# Patient Record
Sex: FEMALE | Race: WHITE | Employment: UNEMPLOYED | URBAN - METROPOLITAN AREA
[De-identification: names, ages, dates, MRNs, and addresses within clinical notes are randomized per-mention and may not be internally consistent; named-entity substitution may affect disease eponyms.]

---

## 2019-06-29 ENCOUNTER — HOSPITAL ENCOUNTER (OUTPATIENT)
Dept: MRI IMAGING | Age: 24
Discharge: HOME | End: 2019-07-01
Payer: COMMERCIAL

## 2019-06-29 DIAGNOSIS — G43.109 MIGRAINE WITH AURA AND WITHOUT STATUS MIGRAINOSUS, NOT INTRACTABLE: ICD-10-CM

## 2019-06-29 DIAGNOSIS — G89.29 CHRONIC NONINTRACTABLE HEADACHE, UNSPECIFIED HEADACHE TYPE: ICD-10-CM

## 2019-06-29 DIAGNOSIS — R51.9 CHRONIC NONINTRACTABLE HEADACHE, UNSPECIFIED HEADACHE TYPE: ICD-10-CM

## 2019-06-29 PROCEDURE — 70551 MRI BRAIN STEM W/O DYE: CPT

## 2019-07-03 ENCOUNTER — HOSPITAL ENCOUNTER (OUTPATIENT)
Dept: MRI IMAGING | Age: 24
Discharge: HOME | End: 2019-07-05
Payer: COMMERCIAL

## 2019-07-03 DIAGNOSIS — G43.109 MIGRAINE WITH AURA AND WITHOUT STATUS MIGRAINOSUS, NOT INTRACTABLE: ICD-10-CM

## 2019-07-03 PROCEDURE — A9579 GAD-BASE MR CONTRAST NOS,1ML: HCPCS | Performed by: NURSE PRACTITIONER

## 2019-07-03 PROCEDURE — 70553 MRI BRAIN STEM W/O & W/DYE: CPT

## 2019-07-03 PROCEDURE — 6360000004 HC RX CONTRAST MEDICATION: Performed by: NURSE PRACTITIONER

## 2019-07-03 RX ADMIN — GADOTERIDOL 20 ML: 279.3 INJECTION, SOLUTION INTRAVENOUS at 13:25

## 2020-08-21 ENCOUNTER — HOSPITAL ENCOUNTER (OUTPATIENT)
Dept: SLEEP CENTER | Age: 25
Discharge: HOME | End: 2020-08-23
Payer: COMMERCIAL

## 2020-08-21 PROCEDURE — 95810 POLYSOM 6/> YRS 4/> PARAM: CPT

## 2020-08-22 PROCEDURE — 95810 POLYSOM 6/> YRS 4/> PARAM: CPT | Performed by: INTERNAL MEDICINE

## 2020-09-17 ENCOUNTER — HOSPITAL ENCOUNTER (OUTPATIENT)
Dept: SLEEP CENTER | Age: 25
Discharge: HOME | End: 2020-09-19
Payer: COMMERCIAL

## 2020-09-17 PROCEDURE — 95811 POLYSOM 6/>YRS CPAP 4/> PARM: CPT | Performed by: INTERNAL MEDICINE

## 2020-09-17 PROCEDURE — 95811 POLYSOM 6/>YRS CPAP 4/> PARM: CPT

## 2020-11-10 ENCOUNTER — OFFICE VISIT (OUTPATIENT)
Dept: GASTROENTEROLOGY | Age: 25
End: 2020-11-10
Payer: COMMERCIAL

## 2020-11-10 VITALS
BODY MASS INDEX: 48.82 KG/M2 | HEART RATE: 90 BPM | HEIGHT: 65 IN | SYSTOLIC BLOOD PRESSURE: 122 MMHG | DIASTOLIC BLOOD PRESSURE: 80 MMHG | WEIGHT: 293 LBS | OXYGEN SATURATION: 98 %

## 2020-11-10 PROCEDURE — 1036F TOBACCO NON-USER: CPT | Performed by: INTERNAL MEDICINE

## 2020-11-10 PROCEDURE — 99203 OFFICE O/P NEW LOW 30 MIN: CPT | Performed by: INTERNAL MEDICINE

## 2020-11-10 PROCEDURE — G8484 FLU IMMUNIZE NO ADMIN: HCPCS | Performed by: INTERNAL MEDICINE

## 2020-11-10 PROCEDURE — G8427 DOCREV CUR MEDS BY ELIG CLIN: HCPCS | Performed by: INTERNAL MEDICINE

## 2020-11-10 PROCEDURE — G8417 CALC BMI ABV UP PARAM F/U: HCPCS | Performed by: INTERNAL MEDICINE

## 2020-11-10 RX ORDER — ARIPIPRAZOLE 20 MG/1
TABLET ORAL
COMMUNITY
Start: 2020-10-08

## 2020-11-10 RX ORDER — ONDANSETRON 4 MG/1
TABLET, FILM COATED ORAL
COMMUNITY
Start: 2020-08-09 | End: 2021-05-30

## 2020-11-10 RX ORDER — SERTRALINE HYDROCHLORIDE 100 MG/1
TABLET, FILM COATED ORAL
COMMUNITY
Start: 2020-10-08

## 2020-11-10 RX ORDER — BENZTROPINE MESYLATE 1 MG/1
TABLET ORAL
COMMUNITY
Start: 2020-10-16

## 2020-11-10 RX ORDER — LEVONORGESTREL AND ETHINYL ESTRADIOL AND ETHINYL ESTRADIOL 150-30(84)
KIT ORAL
COMMUNITY
Start: 2020-10-08 | End: 2021-05-30

## 2020-11-10 RX ORDER — CARIPRAZINE 1.5 MG/1
CAPSULE, GELATIN COATED ORAL
COMMUNITY
Start: 2020-08-17

## 2020-11-10 RX ORDER — GABAPENTIN 400 MG/1
CAPSULE ORAL
COMMUNITY
Start: 2019-05-24 | End: 2021-02-17

## 2020-11-10 ASSESSMENT — ENCOUNTER SYMPTOMS
CHEST TIGHTNESS: 0
RECTAL PAIN: 0
EYE REDNESS: 0
ABDOMINAL PAIN: 0
TROUBLE SWALLOWING: 0
DIARRHEA: 0
EYE PAIN: 0
NAUSEA: 0
BLOOD IN STOOL: 0
PHOTOPHOBIA: 0
CONSTIPATION: 0
SHORTNESS OF BREATH: 0
ABDOMINAL DISTENTION: 0
VOMITING: 0
VOICE CHANGE: 0
WHEEZING: 0
COLOR CHANGE: 0

## 2020-11-10 NOTE — PROGRESS NOTES
Subjective:      Patient ID: Raul Swann is a 26 y.o. female who presents today for:  Chief Complaint   Patient presents with   [facility] Consultation     irregular bm. Is in the process of bariatric surgery.  Abdominal Pain     hiatal hernia. abdominal pain       HPI  This is a very pleasant 81-year-old who came in today and she reports epigastric pain. Patient mentioned that she was told that she has hiatal hernia? Also patient is in the process of getting bariatric surgery. Patient mentioned that there is no triggering or relieving factors. The pain is constant and point toward the epigastrium. No associated nausea vomiting . Patient denies any postprandial nature of the pain. Denies any change of bowel movements in terms of diarrhea or constipation though she reported that her bowel been irregular for long time. No hematemesis melena hematochezia. Patient mentioned that her symptoms been there for at least 1 to 2 years. Of note CT scan in July 2019 was negative for any acute process. It did show some partially ruptured right ovarian cyst as well as constipation. Otherwise patient came in today to establish care and further evaluation.   Mention patient's been evaluated for bariatric surgery current BMI 63  Social History     Socioeconomic History    Marital status: Single     Spouse name: Not on file    Number of children: Not on file    Years of education: Not on file    Highest education level: Not on file   Occupational History    Not on file   Social Needs    Financial resource strain: Not on file    Food insecurity     Worry: Not on file     Inability: Not on file    Transportation needs     Medical: Not on file     Non-medical: Not on file   Tobacco Use    Smoking status: Former Smoker     Last attempt to quit: 11/10/2017     Years since quitting: 3.0    Smokeless tobacco: Never Used   Substance and Sexual Activity    Alcohol use: Not Currently    Drug use: Never    Sexual activity: Not on file   Lifestyle    Physical activity     Days per week: Not on file     Minutes per session: Not on file    Stress: Not on file   Relationships    Social connections     Talks on phone: Not on file     Gets together: Not on file     Attends Yazidism service: Not on file     Active member of club or organization: Not on file     Attends meetings of clubs or organizations: Not on file     Relationship status: Not on file    Intimate partner violence     Fear of current or ex partner: Not on file     Emotionally abused: Not on file     Physically abused: Not on file     Forced sexual activity: Not on file   Other Topics Concern    Not on file   Social History Narrative    Not on file     Family History   Problem Relation Age of Onset    Celiac Disease Neg Hx     Colon Cancer Neg Hx     Crohn's Disease Neg Hx      No Known Allergies      Review of Systems   Constitutional: Negative for appetite change, chills, fatigue, fever and unexpected weight change. HENT: Negative for nosebleeds, tinnitus, trouble swallowing and voice change. Eyes: Negative for photophobia, pain and redness. Respiratory: Negative for chest tightness, shortness of breath and wheezing. Cardiovascular: Negative for chest pain, palpitations and leg swelling. Gastrointestinal: Negative for abdominal distention, abdominal pain, blood in stool, constipation, diarrhea, nausea, rectal pain and vomiting. Endocrine: Negative for polydipsia, polyphagia and polyuria. Genitourinary: Negative for difficulty urinating and hematuria. Skin: Negative for color change, pallor and rash. Neurological: Negative for dizziness, speech difficulty and headaches. Psychiatric/Behavioral: Negative for confusion and suicidal ideas.        Objective:   /80 (Site: Right Lower Arm, Position: Sitting, Cuff Size: Medium Adult)   Pulse 90   Ht 5' 5\" (1.651 m)   Wt (!) 379 lb (171.9 kg)   SpO2 98%   BMI 63.07 kg/m² Physical Exam  Constitutional:       General: She is not in acute distress. Appearance: She is well-developed. HENT:      Head: Normocephalic and atraumatic. Eyes:      Conjunctiva/sclera: Conjunctivae normal.      Pupils: Pupils are equal, round, and reactive to light. Neck:      Musculoskeletal: Neck supple. Cardiovascular:      Rate and Rhythm: Normal rate and regular rhythm. Heart sounds: Normal heart sounds. Pulmonary:      Effort: Pulmonary effort is normal. No respiratory distress. Breath sounds: Normal breath sounds. No wheezing or rales. Abdominal:      General: Bowel sounds are normal. There is no distension. Palpations: Abdomen is soft. Abdomen is not rigid. There is no hepatomegaly, splenomegaly or mass. Tenderness: There is no abdominal tenderness. There is no guarding or rebound. Musculoskeletal: Normal range of motion. General: No tenderness or deformity. Skin:     Coloration: Skin is not pale. Findings: No erythema or rash. Neurological:      Mental Status: She is alert and oriented to person, place, and time. Laboratory, Pathology, Radiology reviewed in detail with relevantimportant investigations summarized below:  Lab Results   Component Value Date    WBC 19.3 07/04/2019    HGB 12.7 07/04/2019    HCT 39.4 07/04/2019    MCV 81 07/04/2019     07/04/2019    . Lab Results   Component Value Date    ALT 6 03/02/2020    ALT 12 07/04/2019    AST 13 03/02/2020    AST 12 07/04/2019    ALKPHOS 74 03/02/2020    ALKPHOS 69 07/04/2019    BILITOT <0.2 03/02/2020    BILITOT 0.3 07/04/2019       No results found. No results found for: IRON, TIBC, FERRITIN  No results found for: INR  No components found for: ACUTEHEPATITISSCREEN  No components found for: CELIACPANEL  No components found for: STOOLCULTURE, C.DIFF, STOOLOVAPARASITE, STOOLLEUCOCYTE        Assessment:       Diagnosis Orders   1.  Epigastric pain  EGD         Plan:      Orders Placed This Encounter   Procedures    EGD     Standing Status:   Future     Standing Expiration Date:   3/10/2021     Order Specific Question:   Screening or Diagnostic? Answer:   Diagnostic     Orders Placed This Encounter   Medications    esomeprazole (NEXIUM) 20 MG delayed release capsule     Sig: Take 1 capsule by mouth daily     Dispense:  30 capsule     Refill:  3     1-Dyspepsia/epigastric pain/abdominal pain  Not necessarily postprandial.  Patient mentioned that she has been told that she has hiatal hernia. No recall of recent EGD  Of note patient is currently undergoing work-up for bariatric surgery. We will proceed with an upper endoscopy assess for H. pylori as well as presence of hiatal hernia. Obtain also duodenal biopsies to assess for celiac given nonspecific symptoms. Trial of PPI. If negative initial work-up we may consider cross-sectional study / colonoscopy. Explained the procedure risk and benefit. Patient would like to proceed accordingly. 2-associated medical conditions include class II obesity with BMI 63, history of migraine, ascites/depression. .    Return in about 4 weeks (around 12/8/2020) for Post procedure results discussion, further management.       Kaylee Pittman MD

## 2020-11-23 ENCOUNTER — OUTSIDE SERVICES (OUTPATIENT)
Dept: GASTROENTEROLOGY | Age: 25
End: 2020-11-23
Payer: COMMERCIAL

## 2020-11-23 PROCEDURE — 43239 EGD BIOPSY SINGLE/MULTIPLE: CPT | Performed by: INTERNAL MEDICINE

## 2020-11-23 NOTE — PROGRESS NOTES
None  Complication: None  Specimen Sent: Yes    Impression:    Irregular Z-line otherwise negative/normal EGD  Biopsies obtained to assess for H. pylori as well as for celiac disease. Recommendations:    Follow-up in GI clinic in 1 to 2 weeks  Await pathology  Resume previous medications  Discharge Patient home when criteria met    Shana Nash MD  [facility]

## 2020-12-04 ENCOUNTER — VIRTUAL VISIT (OUTPATIENT)
Dept: GASTROENTEROLOGY | Age: 25
End: 2020-12-04
Payer: COMMERCIAL

## 2020-12-04 PROBLEM — R10.13 EPIGASTRIC PAIN: Status: ACTIVE | Noted: 2020-12-04

## 2020-12-04 PROCEDURE — 99212 OFFICE O/P EST SF 10 MIN: CPT | Performed by: NURSE PRACTITIONER

## 2020-12-04 ASSESSMENT — ENCOUNTER SYMPTOMS
SHORTNESS OF BREATH: 0
WHEEZING: 0
PHOTOPHOBIA: 0
EYE PAIN: 0
ABDOMINAL DISTENTION: 0
COLOR CHANGE: 0
CHEST TIGHTNESS: 0
CONSTIPATION: 0
BLOOD IN STOOL: 0
RECTAL PAIN: 0
DIARRHEA: 0
ABDOMINAL PAIN: 0
ANAL BLEEDING: 0
EYE REDNESS: 0
VOMITING: 0
VOICE CHANGE: 0
NAUSEA: 0
TROUBLE SWALLOWING: 0

## 2020-12-04 NOTE — PROGRESS NOTES
2020    TELEHEALTH EVALUATION -- Audio/Visual (During WLLHR-64 public health emergency)    Due to COVID 19 outbreak, patient's office visit was converted to a virtual visit. Patient was contacted and agreed to proceed with a virtual visit via Telephone Visit, 15 minutes  The risks and benefits of converting to a virtual visit were discussed in light of the current infectious disease epidemic. Patient also understood that insurance coverage and co-pays are up to their individual insurance plans. HPI:    Fredo Murry (:  1995) has requested an audio/video evaluation for the following concern(s):    Endoscopic hx:  EGD Dr Jerome Vallejo 20  Irregular Z-line otherwise negative EGD  Bx:  A. DUODENAL BIOPSY-      DUODENAL MUCOSA WITH NO SIGNIFICANT PATHOLOGIC CHANGES. [address] [place] MUCOSA WITH NO SIGNIFICANT PATHOLOGIC CHANGES.    NEGATIVE FOR HELICOBACTER PYLORI ORGANISMS ON   Background: This is a very pleasant 81-year-old who came in today and she reports epigastric pain. Patient mentioned that she was told that she has hiatal hernia? Also patient is in the process of getting bariatric surgery. Patient mentioned that there is no triggering or relieving factors. The pain is constant and point toward the epigastrium. No associated nausea vomiting . Patient denies any postprandial nature of the pain. Denies any change of bowel movements in terms of diarrhea or constipation though she reported that her bowel been irregular for long time. No hematemesis melena hematochezia. Patient mentioned that her symptoms been there for at least 1 to 2 years. Of note CT scan in 2019 was negative for any acute process. It did show some partially ruptured right ovarian cyst as well as constipation. Otherwise patient came in today to establish care and further evaluation.   Mention patient's been evaluated for bariatric surgery current BMI 63  Review of Systems    Prior to Visit Medications    Medication Sig Taking? Authorizing Provider   ARIPiprazole (ABILIFY) 20 MG tablet TAKE 1 TABLET BY MOUTH IN THE EVENING AS DIRECTED FOR DEPRESSION MOOD  Historical Provider, MD   B-Complex TABS TAKE 1 TABLET BY MOUTH ONCE DAILY  Historical Provider, MD   VRAYLAR 1.5 MG capsule TAKE 1 CAPSULE BY MOUTH IN THE MORNING AS DIRECTED FOR MOOD  Historical Provider, MD   benztropine (COGENTIN) 1 MG tablet TAKE 1 TABLET BY MOUTH ONCE DAILY AS DIRECTED FOR SIDE EFFECTS  Historical Provider, MD   gabapentin (NEURONTIN) 400 MG capsule Take by mouth.   Historical Provider, MD   ASHLYNA 0.15-0.03 &0.01 MG TABS TAKE 1 TABLET BY MOUTH ONCE DAILY  Historical Provider, MD   ondansetron (ZOFRAN) 4 MG tablet TAKE 1 TABLET BY MOUTH EVERY 8 HOURS  Historical Provider, MD   sertraline (ZOLOFT) 100 MG tablet TAKE 2 TABLETS BY MOUTH ONCE DAILY AT NIGHT AS DIRECTED FOR DEPRESSION ANXIETY  Historical Provider, MD   esomeprazole ([address]) 20 MG delayed release capsule Take 1 capsule by mouth daily  Harry Panchal MD       Social History     Tobacco Use    Smoking status: Former Smoker     Last attempt to quit: 11/10/2017     Years since quitting: 3.0    Smokeless tobacco: Never Used   Substance Use Topics    Alcohol use: Not Currently    Drug use: Never        {F2 for Optional History Smart[employer]:32192}    PHYSICAL EXAMINATION:  [ INSTRUCTIONS:  \"[x]\" Indicates a positive item  \"[]\" Indicates a negative item  -- DELETE ALL ITEMS NOT EXAMINED]  [] Alert  [] Oriented to person/place/time    [] No apparent distress  [] Toxic appearing    [] Face flushed appearing [] Sclera clear  [] Lips are cyanotic      [] Breathing appears normal  [] Appears tachypneic      [] Rash on visible skin    [] Cranial Nerves II-XII grossly intact    [] Motor grossly intact in visible upper extremities    [] Motor grossly intact in visible lower extremities    [] Normal Mood  [] Anxious appearing    [] Depressed appearing  [] Confused appearing [] Poor short term memory  [] Poor long term memory    [] OTHER:      Due to this being a TeleHealth encounter, evaluation of the following organ systems is limited: Vitals/Constitutional/EENT/Resp/CV/GI//MS/Neuro/Skin/Heme-Lymph-Imm. ASSESSMENT/PLAN:  There are no diagnoses linked to this encounter. No follow-ups on file. An  electronic signature was used to authenticate this note. --VIPUL Olmos - CNP on 12/4/2020 at 12:45 PM    {Coding Help -- Use CPT 77207-51770 with EM elements or Time rules for Office Visits.:64818}    Pursuant to the emergency declaration under the [facility], [facility] waiver authority and the [employer] and [employer] Act, this Virtual  Visit was conducted, with patient's consent, to reduce the patient's risk of exposure to COVID-19 and provide continuity of care for an established patient. Services were provided through a video synchronous discussion virtually to substitute for in-person clinic visit.

## 2020-12-04 NOTE — PROGRESS NOTES
2020    TELEHEALTH EVALUATION -- Audio/Visual (During JCSCR-56 public health emergency)    Due to COVID 19 outbreak, patient's office visit was converted to a virtual visit. Patient was contacted and agreed to proceed with a virtual visit via [email or web address] me  The risks and benefits of converting to a virtual visit were discussed in light of the current infectious disease epidemic. Patient also understood that insurance coverage and co-pays are up to their individual insurance plans. HPI:    Juana Nuñezorest (:  1995) has requested an audio/video evaluation for the following concern(s): Follow-up to recent EGD for epigastric pain, EGD results noted below and discussed at length with patient including pathology. Since undergoing her EGD she has been on Nexium 20 mg once daily, has no further epigastric pain denies nausea or vomiting, hematemesis, melena, or hematochezia. Pt is scheduled to meet with bariatric surgeon for evaluation for gastric bypass. Endoscopic hx:  EGD Dr Raymundo Hinds 20  Irregular Z-line otherwise negative EGD  Bx:  A. DUODENAL BIOPSY-      DUODENAL MUCOSA WITH NO SIGNIFICANT PATHOLOGIC CHANGES. [address] [place] MUCOSA WITH NO SIGNIFICANT PATHOLOGIC CHANGES.    NEGATIVE FOR HELICOBACTER PYLORI ORGANISMS ON   Background: This is a very pleasant 41-year-old who came in today and she reports epigastric pain. Patient mentioned that she was told that she has hiatal hernia? Also patient is in the process of getting bariatric surgery. Patient mentioned that there is no triggering or relieving factors. The pain is constant and point toward the epigastrium. No associated nausea vomiting . Patient denies any postprandial nature of the pain. Denies any change of bowel movements in terms of diarrhea or constipation though she reported that her bowel been irregular for long time. No hematemesis melena hematochezia.   Patient mentioned that her symptoms been there for at least 1 to 2 years. Of note CT scan in July 2019 was negative for any acute process. It did show some partially ruptured right ovarian cyst as well as constipation. Otherwise patient came in today to establish care and further evaluation. Mention patient's been evaluated for bariatric surgery current BMI 63      Review of Systems   Constitutional: Negative for appetite change, chills, fever and unexpected weight change. HENT: Negative for nosebleeds, tinnitus, trouble swallowing and voice change. Eyes: Negative for photophobia, pain and redness. Respiratory: Negative for chest tightness, shortness of breath and wheezing. Cardiovascular: Negative for chest pain, palpitations and leg swelling. Gastrointestinal: Negative for abdominal distention, abdominal pain, anal bleeding, blood in stool, constipation, diarrhea, nausea, rectal pain and vomiting. Endocrine: Negative for polydipsia, polyphagia and polyuria. Genitourinary: Negative for difficulty urinating and hematuria. Skin: Negative for color change, pallor and rash. Neurological: Negative for dizziness, speech difficulty and headaches. Psychiatric/Behavioral: Negative for confusion and suicidal ideas. Prior to Visit Medications    Medication Sig Taking? Authorizing Provider   ARIPiprazole (ABILIFY) 20 MG tablet TAKE 1 TABLET BY MOUTH IN THE EVENING AS DIRECTED FOR DEPRESSION MOOD Yes Historical Provider, MD   B-Complex TABS TAKE 1 TABLET BY MOUTH ONCE DAILY Yes Historical Provider, MD   VRAYLAR 1.5 MG capsule TAKE 1 CAPSULE BY MOUTH IN THE MORNING AS DIRECTED FOR MOOD Yes Historical Provider, MD   benztropine (COGENTIN) 1 MG tablet TAKE 1 TABLET BY MOUTH ONCE DAILY AS DIRECTED FOR SIDE EFFECTS Yes Historical Provider, MD   gabapentin (NEURONTIN) 400 MG capsule Take by mouth.  Yes Historical Provider, MD   ASHLYNA 0.15-0.03 &0.01 MG TABS TAKE 1 TABLET BY MOUTH ONCE DAILY Yes Historical Provider, MD   ondansetron (ZOFRAN) 4 MG tablet TAKE 1 TABLET BY MOUTH EVERY 8 HOURS Yes Historical Provider, MD   sertraline (ZOLOFT) 100 MG tablet TAKE 2 TABLETS BY MOUTH ONCE DAILY AT NIGHT AS DIRECTED FOR DEPRESSION ANXIETY Yes Historical Provider, MD   esomeprazole ([employer]) 20 MG delayed release capsule Take 1 capsule by mouth daily Yes Milagro Addison MD       Social History     Tobacco Use    Smoking status: Former Smoker     Last attempt to quit: 11/10/2017     Years since quitting: 3.0    Smokeless tobacco: Never Used   Substance Use Topics    Alcohol use: Not Currently    Drug use: Never        No Known Allergies, No past medical history on file., No past surgical history on file.,   Social History     Tobacco Use    Smoking status: Former Smoker     Last attempt to quit: 11/10/2017     Years since quitting: 3.0    Smokeless tobacco: Never Used   Substance Use Topics    Alcohol use: Not Currently    Drug use: Never   ,   Family History   Problem Relation Age of Onset    Celiac Disease Neg Hx     Colon Cancer Neg Hx     Crohn's Disease Neg Hx        PHYSICAL EXAMINATION:  [ INSTRUCTIONS:  \"[x]\" Indicates a positive item  \"[]\" Indicates a negative item  -- DELETE ALL ITEMS NOT EXAMINED]  [] Alert  [] Oriented to person/place/time    [] No apparent distress  [] Toxic appearing    [] Face flushed appearing [] Sclera clear  [] Lips are cyanotic      [] Breathing appears normal  [] Appears tachypneic      [] Rash on visible skin    [] Cranial Nerves II-XII grossly intact    [] Motor grossly intact in visible upper extremities    [] Motor grossly intact in visible lower extremities    [] Normal Mood  [] Anxious appearing    [] Depressed appearing  [] Confused appearing      [] Poor short term memory  [] Poor long term memory    [] OTHER:      Due to this being a TeleHealth encounter, evaluation of the following organ systems is limited: Vitals/Constitutional/EENT/Resp/CV/GI//MS/Neuro/Skin/Heme-Lymph-Imm. ASSESSMENT/PLAN:  1.  Epigastric pain  Now resolved since EGD and PPI daily, EGD essentially normal with biopsies negative for HP. Patient is scheduled to proceed with bariatric evaluation for gastric bypass next month  -Continue PPI or H2 blocker as needed  -Continue antireflux lifestyle measures  2. Associated medical conditions including but not limited to class III obesity BMI 63, migraines, anxiety and depression        Return if symptoms worsen or fail to improve. An  electronic signature was used to authenticate this note. --VIPUL Maradiaga - CNP on 12/4/2020 at 1:25 PM        Pursuant to the emergency declaration under the [facility], [facility] waiver authority and the [employer] and [employer] Act, this Virtual  Visit was conducted, with patient's consent, to reduce the patient's risk of exposure to COVID-19 and provide continuity of care for an established patient. Services were provided through a video synchronous discussion virtually to substitute for in-person clinic visit.

## 2021-04-07 ENCOUNTER — VIRTUAL VISIT (OUTPATIENT)
Dept: GASTROENTEROLOGY | Age: 26
End: 2021-04-07
Payer: COMMERCIAL

## 2021-04-07 DIAGNOSIS — R12 HEART BURN: Primary | ICD-10-CM

## 2021-04-07 DIAGNOSIS — R10.84 GENERALIZED ABDOMINAL PAIN: ICD-10-CM

## 2021-04-07 PROCEDURE — 99442 PR PHYS/QHP TELEPHONE EVALUATION 11-20 MIN: CPT | Performed by: NURSE PRACTITIONER

## 2021-04-07 RX ORDER — OMEPRAZOLE 40 MG/1
CAPSULE, DELAYED RELEASE ORAL
COMMUNITY
Start: 2021-02-09 | End: 2021-05-30

## 2021-04-07 RX ORDER — FAMOTIDINE 20 MG/1
20 TABLET, FILM COATED ORAL 2 TIMES DAILY
Qty: 60 TABLET | Refills: 0 | Status: SHIPPED | OUTPATIENT
Start: 2021-04-07 | End: 2022-01-17

## 2021-04-07 ASSESSMENT — ENCOUNTER SYMPTOMS
SHORTNESS OF BREATH: 0
COLOR CHANGE: 0
VOICE CHANGE: 0
PHOTOPHOBIA: 0
BLOOD IN STOOL: 0
NAUSEA: 0
CONSTIPATION: 0
EYE PAIN: 0
ABDOMINAL DISTENTION: 0
TROUBLE SWALLOWING: 0
ABDOMINAL PAIN: 1
CHEST TIGHTNESS: 0
ANAL BLEEDING: 0
VOMITING: 0
WHEEZING: 0
DIARRHEA: 0
RECTAL PAIN: 0
EYE REDNESS: 0

## 2021-04-07 NOTE — PROGRESS NOTES
blood in stool, constipation, diarrhea, nausea, rectal pain and vomiting. Endocrine: Negative for polydipsia, polyphagia and polyuria. Genitourinary: Negative for difficulty urinating and hematuria. Skin: Negative for color change, pallor and rash. Neurological: Negative for dizziness, speech difficulty and headaches. Psychiatric/Behavioral: Negative for confusion and suicidal ideas. Prior to Visit Medications    Medication Sig Taking? Authorizing Provider   omeprazole (PRILOSEC) 40 MG delayed release capsule  Yes Historical Provider, MD   famotidine (PEPCID) 20 MG tablet Take 1 tablet by mouth 2 times daily Yes Wei Clayton APRN - CNP   ARIPiprazole (ABILIFY) 20 MG tablet TAKE 1 TABLET BY MOUTH IN THE EVENING AS DIRECTED FOR DEPRESSION MOOD Yes Historical Provider, MD   B-Complex TABS TAKE 1 TABLET BY MOUTH ONCE DAILY Yes Historical Provider, MD   VRAYLAR 1.5 MG capsule TAKE 1 CAPSULE BY MOUTH IN THE MORNING AS DIRECTED FOR MOOD Yes Historical Provider, MD   benztropine (COGENTIN) 1 MG tablet TAKE 1 TABLET BY MOUTH ONCE DAILY AS DIRECTED FOR SIDE EFFECTS Yes Historical Provider, MD   sertraline (ZOLOFT) 100 MG tablet TAKE 2 TABLETS BY MOUTH ONCE DAILY AT NIGHT AS DIRECTED FOR DEPRESSION ANXIETY Yes Historical Provider, MD   esomeprazole (NEXIUM) 20 MG delayed release capsule Take 1 capsule by mouth daily Yes Evangelina Hoskins MD   gabapentin (NEURONTIN) 400 MG capsule Take by mouth.   Historical Provider, MD Bryna Ahumada 0.15-0.03 &0.01 MG TABS TAKE 1 TABLET BY MOUTH ONCE DAILY  Historical Provider, MD   ondansetron (ZOFRAN) 4 MG tablet TAKE 1 TABLET BY MOUTH EVERY 8 HOURS  Historical Provider, MD       Social History     Tobacco Use    Smoking status: Former Smoker     Quit date: 11/10/2017     Years since quitting: 3.4    Smokeless tobacco: Never Used   Substance Use Topics    Alcohol use: Not Currently    Drug use: Never        No Known Allergies, No past medical history on file., No past surgical history on file.,   Social History     Tobacco Use    Smoking status: Former Smoker     Quit date: 11/10/2017     Years since quitting: 3.4    Smokeless tobacco: Never Used   Substance Use Topics    Alcohol use: Not Currently    Drug use: Never   ,   Family History   Problem Relation Age of Onset    Celiac Disease Neg Hx     Colon Cancer Neg Hx     Crohn's Disease Neg Hx        PHYSICAL EXAMINATION:limited due to telephone exam  [ INSTRUCTIONS:  \"[x]\" Indicates a positive item  \"[]\" Indicates a negative item  -- DELETE ALL ITEMS NOT EXAMINED]  [x] Alert  [x] Oriented to person/place/time    [x] No apparent distress  [] Toxic appearing    [] Face flushed appearing [] Sclera clear  [] Lips are cyanotic      [] Breathing appears normal  [] Appears tachypneic      [] Rash on visible skin    [] Cranial Nerves II-XII grossly intact    [] Motor grossly intact in visible upper extremities    [] Motor grossly intact in visible lower extremities    [] Normal Mood  [] Anxious appearing    [] Depressed appearing  [] Confused appearing      [] Poor short term memory  [] Poor long term memory    [] OTHER:      Due to this being a TeleHealth encounter, evaluation of the following organ systems is limited: Vitals/Constitutional/EENT/Resp/CV/GI//MS/Neuro/Skin/Heme-Lymph-Imm. Endoscopic hx:  EGD Dr Richard Muller 11/23/20  Irregular Z-line otherwise normal  Bx:  A. DUODENAL BIOPSY-      DUODENAL MUCOSA WITH NO SIGNIFICANT PATHOLOGIC CHANGES. B.  GASTRIC BIOPSY-       GASTRIC MUCOSA WITH NO SIGNIFICANT PATHOLOGIC CHANGES.      NEGATIVE FOR HELICOBACTER PYLORI ORGANISMS ON        IMMUNOHISTOCHEMISTRY               STAINS WITH SATISFACTORY CONTROLS.             NEGATIVE FOR INTESTINAL METAPLASIA OR DYSPLASIA. ASSESSMENT/PLAN:  1.  Abdominal pain and loss of appetite  1 month history of generalized abdominal pain and anorexia did go to the emergency department at [facility] [place], no records are available for review she [address] [facility] waiver authority and the [employer] and [employer] Act, this Virtual  Visit was conducted, with patient's consent, to reduce the patient's risk of exposure to COVID-19 and provide continuity of care for an established patient. Services were provided through a video synchronous discussion virtually to substitute for in-person clinic visit.

## 2021-05-21 ENCOUNTER — VIRTUAL VISIT (OUTPATIENT)
Dept: GASTROENTEROLOGY | Age: 26
End: 2021-05-21
Payer: COMMERCIAL

## 2021-05-21 DIAGNOSIS — R16.1 SPLENOMEGALY: Primary | ICD-10-CM

## 2021-05-21 DIAGNOSIS — R12 HEART BURN: ICD-10-CM

## 2021-05-21 PROCEDURE — 99442 PR PHYS/QHP TELEPHONE EVALUATION 11-20 MIN: CPT | Performed by: NURSE PRACTITIONER

## 2021-05-21 RX ORDER — MULTIVIT WITH MINERALS/LUTEIN
TABLET ORAL
COMMUNITY
Start: 2021-05-03

## 2021-05-21 RX ORDER — VITAMIN B COMPLEX
CAPSULE ORAL
COMMUNITY
Start: 2021-05-03

## 2021-05-21 RX ORDER — IRON PS COMPLEX/B12/FOLIC ACID 150-25-1
CAPSULE ORAL
Status: ON HOLD | COMMUNITY
Start: 2021-05-05 | End: 2021-05-30 | Stop reason: HOSPADM

## 2021-05-21 ASSESSMENT — ENCOUNTER SYMPTOMS
EYE REDNESS: 0
RECTAL PAIN: 0
CHEST TIGHTNESS: 0
VOICE CHANGE: 0
CONSTIPATION: 0
SHORTNESS OF BREATH: 0
ANAL BLEEDING: 0
COLOR CHANGE: 0
DIARRHEA: 0
WHEEZING: 0
PHOTOPHOBIA: 0
TROUBLE SWALLOWING: 0
VOMITING: 0
EYE PAIN: 0
ABDOMINAL PAIN: 1
BLOOD IN STOOL: 0
ABDOMINAL DISTENTION: 0
NAUSEA: 0

## 2021-05-21 NOTE — PROGRESS NOTES
2021    TELEHEALTH EVALUATION -- Audio/Visual (During [facility]- public health emergency)    Due to [place] 19 outbreak, patient's office visit was converted to a virtual visit. Patient was contacted and agreed to proceed with a virtual visit via Telephone Visit, 15 minutes  The risks and benefits of converting to a virtual visit were discussed in light of the current infectious disease epidemic. Patient also understood that insurance coverage and co-pays are up to their individual insurance plans. HPI:    Levi Snow (:  1995) has requested an audio/video evaluation for the following concern(s): Patient came in as 1 month follow-up to generalized abdominal pain, primarily epigastric, and abnormal CT imaging, was initiated on double dose PPI, reports some improvement in symptoms. At last visit patient reported CT imaging at [address] [place] [address] from March noted questionable splenomegaly, records were not available for review, our office obtained [facility] records which noted a spleen of 16.1 cm, no liver abnormalities, blood work from that emergency room visit was reviewed, noted no thrombocytopenia and normal LFTs, noted BMI 63, pt denies any trauma. Patient mentions that she recently found out she is pregnant. Otherwise denies nausea or vomiting, hematemesis, melena, or hematochezia. Interval OV 21  Patient came in for abdominal pain, she reports a 1 month history of generalized abdominal pain and anorexia did go to the emergency department at [facility], no records are available for review she mentions that she had a CAT scan and blood work that showed questionable splenomegaly. Has previously been seen for similar symptoms several months ago had EGD which was essentially normal with negative biopsies, has been on a PPI since her EGD. She denies nausea or vomiting, hematemesis, melena, or hematochezia. No changes to bowel habits, or unintentional weight loss.   Mentions that she is in the process of undergoing evaluation for bariatric surgery, currently being followed by nutritional services, has monthly appointments and when she completes her 6-month evaluation will be scheduled accordingly. Endoscopic hx:  EGD Dr Celine Dennis 11/23/20  Irregular Z-line otherwise negative EGD  Bx:  A. DUODENAL BIOPSY-      DUODENAL MUCOSA WITH NO SIGNIFICANT PATHOLOGIC CHANGES. [address] MUCOSA WITH NO SIGNIFICANT PATHOLOGIC CHANGES.    NEGATIVE FOR HELICOBACTER PYLORI ORGANISMS ON   Background  This is a very pleasant 18-year-old who came in today and she reports epigastric pain. Patient mentioned that she was told that she has hiatal hernia? Also patient is in the process of getting bariatric surgery. Patient mentioned that there is no triggering or relieving factors. The pain is constant and point toward the epigastrium. No associated nausea vomiting . Patient denies any postprandial nature of the pain. Denies any change of bowel movements in terms of diarrhea or constipation though she reported that her bowel been irregular for long time. No hematemesis melena hematochezia. Patient mentioned that her symptoms been there for at least 1 to 2 years. Of note CT scan in July 2019 was negative for any acute process. It did show some partially ruptured right ovarian cyst as well as constipation. Otherwise patient came in today to establish care and further evaluation. Mention patient's been evaluated for bariatric surgery current BMI 63  Review of Systems   Constitutional: Negative for appetite change, chills, fever and unexpected weight change. HENT: Negative for nosebleeds, tinnitus, trouble swallowing and voice change. Eyes: Negative for photophobia, pain and redness. Respiratory: Negative for chest tightness, shortness of breath and wheezing. Cardiovascular: Negative for chest pain, palpitations and leg swelling. Gastrointestinal: Positive for abdominal pain (epigastric pain). Negative for abdominal distention, anal bleeding, blood in stool, constipation, diarrhea, nausea, rectal pain and vomiting. Endocrine: Negative for polydipsia, polyphagia and polyuria. Genitourinary: Negative for difficulty urinating and hematuria. Skin: Negative for color change, pallor and rash. Neurological: Negative for dizziness, speech difficulty and headaches. Psychiatric/Behavioral: Negative for confusion and suicidal ideas. Prior to Visit Medications    Medication Sig Taking?  Authorizing Provider   metFORMIN (GLUCOPHAGE) 500 MG tablet TAKE 1 TABLET BY MOUTH ONCE DAILY WITH FOOD *EMERGENCY REFIL* Yes Historical Provider, MD   Ascorbic Acid (VITAMIN C) 250 MG tablet TAKE 1 TABLET BY MOUTH EVERY DAY WITH IRON Yes Historical Provider, MD   POLY-IRON 150 FORTE 150-25-1 MG-MCG-MG CAPS capsule TAKE 1 CAPSULE BY MOUTH ONCE DAILY Yes Historical Provider, MD   B Complex CAPS TAKE 1 CAPSULE BY MOUTH EVERY DAY Yes Historical Provider, MD   omeprazole (PRILOSEC) 40 MG delayed release capsule  Yes Historical Provider, MD   famotidine (PEPCID) 20 MG tablet Take 1 tablet by mouth 2 times daily Yes Shira Jiang, VIPUL - CNP   ARIPiprazole (ABILIFY) 20 MG tablet TAKE 1 TABLET BY MOUTH IN THE EVENING AS DIRECTED FOR DEPRESSION MOOD Yes Historical Provider, MD   B-Complex TABS TAKE 1 TABLET BY MOUTH ONCE DAILY Yes Historical Provider, MD   VRAYLAR 1.5 MG capsule TAKE 1 CAPSULE BY MOUTH IN THE MORNING AS DIRECTED FOR MOOD Yes Historical Provider, MD   benztropine (COGENTIN) 1 MG tablet TAKE 1 TABLET BY MOUTH ONCE DAILY AS DIRECTED FOR SIDE EFFECTS Yes Historical Provider, MD Claire Galeazzi 0.15-0.03 &0.01 MG TABS TAKE 1 TABLET BY MOUTH ONCE DAILY Yes Historical Provider, MD   ondansetron (ZOFRAN) 4 MG tablet TAKE 1 TABLET BY MOUTH EVERY 8 HOURS Yes Historical Provider, MD   sertraline (ZOLOFT) 100 MG tablet TAKE 2 TABLETS BY MOUTH ONCE DAILY AT NIGHT AS DIRECTED FOR DEPRESSION ANXIETY Yes Historical Provider, MD esomeprazole (NEXIUM) 20 MG delayed release capsule Take 1 capsule by mouth daily Yes Severa Morrow, MD   gabapentin (NEURONTIN) 400 MG capsule Take by mouth. Historical Provider, MD       Social History     Tobacco Use    Smoking status: Former Smoker     Quit date: 11/10/2017     Years since quitting: 3.5    Smokeless tobacco: Never Used   Substance Use Topics    Alcohol use: Not Currently    Drug use: Never        No Known Allergies, No past medical history on file., No past surgical history on file.,   Social History     Tobacco Use    Smoking status: Former Smoker     Quit date: 11/10/2017     Years since quitting: 3.5    Smokeless tobacco: Never Used   Substance Use Topics    Alcohol use: Not Currently    Drug use: Never   ,   Family History   Problem Relation Age of Onset    Celiac Disease Neg Hx     Colon Cancer Neg Hx     Crohn's Disease Neg Hx        PHYSICAL EXAMINATION: Limited due to telephone exam  [ INSTRUCTIONS:  \"[x]\" Indicates a positive item  \"[]\" Indicates a negative item  -- DELETE ALL ITEMS NOT EXAMINED]  [x] Alert  [x] Oriented to person/place/time    [x] No apparent distress  [] Toxic appearing    [] Face flushed appearing [] Sclera clear  [] Lips are cyanotic      [] Breathing appears normal  [] Appears tachypneic      [] Rash on visible skin    [] Cranial Nerves II-XII grossly intact    [] Motor grossly intact in visible upper extremities    [] Motor grossly intact in visible lower extremities    [] Normal Mood  [] Anxious appearing    [] Depressed appearing  [] Confused appearing      [] Poor short term memory  [] Poor long term memory    [] OTHER:      Due to this being a TeleHealth encounter, evaluation of the following organ systems is limited: Vitals/Constitutional/EENT/Resp/CV/GI//MS/Neuro/Skin/Heme-Lymph-Imm. ASSESSMENT/PLAN:  1. Splenomegaly  Incidental finding on CT imaging from March, spleen 16.1 cm, no liver abnormalities noted.   Most recent blood work noted normal LFTs and no thrombocytopenia. Noted patient does have a BMI of 63, splenomegaly is of no clear clinical significance, No abnormal hematological findings on CBC, would recommend repeat CT imaging however pt is 3 weeks pregnant precluding further radiological imaging.  -obtain repeat blood work  -further evaluation pending clinical course and after pregnancy  2. GERD  History of epigastric pain/GERD has been on a PPI, had EGD 11/23/20 that was negative with negative biopsies  Patient is 3 weeks pregnant advised to stop PPI until seen by OB/GYN next week  = Pathophysiology and etiology of reflux discussed at length  = Lifestyle modification recommended and discussed with the patient    --Avoid spicy and acidic based foods   --Limit coffee, tea, alcohol use   --Limit the amount of food during meal time   --Avoid bedtime snacks and eat meals 3 to 4 hrs before lying down   --Elevate the head of the bed    --Keep healthy weight  3. Associated medical conditions include but are not limited to class III obesity BMI 63, migraines, anxiety and depression    Return if symptoms worsen or fail to improve. An  electronic signature was used to authenticate this note. --Geraldine Crigler, APRN - CNP on 5/21/2021 at 12:51 PM        Pursuant to the emergency declaration under the [facility], [facility]5 waiver authority and the [employer] and [employer] Act, this Virtual  Visit was conducted, with patient's consent, to reduce the patient's risk of exposure to COVID-19 and provide continuity of care for an established patient. Services were provided through a video synchronous discussion virtually to substitute for in-person clinic visit.

## 2021-05-28 ENCOUNTER — HOSPITAL ENCOUNTER (OUTPATIENT)
Age: 26
Setting detail: OBSERVATION
Discharge: HOME | End: 2021-05-28
Attending: EMERGENCY MEDICINE
Payer: COMMERCIAL

## 2021-05-28 ENCOUNTER — HOSPITAL ENCOUNTER (OUTPATIENT)
Dept: ULTRASOUND IMAGING | Age: 26
Discharge: HOME | End: 2021-05-30
Payer: COMMERCIAL

## 2021-05-28 VITALS
HEART RATE: 89 BPM | OXYGEN SATURATION: 99 % | BODY MASS INDEX: 48.82 KG/M2 | TEMPERATURE: 98.1 F | SYSTOLIC BLOOD PRESSURE: 141 MMHG | RESPIRATION RATE: 18 BRPM | DIASTOLIC BLOOD PRESSURE: 78 MMHG | HEIGHT: 65 IN | WEIGHT: 293 LBS

## 2021-05-28 DIAGNOSIS — O36.80X0 PREGNANCY OF UNKNOWN ANATOMIC LOCATION: ICD-10-CM

## 2021-05-28 DIAGNOSIS — Z34.90 PREGNANCY WITH FETUS OF UNKNOWN GESTATIONAL AGE: Primary | ICD-10-CM

## 2021-05-28 DIAGNOSIS — O20.0 THREATENED ABORTION: ICD-10-CM

## 2021-05-28 PROBLEM — O00.202 ECTOPIC PREGNANCY OF LEFT OVARY: Status: ACTIVE | Noted: 2021-05-28

## 2021-05-28 LAB
ALBUMIN SERPL-MCNC: 4.8 G/DL (ref 3.5–4.6)
ALP BLD-CCNC: 84 U/L (ref 40–130)
ALT SERPL-CCNC: 20 U/L (ref 0–33)
ANION GAP SERPL CALCULATED.3IONS-SCNC: 17 MEQ/L (ref 9–15)
AST SERPL-CCNC: 18 U/L (ref 0–35)
BASOPHILS ABSOLUTE: 0 K/UL (ref 0–0.2)
BASOPHILS RELATIVE PERCENT: 0.3 %
BILIRUB SERPL-MCNC: <0.2 MG/DL (ref 0.2–0.7)
BILIRUBIN URINE: NEGATIVE
BLOOD, URINE: NEGATIVE
BUN BLDV-MCNC: 13 MG/DL (ref 6–20)
CALCIUM SERPL-MCNC: 9.7 MG/DL (ref 8.5–9.9)
CHLORIDE BLD-SCNC: 102 MEQ/L (ref 95–107)
CLARITY: CLEAR
CO2: 22 MEQ/L (ref 20–31)
COLOR: YELLOW
CREAT SERPL-MCNC: 0.74 MG/DL (ref 0.5–0.9)
EOSINOPHILS ABSOLUTE: 0.3 K/UL (ref 0–0.7)
EOSINOPHILS RELATIVE PERCENT: 2.9 %
GFR AFRICAN AMERICAN: >60
GFR NON-AFRICAN AMERICAN: >60
GLOBULIN: 3.4 G/DL (ref 2.3–3.5)
GLUCOSE BLD-MCNC: 91 MG/DL (ref 70–99)
GLUCOSE URINE: NEGATIVE MG/DL
GONADOTROPIN, CHORIONIC (HCG) QUANT: 6202 MIU/ML
HCT VFR BLD CALC: 37.3 % (ref 37–47)
HEMOGLOBIN: 12 G/DL (ref 12–16)
INR BLD: 1
KETONES, URINE: NEGATIVE MG/DL
LEUKOCYTE ESTERASE, URINE: NEGATIVE
LYMPHOCYTES ABSOLUTE: 1.7 K/UL (ref 1–4.8)
LYMPHOCYTES RELATIVE PERCENT: 18.8 %
MCH RBC QN AUTO: 25.2 PG (ref 27–31.3)
MCHC RBC AUTO-ENTMCNC: 32.2 % (ref 33–37)
MCV RBC AUTO: 78.1 FL (ref 82–100)
MONOCYTES ABSOLUTE: 0.6 K/UL (ref 0.2–0.8)
MONOCYTES RELATIVE PERCENT: 6.2 %
NEUTROPHILS ABSOLUTE: 6.6 K/UL (ref 1.4–6.5)
NEUTROPHILS RELATIVE PERCENT: 71.8 %
NITRITE, URINE: NEGATIVE
PDW BLD-RTO: 16.2 % (ref 11.5–14.5)
PH UA: 5.5 (ref 5–9)
PLATELET # BLD: 332 K/UL (ref 130–400)
POTASSIUM SERPL-SCNC: 3.9 MEQ/L (ref 3.4–4.9)
PROTEIN UA: NEGATIVE MG/DL
PROTHROMBIN TIME: 13.5 SEC (ref 12.3–14.9)
RBC # BLD: 4.77 M/UL (ref 4.2–5.4)
SODIUM BLD-SCNC: 141 MEQ/L (ref 135–144)
SPECIFIC GRAVITY UA: 1.03 (ref 1–1.03)
TOTAL PROTEIN: 8.2 G/DL (ref 6.3–8)
URINE REFLEX TO CULTURE: NORMAL
UROBILINOGEN, URINE: 0.2 E.U./DL
WBC # BLD: 9.2 K/UL (ref 4.8–10.8)

## 2021-05-28 PROCEDURE — 85025 COMPLETE CBC W/AUTO DIFF WBC: CPT

## 2021-05-28 PROCEDURE — 81003 URINALYSIS AUTO W/O SCOPE: CPT

## 2021-05-28 PROCEDURE — 99283 EMERGENCY DEPT VISIT LOW MDM: CPT

## 2021-05-28 PROCEDURE — 99245 OFF/OP CONSLTJ NEW/EST HI 55: CPT | Performed by: OBSTETRICS & GYNECOLOGY

## 2021-05-28 PROCEDURE — 76801 OB US < 14 WKS SINGLE FETUS: CPT

## 2021-05-28 PROCEDURE — 84702 CHORIONIC GONADOTROPIN TEST: CPT

## 2021-05-28 PROCEDURE — 85610 PROTHROMBIN TIME: CPT

## 2021-05-28 PROCEDURE — 80053 COMPREHEN METABOLIC PANEL: CPT

## 2021-05-28 PROCEDURE — 36415 COLL VENOUS BLD VENIPUNCTURE: CPT

## 2021-05-28 PROCEDURE — 76817 TRANSVAGINAL US OBSTETRIC: CPT

## 2021-05-28 PROCEDURE — G0378 HOSPITAL OBSERVATION PER HR: HCPCS

## 2021-05-28 ASSESSMENT — ENCOUNTER SYMPTOMS
ABDOMINAL DISTENTION: 0
VOMITING: 0
COUGH: 0
ABDOMINAL PAIN: 0
EYE PAIN: 0
SINUS PAIN: 0
DIARRHEA: 0
SHORTNESS OF BREATH: 0
CONSTIPATION: 0
RHINORRHEA: 0
WHEEZING: 0
EYE DISCHARGE: 0
NAUSEA: 0

## 2021-05-28 NOTE — Clinical Note
Patient Class: Observation [104]  REQUIRED: Diagnosis: Ectopic pregnancy of left ovary [8141022]  Estimated Length of Stay: Estimated stay of less than 2 midnights

## 2021-05-28 NOTE — ED PROVIDER NOTES
[address] [facility]  EMERGENCY MEDICINE     Pt Name: Dalia Wellington  MRN: [id number]  [place] 1995  Date of evaluation: 5/28/2021  PCP:    VIPUL Laureano - CNP  Provider: Ángel Bennett DO    CHIEF COMPLAINT       Chief Complaint   Patient presents with    Other     sent from radiology may have tubal preg       HISTORY OF PRESENT ILLNESS    HPI     44-year-old female G1, P1 presents to the emergency department with complaint of possible to her pregnancy. Patient was seen at her OB/GYN today and was getting an ultrasound but OB/GYN was unable to visualize the pregnancy so she was sent into the hospital for a transvaginal ultrasound. During the ultrasound, there was a possible left-sided tubal pregnancy. He sent her into the emergency room for further evaluation. Patient is not having any vaginal bleeding, abdominal pain or cramping. Last period was March 18. Triage notes and Nursing notes were reviewed by myself. Any discrepancies are addressed above. PAST MEDICAL HISTORY   History reviewed. No pertinent past medical history. SURGICAL HISTORY     History reviewed. No pertinent surgical history.     CURRENT MEDICATIONS       Current Discharge Medication List      CONTINUE these medications which have NOT CHANGED    Details   metFORMIN (GLUCOPHAGE) 500 MG tablet TAKE 1 TABLET BY MOUTH ONCE DAILY WITH FOOD *EMERGENCY REFIL*      Ascorbic Acid (VITAMIN C) 250 MG tablet TAKE 1 TABLET BY MOUTH EVERY DAY WITH IRON      POLY-IRON 150 FORTE 150-25-1 MG-MCG-MG CAPS capsule TAKE 1 CAPSULE BY MOUTH ONCE DAILY      B Complex CAPS TAKE 1 CAPSULE BY MOUTH EVERY DAY      omeprazole (PRILOSEC) 40 MG delayed release capsule       famotidine (PEPCID) 20 MG tablet Take 1 tablet by mouth 2 times daily  Qty: 60 tablet, Refills: 0    Associated Diagnoses: Heart burn      ARIPiprazole (ABILIFY) 20 MG tablet TAKE 1 TABLET BY MOUTH IN THE EVENING AS DIRECTED FOR DEPRESSION MOOD      B-Complex TABS TAKE 1 TABLET BY MOUTH ONCE DAILY      VRAYLAR 1.5 MG capsule TAKE 1 CAPSULE BY MOUTH IN THE MORNING AS DIRECTED FOR MOOD      benztropine (COGENTIN) 1 MG tablet TAKE 1 TABLET BY MOUTH ONCE DAILY AS DIRECTED FOR SIDE EFFECTS      gabapentin (NEURONTIN) 400 MG capsule Take by mouth. ASHLYNA 0.15-0.03 &0.01 MG TABS TAKE 1 TABLET BY MOUTH ONCE DAILY      ondansetron (ZOFRAN) 4 MG tablet TAKE 1 TABLET BY MOUTH EVERY 8 HOURS      sertraline (ZOLOFT) 100 MG tablet TAKE 2 TABLETS BY MOUTH ONCE DAILY AT NIGHT AS DIRECTED FOR DEPRESSION ANXIETY      esomeprazole (NEXIUM) 20 MG delayed release capsule Take 1 capsule by mouth daily  Qty: 30 capsule, Refills: 3             ALLERGIES     No Known Allergies    FAMILY HISTORY       Family History   Problem Relation Age of Onset    Celiac Disease Neg Hx     Colon Cancer Neg Hx     Crohn's Disease Neg Hx         SOCIAL HISTORY       Social History     Socioeconomic History    Marital status: Single     Spouse name: None    Number of children: None    Years of education: None    Highest education level: None   Occupational History    None   Tobacco Use    Smoking status: Former Smoker     Quit date: 11/10/2017     Years since quitting: 3.5    Smokeless tobacco: Never Used   Substance and Sexual Activity    Alcohol use: Yes     Comment: rare    Drug use: Never    Sexual activity: Yes     Partners: Male   Other Topics Concern    None   Social History Narrative    None     Social Determinants of Health     Financial Resource Strain:     Difficulty of Paying Living Expenses:    Food Insecurity:     Worried About Running Out of Food in the Last Year:     Ran Out of Food in the Last Year:    Transportation Needs:     Lack of Transportation (Medical):      Lack of Transportation (Non-Medical):    Physical Activity:     Days of Exercise per Week:     Minutes of Exercise per Session:    Stress:     Feeling of Stress :    Social Connections:     Frequency of Communication with Friends and Family:     Frequency of Social Gatherings with Friends and Family:     Attends Anabaptism Services:     Active Member of Clubs or Organizations:     Attends Club or Organization Meetings:     Marital Status:    Intimate Partner Violence:     Fear of Current or Ex-Partner:     Emotionally Abused:     Physically Abused:     Sexually Abused:        REVIEW OF SYSTEMS     Review of Systems   Constitutional: Negative for activity change, chills, fatigue and fever. HENT: Negative for congestion, rhinorrhea and sinus pain. Eyes: Negative for pain and discharge. Respiratory: Negative for cough, shortness of breath and wheezing. Cardiovascular: Negative for chest pain and palpitations. Gastrointestinal: Negative for abdominal distention, abdominal pain, constipation, diarrhea, nausea and vomiting. Genitourinary: Negative for difficulty urinating, dysuria and hematuria. Pregnancy issues   Musculoskeletal: Negative for joint swelling and myalgias. Skin: Negative for rash. Neurological: Negative for dizziness, weakness, light-headedness, numbness and headaches. Except as noted above the remainder of the review of systems was reviewed and is negative. SCREENINGS        Himanshu Coma Scale  Eye Opening: Spontaneous  Best Verbal Response: Oriented  Best Motor Response: Obeys commands  [place] Coma Scale Score: 15               PHYSICAL EXAM    (up to 7 for level 4, 8 or more for level 5)     ED Triage Vitals [05/28/21 1343]   BP Temp Temp Source Pulse Resp SpO2 Height Weight   (!) 146/85 98.1 °F (36.7 °C) Temporal 93 19 98 % 5' 5\" (1.651 m) (!) 391 lb (177.4 kg)       Physical Exam  Constitutional:       General: She is not in acute distress. Appearance: Normal appearance. She is obese. She is not ill-appearing. Comments: Nontoxic-appearing   HENT:      Head: Normocephalic and atraumatic.       Right Ear: External ear normal.      Left Ear: External ear normal.      Nose: Nose normal. No congestion or rhinorrhea. Mouth/Throat:      Mouth: Mucous membranes are moist.      Pharynx: No oropharyngeal exudate or posterior oropharyngeal erythema. Eyes:      General:         Right eye: No discharge. Left eye: No discharge. Extraocular Movements: Extraocular movements intact. Pupils: Pupils are equal, round, and reactive to light. Cardiovascular:      Rate and Rhythm: Normal rate and regular rhythm. Pulses: Normal pulses. Pulmonary:      Effort: Pulmonary effort is normal.      Breath sounds: Normal breath sounds. Abdominal:      General: Abdomen is flat. Bowel sounds are normal. There is no distension. Tenderness: There is no abdominal tenderness. There is no right CVA tenderness, left CVA tenderness, guarding or rebound. Musculoskeletal:         General: No swelling or tenderness. Normal range of motion. Cervical back: Normal range of motion and neck supple. Right lower leg: No edema. Left lower leg: No edema. Skin:     General: Skin is warm and dry. Capillary Refill: Capillary refill takes less than 2 seconds. Neurological:      General: No focal deficit present. Mental Status: She is alert. Psychiatric:         Mood and Affect: Mood normal.           DIAGNOSTIC RESULTS     EKG:(none if blank)  All EKGs are interpreted by the Emergency Department Physician who either signs or Co-signs this chart in the absence of a cardiologist.        RADIOLOGY: (none if blank)   I directly visualized the following images and reviewed the radiologist interpretations. Interpretation per the Radiologist below, if available at the time of this note:  No orders to display   Patient's transvaginal ultrasound shows  1. THERE IS NO DEFINITIVE IUP OR YOLK SAC IDENTIFIED. HOWEVER THERE IS A THICK WALLED MASS WITH INCREASED VASCULARITY AND QUESTION POSSIBLE FETAL POLE WITHIN THE LEFT FALLOPIAN TUBE.  FINDINGS SUSPICIOUS FOR POSSIBLE ECTOPIC TUBAL PREGNANCY. 2. SPECTRAL COLOR FLOW SEEN BILATERALLY. LABS:  Labs Reviewed   CBC WITH AUTO DIFFERENTIAL - Abnormal; Notable for the following components:       Result Value    MCV 78.1 (*)     MCH 25.2 (*)     MCHC 32.2 (*)     RDW 16.2 (*)     Neutrophils Absolute 6.6 (*)     All other components within normal limits   COMPREHENSIVE METABOLIC PANEL - Abnormal; Notable for the following components:    Anion Gap 17 (*)     Total Protein 8.2 (*)     Albumin 4.8 (*)     All other components within normal limits   URINE RT REFLEX TO CULTURE   HCG, QUANTITATIVE, PREGNANCY   PROTIME-INR   TYPE AND SCREEN       All other labs were within normal range or not returned as of this dictation. Please note, any cultures that may have been sent were not resulted at the time of this patient visit. EMERGENCY DEPARTMENT COURSE and Medical Decision Making:     Vitals:    Vitals:    05/28/21 1343 05/28/21 1522 05/28/21 1707   BP: (!) 146/85 132/82 (!) 141/78   Pulse: 93 90 89   Resp: 19 18 18   Temp: 98.1 °F (36.7 °C)     TempSrc: Temporal     SpO2: 98% 99% 99%   Weight: (!) 391 lb (177.4 kg)     Height: 5' 5\" (1.651 m)         PROCEDURES: (None if blank)  Procedures       MDM     OB/GYN, , was consulted as the patient's hCG was over the discriminatory zone at 6000. After looking at the scans himself, he states that this may just be early pregnancy still. He states that because this is a wanted pregnancy, he wants to compare the hCG in 2 days for repeat ultrasound and repeat hCG in the hospital.  Patient is stable at this point and she has been stable in the entirety of her stay. He did come down to see the patient himself and recommended follow-up in 2 days to further assess the pregnancy. He wants to compare hCG levels. Patient will be discharged in stable condition with all set up follow-up.     Strict return precautions and follow up instructions were discussed with the

## 2021-05-28 NOTE — CONSULTS
Bleeding  Kelin Hightower is a 23 y.o. female,  who was sent from the office for an ultrasound     She has no complains with no pain or bleeding       She had a previous positive pregnancy test but no IUP     Previous Ectopic pregnancy left side     This is a wanted pregnancy       Obstetrical History:     A1  2X   1 X ectopic pregnancy ( Left side )        Gynecological history:    Previous regular periods , last period was in March   No active bleeding  No discharge         History reviewed. No pertinent past medical history. History reviewed. No pertinent surgical history. Family History   Problem Relation Age of Onset    Celiac Disease Neg Hx     Colon Cancer Neg Hx     Crohn's Disease Neg Hx      Social History     Tobacco Use    Smoking status: Former Smoker     Quit date: 11/10/2017     Years since quitting: 3.5    Smokeless tobacco: Never Used   Substance Use Topics    Alcohol use: Yes     Comment: rare    Drug use: Never     Social History     Tobacco Use   Smoking Status Former Smoker    Quit date: 11/10/2017    Years since quitting: 3.5   Smokeless Tobacco Never Used     No current facility-administered medications on file prior to encounter.      Current Outpatient Medications on File Prior to Encounter   Medication Sig Dispense Refill    metFORMIN (GLUCOPHAGE) 500 MG tablet TAKE 1 TABLET BY MOUTH ONCE DAILY WITH FOOD *EMERGENCY REFIL*      Ascorbic Acid (VITAMIN C) 250 MG tablet TAKE 1 TABLET BY MOUTH EVERY DAY WITH IRON      POLY-IRON 150 FORTE 150-25-1 MG-MCG-MG CAPS capsule TAKE 1 CAPSULE BY MOUTH ONCE DAILY      B Complex CAPS TAKE 1 CAPSULE BY MOUTH EVERY DAY      omeprazole (PRILOSEC) 40 MG delayed release capsule       famotidine (PEPCID) 20 MG tablet Take 1 tablet by mouth 2 times daily 60 tablet 0    ARIPiprazole (ABILIFY) 20 MG tablet TAKE 1 TABLET BY MOUTH IN THE EVENING AS DIRECTED FOR DEPRESSION MOOD      B-Complex TABS TAKE 1 TABLET BY MOUTH ONCE DAILY  VRAYLAR 1.5 MG capsule TAKE 1 CAPSULE BY MOUTH IN THE MORNING AS DIRECTED FOR MOOD      benztropine (COGENTIN) 1 MG tablet TAKE 1 TABLET BY MOUTH ONCE DAILY AS DIRECTED FOR SIDE EFFECTS      gabapentin (NEURONTIN) 400 MG capsule Take by mouth.  ASHLYNA 0.15-0.03 &0.01 MG TABS TAKE 1 TABLET BY MOUTH ONCE DAILY      ondansetron (ZOFRAN) 4 MG tablet TAKE 1 TABLET BY MOUTH EVERY 8 HOURS      sertraline (ZOLOFT) 100 MG tablet TAKE 2 TABLETS BY MOUTH ONCE DAILY AT NIGHT AS DIRECTED FOR DEPRESSION ANXIETY      esomeprazole (NEXIUM) 20 MG delayed release capsule Take 1 capsule by mouth daily 30 capsule 3        REVIEW OF SYSTEMS:  General: No weight loss, malaise or fevers or other constitutional symptoms. Neuro: No history of TIA's, stroke,. No neurological symptoms or problems. No visual  changes  Respiratory: No history of respiratory/pulmonary symptoms or problems. Cardiovascular: No history of cardiovascular symptoms or problems. GI: No history of GI symptoms or problems. : No history of UTI in past 6 weeks. No history of  symptoms or problems. Hematology: No history of bleeding or clotting disorder. Skin: Negative for lesions, rash, and itching. PHYSICAL EXAMINATION:       Vitals:    05/28/21 1522   BP: 132/82   Pulse: 90   Resp: 18   Temp:    SpO2: 99%         GENERAL: pleasant, female in no apparent distress  HEENT: Normocephalic, atraumatic, mucus membranes moist and no lesions  NEURO: alert and oriented x3,exam grossly non-focal  NECK: Supple, full range of motion, no adenopathy and thyroid normal  CHEST: Normal inspiratory effort  ABDOMEN: soft, non-tender and no masses  PELVIC:  uterus normal size, shape and consistency, no  adnexal masses and nontender    US:   THERE IS NO DEFINITIVE IUP OR YOLK SAC IDENTIFIED. HOWEVER THERE IS A THICK WALLED MASS WITH INCREASED VASCULARITY AND QUESTION POSSIBLE FETAL POLE WITHIN THE LEFT FALLOPIAN TUBE.  FINDINGS SUSPICIOUS FOR POSSIBLE ECTOPIC

## 2021-05-28 NOTE — ED NOTES
Pt isn't able to provide a urine sample at this time and lab was called to obtain pt's lab work      Kandis Feldman RN  05/28/21 2211

## 2021-05-30 ENCOUNTER — APPOINTMENT (OUTPATIENT)
Dept: ULTRASOUND IMAGING | Age: 26
End: 2021-05-30
Payer: COMMERCIAL

## 2021-05-30 ENCOUNTER — HOSPITAL ENCOUNTER (OUTPATIENT)
Age: 26
Setting detail: OBSERVATION
Discharge: HOME | End: 2021-05-30
Attending: EMERGENCY MEDICINE | Admitting: OBSTETRICS & GYNECOLOGY
Payer: COMMERCIAL

## 2021-05-30 VITALS
OXYGEN SATURATION: 100 % | WEIGHT: 293 LBS | TEMPERATURE: 97.7 F | HEIGHT: 65 IN | BODY MASS INDEX: 48.82 KG/M2 | DIASTOLIC BLOOD PRESSURE: 63 MMHG | SYSTOLIC BLOOD PRESSURE: 128 MMHG | RESPIRATION RATE: 18 BRPM | HEART RATE: 83 BPM

## 2021-05-30 DIAGNOSIS — O00.202 ECTOPIC PREGNANCY OF LEFT OVARY: ICD-10-CM

## 2021-05-30 DIAGNOSIS — O00.102 LEFT TUBAL PREGNANCY WITHOUT INTRAUTERINE PREGNANCY: Primary | ICD-10-CM

## 2021-05-30 PROBLEM — O00.90 ECTOPIC PREGNANCY WITHOUT INTRAUTERINE PREGNANCY: Status: ACTIVE | Noted: 2021-05-30

## 2021-05-30 LAB
ABO/RH: NORMAL
ALBUMIN SERPL-MCNC: 4.7 G/DL (ref 3.5–4.6)
ALP BLD-CCNC: 89 U/L (ref 40–130)
ALT SERPL-CCNC: 17 U/L (ref 0–33)
ANION GAP SERPL CALCULATED.3IONS-SCNC: 10 MEQ/L (ref 9–15)
ANTIBODY SCREEN: NORMAL
AST SERPL-CCNC: 16 U/L (ref 0–35)
BASOPHILS ABSOLUTE: 0.1 K/UL (ref 0–0.2)
BASOPHILS RELATIVE PERCENT: 0.9 %
BILIRUB SERPL-MCNC: 0.3 MG/DL (ref 0.2–0.7)
BUN BLDV-MCNC: 12 MG/DL (ref 6–20)
CALCIUM SERPL-MCNC: 9.7 MG/DL (ref 8.5–9.9)
CHLORIDE BLD-SCNC: 101 MEQ/L (ref 95–107)
CO2: 26 MEQ/L (ref 20–31)
CREAT SERPL-MCNC: 0.77 MG/DL (ref 0.5–0.9)
EOSINOPHILS ABSOLUTE: 0.3 K/UL (ref 0–0.7)
EOSINOPHILS RELATIVE PERCENT: 2.7 %
GFR AFRICAN AMERICAN: >60
GFR NON-AFRICAN AMERICAN: >60
GLOBULIN: 3.5 G/DL (ref 2.3–3.5)
GLUCOSE BLD-MCNC: 94 MG/DL (ref 70–99)
GONADOTROPIN, CHORIONIC (HCG) QUANT: 6807 MIU/ML
HCT VFR BLD CALC: 38.8 % (ref 37–47)
HEMOGLOBIN: 12.7 G/DL (ref 12–16)
LYMPHOCYTES ABSOLUTE: 2.1 K/UL (ref 1–4.8)
LYMPHOCYTES RELATIVE PERCENT: 19.6 %
MCH RBC QN AUTO: 25.5 PG (ref 27–31.3)
MCHC RBC AUTO-ENTMCNC: 32.7 % (ref 33–37)
MCV RBC AUTO: 77.9 FL (ref 82–100)
MONOCYTES ABSOLUTE: 0.8 K/UL (ref 0.2–0.8)
MONOCYTES RELATIVE PERCENT: 7.1 %
NEUTROPHILS ABSOLUTE: 7.6 K/UL (ref 1.4–6.5)
NEUTROPHILS RELATIVE PERCENT: 69.7 %
PDW BLD-RTO: 16 % (ref 11.5–14.5)
PLATELET # BLD: 345 K/UL (ref 130–400)
POTASSIUM SERPL-SCNC: 4.4 MEQ/L (ref 3.4–4.9)
RBC # BLD: 4.98 M/UL (ref 4.2–5.4)
REASON FOR REJECTION: NORMAL
REJECTED TEST: NORMAL
SODIUM BLD-SCNC: 137 MEQ/L (ref 135–144)
TOTAL PROTEIN: 8.2 G/DL (ref 6.3–8)
WBC # BLD: 10.9 K/UL (ref 4.8–10.8)

## 2021-05-30 PROCEDURE — 99231 SBSQ HOSP IP/OBS SF/LOW 25: CPT | Performed by: OBSTETRICS & GYNECOLOGY

## 2021-05-30 PROCEDURE — 86900 BLOOD TYPING SEROLOGIC ABO: CPT

## 2021-05-30 PROCEDURE — 76817 TRANSVAGINAL US OBSTETRIC: CPT

## 2021-05-30 PROCEDURE — 76801 OB US < 14 WKS SINGLE FETUS: CPT

## 2021-05-30 PROCEDURE — 99283 EMERGENCY DEPT VISIT LOW MDM: CPT

## 2021-05-30 PROCEDURE — 85025 COMPLETE CBC W/AUTO DIFF WBC: CPT

## 2021-05-30 PROCEDURE — 86901 BLOOD TYPING SEROLOGIC RH(D): CPT

## 2021-05-30 PROCEDURE — 86850 RBC ANTIBODY SCREEN: CPT

## 2021-05-30 PROCEDURE — 6360000002 HC RX W HCPCS: Performed by: OBSTETRICS & GYNECOLOGY

## 2021-05-30 PROCEDURE — 84702 CHORIONIC GONADOTROPIN TEST: CPT

## 2021-05-30 PROCEDURE — 99284 EMERGENCY DEPT VISIT MOD MDM: CPT

## 2021-05-30 PROCEDURE — 2580000003 HC RX 258: Performed by: EMERGENCY MEDICINE

## 2021-05-30 PROCEDURE — 36415 COLL VENOUS BLD VENIPUNCTURE: CPT

## 2021-05-30 PROCEDURE — 96401 CHEMO ANTI-NEOPL SQ/IM: CPT

## 2021-05-30 PROCEDURE — G0378 HOSPITAL OBSERVATION PER HR: HCPCS

## 2021-05-30 PROCEDURE — 80053 COMPREHEN METABOLIC PANEL: CPT

## 2021-05-30 PROCEDURE — 6370000000 HC RX 637 (ALT 250 FOR IP): Performed by: EMERGENCY MEDICINE

## 2021-05-30 RX ORDER — GABAPENTIN 100 MG/1
400 CAPSULE ORAL 2 TIMES DAILY
Status: CANCELLED | OUTPATIENT
Start: 2021-05-30

## 2021-05-30 RX ORDER — SODIUM CHLORIDE 9 MG/ML
25 INJECTION, SOLUTION INTRAVENOUS PRN
Status: CANCELLED | OUTPATIENT
Start: 2021-05-30

## 2021-05-30 RX ORDER — KETOROLAC TROMETHAMINE 30 MG/ML
30 INJECTION, SOLUTION INTRAMUSCULAR; INTRAVENOUS EVERY 6 HOURS PRN
Status: CANCELLED | OUTPATIENT
Start: 2021-05-30 | End: 2021-06-01

## 2021-05-30 RX ORDER — SODIUM CHLORIDE 0.9 % (FLUSH) 0.9 %
5-40 SYRINGE (ML) INJECTION PRN
Status: CANCELLED | OUTPATIENT
Start: 2021-05-30

## 2021-05-30 RX ORDER — ACETAMINOPHEN 500 MG
1000 TABLET ORAL ONCE
Status: COMPLETED | OUTPATIENT
Start: 2021-05-30 | End: 2021-05-30

## 2021-05-30 RX ORDER — SODIUM CHLORIDE 0.9 % (FLUSH) 0.9 %
5-40 SYRINGE (ML) INJECTION EVERY 12 HOURS SCHEDULED
Status: CANCELLED | OUTPATIENT
Start: 2021-05-30

## 2021-05-30 RX ORDER — 0.9 % SODIUM CHLORIDE 0.9 %
1000 INTRAVENOUS SOLUTION INTRAVENOUS ONCE
Status: COMPLETED | OUTPATIENT
Start: 2021-05-30 | End: 2021-05-30

## 2021-05-30 RX ORDER — ACETAMINOPHEN 325 MG/1
650 TABLET ORAL EVERY 4 HOURS PRN
Qty: 120 TABLET | Refills: 3 | Status: SHIPPED | OUTPATIENT
Start: 2021-05-30 | End: 2022-01-17 | Stop reason: SDUPTHER

## 2021-05-30 RX ORDER — PANTOPRAZOLE SODIUM 40 MG/1
40 TABLET, DELAYED RELEASE ORAL
Refills: 3 | Status: CANCELLED | OUTPATIENT
Start: 2021-05-31

## 2021-05-30 RX ORDER — BENZTROPINE MESYLATE 1 MG/1
1 TABLET ORAL 2 TIMES DAILY
Status: CANCELLED | OUTPATIENT
Start: 2021-05-30

## 2021-05-30 RX ORDER — ACETAMINOPHEN 325 MG/1
650 TABLET ORAL EVERY 4 HOURS PRN
Status: CANCELLED | OUTPATIENT
Start: 2021-05-30

## 2021-05-30 RX ORDER — ONDANSETRON 2 MG/ML
4 INJECTION INTRAMUSCULAR; INTRAVENOUS EVERY 6 HOURS PRN
Status: CANCELLED | OUTPATIENT
Start: 2021-05-30

## 2021-05-30 RX ORDER — MORPHINE SULFATE 2 MG/ML
4 INJECTION, SOLUTION INTRAMUSCULAR; INTRAVENOUS EVERY 4 HOURS PRN
Status: CANCELLED | OUTPATIENT
Start: 2021-05-30

## 2021-05-30 RX ORDER — ASCORBIC ACID 500 MG
250 TABLET ORAL DAILY
Status: CANCELLED | OUTPATIENT
Start: 2021-05-30

## 2021-05-30 RX ORDER — VITAMIN B COMPLEX
1 CAPSULE ORAL DAILY
Status: CANCELLED | OUTPATIENT
Start: 2021-05-30

## 2021-05-30 RX ORDER — ARIPIPRAZOLE 10 MG/1
20 TABLET ORAL DAILY
Status: CANCELLED | OUTPATIENT
Start: 2021-05-30

## 2021-05-30 RX ORDER — METHOTREXATE 25 MG/ML
50 INJECTION INTRA-ARTERIAL; INTRAMUSCULAR; INTRATHECAL; INTRAVENOUS ONCE
Status: COMPLETED | OUTPATIENT
Start: 2021-05-30 | End: 2021-05-30

## 2021-05-30 RX ORDER — FAMOTIDINE 20 MG/1
20 TABLET, FILM COATED ORAL 2 TIMES DAILY
Status: CANCELLED | OUTPATIENT
Start: 2021-05-30

## 2021-05-30 RX ADMIN — METHOTREXATE 142.5 MG: 25 SOLUTION INTRA-ARTERIAL; INTRAMUSCULAR; INTRATHECAL; INTRAVENOUS at 21:54

## 2021-05-30 RX ADMIN — SODIUM CHLORIDE 1000 ML: 9 INJECTION, SOLUTION INTRAVENOUS at 13:46

## 2021-05-30 RX ADMIN — ACETAMINOPHEN 1000 MG: 500 TABLET ORAL at 13:46

## 2021-05-30 ASSESSMENT — ENCOUNTER SYMPTOMS
ABDOMINAL PAIN: 0
ABDOMINAL PAIN: 1
VOMITING: 0
DIARRHEA: 0
SHORTNESS OF BREATH: 0
COUGH: 0
SORE THROAT: 0
NAUSEA: 0
BACK PAIN: 0

## 2021-05-30 ASSESSMENT — PAIN DESCRIPTION - FREQUENCY: FREQUENCY: INTERMITTENT

## 2021-05-30 ASSESSMENT — PAIN DESCRIPTION - LOCATION: LOCATION: ABDOMEN

## 2021-05-30 ASSESSMENT — PAIN SCALES - GENERAL
PAINLEVEL_OUTOF10: 6
PAINLEVEL_OUTOF10: 6

## 2021-05-30 ASSESSMENT — PAIN DESCRIPTION - DESCRIPTORS: DESCRIPTORS: CRAMPING

## 2021-05-30 ASSESSMENT — PAIN DESCRIPTION - PAIN TYPE: TYPE: ACUTE PAIN

## 2021-05-30 NOTE — ED PROVIDER NOTES
[facility] ED  eMERGENCYdEPARTMENT eNCOUnter      Pt Name: Kelsea Cordero  MRN: [id number]  Debbie 1995  Date of evaluation: 2021  Cassandra Wolff MD    CHIEF COMPLAINT           HPI  Kelsea Cordero is a 23 y.o. female per chart review is  at about 6-8 weeks dated by LMP presents to the ED with ab cramping. Pt notes she had an US done 2 days ago that showed no definite IUP and possible L ectopic pregnancy. Pt notes gradual onset, moderate, constant, cramping, LLQ ab pain since last night. Pt denies fever, n/v, cp, sob, vaginal bleeding, dysuria, diarrhea. Pt was seen in the ED 2 days ago and ob/gyn requested return in 2 days for repeat US and HCG. ROS  Review of Systems   Constitutional: Negative for activity change, chills and fever. HENT: Negative for ear pain and sore throat. Eyes: Negative for visual disturbance. Respiratory: Negative for cough and shortness of breath. Cardiovascular: Negative for chest pain, palpitations and leg swelling. Gastrointestinal: Positive for abdominal pain. Negative for diarrhea, nausea and vomiting. Genitourinary: Negative for dysuria. Musculoskeletal: Negative for back pain. Skin: Negative for rash. Neurological: Negative for dizziness and weakness. Except as noted above the remainder of the review of systems was reviewed and negative. PAST MEDICAL HISTORY   History reviewed. No pertinent past medical history. SURGICAL HISTORY     History reviewed. No pertinent surgical history.       CURRENTMEDICATIONS       Previous Medications    ARIPIPRAZOLE (ABILIFY) 20 MG TABLET    TAKE 1 TABLET BY MOUTH IN THE EVENING AS DIRECTED FOR DEPRESSION MOOD    ASCORBIC ACID (VITAMIN C) 250 MG TABLET    TAKE 1 TABLET BY MOUTH EVERY DAY WITH IRON    ASHLYNA 0.15-0.03 &0.01 MG TABS    TAKE 1 TABLET BY MOUTH ONCE DAILY    B COMPLEX CAPS    TAKE 1 CAPSULE BY MOUTH EVERY DAY    B-COMPLEX TABS    TAKE 1 TABLET BY MOUTH ONCE DAILY    BENZTROPINE (COGENTIN) 1 MG TABLET    TAKE 1 TABLET BY MOUTH ONCE DAILY AS DIRECTED FOR SIDE EFFECTS    ESOMEPRAZOLE (NEXIUM) 20 MG DELAYED RELEASE CAPSULE    Take 1 capsule by mouth daily    FAMOTIDINE (PEPCID) 20 MG TABLET    Take 1 tablet by mouth 2 times daily    GABAPENTIN (NEURONTIN) 400 MG CAPSULE    Take by mouth. METFORMIN (GLUCOPHAGE) 500 MG TABLET    TAKE 1 TABLET BY MOUTH ONCE DAILY WITH FOOD *EMERGENCY REFIL*    OMEPRAZOLE (PRILOSEC) 40 MG DELAYED RELEASE CAPSULE        ONDANSETRON (ZOFRAN) 4 MG TABLET    TAKE 1 TABLET BY MOUTH EVERY 8 HOURS    POLY-IRON 150 FORTE 150-25-1 MG-MCG-MG CAPS CAPSULE    TAKE 1 CAPSULE BY MOUTH ONCE DAILY    SERTRALINE (ZOLOFT) 100 MG TABLET    TAKE 2 TABLETS BY MOUTH ONCE DAILY AT NIGHT AS DIRECTED FOR DEPRESSION ANXIETY    VRAYLAR 1.5 MG CAPSULE    TAKE 1 CAPSULE BY MOUTH IN THE MORNING AS DIRECTED FOR MOOD       ALLERGIES     Patient has no known allergies.     FAMILY HISTORY       Family History   Problem Relation Age of Onset    Celiac Disease Neg Hx     Colon Cancer Neg Hx     Crohn's Disease Neg Hx           SOCIAL HISTORY       Social History     Socioeconomic History    Marital status: Single     Spouse name: None    Number of children: None    Years of education: None    Highest education level: None   Occupational History    None   Tobacco Use    Smoking status: Former Smoker     Quit date: 11/10/2017     Years since quitting: 3.5    Smokeless tobacco: Never Used   Substance and Sexual Activity    Alcohol use: Yes     Comment: rare    Drug use: Never    Sexual activity: Yes     Partners: Male   Other Topics Concern    None   Social History Narrative    None     Social Determinants of Health     Financial Resource Strain:     Difficulty of Paying Living Expenses:    Food Insecurity:     Worried About Running Out of Food in the Last Year:     Ran Out of Food in the Last Year:    Transportation Needs:     Lack of Transportation (Medical):  Lack of Transportation (Non-Medical):    Physical Activity:     Days of Exercise per Week:     Minutes of Exercise per Session:    Stress:     Feeling of Stress :    Social Connections:     Frequency of Communication with Friends and Family:     Frequency of Social Gatherings with Friends and Family:     Attends Restorationism Services:     Active Member of Clubs or Organizations:     Attends Club or Organization Meetings:     Marital Status:    Intimate Partner Violence:     Fear of Current or Ex-Partner:     Emotionally Abused:     Physically Abused:     Sexually Abused:          PHYSICAL EXAM       ED Triage Vitals [05/30/21 1311]   BP Temp Temp Source Pulse Resp SpO2 Height Weight   (!) 147/66 98.8 °F (37.1 °C) Oral 87 16 100 % 5' 5\" (1.651 m) (!) 391 lb 4.8 oz (177.5 kg)       Physical Exam  Vitals and nursing note reviewed. Constitutional:       Appearance: She is well-developed. HENT:      Head: Normocephalic. Right Ear: External ear normal.      Left Ear: External ear normal.   Eyes:      Conjunctiva/sclera: Conjunctivae normal.      Pupils: Pupils are equal, round, and reactive to light. Cardiovascular:      Rate and Rhythm: Normal rate and regular rhythm. Heart sounds: Normal heart sounds. Pulmonary:      Effort: Pulmonary effort is normal.      Breath sounds: Normal breath sounds. Abdominal:      Comments: Soft, nondistended. Moderate tenderness to palpation in LLQ. No rebound, guarding, peritoneal signs. Musculoskeletal:         General: Normal range of motion. Cervical back: Normal range of motion and neck supple. Skin:     General: Skin is warm and dry. Neurological:      Mental Status: She is alert and oriented to person, place, and time. Psychiatric:         Mood and Affect: Mood normal.           MDM  24 yo female presents to the ED with ab pain and possible ectopic pregnancy. Pt is afebrile, hemodynamically stable.   Pt given 1 L NS, PO tylenol in the ED. Labs remarkable for WBC 11, HCG 6807. Pt's HCG 2 days ago was 6000. US shows no IUP and thickened walled tubular structure in L adnex. Given HCG of 6807, previous ectopic, no IUP, no fetal pole, pt likely with ectopic pregnancy. Results discussed with Dr. Luis Muñoz (ob/gyn) who recommended admission for surgery. Pt is hemodynamically stable and without signs of rupture. Pt admitted to ob/gyn in serious condition. Critical care time:  36 min excluding procedures          FINAL IMPRESSION      1.  Left tubal pregnancy without intrauterine pregnancy          DISPOSITION/PLAN   DISPOSITION Decision To Admit 05/30/2021 06:06:10 PM        DISCHARGE MEDICATIONS:  @ROD@         Emilio Loyd MD(electronically signed)  Attending Emergency Physician            Emilio Loyd MD  05/30/21 [id number]

## 2021-05-30 NOTE — Clinical Note
Patient Class: Inpatient [101]   REQUIRED: Diagnosis: Ectopic pregnancy of left ovary [8292104]   Estimated Length of Stay: Estimated stay of more than 2 midnights

## 2021-05-30 NOTE — ED NOTES
Dr Servando Delong at bedside explaining results of [address] and possible admission status. All questions and concerns answered. Pt understands that she must remain NPO.        William Baker RN  05/30/21 1074

## 2021-05-30 NOTE — ED NOTES
Patient is asking for food. This nurse explained that we are currently awaiting US results before she can have any food.       Jhon Morrow RN  05/30/21 2087

## 2021-05-31 PROCEDURE — 99283 EMERGENCY DEPT VISIT LOW MDM: CPT

## 2021-05-31 NOTE — PROGRESS NOTES
Patient ambulatory, walked to main lobby by unit secretary and given instructions on proceeding to ER per patient request to leave that way.

## 2021-05-31 NOTE — DISCHARGE SUMMARY
Discharge Summary    Date: 5/30/2021  Patient Name: Daniel Swan YOB: 1995 Age: 23 y. o. Admit Date: 5/30/2021  Discharge Date: 5/30/2021  Discharge Condition: Stable    Admission Diagnosis  Ectopic pregnancy of left ovary (O00.202)     Discharge Diagnosis  Active Problems: Ectopic pregnancy of left ovaryResolved Problems: * No resolved hospital problems. [facility] Stay  Narrative of Hospital Course:  Uncomplicated    Consultants:  None    Surgeries/procedures Performed:  Methotrexate     Treatments:           Discharge Plan/Disposition:  Home    Hospital/Incidental Findings Requiring Follow Up:    Patient Instructions:    Diet: Regular Diet    Activity:Activity as Tolerated  For number of days (if applicable): Other Instructions: Stop prenatal vitamins  No intercourse x 2 weeks  Follow up on Wednesday 6/2 for second dose MTX  Follow up on Saturday 6/5 for beta hcg levels   Follow up with ob/gyn next Monday     Provider Follow-Up:   No follow-ups on file.      Significant Diagnostic Studies:    Recent Labs:  Admission on 05/30/2021Sodium                                        Date: 05/30/2021Value: 137         Ref range: 135 - 144 mEq/L    Status: FinalPotassium                                     Date: 05/30/2021Value: 4.4         Ref range: 3.4 - 4.9 mEq/L    Status: FinalChloride                                      Date: 05/30/2021Value: 101         Ref range: 95 - 107 mEq/L     Status: FinalCO2                                           Date: 05/30/2021Value: 26          Ref range: 20 - 31 mEq/L      Status: FinalAnion Gap                                     Date: 05/30/2021Value: 10          Ref range: 9 - 15 mEq/L       Status: FinalGlucose                                       Date: 05/30/2021Value: 94          Ref range: 70 - 99 mg/dL      Status: FinalBUN                                           Date: 05/30/2021Value: 12          Ref range: 6 - 20 mg/dL       Status: Endy Nguyen                                    Date: 05/30/2021Value: 0.77        Ref range: 0.50 - 0.90 mg/dL  Status: FinalGFR Non-                      Date: 05/30/2021Value: >60.0       Ref range: >60                Status: Final              Comment: >60 mL/min/1.73m2 EGFR, calc. for ages 25 and older using theMDRD formula (not corrected for weight), is valid for stablerenal function. GFR                           Date: 05/30/2021Value: >60.0       Ref range: >60                Status: Final              Comment: >60 mL/min/1.73m2 EGFR, calc. for ages 25 and older using theMDRD formula (not corrected for weight), is valid for stablerenal function. Calcium                                       Date: 05/30/2021Value: 9.7         Ref range: 8.5 - 9.9 mg/dL    Status: FinalTotal Protein                                 Date: 05/30/2021Value: 8.2*        Ref range: 6.3 - 8.0 g/dL     Status: FinalAlbumin                                       Date: 05/30/2021Value: 4.7*        Ref range: 3.5 - 4.6 g/dL     Status: FinalTotal Bilirubin                               Date: 05/30/2021Value: 0.3         Ref range: 0.2 - 0.7 mg/dL    Status: FinalAlkaline Phosphatase                          Date: 05/30/2021Value: 89          Ref range: 40 - 130 U/L       Status: FinalALT                                           Date: 05/30/2021Value: 17          Ref range: 0 - 33 U/L         Status: FinalAST                                           Date: 05/30/2021Value: 16          Ref range: 0 - 35 U/L         Status: FinalGlobulin                                      Date: 05/30/2021Value: 3.5         Ref range: 2.3 - 3.5 g/dL     Status: [address]                                           Date: 05/30/2021Value: 10.9*       Ref range: 4.8 - 10.8 K/uL    Status: FinalRBC                                           Date: 05/30/2021Value: 4.98        Ref range: 4.20 - 5.40 M/uL   Status: Joey Tang Date: 05/30/2021Value: 12.7        Ref range: 12.0 - 16.0 g/dL   Status: FinalHematocrit                                    Date: 05/30/2021Value: 38.8        Ref range: 37.0 - 47.0 %      Status: FinalMCV                                           Date: 05/30/2021Value: 77.9*       Ref range: 82.0 - 100.0 fL    Status: [address]                                           Date: 05/30/2021Value: 25.5*       Ref range: 27.0 - 31.3 pg     Status: [address]                                          Date: 05/30/2021Value: 32.7*       Ref range: 33.0 - 37.0 %      Status: FinalRDW                                           Date: 05/30/2021Value: 16.0*       Ref range: 11.5 - 14.5 %      Status: FinalPlatelets                                     Date: 05/30/2021Value: 345         Ref range: 130 - 400 K/uL     Status: FinalNeutrophils %                                 Date: 05/30/2021Value: 69.7        Ref range: %                  Status: FinalLymphocytes %                                 Date: 05/30/2021Value: 19.6        Ref range: %                  Status: FinalMonocytes %                                   Date: 05/30/2021Value: 7.1         Ref range: %                  Status: FinalEosinophils %                                 Date: 05/30/2021Value: 2.7         Ref range: %                  Status: FinalBasophils %                                   Date: 05/30/2021Value: 0.9         Ref range: %                  Status: FinalNeutrophils Absolute                          Date: 05/30/2021Value: 7.6*        Ref range: 1.4 - 6.5 K/uL     Status: FinalLymphocytes Absolute                          Date: 05/30/2021Value: 2.1         Ref range: 1.0 - 4.8 K/uL     Status: FinalMonocytes Absolute                            Date: 05/30/2021Value: 0.8         Ref range: 0.2 - 0.8 K/uL     Status: FinalEosinophils Absolute                          Date: 05/30/2021Value: 0.3         Ref range: 0.0 - 0.7 K/uL     Status: FinalBasophils Absolute                            Date: 05/30/2021Value: 0.1         Ref range: 0.0 - 0.2 K/uL     Status: FinalhCG Quant                                     Date: 05/30/2021Value: 6807.0      Ref range: mIU/mL             Status: Final              Comment: Gestational Age          Expected HCG values (mIU/ml)    3 weeks                           5-72    4 weeks                             5 weeks                      217-8,245    6 weeks                     152-32,177    8 weeks                 31,366-149,094   12 weeks                 27,107-201,615   16 weeks                   7,203-06,358   18 weeks                   0,786-17,600AYB/IE                                        Date: 05/30/2021Value: A POS         Status: FinalAntibody Screen                               Date: 05/30/2021Value: NEG           Status: FinalRejected Test                                 Date: 05/30/2021Value: ts3c          Status: Tila Weeks for Rejection                          Date: 05/30/2021Value: see below     Status: Final              Comment: Unable to perform testing; specimen special handlingrequirements not followed. To perform testing thespecimen will need to be recollected. Sp handle------------    Radiology last 7 days:  [address] Date: 5/30/2021NO INTRAUTERINE PREGNANCY IDENTIFIED. THICKENED WALLED TUBULAR STRUCTURE, LEFT ADNEXA, AGAIN DEMONSTRATED. NO FETAL POLE DEMONSTRATED WITH LEFT ADNEXAL TUBULAR STRUCTURE. CANNOT RULE OUT ECTOPIC PREGNANCY. US OB LESS THAN 14 WEEKS SINGLE OR FIRST GESTATIONResult Date: 5/28/20211. THERE IS NO DEFINITIVE IUP OR YOLK SAC IDENTIFIED. HOWEVER THERE IS A THICK WALLED MASS WITH INCREASED VASCULARITY AND QUESTION POSSIBLE FETAL POLE WITHIN THE LEFT FALLOPIAN TUBE. FINDINGS SUSPICIOUS FOR POSSIBLE ECTOPIC TUBAL PREGNANCY. 2. SPECTRAL COLOR FLOW SEEN BILATERALLY.  PATIENT WAS ADVISED OF THE FINDINGS AND tabletTAKE 2 TABLETS BY MOUTH ONCE DAILY AT NIGHT AS DIRECTED FOR DEPRESSION ANXIETYesomeprazole (NEXIUM) 20 MG delayed release capsuleTake 1 capsule by mouth dailyQty: 30 capsule Refills: 3gabapentin (NEURONTIN) 400 MG capsuleTake by mouth. Current Discharge Medication ListSTOP taking these medicationsomeprazole (PRILOSEC) 40 MG delayed release capsuleComments:Reason for Stopping:B-Complex TABSComments:Reason for Stopping:ASHLYNA 0.15-0.03 &0.01 MG TABSComments:Reason for Stopping:ondansetron ([place] Jazzmine) 4 MG tabletComments:Reason for Stopping:    Time Spent on Discharge:E] minutes were spent in patient examination, evaluation, counseling as well as medication reconciliation, prescriptions for required medications, discharge plan, and follow up.     Electronically signed by Hector Carl DO on 5/30/21 at 9:58 PM EDT

## 2021-05-31 NOTE — PROGRESS NOTES
Patient given discharge instructions, denies further questions. Patient educated on putting toilet seat down and flushing twice after using restroom for 48 hours due to administration of medication. Patient to return on Wednesday for another injection, Saturday to outpatient lab and follow up with Dr Belle Hernandez and [facility] on Monday, 6/7.

## 2021-05-31 NOTE — PLAN OF CARE
Problem: Coping:  Goal: Ability to identify appropriate support needs will improve  Description: Ability to identify appropriate support needs will improve  Outcome: Ongoing  Goal: Ability to verbalize feelings will improve  Description: Ability to verbalize feelings will improve  Outcome: Ongoing  Goal: Level of anxiety will decrease  Description: Level of anxiety will decrease  Outcome: Ongoing     Problem: Sensory:  Goal: General experience of comfort will improve  Description: General experience of comfort will improve  Outcome: Ongoing

## 2021-05-31 NOTE — PROGRESS NOTES
Patient arrived to [facility] floor, put in room 328. Patient informed that RN will be back with consents as soon as patient is transferred in computer system to [facility] floor. Patient verbalized understanding and denies needs currently.

## 2021-05-31 NOTE — H&P
HISTORY AND PHYSICAL             Date: 5/30/2021        Patient Name: Tiffany Shetty     YOB: 1995      Age:  23 y.o. Chief Complaint     Chief Complaint   Patient presents with    Pregnancy Problem     pt is pregnant, unknown how far along, c/o ABD cramping , seen here two days ago for the same symptoms      Pt presented for labs    History Obtained From   patient    History of Present Illness   Pt was seen on Friday for US and beta and was told to return today for repeat. US found no IUP, possible ectopic. Past Medical History     Past Medical History:   Diagnosis Date    Mental disorder     depression and anxiety    Postpartum depression         Past Surgical History     Past Surgical History:   Procedure Laterality Date    UPPER GASTROINTESTINAL ENDOSCOPY  2020    WISDOM TOOTH EXTRACTION  2017    WISDOM TOOTH EXTRACTION  2019        Medications Prior to Admission     Prior to Admission medications    Medication Sig Start Date End Date Taking?  Authorizing Provider   metFORMIN (GLUCOPHAGE) 500 MG tablet TAKE 1 TABLET BY MOUTH ONCE DAILY WITH FOOD *EMERGENCY REFIL* 4/30/21  Yes Historical Provider, MD   Ascorbic Acid (VITAMIN C) 250 MG tablet TAKE 1 TABLET BY MOUTH EVERY DAY WITH IRON 5/3/21  Yes Historical Provider, MD   POLY-IRON 150 FORTE 399-54-9 MG-MCG-MG CAPS capsule TAKE 1 CAPSULE BY MOUTH ONCE DAILY 5/5/21  Yes Historical Provider, MD   B Complex CAPS TAKE 1 CAPSULE BY MOUTH EVERY DAY 5/3/21  Yes Historical Provider, MD   famotidine (PEPCID) 20 MG tablet Take 1 tablet by mouth 2 times daily 4/7/21  Yes Michael Morales, APRN - CNP   ARIPiprazole (ABILIFY) 20 MG tablet TAKE 1 TABLET BY MOUTH IN THE EVENING AS DIRECTED FOR DEPRESSION MOOD 10/8/20  Yes Historical Provider, MD   VRAYLAR 1.5 MG capsule TAKE 1 CAPSULE BY MOUTH IN THE MORNING AS DIRECTED FOR MOOD 8/17/20  Yes Historical Provider, MD   benztropine (COGENTIN) 1 MG tablet TAKE 1 TABLET BY MOUTH ONCE DAILY AS DIRECTED FOR SIDE EFFECTS 10/16/20  Yes Historical Provider, MD   sertraline (ZOLOFT) 100 MG tablet TAKE 2 TABLETS BY MOUTH ONCE DAILY AT NIGHT AS DIRECTED FOR DEPRESSION ANXIETY 10/8/20  Yes Historical Provider, MD   esomeprazole ([address]) 20 MG delayed release capsule Take 1 capsule by mouth daily 11/10/20  Yes Peggy Cardozo MD   gabapentin (NEURONTIN) 400 MG capsule Take by mouth. 5/24/19 2/17/21  Historical Provider, MD        Allergies   Patient has no known allergies. Social History     Social History     Tobacco History     Smoking Status  Former Smoker Quit date  11/10/2017    Smokeless Tobacco Use  Never Used          Alcohol History     Alcohol Use Status  Yes Comment  rare          Drug Use     Drug Use Status  Never          Sexual Activity     Sexually Active  Yes Partners  Male                Family History     Family History   Problem Relation Age of Onset    Diabetes Mother     Cancer Maternal Grandfather     Celiac Disease Neg Hx     Colon Cancer Neg Hx     Crohn's Disease Neg Hx        Review of Systems   Review of Systems   Constitutional: Negative for fever. HENT: Negative for sore throat. Respiratory: Negative for cough and shortness of breath. Cardiovascular: Negative for chest pain. Gastrointestinal: Negative for abdominal pain and nausea. Genitourinary: Negative for difficulty urinating. Neurological: Negative for dizziness. All other systems reviewed and are negative. Physical Exam   BP (!) 112/50   Pulse 88   Temp 98.1 °F (36.7 °C) (Oral)   Resp 18   Ht 5' 5\" (1.651 m)   Wt (!) 391 lb 4.8 oz (177.5 kg)   LMP 03/25/2021   SpO2 100%   BMI 65.12 kg/m²     Physical Exam  Constitutional:       Appearance: Normal appearance. HENT:      Head: Normocephalic and atraumatic. Nose: Nose normal.      Mouth/Throat:      Mouth: Mucous membranes are moist.   Cardiovascular:      Rate and Rhythm: Normal rate. Pulses: Normal pulses.    Pulmonary: Effort: Pulmonary effort is normal.   Musculoskeletal:      Cervical back: Normal range of motion. Skin:     General: Skin is warm and dry. Neurological:      Mental Status: She is alert.    Psychiatric:         Mood and Affect: Mood normal.         Behavior: Behavior normal.         Labs      Recent Results (from the past 24 hour(s))   Comprehensive Metabolic Panel    Collection Time: 05/30/21  1:30 PM   Result Value Ref Range    Sodium 137 135 - 144 mEq/L    Potassium 4.4 3.4 - 4.9 mEq/L    Chloride 101 95 - 107 mEq/L    CO2 26 20 - 31 mEq/L    Anion Gap 10 9 - 15 mEq/L    Glucose 94 70 - 99 mg/dL    BUN 12 6 - 20 mg/dL    CREATININE 0.77 0.50 - 0.90 mg/dL    GFR Non-African American >60.0 >60    GFR  >60.0 >60    Calcium 9.7 8.5 - 9.9 mg/dL    Total Protein 8.2 (H) 6.3 - 8.0 g/dL    Albumin 4.7 (H) 3.5 - 4.6 g/dL    Total Bilirubin 0.3 0.2 - 0.7 mg/dL    Alkaline Phosphatase 89 40 - 130 U/L    ALT 17 0 - 33 U/L    AST 16 0 - 35 U/L    Globulin 3.5 2.3 - 3.5 g/dL   CBC Auto Differential    Collection Time: 05/30/21  1:30 PM   Result Value Ref Range    WBC 10.9 (H) 4.8 - 10.8 K/uL    RBC 4.98 4.20 - 5.40 M/uL    Hemoglobin 12.7 12.0 - 16.0 g/dL    Hematocrit 38.8 37.0 - 47.0 %    MCV 77.9 (L) 82.0 - 100.0 fL    MCH 25.5 (L) 27.0 - 31.3 pg    MCHC 32.7 (L) 33.0 - 37.0 %    RDW 16.0 (H) 11.5 - 14.5 %    Platelets 239 534 - 076 K/uL    Neutrophils % 69.7 %    Lymphocytes % 19.6 %    Monocytes % 7.1 %    Eosinophils % 2.7 %    Basophils % 0.9 %    Neutrophils Absolute 7.6 (H) 1.4 - 6.5 K/uL    Lymphocytes Absolute 2.1 1.0 - 4.8 K/uL    Monocytes Absolute 0.8 0.2 - 0.8 K/uL    Eosinophils Absolute 0.3 0.0 - 0.7 K/uL    Basophils Absolute 0.1 0.0 - 0.2 K/uL   HCG, Quantitative, Pregnancy    Collection Time: 05/30/21  1:30 PM   Result Value Ref Range    hCG Quant 6807.0 mIU/mL   TYPE AND SCREEN    Collection Time: 05/30/21  1:53 PM   Result Value Ref Range    ABO/Rh A POS     Antibody Screen NEG SPECIMEN REJECTION    Collection Time: 05/30/21  1:56 PM   Result Value Ref Range    Rejected Test [id number]     Reason for Rejection see below         Imaging/Diagnostics Last 24 Hours   US OB LESS THAN 14 WEEKS SINGLE OR FIRST GESTATION    Result Date: 5/30/2021  EXAMINATION: US OB LESS THAN 14 WEEKS SINGLE OR FIRST GESTATION, US OB TRANSVAGINAL CLINICAL HISTORY: ABDOMINAL CRAMPING. ULTRASOUND 2 DAYS AGO COULD NOT EXCLUDE ECTOPIC PREGNANCY. FOLLOW-UP (beta hCG today, [id number]). TECHNICAL FACTORS: Transabdominal and transvaginal sonography performed with transvaginal imaging obtained to better assess pelvic fetal anatomy. COMPARISON : Pelvic sonography, May 28, 2021 (beta hCG, 6202). FINDINGS: Uterus measured as 9.2 x 2.8 cm. Endometrial canal, measures 6 mm. No gestational sac identified. No fetal pole demonstrated. Right ovary measures 3.5 x 2.4 x 2.6 cm and contains a 2.0 x 1.5 x 1.7 cm complex, predominantly cystic area, with higher attenuation elements within it. Color flow and Doppler identified. Left ovary measures 2.3 x 2.0 x 1.9 cm. No color flow and Doppler identified within the left ovary. Thick walled tubular structure identified within left adnexa, measuring 2.6 x 1.9 x 1.7 cm. No fetal pole identified within tubular structure. No increased vascularity or flow demonstrated in either around or within tubular structure. No free fluid. NO INTRAUTERINE PREGNANCY IDENTIFIED. THICKENED WALLED TUBULAR STRUCTURE, LEFT ADNEXA, AGAIN DEMONSTRATED. NO FETAL POLE DEMONSTRATED WITH LEFT ADNEXAL TUBULAR STRUCTURE. CANNOT RULE OUT ECTOPIC PREGNANCY. US OB TRANSVAGINAL    Result Date: 5/30/2021  EXAMINATION: US OB LESS THAN 14 WEEKS SINGLE OR FIRST GESTATION, US OB TRANSVAGINAL CLINICAL HISTORY: ABDOMINAL CRAMPING. ULTRASOUND 2 DAYS AGO COULD NOT EXCLUDE ECTOPIC PREGNANCY. FOLLOW-UP (beta hCG today, [id number]).  TECHNICAL FACTORS: Transabdominal and transvaginal sonography performed with transvaginal imaging obtained to better assess pelvic fetal anatomy. COMPARISON : Pelvic sonography, May 28, 2021 (beta hCG, 6202). FINDINGS: Uterus measured as 9.2 x 2.8 cm. Endometrial canal, measures 6 mm. No gestational sac identified. No fetal pole demonstrated. Right ovary measures 3.5 x 2.4 x 2.6 cm and contains a 2.0 x 1.5 x 1.7 cm complex, predominantly cystic area, with higher attenuation elements within it. Color flow and Doppler identified. Left ovary measures 2.3 x 2.0 x 1.9 cm. No color flow and Doppler identified within the left ovary. Thick walled tubular structure identified within left adnexa, measuring 2.6 x 1.9 x 1.7 cm. No fetal pole identified within tubular structure. No increased vascularity or flow demonstrated in either around or within tubular structure. No free fluid. NO INTRAUTERINE PREGNANCY IDENTIFIED. THICKENED WALLED TUBULAR STRUCTURE, LEFT ADNEXA, AGAIN DEMONSTRATED. NO FETAL POLE DEMONSTRATED WITH LEFT ADNEXAL TUBULAR STRUCTURE. CANNOT RULE OUT ECTOPIC PREGNANCY. Assessment      Hospital Problems         Last Modified POA    Ectopic pregnancy of left ovary 2021 Yes          Plan   1. 24 yo  at 9+ wks presents for repeat labs and US  2. US shows no IUP, possible left ectopic  3. Results discussed w/pt and both medical and surgical treatments discussed - Pt has opted for medical treatment at this time  4. Administration of Methotrexate today x one  5. Follow up in 3 days for second dose   6. Follow up in 6 days for HCG level  7. R/B/A of treatment discussed with pt including but not limited to pain, nausea, bleeding and failure of treatment and need for surgery - pt verbalized understanding and agrees to treatment. 8. Case d/w admitting provider and treatment agreed upon since pt is hemodynamically stable she is a good candidate for medical treatment   9.  If pt tolerates medication well, she may be d/c home tonight    Consultations Ordered:  None    Electronically signed by Norbert Marin DO on 5/30/21 at 9:12 PM EDT

## 2021-05-31 NOTE — PROGRESS NOTES
Dr Kwame Leiva in to discuss options with patient regarding ectopic pregnancy. Patient initially adamant about surgery, however, once Dr Elizabeth Yang explained the risks, benefits and procedures, patient agreed to methotrexate injection.

## 2021-05-31 NOTE — PROGRESS NOTES
Patient provided with food and drink per request, Dr Dominique Vieira approved patient eating while awaiting administration of methotrexate.

## 2021-06-02 ENCOUNTER — HOSPITAL ENCOUNTER (OUTPATIENT)
Age: 26
Discharge: HOME | End: 2021-06-02
Attending: OBSTETRICS & GYNECOLOGY | Admitting: OBSTETRICS & GYNECOLOGY
Payer: COMMERCIAL

## 2021-06-02 VITALS
RESPIRATION RATE: 18 BRPM | HEART RATE: 87 BPM | TEMPERATURE: 98.9 F | DIASTOLIC BLOOD PRESSURE: 77 MMHG | SYSTOLIC BLOOD PRESSURE: 132 MMHG

## 2021-06-02 LAB
ALBUMIN SERPL-MCNC: 4.1 G/DL (ref 3.5–4.6)
ALP BLD-CCNC: 81 U/L (ref 40–130)
ALT SERPL-CCNC: 23 U/L (ref 0–33)
ANION GAP SERPL CALCULATED.3IONS-SCNC: 8 MEQ/L (ref 9–15)
AST SERPL-CCNC: 22 U/L (ref 0–35)
BASOPHILS ABSOLUTE: 0.1 K/UL (ref 0–0.2)
BASOPHILS RELATIVE PERCENT: 0.7 %
BILIRUB SERPL-MCNC: 0.4 MG/DL (ref 0.2–0.7)
BUN BLDV-MCNC: 9 MG/DL (ref 6–20)
CALCIUM SERPL-MCNC: 9.3 MG/DL (ref 8.5–9.9)
CHLORIDE BLD-SCNC: 102 MEQ/L (ref 95–107)
CO2: 25 MEQ/L (ref 20–31)
CREAT SERPL-MCNC: 0.65 MG/DL (ref 0.5–0.9)
EOSINOPHILS ABSOLUTE: 0.3 K/UL (ref 0–0.7)
EOSINOPHILS RELATIVE PERCENT: 3.2 %
GFR AFRICAN AMERICAN: >60
GFR NON-AFRICAN AMERICAN: >60
GLOBULIN: 3.1 G/DL (ref 2.3–3.5)
GLUCOSE BLD-MCNC: 102 MG/DL (ref 70–99)
GONADOTROPIN, CHORIONIC (HCG) QUANT: 6409 MIU/ML
HCT VFR BLD CALC: 37 % (ref 37–47)
HEMOGLOBIN: 12 G/DL (ref 12–16)
LYMPHOCYTES ABSOLUTE: 1.6 K/UL (ref 1–4.8)
LYMPHOCYTES RELATIVE PERCENT: 18.2 %
MCH RBC QN AUTO: 25.3 PG (ref 27–31.3)
MCHC RBC AUTO-ENTMCNC: 32.4 % (ref 33–37)
MCV RBC AUTO: 78.1 FL (ref 82–100)
MONOCYTES ABSOLUTE: 0.2 K/UL (ref 0.2–0.8)
MONOCYTES RELATIVE PERCENT: 1.8 %
NEUTROPHILS ABSOLUTE: 6.6 K/UL (ref 1.4–6.5)
NEUTROPHILS RELATIVE PERCENT: 76.1 %
PDW BLD-RTO: 15.9 % (ref 11.5–14.5)
PLATELET # BLD: 319 K/UL (ref 130–400)
POTASSIUM SERPL-SCNC: 4.2 MEQ/L (ref 3.4–4.9)
RBC # BLD: 4.73 M/UL (ref 4.2–5.4)
SODIUM BLD-SCNC: 135 MEQ/L (ref 135–144)
TOTAL PROTEIN: 7.2 G/DL (ref 6.3–8)
WBC # BLD: 8.7 K/UL (ref 4.8–10.8)

## 2021-06-02 PROCEDURE — 80053 COMPREHEN METABOLIC PANEL: CPT

## 2021-06-02 PROCEDURE — 84702 CHORIONIC GONADOTROPIN TEST: CPT

## 2021-06-02 PROCEDURE — 96372 THER/PROPH/DIAG INJ SC/IM: CPT

## 2021-06-02 PROCEDURE — 6360000002 HC RX W HCPCS: Performed by: OBSTETRICS & GYNECOLOGY

## 2021-06-02 PROCEDURE — 36415 COLL VENOUS BLD VENIPUNCTURE: CPT

## 2021-06-02 PROCEDURE — 99281 EMR DPT VST MAYX REQ PHY/QHP: CPT | Performed by: OBSTETRICS & GYNECOLOGY

## 2021-06-02 PROCEDURE — 85025 COMPLETE CBC W/AUTO DIFF WBC: CPT

## 2021-06-02 RX ORDER — METHOTREXATE 25 MG/ML
50 INJECTION INTRA-ARTERIAL; INTRAMUSCULAR; INTRATHECAL; INTRAVENOUS ONCE
Status: COMPLETED | OUTPATIENT
Start: 2021-06-02 | End: 2021-06-02

## 2021-06-02 RX ORDER — ACETAMINOPHEN 325 MG/1
650 TABLET ORAL EVERY 4 HOURS PRN
Status: DISCONTINUED | OUTPATIENT
Start: 2021-06-02 | End: 2021-06-02 | Stop reason: HOSPADM

## 2021-06-02 RX ADMIN — METHOTREXATE 142.5 MG: 25 SOLUTION INTRA-ARTERIAL; INTRAMUSCULAR; INTRATHECAL; INTRAVENOUS at 21:40

## 2021-06-03 NOTE — PLAN OF CARE
Pt returns after discussion of care   S/p MTX injection May 30th for left ectopic pregnancy  Pt returns for second injection on day #4  BHCG decreased to 6400. Normal CBC and LFT  Described to pt mechanism of action of MTX to decrease growth of ectopic as a medical therapy for ectopic vs surgery  Pt aware of no folic acid, risk of ruptured or growth of ectopic, need for emergent surgery.  Pain, orthostasis  Described laparoscopic removal and risk of anes, infx, injury to bowel, bladder, hemorrhage and loss of fallopian tube  Aware needs repeat BHCG day #7    MTX arrived   Injection site @ left buttock prepped w alcohol  Syringe inserted and drawnback without blood return  Medication injected  Pt tolerated well

## 2021-06-05 ENCOUNTER — HOSPITAL ENCOUNTER (OUTPATIENT)
Age: 26
Discharge: HOME | End: 2021-06-05
Attending: OBSTETRICS & GYNECOLOGY | Admitting: OBSTETRICS & GYNECOLOGY
Payer: COMMERCIAL

## 2021-06-05 DIAGNOSIS — O00.202 ECTOPIC PREGNANCY OF LEFT OVARY: ICD-10-CM

## 2021-06-05 DIAGNOSIS — O00.102 LEFT TUBAL PREGNANCY WITHOUT INTRAUTERINE PREGNANCY: ICD-10-CM

## 2021-06-05 LAB
ALBUMIN SERPL-MCNC: 4.6 G/DL (ref 3.5–4.6)
ALP BLD-CCNC: 89 U/L (ref 40–130)
ALT SERPL-CCNC: 34 U/L (ref 0–33)
ANION GAP SERPL CALCULATED.3IONS-SCNC: 12 MEQ/L (ref 9–15)
AST SERPL-CCNC: 27 U/L (ref 0–35)
BILIRUB SERPL-MCNC: 0.5 MG/DL (ref 0.2–0.7)
BUN BLDV-MCNC: 11 MG/DL (ref 6–20)
CALCIUM SERPL-MCNC: 9.9 MG/DL (ref 8.5–9.9)
CHLORIDE BLD-SCNC: 100 MEQ/L (ref 95–107)
CO2: 25 MEQ/L (ref 20–31)
CREAT SERPL-MCNC: 0.72 MG/DL (ref 0.5–0.9)
GFR AFRICAN AMERICAN: >60
GFR NON-AFRICAN AMERICAN: >60
GLOBULIN: 3.5 G/DL (ref 2.3–3.5)
GLUCOSE BLD-MCNC: 107 MG/DL (ref 70–99)
GONADOTROPIN, CHORIONIC (HCG) QUANT: 6598 MIU/ML
HCT VFR BLD CALC: 38.3 % (ref 37–47)
HEMOGLOBIN: 12.5 G/DL (ref 12–16)
MCH RBC QN AUTO: 25.6 PG (ref 27–31.3)
MCHC RBC AUTO-ENTMCNC: 32.8 % (ref 33–37)
MCV RBC AUTO: 78 FL (ref 82–100)
PDW BLD-RTO: 15.9 % (ref 11.5–14.5)
PLATELET # BLD: 334 K/UL (ref 130–400)
POTASSIUM SERPL-SCNC: 3.9 MEQ/L (ref 3.4–4.9)
RBC # BLD: 4.9 M/UL (ref 4.2–5.4)
SODIUM BLD-SCNC: 137 MEQ/L (ref 135–144)
TOTAL PROTEIN: 8.1 G/DL (ref 6.3–8)
WBC # BLD: 7.4 K/UL (ref 4.8–10.8)

## 2021-06-05 PROCEDURE — 85027 COMPLETE CBC AUTOMATED: CPT

## 2021-06-05 PROCEDURE — 99283 EMERGENCY DEPT VISIT LOW MDM: CPT

## 2021-06-05 PROCEDURE — 99213 OFFICE O/P EST LOW 20 MIN: CPT | Performed by: OBSTETRICS & GYNECOLOGY

## 2021-06-05 RX ORDER — METHOTREXATE 25 MG/ML
50 INJECTION INTRA-ARTERIAL; INTRAMUSCULAR; INTRATHECAL; INTRAVENOUS ONCE
Status: DISCONTINUED | OUTPATIENT
Start: 2021-06-05 | End: 2021-06-05 | Stop reason: HOSPADM

## 2021-06-05 NOTE — PROCEDURES
Claire Myles is a 23 y.o. female patient. No diagnosis found. Past Medical History:   Diagnosis Date    Mental disorder     depression and anxiety    Postpartum depression      Last menstrual period 03/25/2021. Procedures  Pt returns for counseling due to increasing BHCG  Initial BHCG May 30 6807, plan for MTX injection  Returned June 2 for 2nd injection after BHCG decreased 6409  Returned today for BHCG which increased to [address] [facility] was expecting >15% drop to consider hold on 3rd MTX  However no decrease and did increase slightly  Pt remains asx, LFT and CBC normal  Offered to continue w 3rd injection Day #11 due to increased BHCG and clinically stable as an alternative to surgery  Will schedule BHCG and poss MTX on day #11 (Wednesday, June 9)     However, aware surgery may be needed if failed MTX therapy or symptoms of rupture occur and no longer medically stable for expt management  Requests to schedule surgery if possible for a later date due to family availability  Aware risk of surgery with risk of anes, infx, injury to bowel, bladder, loss of tube/fertility.     Mallorie Nascimento MD  6/5/2021

## 2021-06-05 NOTE — FLOWSHEET NOTE
Dr Manuela Benavidez at the bedside speaking with pt regarding treatment options. CBC drawn as ordered by Dr Griffin Shabazz tolerated well.

## 2021-06-05 NOTE — FLOWSHEET NOTE
Dr Carroll Correia at the bedside plan of care and treatment options reviewed with pt. Pt decided to return Wednesday for more lab work and possible injection then.

## 2021-06-08 ENCOUNTER — OFFICE VISIT (OUTPATIENT)
Dept: OBGYN CLINIC | Age: 26
End: 2021-06-08
Payer: COMMERCIAL

## 2021-06-08 ENCOUNTER — PATIENT MESSAGE (OUTPATIENT)
Dept: OBGYN CLINIC | Age: 26
End: 2021-06-08

## 2021-06-08 VITALS
DIASTOLIC BLOOD PRESSURE: 60 MMHG | BODY MASS INDEX: 48.82 KG/M2 | SYSTOLIC BLOOD PRESSURE: 92 MMHG | HEART RATE: 96 BPM | HEIGHT: 65 IN | WEIGHT: 293 LBS

## 2021-06-08 DIAGNOSIS — O00.102 LEFT TUBAL PREGNANCY WITHOUT INTRAUTERINE PREGNANCY: ICD-10-CM

## 2021-06-08 DIAGNOSIS — O00.102 LEFT TUBAL PREGNANCY WITHOUT INTRAUTERINE PREGNANCY: Primary | ICD-10-CM

## 2021-06-08 LAB — GONADOTROPIN, CHORIONIC (HCG) QUANT: 4709 MIU/ML

## 2021-06-08 PROCEDURE — G8427 DOCREV CUR MEDS BY ELIG CLIN: HCPCS | Performed by: OBSTETRICS & GYNECOLOGY

## 2021-06-08 PROCEDURE — 1036F TOBACCO NON-USER: CPT | Performed by: OBSTETRICS & GYNECOLOGY

## 2021-06-08 PROCEDURE — 99203 OFFICE O/P NEW LOW 30 MIN: CPT | Performed by: OBSTETRICS & GYNECOLOGY

## 2021-06-08 PROCEDURE — G8417 CALC BMI ABV UP PARAM F/U: HCPCS | Performed by: OBSTETRICS & GYNECOLOGY

## 2021-06-08 RX ORDER — POLYETHYLENE GLYCOL 3350 17 G/17G
POWDER, FOR SOLUTION ORAL
Qty: 1020 G | Refills: 0 | Status: SHIPPED | OUTPATIENT
Start: 2021-06-08 | End: 2022-01-25

## 2021-06-08 NOTE — PROGRESS NOTES
for a second surgery and that there may be an incomplete removal of abnormal tissue. Discussed all risks and benefits of procedure in detail including risks of anesthesia, blood loss, need for transfusion, infection;  also potential for complication, injury, need for repair and/or removal of uterus, tubes, ovaries, bowel, bladder, ureters, blood vessels and nerves. Also discussed pre-operative and post-operative expectations. Patient verbalizes understanding. Orders Placed This Encounter   Procedures    HCG, Quantitative, Pregnancy     Standing Status:   Future     Standing Expiration Date:   6/8/2022     Orders Placed This Encounter   Medications    polyethylene glycol (MIRALAX) 17 GM/SCOOP powder     Sig: Use ONE CAPEFUL at 10 am and then 2 pm on day prior to procedure     Dispense:  1020 g     Refill:  0       Follow Up:  Return for scheduled for surgery.         Dot Robles MD

## 2021-06-08 NOTE — TELEPHONE ENCOUNTER
Per Dr. Jarred Warren, pt was contacted to come in tomorrow for HCG level. Lost connection with pt during call, vm picked up upon calling back.

## 2021-06-08 NOTE — TELEPHONE ENCOUNTER
She was seen today for hospital follow up. Possible ectopic. Hcg quant done today. Scheduled for procedure 6/10/21.

## 2021-06-08 NOTE — TELEPHONE ENCOUNTER
From: Lelia Ortiz  To:  Triny Kingsley MD  Sent: 6/8/2021 3:20 PM EDT  Subject: Non-Urgent Medical Question    I just passed this what could it possibly mean

## 2021-06-09 ENCOUNTER — HOSPITAL ENCOUNTER (EMERGENCY)
Age: 26
Discharge: HOME | End: 2021-06-09
Attending: EMERGENCY MEDICINE
Payer: COMMERCIAL

## 2021-06-09 VITALS
DIASTOLIC BLOOD PRESSURE: 42 MMHG | SYSTOLIC BLOOD PRESSURE: 103 MMHG | BODY MASS INDEX: 48.82 KG/M2 | WEIGHT: 293 LBS | RESPIRATION RATE: 18 BRPM | TEMPERATURE: 98.6 F | OXYGEN SATURATION: 92 % | HEIGHT: 65 IN | HEART RATE: 99 BPM

## 2021-06-09 DIAGNOSIS — O00.109 TUBAL PREGNANCY WITHOUT INTRAUTERINE PREGNANCY, UNSPECIFIED LATERALITY: Primary | ICD-10-CM

## 2021-06-09 LAB
ALBUMIN SERPL-MCNC: 3.9 G/DL (ref 3.5–4.6)
ALP BLD-CCNC: 76 U/L (ref 40–130)
ALT SERPL-CCNC: 14 U/L (ref 0–33)
ANION GAP SERPL CALCULATED.3IONS-SCNC: 10 MEQ/L (ref 9–15)
AST SERPL-CCNC: 13 U/L (ref 0–35)
BASOPHILS ABSOLUTE: 0 K/UL (ref 0–0.2)
BASOPHILS RELATIVE PERCENT: 0.3 %
BILIRUB SERPL-MCNC: 0.4 MG/DL (ref 0.2–0.7)
BUN BLDV-MCNC: 10 MG/DL (ref 6–20)
CALCIUM SERPL-MCNC: 9.5 MG/DL (ref 8.5–9.9)
CHLORIDE BLD-SCNC: 105 MEQ/L (ref 95–107)
CO2: 25 MEQ/L (ref 20–31)
CREAT SERPL-MCNC: 0.68 MG/DL (ref 0.5–0.9)
EOSINOPHILS ABSOLUTE: 0.2 K/UL (ref 0–0.7)
EOSINOPHILS RELATIVE PERCENT: 1.6 %
GFR AFRICAN AMERICAN: >60
GFR NON-AFRICAN AMERICAN: >60
GLOBULIN: 3.1 G/DL (ref 2.3–3.5)
GLUCOSE BLD-MCNC: 101 MG/DL (ref 70–99)
GONADOTROPIN, CHORIONIC (HCG) QUANT: 3537 MIU/ML
HCT VFR BLD CALC: 35.6 % (ref 37–47)
HEMOGLOBIN: 11.5 G/DL (ref 12–16)
INR BLD: 1
LYMPHOCYTES ABSOLUTE: 1.2 K/UL (ref 1–4.8)
LYMPHOCYTES RELATIVE PERCENT: 9.6 %
MCH RBC QN AUTO: 25.4 PG (ref 27–31.3)
MCHC RBC AUTO-ENTMCNC: 32.4 % (ref 33–37)
MCV RBC AUTO: 78.6 FL (ref 82–100)
MONOCYTES ABSOLUTE: 0.5 K/UL (ref 0.2–0.8)
MONOCYTES RELATIVE PERCENT: 4 %
NEUTROPHILS ABSOLUTE: 10.9 K/UL (ref 1.4–6.5)
NEUTROPHILS RELATIVE PERCENT: 84.5 %
PDW BLD-RTO: 16.1 % (ref 11.5–14.5)
PLATELET # BLD: 257 K/UL (ref 130–400)
POTASSIUM SERPL-SCNC: 4.2 MEQ/L (ref 3.4–4.9)
PROTHROMBIN TIME: 13.5 SEC (ref 12.3–14.9)
RBC # BLD: 4.52 M/UL (ref 4.2–5.4)
SODIUM BLD-SCNC: 140 MEQ/L (ref 135–144)
TOTAL PROTEIN: 7 G/DL (ref 6.3–8)
WBC # BLD: 12.9 K/UL (ref 4.8–10.8)

## 2021-06-09 PROCEDURE — 85610 PROTHROMBIN TIME: CPT

## 2021-06-09 PROCEDURE — 84702 CHORIONIC GONADOTROPIN TEST: CPT

## 2021-06-09 PROCEDURE — 2580000003 HC RX 258: Performed by: EMERGENCY MEDICINE

## 2021-06-09 PROCEDURE — 80053 COMPREHEN METABOLIC PANEL: CPT

## 2021-06-09 PROCEDURE — 85025 COMPLETE CBC W/AUTO DIFF WBC: CPT

## 2021-06-09 PROCEDURE — 99283 EMERGENCY DEPT VISIT LOW MDM: CPT

## 2021-06-09 PROCEDURE — 36415 COLL VENOUS BLD VENIPUNCTURE: CPT

## 2021-06-09 RX ORDER — 0.9 % SODIUM CHLORIDE 0.9 %
1000 INTRAVENOUS SOLUTION INTRAVENOUS ONCE
Status: COMPLETED | OUTPATIENT
Start: 2021-06-09 | End: 2021-06-09

## 2021-06-09 RX ORDER — HYDROCODONE BITARTRATE AND ACETAMINOPHEN 5; 325 MG/1; MG/1
1 TABLET ORAL EVERY 6 HOURS PRN
Qty: 10 TABLET | Refills: 0 | Status: SHIPPED | OUTPATIENT
Start: 2021-06-09 | End: 2021-06-12

## 2021-06-09 RX ADMIN — SODIUM CHLORIDE 1000 ML: 9 INJECTION, SOLUTION INTRAVENOUS at 11:42

## 2021-06-09 ASSESSMENT — ENCOUNTER SYMPTOMS
NAUSEA: 0
SORE THROAT: 0
CHEST TIGHTNESS: 0
ABDOMINAL PAIN: 0
VOMITING: 0
SHORTNESS OF BREATH: 0
EYE PAIN: 0

## 2021-06-09 ASSESSMENT — PAIN SCALES - GENERAL: PAINLEVEL_OUTOF10: 5

## 2021-06-09 ASSESSMENT — PAIN DESCRIPTION - PAIN TYPE: TYPE: ACUTE PAIN

## 2021-06-09 ASSESSMENT — PAIN DESCRIPTION - ORIENTATION: ORIENTATION: LEFT;LOWER

## 2021-06-09 ASSESSMENT — PAIN DESCRIPTION - DESCRIPTORS: DESCRIPTORS: CRAMPING

## 2021-06-09 ASSESSMENT — PAIN DESCRIPTION - LOCATION: LOCATION: ABDOMEN

## 2021-06-09 NOTE — ED NOTES
Pt resting on cart skin pink w/d resp non labored. Awaiting dispo     Carina Cheung.  Michelle Tolliver RN  06/09/21 [id number]

## 2021-06-09 NOTE — ED NOTES
PATIENT PRESENTS TO THE ER WITH COMPLAINTS OF ABDOMINAL PAIN AND VAGINAL BLEEDING DUE TO ECTOPIC PREGNANCY   PT IS SCHEDULED FOR SURGERY TOMORROW WITH DR Shannan Bob  SHE HAS HAD TWO SHOTS DOSES OF METHOTREXATE  BLEEDING AND CRAMPING STARTED YESTERDAY   PATIENT IS NOT WEARING A PAD OR TAMPON BUT STATES THAT EVERY TIME SHE USES THE BATHROOM SHE SATURATES A BABY WIPE WITH BRIGHT RED BLOOD   PATIENT RECEIVED 0.5 MG OF DILUADID AND 4MG OF [address] ON THE WAY TO THE ER   STATES PAIN IS 5/10     Jalen Squires RN  06/09/21 [id number]

## 2021-06-09 NOTE — ED PROVIDER NOTES
[facility] ED  EMERGENCY DEPARTMENT ENCOUNTER      Pt Name: Shruthi Bartholomew  MRN: [id number]  [place] 1995  Date of evaluation: 2021  Provider: Beti Rome, [address] [place] [address]       Chief Complaint   Patient presents with    Vaginal Bleeding         HISTORY OF PRESENT ILLNESS   (Location/Symptom, Timing/Onset, Context/Setting, Quality, Duration, Modifying Factors, Severity)  Note limiting factors. Shruthi Bartholomew is a 23 y.o. female who presents to the emergency department . Patient presents with severe pain left side of her pelvis. Patient has known ectopic pregnancy on the left. Scheduled for surgery tomorrow by Dr. Rashid Gutierrez. Patient is  4 para 2-0-0-2. This is her second ectopic pregnancy on the left. Surgery tomorrow is scheduled to remove the tube and possibly the left ovary as well. Patient states that she is here today because the pain became unbearable. Pain was not really a big issue before. Patient was given Zofran and Dilaudid by EMS. Patient feels a little bit dizzy. Did not feel dizzy when she saw OB/GYN yesterday in the office and her blood pressure was 92 systolic. HPI    Nursing Notes were reviewed. REVIEW OF SYSTEMS    (2-9 systems for level 4, 10 or more for level 5)     Review of Systems   Constitutional: Negative for activity change, appetite change and fatigue. HENT: Negative for congestion and sore throat. Eyes: Negative for pain and visual disturbance. Respiratory: Negative for chest tightness and shortness of breath. Cardiovascular: Negative for chest pain. Gastrointestinal: Negative for abdominal pain, nausea and vomiting. Endocrine: Negative for polydipsia. Genitourinary: Positive for pelvic pain and vaginal bleeding. Negative for flank pain and urgency. Musculoskeletal: Negative for gait problem and neck stiffness. Skin: Negative for rash. Neurological: Negative for weakness, light-headedness and headaches. Psychiatric/Behavioral: Negative for confusion and sleep disturbance. Except as noted above the remainder of the review of systems was reviewed and negative. PAST MEDICAL HISTORY     Past Medical History:   Diagnosis Date    Mental disorder     depression and anxiety    Postpartum depression          SURGICAL HISTORY       Past Surgical History:   Procedure Laterality Date    UPPER GASTROINTESTINAL ENDOSCOPY  2020    WISDOM TOOTH EXTRACTION  2017    WISDOM TOOTH EXTRACTION  2019         CURRENT MEDICATIONS       Previous Medications    ACETAMINOPHEN (AMINOFEN) 325 MG TABLET    Take 2 tablets by mouth every 4 hours as needed for Pain    ARIPIPRAZOLE (ABILIFY) 20 MG TABLET    TAKE 1 TABLET BY MOUTH IN THE EVENING AS DIRECTED FOR DEPRESSION MOOD    ASCORBIC ACID (VITAMIN C) 250 MG TABLET    TAKE 1 TABLET BY MOUTH EVERY DAY WITH IRON    B COMPLEX CAPS    TAKE 1 CAPSULE BY MOUTH EVERY DAY    BENZTROPINE (COGENTIN) 1 MG TABLET    TAKE 1 TABLET BY MOUTH ONCE DAILY AS DIRECTED FOR SIDE EFFECTS    ESOMEPRAZOLE (NEXIUM) 20 MG DELAYED RELEASE CAPSULE    Take 1 capsule by mouth daily    FAMOTIDINE (PEPCID) 20 MG TABLET    Take 1 tablet by mouth 2 times daily    GABAPENTIN (NEURONTIN) 400 MG CAPSULE    Take by mouth. METFORMIN (GLUCOPHAGE) 500 MG TABLET    TAKE 1 TABLET BY MOUTH ONCE DAILY WITH FOOD *EMERGENCY REFIL*    POLYETHYLENE GLYCOL (MIRALAX) 17 GM/SCOOP POWDER    Use ONE CAPEFUL at 10 am and then 2 pm on day prior to procedure    SERTRALINE (ZOLOFT) 100 MG TABLET    TAKE 2 TABLETS BY MOUTH ONCE DAILY AT NIGHT AS DIRECTED FOR DEPRESSION ANXIETY    VRAYLAR 1.5 MG CAPSULE    TAKE 1 CAPSULE BY MOUTH IN THE MORNING AS DIRECTED FOR MOOD       ALLERGIES     Patient has no known allergies.     FAMILY HISTORY       Family History   Problem Relation Age of Onset    Diabetes Mother     Cancer Maternal Grandfather     Celiac Disease Neg Hx     Colon Cancer Neg Hx     Crohn's Disease Neg Hx           SOCIAL HISTORY       Social History     Socioeconomic History    Marital status: Single     Spouse name: None    Number of children: None    Years of education: None    Highest education level: None   Occupational History    None   Tobacco Use    Smoking status: Former Smoker     Quit date: 11/10/2017     Years since quitting: 3.5    Smokeless tobacco: Never Used   Vaping Use    Vaping Use: Never used   Substance and Sexual Activity    Alcohol use: Yes     Comment: rare    Drug use: Never    Sexual activity: Yes     Partners: Male   Other Topics Concern    None   Social History Narrative    None     Social Determinants of Health     Financial Resource Strain:     Difficulty of Paying Living Expenses:    Food Insecurity:     Worried About Running Out of Food in the Last Year:     [address] in the Last Year:    Transportation Needs:     Lack of Transportation (Medical):  Lack of Transportation (Non-Medical):    Physical Activity:     Days of Exercise per Week:     Minutes of Exercise per Session:    Stress:     Feeling of Stress :    Social Connections:     Frequency of Communication with Friends and Family:     Frequency of Social Gatherings with Friends and Family:     Attends Jain Services:     Active Member of Clubs or Organizations:     Attends Club or Organization Meetings:     Marital Status:    Intimate Partner Violence:     Fear of Current or Ex-Partner:     Emotionally Abused:     Physically Abused:     Sexually Abused:        SCREENINGS                        PHYSICAL EXAM    (up to 7 for level 4, 8 or more for level 5)     ED Triage Vitals   BP Temp Temp Source Pulse Resp SpO2 Height Weight   06/09/21 1144 06/09/21 1143 06/09/21 1143 06/09/21 1143 06/09/21 1143 06/09/21 1143 06/09/21 1143 06/09/21 1143   99/62 98.6 °F (37 °C) Oral 103 18 100 % 5' 5\" (1.651 m) (!) 388 lb (176 kg)       Physical Exam  Vitals and nursing note reviewed.    Constitutional:       General: She is not in acute distress. Appearance: She is well-developed. She is obese. She is not ill-appearing or diaphoretic. HENT:      Head: Normocephalic and atraumatic. Right Ear: External ear normal.      Left Ear: External ear normal.      Mouth/Throat:      Pharynx: No oropharyngeal exudate. Eyes:      Conjunctiva/sclera: Conjunctivae normal.      Pupils: Pupils are equal, round, and reactive to light. Neck:      Thyroid: No thyromegaly. Vascular: No JVD. Trachea: No tracheal deviation. Cardiovascular:      Rate and Rhythm: Normal rate. Heart sounds: Normal heart sounds. No murmur heard. Pulmonary:      Effort: Pulmonary effort is normal. No respiratory distress. Breath sounds: Normal breath sounds. No wheezing. Abdominal:      General: Bowel sounds are normal.      Palpations: Abdomen is soft. Tenderness: There is abdominal tenderness. There is no guarding. Comments: Left pelvic region. No guarding or rebound. Musculoskeletal:         General: Normal range of motion. Cervical back: Normal range of motion and neck supple. Skin:     General: Skin is warm and dry. Findings: No rash. Neurological:      Mental Status: She is alert and oriented to person, place, and time. Cranial Nerves: No cranial nerve deficit.    Psychiatric:         Behavior: Behavior normal.         DIAGNOSTIC RESULTS     EKG: All EKG's are interpreted by the Emergency Department Physician who either signs or Co-signs this chart in the absence of a cardiologist.        RADIOLOGY:   Non-plain film images such as CT, Ultrasound and MRI are read by the radiologist. Plain radiographic images are visualized and preliminarily interpreted by the emergency physician with the below findings:        Interpretation per the Radiologist below, if available at the time of this note:    No orders to display         ED BEDSIDE ULTRASOUND:   Performed by ED Physician - none    LABS:  Labs Reviewed HCG, QUANTITATIVE, PREGNANCY   CBC WITH AUTO DIFFERENTIAL   COMPREHENSIVE METABOLIC PANEL   PROTIME-INR       All other labs were within normal range or not returned as of this dictation. EMERGENCY DEPARTMENT COURSE and DIFFERENTIAL DIAGNOSIS/MDM:   Vitals:    Vitals:    06/09/21 1143 06/09/21 1144   BP:  99/62   Pulse: 103    Resp: 18    Temp: 98.6 °F (37 °C)    TempSrc: Oral    SpO2: 100%    Weight: (!) 388 lb (176 kg)    Height: 5' 5\" (1.651 m)        Patient comes in stating that she has increased pelvic pain. Known ectopic. hCG is decreasing. Patient did not appear in severe pain at any point. I did speak to Dr Ileana Terrell about her and it was decided that she could go home since she is already scheduled for surgery the next day. Patient agreed that that would be fine and I did send her home with a small supply of Norco.  Patient had normal vitals normal hemoglobin. MDM      REASSESSMENT          CRITICAL CARE TIME   Total Critical Care time was 0 minutes, excluding separately reportable procedures. There was a high probability of clinically significant/life threatening deterioration in the patient's condition which required my urgent intervention. CONSULTS:  None    PROCEDURES:  Unless otherwise noted below, none     Procedures        FINAL IMPRESSION      1. Tubal pregnancy without intrauterine pregnancy, unspecified laterality          DISPOSITION/PLAN   DISPOSITION        PATIENT REFERRED TO:  Candace Rueda MD  [address] [place] [id number] [phone]    In 1 day        DISCHARGE MEDICATIONS:  Discharge Medication List as of 6/9/2021  2:06 PM      START taking these medications    Details   HYDROcodone-acetaminophen (NORCO) 5-325 MG per tablet Take 1 tablet by mouth every 6 hours as needed for Pain for up to 3 days. , Disp-10 tablet, R-0Print           Controlled Substances Monitoring:     No flowsheet data found.     (Please note that portions of this note were completed with a voice recognition program.  Efforts were made to edit the dictations but occasionally words are mis-transcribed.)    Lou Fowler DO (electronically signed)  Attending Emergency Physician            Lou Fowler DO  06/11/21 8521

## 2021-06-09 NOTE — ED NOTES
Bed: 07  Expected date: 6/9/21  Expected time: 11:23 AM  Means of arrival: [facility]  Comments:  23 F - ectopic pregnancy. Scheduled for surgery tomorrow. Has been given methotrexate x2 doses. Last oral intake 0930 today. IV. Given Zorfan 4mg and fluids running. 120/67,16,102,99% RA.  JON Leblanc 49, [facility]  06/09/21 114

## 2021-06-10 ENCOUNTER — ANESTHESIA EVENT (OUTPATIENT)
Dept: OPERATING ROOM | Age: 26
End: 2021-06-10
Payer: COMMERCIAL

## 2021-06-10 ENCOUNTER — ANESTHESIA (OUTPATIENT)
Dept: OPERATING ROOM | Age: 26
End: 2021-06-10
Payer: COMMERCIAL

## 2021-06-10 ENCOUNTER — HOSPITAL ENCOUNTER (OUTPATIENT)
Age: 26
Setting detail: OUTPATIENT SURGERY
Discharge: HOME | End: 2021-06-10
Attending: OBSTETRICS & GYNECOLOGY | Admitting: OBSTETRICS & GYNECOLOGY
Payer: COMMERCIAL

## 2021-06-10 VITALS
BODY MASS INDEX: 48.82 KG/M2 | TEMPERATURE: 98.5 F | OXYGEN SATURATION: 97 % | WEIGHT: 293 LBS | SYSTOLIC BLOOD PRESSURE: 112 MMHG | HEART RATE: 92 BPM | DIASTOLIC BLOOD PRESSURE: 62 MMHG | HEIGHT: 65 IN | RESPIRATION RATE: 16 BRPM

## 2021-06-10 VITALS — DIASTOLIC BLOOD PRESSURE: 82 MMHG | SYSTOLIC BLOOD PRESSURE: 136 MMHG | TEMPERATURE: 67.5 F | OXYGEN SATURATION: 96 %

## 2021-06-10 DIAGNOSIS — G89.18 POSTOPERATIVE PAIN: Primary | ICD-10-CM

## 2021-06-10 LAB — SARS-COV-2, NAAT: NOT DETECTED

## 2021-06-10 PROCEDURE — 6360000002 HC RX W HCPCS: Performed by: OBSTETRICS & GYNECOLOGY

## 2021-06-10 PROCEDURE — 3700000001 HC ADD 15 MINUTES (ANESTHESIA): Performed by: OBSTETRICS & GYNECOLOGY

## 2021-06-10 PROCEDURE — 6370000000 HC RX 637 (ALT 250 FOR IP): Performed by: OBSTETRICS & GYNECOLOGY

## 2021-06-10 PROCEDURE — 7100000000 HC PACU RECOVERY - FIRST 15 MIN: Performed by: OBSTETRICS & GYNECOLOGY

## 2021-06-10 PROCEDURE — 2500000003 HC RX 250 WO HCPCS: Performed by: ANESTHESIOLOGY

## 2021-06-10 PROCEDURE — 7100000010 HC PHASE II RECOVERY - FIRST 15 MIN: Performed by: OBSTETRICS & GYNECOLOGY

## 2021-06-10 PROCEDURE — 87635 SARS-COV-2 COVID-19 AMP PRB: CPT

## 2021-06-10 PROCEDURE — 2720000010 HC SURG SUPPLY STERILE: Performed by: OBSTETRICS & GYNECOLOGY

## 2021-06-10 PROCEDURE — 6360000002 HC RX W HCPCS: Performed by: ANESTHESIOLOGY

## 2021-06-10 PROCEDURE — 7100000011 HC PHASE II RECOVERY - ADDTL 15 MIN: Performed by: OBSTETRICS & GYNECOLOGY

## 2021-06-10 PROCEDURE — 64488 TAP BLOCK BI INJECTION: CPT | Performed by: ANESTHESIOLOGY

## 2021-06-10 PROCEDURE — 2709999900 HC NON-CHARGEABLE SUPPLY: Performed by: OBSTETRICS & GYNECOLOGY

## 2021-06-10 PROCEDURE — 2580000003 HC RX 258: Performed by: OBSTETRICS & GYNECOLOGY

## 2021-06-10 PROCEDURE — 88305 TISSUE EXAM BY PATHOLOGIST: CPT

## 2021-06-10 PROCEDURE — 2580000003 HC RX 258: Performed by: ANESTHESIOLOGY

## 2021-06-10 PROCEDURE — 3600000004 HC SURGERY LEVEL 4 BASE: Performed by: OBSTETRICS & GYNECOLOGY

## 2021-06-10 PROCEDURE — 3600000014 HC SURGERY LEVEL 4 ADDTL 15MIN: Performed by: OBSTETRICS & GYNECOLOGY

## 2021-06-10 PROCEDURE — 59151 TREAT ECTOPIC PREGNANCY: CPT | Performed by: OBSTETRICS & GYNECOLOGY

## 2021-06-10 PROCEDURE — C1760 CLOSURE DEV, VASC: HCPCS | Performed by: OBSTETRICS & GYNECOLOGY

## 2021-06-10 PROCEDURE — 3700000000 HC ANESTHESIA ATTENDED CARE: Performed by: OBSTETRICS & GYNECOLOGY

## 2021-06-10 PROCEDURE — 7100000001 HC PACU RECOVERY - ADDTL 15 MIN: Performed by: OBSTETRICS & GYNECOLOGY

## 2021-06-10 RX ORDER — SODIUM CHLORIDE 9 MG/ML
25 INJECTION, SOLUTION INTRAVENOUS PRN
Status: DISCONTINUED | OUTPATIENT
Start: 2021-06-10 | End: 2021-06-10 | Stop reason: HOSPADM

## 2021-06-10 RX ORDER — HYDROCODONE BITARTRATE AND ACETAMINOPHEN 5; 325 MG/1; MG/1
2 TABLET ORAL PRN
Status: DISCONTINUED | OUTPATIENT
Start: 2021-06-10 | End: 2021-06-10 | Stop reason: HOSPADM

## 2021-06-10 RX ORDER — POLYETHYLENE GLYCOL 3350 17 G/17G
17 POWDER, FOR SOLUTION ORAL 2 TIMES DAILY PRN
Qty: 1020 G | Refills: 1 | Status: SHIPPED | OUTPATIENT
Start: 2021-06-10 | End: 2021-07-10

## 2021-06-10 RX ORDER — MIDAZOLAM HYDROCHLORIDE 1 MG/ML
INJECTION INTRAMUSCULAR; INTRAVENOUS PRN
Status: DISCONTINUED | OUTPATIENT
Start: 2021-06-10 | End: 2021-06-10 | Stop reason: SDUPTHER

## 2021-06-10 RX ORDER — FENTANYL CITRATE 50 UG/ML
50 INJECTION, SOLUTION INTRAMUSCULAR; INTRAVENOUS EVERY 10 MIN PRN
Status: DISCONTINUED | OUTPATIENT
Start: 2021-06-10 | End: 2021-06-10 | Stop reason: HOSPADM

## 2021-06-10 RX ORDER — LIDOCAINE HYDROCHLORIDE 20 MG/ML
JELLY TOPICAL PRN
Status: DISCONTINUED | OUTPATIENT
Start: 2021-06-10 | End: 2021-06-10

## 2021-06-10 RX ORDER — OXYCODONE HYDROCHLORIDE AND ACETAMINOPHEN 5; 325 MG/1; MG/1
1 TABLET ORAL EVERY 4 HOURS PRN
Status: DISCONTINUED | OUTPATIENT
Start: 2021-06-10 | End: 2021-06-10 | Stop reason: HOSPADM

## 2021-06-10 RX ORDER — DIPHENHYDRAMINE HYDROCHLORIDE 50 MG/ML
12.5 INJECTION INTRAMUSCULAR; INTRAVENOUS
Status: DISCONTINUED | OUTPATIENT
Start: 2021-06-10 | End: 2021-06-10

## 2021-06-10 RX ORDER — SODIUM CHLORIDE 0.9 % (FLUSH) 0.9 %
10 SYRINGE (ML) INJECTION PRN
Status: DISCONTINUED | OUTPATIENT
Start: 2021-06-10 | End: 2021-06-10

## 2021-06-10 RX ORDER — ROCURONIUM BROMIDE 10 MG/ML
INJECTION, SOLUTION INTRAVENOUS PRN
Status: DISCONTINUED | OUTPATIENT
Start: 2021-06-10 | End: 2021-06-10 | Stop reason: SDUPTHER

## 2021-06-10 RX ORDER — ROPIVACAINE HYDROCHLORIDE 5 MG/ML
INJECTION, SOLUTION EPIDURAL; INFILTRATION; PERINEURAL PRN
Status: DISCONTINUED | OUTPATIENT
Start: 2021-06-10 | End: 2021-06-10 | Stop reason: SDUPTHER

## 2021-06-10 RX ORDER — LIDOCAINE HYDROCHLORIDE 20 MG/ML
INJECTION, SOLUTION EPIDURAL; INFILTRATION; INTRACAUDAL; PERINEURAL PRN
Status: DISCONTINUED | OUTPATIENT
Start: 2021-06-10 | End: 2021-06-10 | Stop reason: SDUPTHER

## 2021-06-10 RX ORDER — DIPHENHYDRAMINE HYDROCHLORIDE 50 MG/ML
12.5 INJECTION INTRAMUSCULAR; INTRAVENOUS
Status: DISCONTINUED | OUTPATIENT
Start: 2021-06-10 | End: 2021-06-10 | Stop reason: HOSPADM

## 2021-06-10 RX ORDER — HYDROCODONE BITARTRATE AND ACETAMINOPHEN 5; 325 MG/1; MG/1
2 TABLET ORAL PRN
Status: DISCONTINUED | OUTPATIENT
Start: 2021-06-10 | End: 2021-06-10

## 2021-06-10 RX ORDER — ONDANSETRON 2 MG/ML
INJECTION INTRAMUSCULAR; INTRAVENOUS PRN
Status: DISCONTINUED | OUTPATIENT
Start: 2021-06-10 | End: 2021-06-10 | Stop reason: SDUPTHER

## 2021-06-10 RX ORDER — METOCLOPRAMIDE HYDROCHLORIDE 5 MG/ML
10 INJECTION INTRAMUSCULAR; INTRAVENOUS
Status: DISCONTINUED | OUTPATIENT
Start: 2021-06-10 | End: 2021-06-10

## 2021-06-10 RX ORDER — SODIUM CHLORIDE 0.9 % (FLUSH) 0.9 %
5-40 SYRINGE (ML) INJECTION EVERY 12 HOURS SCHEDULED
Status: DISCONTINUED | OUTPATIENT
Start: 2021-06-10 | End: 2021-06-10 | Stop reason: HOSPADM

## 2021-06-10 RX ORDER — SODIUM CHLORIDE 0.9 % (FLUSH) 0.9 %
10 SYRINGE (ML) INJECTION EVERY 12 HOURS SCHEDULED
Status: DISCONTINUED | OUTPATIENT
Start: 2021-06-10 | End: 2021-06-10

## 2021-06-10 RX ORDER — ONDANSETRON 4 MG/1
4 TABLET, FILM COATED ORAL EVERY 6 HOURS PRN
Qty: 30 TABLET | Refills: 1 | Status: SHIPPED | OUTPATIENT
Start: 2021-06-10

## 2021-06-10 RX ORDER — ONDANSETRON 2 MG/ML
4 INJECTION INTRAMUSCULAR; INTRAVENOUS EVERY 6 HOURS PRN
Status: DISCONTINUED | OUTPATIENT
Start: 2021-06-10 | End: 2021-06-10 | Stop reason: HOSPADM

## 2021-06-10 RX ORDER — SODIUM CHLORIDE, SODIUM LACTATE, POTASSIUM CHLORIDE, CALCIUM CHLORIDE 600; 310; 30; 20 MG/100ML; MG/100ML; MG/100ML; MG/100ML
INJECTION, SOLUTION INTRAVENOUS CONTINUOUS
Status: DISCONTINUED | OUTPATIENT
Start: 2021-06-10 | End: 2021-06-10

## 2021-06-10 RX ORDER — ONDANSETRON 2 MG/ML
4 INJECTION INTRAMUSCULAR; INTRAVENOUS
Status: DISCONTINUED | OUTPATIENT
Start: 2021-06-10 | End: 2021-06-10

## 2021-06-10 RX ORDER — OXYCODONE HYDROCHLORIDE AND ACETAMINOPHEN 5; 325 MG/1; MG/1
2 TABLET ORAL EVERY 4 HOURS PRN
Status: DISCONTINUED | OUTPATIENT
Start: 2021-06-10 | End: 2021-06-10 | Stop reason: HOSPADM

## 2021-06-10 RX ORDER — PROPOFOL 10 MG/ML
INJECTION, EMULSION INTRAVENOUS PRN
Status: DISCONTINUED | OUTPATIENT
Start: 2021-06-10 | End: 2021-06-10 | Stop reason: SDUPTHER

## 2021-06-10 RX ORDER — OXYCODONE HYDROCHLORIDE AND ACETAMINOPHEN 5; 325 MG/1; MG/1
2 TABLET ORAL NIGHTLY PRN
Qty: 10 TABLET | Refills: 0 | Status: SHIPPED | OUTPATIENT
Start: 2021-06-10 | End: 2021-06-15

## 2021-06-10 RX ORDER — DOCUSATE SODIUM 100 MG/1
100 CAPSULE, LIQUID FILLED ORAL 2 TIMES DAILY PRN
Qty: 60 CAPSULE | Refills: 2 | Status: SHIPPED | OUTPATIENT
Start: 2021-06-10

## 2021-06-10 RX ORDER — HYDROCODONE BITARTRATE AND ACETAMINOPHEN 5; 325 MG/1; MG/1
1 TABLET ORAL PRN
Status: DISCONTINUED | OUTPATIENT
Start: 2021-06-10 | End: 2021-06-10 | Stop reason: HOSPADM

## 2021-06-10 RX ORDER — FENTANYL CITRATE 50 UG/ML
INJECTION, SOLUTION INTRAMUSCULAR; INTRAVENOUS PRN
Status: DISCONTINUED | OUTPATIENT
Start: 2021-06-10 | End: 2021-06-10 | Stop reason: SDUPTHER

## 2021-06-10 RX ORDER — MEPERIDINE HYDROCHLORIDE 25 MG/ML
12.5 INJECTION INTRAMUSCULAR; INTRAVENOUS; SUBCUTANEOUS EVERY 5 MIN PRN
Status: DISCONTINUED | OUTPATIENT
Start: 2021-06-10 | End: 2021-06-10 | Stop reason: HOSPADM

## 2021-06-10 RX ORDER — ACETAMINOPHEN 500 MG
1000 TABLET ORAL EVERY 6 HOURS PRN
Qty: 60 TABLET | Refills: 1 | Status: SHIPPED | OUTPATIENT
Start: 2021-06-10 | End: 2022-01-17 | Stop reason: SDUPTHER

## 2021-06-10 RX ORDER — LIDOCAINE HYDROCHLORIDE 10 MG/ML
1 INJECTION, SOLUTION EPIDURAL; INFILTRATION; INTRACAUDAL; PERINEURAL
Status: DISCONTINUED | OUTPATIENT
Start: 2021-06-10 | End: 2021-06-10

## 2021-06-10 RX ORDER — MAGNESIUM HYDROXIDE 1200 MG/15ML
LIQUID ORAL CONTINUOUS PRN
Status: DISCONTINUED | OUTPATIENT
Start: 2021-06-10 | End: 2021-06-10

## 2021-06-10 RX ORDER — SODIUM CHLORIDE 0.9 % (FLUSH) 0.9 %
5-40 SYRINGE (ML) INJECTION PRN
Status: DISCONTINUED | OUTPATIENT
Start: 2021-06-10 | End: 2021-06-10 | Stop reason: HOSPADM

## 2021-06-10 RX ORDER — MEPERIDINE HYDROCHLORIDE 25 MG/ML
12.5 INJECTION INTRAMUSCULAR; INTRAVENOUS; SUBCUTANEOUS EVERY 5 MIN PRN
Status: DISCONTINUED | OUTPATIENT
Start: 2021-06-10 | End: 2021-06-10

## 2021-06-10 RX ORDER — HYDROCODONE BITARTRATE AND ACETAMINOPHEN 5; 325 MG/1; MG/1
1 TABLET ORAL PRN
Status: DISCONTINUED | OUTPATIENT
Start: 2021-06-10 | End: 2021-06-10

## 2021-06-10 RX ORDER — ONDANSETRON 2 MG/ML
4 INJECTION INTRAMUSCULAR; INTRAVENOUS
Status: DISCONTINUED | OUTPATIENT
Start: 2021-06-10 | End: 2021-06-10 | Stop reason: HOSPADM

## 2021-06-10 RX ORDER — SODIUM CHLORIDE 9 MG/ML
25 INJECTION, SOLUTION INTRAVENOUS PRN
Status: DISCONTINUED | OUTPATIENT
Start: 2021-06-10 | End: 2021-06-10

## 2021-06-10 RX ORDER — METOCLOPRAMIDE HYDROCHLORIDE 5 MG/ML
10 INJECTION INTRAMUSCULAR; INTRAVENOUS
Status: DISCONTINUED | OUTPATIENT
Start: 2021-06-10 | End: 2021-06-10 | Stop reason: HOSPADM

## 2021-06-10 RX ORDER — ONDANSETRON 4 MG/1
4 TABLET, ORALLY DISINTEGRATING ORAL EVERY 8 HOURS PRN
Status: DISCONTINUED | OUTPATIENT
Start: 2021-06-10 | End: 2021-06-10 | Stop reason: HOSPADM

## 2021-06-10 RX ORDER — SIMETHICONE 80 MG
80 TABLET,CHEWABLE ORAL 4 TIMES DAILY PRN
Qty: 180 TABLET | Refills: 1 | Status: SHIPPED | OUTPATIENT
Start: 2021-06-10 | End: 2022-01-25

## 2021-06-10 RX ORDER — KETOROLAC TROMETHAMINE 30 MG/ML
30 INJECTION, SOLUTION INTRAMUSCULAR; INTRAVENOUS EVERY 6 HOURS
Status: DISCONTINUED | OUTPATIENT
Start: 2021-06-10 | End: 2021-06-10 | Stop reason: HOSPADM

## 2021-06-10 RX ORDER — IBUPROFEN 800 MG/1
800 TABLET ORAL EVERY 6 HOURS PRN
Qty: 60 TABLET | Refills: 0 | Status: SHIPPED | OUTPATIENT
Start: 2021-06-10 | End: 2022-03-18

## 2021-06-10 RX ORDER — ACETAMINOPHEN 325 MG/1
650 TABLET ORAL EVERY 4 HOURS PRN
Status: DISCONTINUED | OUTPATIENT
Start: 2021-06-10 | End: 2021-06-10 | Stop reason: HOSPADM

## 2021-06-10 RX ORDER — FENTANYL CITRATE 50 UG/ML
50 INJECTION, SOLUTION INTRAMUSCULAR; INTRAVENOUS EVERY 10 MIN PRN
Status: DISCONTINUED | OUTPATIENT
Start: 2021-06-10 | End: 2021-06-10

## 2021-06-10 RX ADMIN — MIDAZOLAM HYDROCHLORIDE 2 MG: 2 INJECTION, SOLUTION INTRAMUSCULAR; INTRAVENOUS at 10:22

## 2021-06-10 RX ADMIN — KETOROLAC TROMETHAMINE 30 MG: 30 INJECTION, SOLUTION INTRAMUSCULAR; INTRAVENOUS at 13:01

## 2021-06-10 RX ADMIN — SUGAMMADEX 200 MG: 100 INJECTION, SOLUTION INTRAVENOUS at 11:45

## 2021-06-10 RX ADMIN — ROCURONIUM BROMIDE 30 MG: 10 INJECTION INTRAVENOUS at 11:17

## 2021-06-10 RX ADMIN — LIDOCAINE HYDROCHLORIDE 50 MG: 20 INJECTION, SOLUTION EPIDURAL; INFILTRATION; INTRACAUDAL; PERINEURAL at 10:29

## 2021-06-10 RX ADMIN — SODIUM CHLORIDE, POTASSIUM CHLORIDE, SODIUM LACTATE AND CALCIUM CHLORIDE: 600; 310; 30; 20 INJECTION, SOLUTION INTRAVENOUS at 11:45

## 2021-06-10 RX ADMIN — ROCURONIUM BROMIDE 50 MG: 10 INJECTION INTRAVENOUS at 10:29

## 2021-06-10 RX ADMIN — PROPOFOL 200 MG: 10 INJECTION, EMULSION INTRAVENOUS at 10:29

## 2021-06-10 RX ADMIN — FENTANYL CITRATE 50 MCG: 50 INJECTION, SOLUTION INTRAMUSCULAR; INTRAVENOUS at 12:12

## 2021-06-10 RX ADMIN — ROPIVACAINE HYDROCHLORIDE 75 MG: 5 INJECTION, SOLUTION EPIDURAL; INFILTRATION; PERINEURAL at 10:38

## 2021-06-10 RX ADMIN — FENTANYL CITRATE 50 MCG: 50 INJECTION, SOLUTION INTRAMUSCULAR; INTRAVENOUS at 10:29

## 2021-06-10 RX ADMIN — ONDANSETRON 4 MG: 2 INJECTION INTRAMUSCULAR; INTRAVENOUS at 11:28

## 2021-06-10 RX ADMIN — SODIUM CHLORIDE, POTASSIUM CHLORIDE, SODIUM LACTATE AND CALCIUM CHLORIDE: 600; 310; 30; 20 INJECTION, SOLUTION INTRAVENOUS at 08:31

## 2021-06-10 RX ADMIN — CEFOXITIN SODIUM 2000 MG: 2 POWDER, FOR SOLUTION INTRAVENOUS at 10:39

## 2021-06-10 RX ADMIN — ROPIVACAINE HYDROCHLORIDE 75 MG: 5 INJECTION, SOLUTION EPIDURAL; INFILTRATION; PERINEURAL at 10:41

## 2021-06-10 RX ADMIN — FENTANYL CITRATE 50 MCG: 50 INJECTION, SOLUTION INTRAMUSCULAR; INTRAVENOUS at 12:24

## 2021-06-10 ASSESSMENT — PULMONARY FUNCTION TESTS
PIF_VALUE: 31
PIF_VALUE: 30
PIF_VALUE: 31
PIF_VALUE: 30
PIF_VALUE: 30
PIF_VALUE: 31
PIF_VALUE: 32
PIF_VALUE: 29
PIF_VALUE: 35
PIF_VALUE: 29
PIF_VALUE: 30
PIF_VALUE: 4
PIF_VALUE: 1
PIF_VALUE: 30
PIF_VALUE: 30
PIF_VALUE: 4
PIF_VALUE: 30
PIF_VALUE: 32
PIF_VALUE: 30
PIF_VALUE: 31
PIF_VALUE: 30
PIF_VALUE: 35
PIF_VALUE: 30
PIF_VALUE: 36
PIF_VALUE: 30
PIF_VALUE: 30
PIF_VALUE: 35
PIF_VALUE: 30
PIF_VALUE: 31
PIF_VALUE: 29
PIF_VALUE: 29
PIF_VALUE: 30
PIF_VALUE: 30
PIF_VALUE: 36
PIF_VALUE: 36
PIF_VALUE: 38
PIF_VALUE: 36
PIF_VALUE: 30
PIF_VALUE: 31
PIF_VALUE: 35
PIF_VALUE: 1
PIF_VALUE: 30
PIF_VALUE: 35
PIF_VALUE: 5
PIF_VALUE: 36
PIF_VALUE: 31
PIF_VALUE: 1
PIF_VALUE: 32
PIF_VALUE: 1
PIF_VALUE: 29
PIF_VALUE: 5
PIF_VALUE: 30
PIF_VALUE: 12
PIF_VALUE: 30
PIF_VALUE: 31
PIF_VALUE: 30
PIF_VALUE: 0
PIF_VALUE: 3
PIF_VALUE: 35
PIF_VALUE: 30
PIF_VALUE: 25
PIF_VALUE: 30
PIF_VALUE: 27
PIF_VALUE: 31
PIF_VALUE: 30
PIF_VALUE: 35
PIF_VALUE: 32
PIF_VALUE: 35
PIF_VALUE: 14
PIF_VALUE: 35
PIF_VALUE: 0
PIF_VALUE: 3
PIF_VALUE: 36
PIF_VALUE: 30
PIF_VALUE: 32
PIF_VALUE: 35
PIF_VALUE: 34
PIF_VALUE: 1
PIF_VALUE: 30
PIF_VALUE: 35
PIF_VALUE: 14
PIF_VALUE: 30
PIF_VALUE: 30
PIF_VALUE: 31
PIF_VALUE: 30
PIF_VALUE: 6
PIF_VALUE: 31
PIF_VALUE: 31
PIF_VALUE: 30

## 2021-06-10 ASSESSMENT — PAIN SCALES - GENERAL
PAINLEVEL_OUTOF10: 9
PAINLEVEL_OUTOF10: 7
PAINLEVEL_OUTOF10: 8
PAINLEVEL_OUTOF10: 9

## 2021-06-10 ASSESSMENT — PAIN DESCRIPTION - DESCRIPTORS: DESCRIPTORS: CRAMPING

## 2021-06-10 ASSESSMENT — PAIN - FUNCTIONAL ASSESSMENT: PAIN_FUNCTIONAL_ASSESSMENT: 0-10

## 2021-06-10 NOTE — ANESTHESIA PROCEDURE NOTES
Peripheral Block    Patient location during procedure: pre-op  Start time: 6/10/2021 10:35 AM  End time: 6/10/2021 10:41 AM  Staffing  Performed: anesthesiologist   Anesthesiologist: Kulwinder Garcia MD  Preanesthetic Checklist  Completed: patient identified, IV checked, site marked, risks and benefits discussed, surgical consent, monitors and equipment checked, pre-op evaluation, timeout performed, anesthesia consent given, oxygen available and patient being monitored  Peripheral Block  Patient position: supine  Prep: ChloraPrep  Patient monitoring: cardiac monitor, continuous pulse ox, frequent blood pressure checks and IV access  Block type: TAP  Laterality: bilateral  Injection technique: single-shot  Guidance: ultrasound guided and IP approach, probe cover employed  Local infiltration: lidocaine  Provider prep: mask and sterile gloves  Local infiltration: lidocaine  Needle  Needle type: combined needle/nerve stimulator   Needle gauge: 21 G  Needle length: 10 cm  Needle localization: ultrasound guidance (IP approach, probe cover employed)  Assessment  Injection assessment: negative aspiration for heme, no paresthesia on injection and local visualized surrounding nerve on ultrasound  Paresthesia pain: none  Slow fractionated injection: yes  Hemodynamics: stable  Additional Notes  30cc 0.25% ropivacaine injected each side  Reason for block: post-op pain management

## 2021-06-10 NOTE — PROGRESS NOTES
CLINICAL PHARMACY NOTE: MEDS TO BEDS    Total # of Prescriptions Filled: 6   The following medications were delivered to the patient:  · Peg 3350 17 gram  · Ondansetron 4 mg tab  · Ibuprofen 800 mg tab  · Docusate 100 mg cap  · Acetaminophen ex 500 mg tab  · Oxycodone/Apap 5-325 mg tab    Additional Documentation:

## 2021-06-10 NOTE — ANESTHESIA POSTPROCEDURE EVALUATION
Department of Anesthesiology  Postprocedure Note    Patient: Cathi Gutierrez  MRN: [id number]  YOB: 1995  Date of evaluation: 6/10/2021  Time:  12:01 PM     Procedure Summary     Date: 06/10/21 Room / Location: [address]    Anesthesia Start: 1024 Anesthesia Stop: 1159    Procedure: LAPAROSCOPIC ECTOPIC REMOVAL, LEFT SALPINGECTOMY (Left Abdomen) Diagnosis: (LEFT TUBAL ECTOPIC)    Surgeons: Henderson Klinefelter, MD Responsible Provider: Jody Mares MD    Anesthesia Type: general ASA Status: 3          Anesthesia Type: general    Deodnre Phase I: Deondre Score: 10    Deondre Phase II:      Last vitals: Reviewed and per EMR flowsheets.        Anesthesia Post Evaluation    Patient location during evaluation: PACU  Patient participation: complete - patient participated  Level of consciousness: awake and alert  Airway patency: patent  Nausea & Vomiting: no nausea and no vomiting  Complications: no  Cardiovascular status: blood pressure returned to baseline and hemodynamically stable  Respiratory status: acceptable and nasal cannula  Hydration status: euvolemic

## 2021-06-10 NOTE — BRIEF OP NOTE
Brief Postoperative Note      Patient: Christina Cage  YOB: 1995  MRN: [id number]    Date of Procedure: 6/10/2021    Pre-Op Diagnosis: LEFT TUBAL ECTOPIC    Post-Op Diagnosis: Same       Procedure(s):  LAPAROSCOPIC ECTOPIC REMOVAL, LEFT SALPINGECTOMY    Surgeon(s):  Little Foy MD    Assistant:  First Assistant: Jackeline Martinez    Anesthesia: General    Estimated Blood Loss (mL): Minimal    Complications: None    Specimens:   ID Type Source Tests Collected by Time Destination   A : Left fallopian tube Tissue Fallopian Tube SURGICAL PATHOLOGY Little Foy MD 6/10/2021 1130        Implants:  * No implants in log *      Drains:   Urethral Catheter Non-latex 16 fr (Active)       [REMOVED] Urethral Catheter Non-latex 16 fr (Removed)   Site Assessment Pink 05/30/21 1340   Urine Color Yellow 05/30/21 1340   Urine Appearance Clear 05/30/21 1340       Findings: left distal tubal ectopic pregnancy     Electronically signed by Little Foy MD on 6/10/2021 at 11:37 AM

## 2021-06-10 NOTE — ANESTHESIA PRE PROCEDURE
Department of Anesthesiology  Preprocedure Note       Name:  Amparo Baptiste   Age:  23 y.o.  :  1995                                          MRN:  [id number]         Date:  6/10/2021      Surgeon: Alexandra Leung):  Laurita Pedraza MD    Procedure: Procedure(s):  LAPAROSCOPIC ECTOPIC REMOVAL, POSSIBLE LEFT OVARY. (PAT ON ADMIT)    Medications prior to admission:   Prior to Admission medications    Medication Sig Start Date End Date Taking? Authorizing Provider   HYDROcodone-acetaminophen (NORCO) 5-325 MG per tablet Take 1 tablet by mouth every 6 hours as needed for Pain for up to 3 days.  21  Maria De Jesus Sutton,    polyethylene glycol (MIRALAX) 17 GM/SCOOP powder Use ONE CAPEFUL at 10 am and then 2 pm on day prior to procedure 21   Laurita Pedraza MD   acetaminophen (AMINOFEN) 325 MG tablet Take 2 tablets by mouth every 4 hours as needed for Pain  Patient not taking: Reported on 2021   Mamadou Curry DO   metFORMIN (GLUCOPHAGE) 500 MG tablet TAKE 1 TABLET BY MOUTH ONCE DAILY WITH FOOD *EMERGENCY REFIL* 21   Historical Provider, MD   Ascorbic Acid (VITAMIN C) 250 MG tablet TAKE 1 TABLET BY MOUTH EVERY DAY WITH IRON 5/3/21   Historical Provider, MD   B Complex CAPS TAKE 1 CAPSULE BY MOUTH EVERY DAY 5/3/21   Historical Provider, MD   famotidine (PEPCID) 20 MG tablet Take 1 tablet by mouth 2 times daily 21   [facility], APRN - CNP   ARIPiprazole (ABILIFY) 20 MG tablet TAKE 1 TABLET BY MOUTH IN THE EVENING AS DIRECTED FOR DEPRESSION MOOD 10/8/20   Historical Provider, MD   VRAYLAR 1.5 MG capsule TAKE 1 CAPSULE BY MOUTH IN THE MORNING AS DIRECTED FOR MOOD 20   Historical Provider, MD   benztropine (COGENTIN) 1 MG tablet TAKE 1 TABLET BY MOUTH ONCE DAILY AS DIRECTED FOR SIDE EFFECTS 10/16/20   Historical Provider, MD   gabapentin (NEURONTIN) 400 MG capsule Take by mouth. 19  Historical Provider, MD   sertraline (ZOLOFT) 100 MG tablet TAKE 2 TABLETS BY MOUTH ONCE DAILY AT NIGHT AS DIRECTED FOR DEPRESSION ANXIETY 10/8/20   Historical Provider, MD   esomeprazole (NEXIUM) 20 MG delayed release capsule Take 1 capsule by mouth daily 11/10/20   Peggy Cardozo MD       Current medications:    Current Facility-Administered Medications   Medication Dose Route Frequency Provider Last Rate Last Admin    cefOXitin (MEFOXIN) 2000 mg in dextrose 5% 50 mL (mini-bag)  2,000 mg Intravenous On Call to 3424 Riana Oneil MD        lactated ringers infusion   Intravenous Continuous Joy Brittle, MD        meperidine (DEMEROL) injection 12.5 mg  12.5 mg Intravenous Q5 Min PRN Joy Brittle, MD        fentaNYL (SUBLIMAZE) injection 50 mcg  50 mcg Intravenous Q10 Min PRN Joy Brittle, MD        HYDROmorphone (DILAUDID) injection 0.5 mg  0.5 mg Intravenous Q10 Min PRN Joy Brittle, MD        HYDROcodone-acetaminophen (NORCO) 5-325 MG per tablet 1 tablet  1 tablet Oral PRN Joy Brittle, MD        Or    HYDROcodone-acetaminophen (NORCO) 5-325 MG per tablet 2 tablet  2 tablet Oral PRN Joy Brittle, MD        ondansetron [facility]) injection 4 mg  4 mg Intravenous Once PRN Joy Brittle, MD        metoclopramide (REGLAN) injection 10 mg  10 mg Intravenous Once PRN Joy Brittle, MD        diphenhydrAMINE (BENADRYL) injection 12.5 mg  12.5 mg Intravenous Once PRN Joy Brittle, MD        lactated ringers infusion   Intravenous Continuous Joy Brittle, MD        sodium chloride flush 0.9 % injection 10 mL  10 mL Intravenous 2 times per day Joy Brittle, MD        sodium chloride flush 0.9 % injection 10 mL  10 mL Intravenous PRN Joy Brittle, MD        0.9 % sodium chloride infusion  25 mL Intravenous PRN Joy Brittle, MD        lidocaine PF 1 % injection 1 mL  1 mL Intradermal Once PRN Joy Brittle, MD           Allergies:  No Known Allergies    Problem List:    Patient Active Problem List   Diagnosis Code    ZAK (obstructive sleep apnea) G47.33    Epigastric pain R10.13    Heart burn R12    Generalized abdominal pain R10.84    Splenomegaly R16.1    Ectopic pregnancy of left ovary O00.202    Ectopic pregnancy without intrauterine pregnancy O00.90       Past Medical History:        Diagnosis Date    Mental disorder     depression and anxiety    Postpartum depression        Past Surgical History:        Procedure Laterality Date    UPPER GASTROINTESTINAL ENDOSCOPY  2020    WISDOM TOOTH EXTRACTION  2017    WISDOM TOOTH EXTRACTION  2019       Social History:    Social History     Tobacco Use    Smoking status: Former Smoker     Quit date: 11/10/2017     Years since quitting: 3.5    Smokeless tobacco: Never Used   Substance Use Topics    Alcohol use: Yes     Comment: rare                                Counseling given: Not Answered      Vital Signs (Current): There were no vitals filed for this visit.                                            BP Readings from Last 3 Encounters:   06/09/21 (!) 103/42   06/08/21 92/60   06/02/21 132/77       NPO Status:                                                                                 BMI:   Wt Readings from Last 3 Encounters:   06/09/21 (!) 388 lb (176 kg)   06/08/21 (!) 388 lb (176 kg)   05/30/21 (!) 391 lb 4.8 oz (177.5 kg)     There is no height or weight on file to calculate BMI.    CBC:   Lab Results   Component Value Date    WBC 12.9 06/09/2021    RBC 4.52 06/09/2021    HGB 11.5 06/09/2021    HCT 35.6 06/09/2021    MCV 78.6 06/09/2021    RDW 16.1 06/09/2021     06/09/2021       CMP:   Lab Results   Component Value Date     06/09/2021    K 4.2 06/09/2021     06/09/2021    CO2 25 06/09/2021    BUN 10 06/09/2021    CREATININE 0.68 06/09/2021    GFRAA >60.0 06/09/2021    LABGLOM >60.0 06/09/2021    GLUCOSE 101 06/09/2021    GLUCOSE 136 07/04/2019    PROT 7.0 06/09/2021    CALCIUM 9.5 06/09/2021    BILITOT 0.4 06/09/2021    ALKPHOS 76 06/09/2021    AST 13 06/09/2021    ALT 14 06/09/2021       POC Tests: No results for input(s): POCGLU, POCNA, POCK, POCCL, POCBUN, POCHEMO, POCHCT in the last 72 hours. Coags:   Lab Results   Component Value Date    PROTIME 13.5 06/09/2021    INR 1.0 06/09/2021       HCG (If Applicable): No results found for: PREGTESTUR, PREGSERUM, HCG, HCGQUANT     ABGs: No results found for: PHART, PO2ART, PUE9HHT, OKL6JBJ, BEART, E9PVIKRL     Type & Screen (If Applicable):  No results found for: LABABO, LABRH    Drug/Infectious Status (If Applicable):  No results found for: HIV, HEPCAB    COVID-19 Screening (If Applicable): No results found for: COVID19        Anesthesia Evaluation  Patient summary reviewed and Nursing notes reviewed no history of anesthetic complications:   Airway: Mallampati: II  TM distance: >3 FB   Neck ROM: full  Mouth opening: > = 3 FB Dental: normal exam         Pulmonary:   (+) sleep apnea:                             Cardiovascular:Negative CV ROS             Beta Blocker:  Not on Beta Blocker         Neuro/Psych:   (+) psychiatric history:            GI/Hepatic/Renal:   (+) morbid obesity (super obesity)          Endo/Other:    (+) blood dyscrasia: anemia:., .                  ROS comment: Ectopic pregnancy Abdominal:           Vascular: negative vascular ROS. Anesthesia Plan      general     ASA 3     (TAP block)  Induction: intravenous. MIPS: Postoperative opioids intended and Prophylactic antiemetics administered. Anesthetic plan and risks discussed with patient. Plan discussed with CRNA.     Attending anesthesiologist reviewed and agrees with Josselin Mathew MD   6/10/2021

## 2021-06-10 NOTE — H&P
Insecurity:     Worried About Running Out of Food in the Last Year:     [address] in the Last Year:    Transportation Needs:     Lack of Transportation (Medical):  Lack of Transportation (Non-Medical):    Physical Activity:     Days of Exercise per Week:     Minutes of Exercise per Session:    Stress:     Feeling of Stress :    Social Connections:     Frequency of Communication with Friends and Family:     Frequency of Social Gatherings with Friends and Family:     Attends L[employer] Services:     Active Member of Clubs or Organizations:     Attends Club or Organization Meetings:     Marital Status:    Intimate Partner Violence:     Fear of Current or Ex-Partner:     Emotionally Abused:     Physically Abused:     Sexually Abused:             Contraceptive method:  none     Patient's medications, allergies, past medical, surgical, social and family histories were reviewed and updated as appropriate.     Review of Systems  As per chief complaint   All other systems reviewed and are negative.     Physical Exam:  Vitals:  BP 92/60 (Site: Right Upper Arm, Position: Sitting, Cuff Size: Large Adult)   Pulse 96   Ht 5' 5\" (1.651 m)   Wt (!) 388 lb (176 kg)   LMP 03/25/2021   BMI 64.57 kg/m²   Lungs: CTAB   Heart : Regular S1/S2, no M/R/G  Abdomen: Soft , NT, ND , + BS   Pelvic exam : deferred     Assessment:        Diagnosis Orders   1. Left tubal pregnancy without intrauterine pregnancy  HCG, Quantitative, Pregnancy         Plan:      Failed medical treatment for left tubal ectopic pregnancy   Counseling: The patient was counseled on all options both medical and surgical, conservative as well as definitive. She has elected to proceed with the procedure as stated above.     The patient was counseled on the procedure. Risks and complications were reviewed in detail. The patients orders, labs, consents have been completed.  The history and physical as well as all supporting surgical documentation will be forwarded to the pre-operative holding area.     The patient is aware that this procedure may not alleviate her symptoms. That there may be a necessity for a second surgery and that there may be an incomplete removal of abnormal tissue.     Discussed all risks and benefits of procedure in detail including risks of anesthesia, blood loss, need for transfusion, infection;  also potential for complication, injury, need for repair and/or removal of uterus, tubes, ovaries, bowel, bladder, ureters, blood vessels and nerves. Also discussed pre-operative and post-operative expectations.   Patient verbalizes understanding.               Cathy Gant MD

## 2021-06-10 NOTE — PROGRESS NOTES
Reviewed discharge instructions & Rx's with patient, states understanding, a &o x4 female,skin pk/w/d, resp unlabored. Up with steady gait.

## 2021-06-11 NOTE — OP NOTE
[address]                      [facility], [address] [facility]                                OPERATIVE REPORT    PATIENT NAME: Carrol Canchola                :        1995  MED REC NO:   [id number]                            ROOM:  ACCOUNT NO:   [de-identified]                           ADMIT DATE: 06/10/2021  PROVIDER:     Denis Gallegos MD    DATE OF PROCEDURE:  06/10/2021    PREOPERATIVE DIAGNOSIS:  Left tubal ectopic pregnancy. POSTOPERATIVE DIAGNOSIS:   Left tubal ectopic pregnancy. OPERATION PERFORMED:  Laparoscopic ectopic removal with left  salpingectomy. SURGEON:  Denis Gallegos MD    ASSISTANT:  Surgical staff. ANESTHESIA:  General.    ESTIMATED BLOOD LOSS:  Minimal.    COMPLICATIONS:  None. SPECIMEN:  Left tube with ectopic pregnancy. FINDINGS:  Left tubal ectopic pregnancy. OPERATIVE PROCEDURE:  The patient was taken to the operating room and  after general anesthesia has been established, she was scrubbed and  draped in usual fashion for laparoscopic procedure. Attention was then  towards the vagina. A ZUMI manipulator was inserted through the cervix  into the uterine cavity. The ZUMI balloon was inflated with 9 mL of  normal saline. After that, attention was towards the abdomen. A Veress  needle was inserted through the umbilicus. Abdomen was inflated to 50  mmHg of CO2 gas after which a 5-mm subumbilical incision was made  through which a 5-mm trocar was inserted under direct visualization. After confirming intra-abdominal entry, the patient was placed in steep  Trendelenburg position. The ectopic pregnancy was visualized on the  left side. After that, two more incisions were made on the left side.    A 12-mm incision was made slightly below the level of the umbilicus 12  cm lateral to the midline through which a 12-mm trocar was inserted  below that at the level of the anterior superior iliac spine just medial  to that a 5-mm incision was made through which a 5-mm trocar was  inserted. Attention was then towards the left adnexa. I used the  bipolar coagulator. The infundibulopelvic ligament on the left side was  coagulated multiple times and then transected using the Harmonic Ace  device. Transection continued through the mesosalpinx and the proximal  end of the tube on the left side. The tube and the ectopic pregnancy  were then placed in an Endobag that was inserted through a 12-mm trocar  and the bag was removed through the 12-mm incision. A Te-Alejandra  needle was then used to introduce 2-0 Vicryl sutures around the 12-mm  fascial defects which were tied down to attenuate the defect. At that  point, the procedure was complete. All gas was emptied. The skin  incisions were closed using 2-0 Vicryl in subcuticular fashion and  Dermabond. All instruments and sponge counts were correct at the end of  the procedure.         Ale Garcia MD    D: 06/10/2021 13:52:51       T: 06/10/2021 14:01:56     [email or web address]  Job#: [id number]     Doc#: [id number]    CC:

## 2021-06-21 ENCOUNTER — OFFICE VISIT (OUTPATIENT)
Dept: OBGYN CLINIC | Age: 26
End: 2021-06-21

## 2021-06-21 VITALS
SYSTOLIC BLOOD PRESSURE: 122 MMHG | WEIGHT: 293 LBS | DIASTOLIC BLOOD PRESSURE: 80 MMHG | HEART RATE: 80 BPM | HEIGHT: 65 IN | BODY MASS INDEX: 48.82 KG/M2

## 2021-06-21 DIAGNOSIS — O00.80 OTHER ECTOPIC PREGNANCY WITHOUT INTRAUTERINE PREGNANCY: Primary | ICD-10-CM

## 2021-06-21 DIAGNOSIS — O00.80 OTHER ECTOPIC PREGNANCY WITHOUT INTRAUTERINE PREGNANCY: ICD-10-CM

## 2021-06-21 LAB — GONADOTROPIN, CHORIONIC (HCG) QUANT: 8.3 MIU/ML

## 2021-06-21 PROCEDURE — 99024 POSTOP FOLLOW-UP VISIT: CPT | Performed by: OBSTETRICS & GYNECOLOGY

## 2021-06-21 NOTE — PROGRESS NOTES
Op Exam     Tisha Tobin is a 23y.o. year old female who presents to the office  10 days status post [address], LEFT SALPINGECTOMY for left tubal ectopic. Bowel movements are Normal.  The patient is not having any pain. The patient states she is unsure with post-procedure results. Vitals:  /80 (Site: Right Upper Arm, Position: Sitting, Cuff Size: Large Adult)   Pulse 80   Ht 5' 5\" (1.651 m)   Wt (!) 399 lb (181 kg)   LMP 2021   Breastfeeding Unknown   BMI 66.40 kg/m²   Allergies:  Patient has no known allergies.   Past Medical History:   Diagnosis Date    Mental disorder     depression and anxiety    Postpartum depression      Past Surgical History:   Procedure Laterality Date    LAPAROSCOPY Left 6/10/2021    LAPAROSCOPIC ECTOPIC REMOVAL, LEFT SALPINGECTOMY performed by Ishmael Parry MD at [place] [address]      WISDOM TOOTH EXTRACTION  2017    WISDOM TOOTH EXTRACTION  2019     OB History        4    Para   2    Term   2            AB        Living   2       SAB        TAB        Ectopic        Molar        Multiple        Live Births                  Family History   Problem Relation Age of Onset    Diabetes Mother     Cancer Maternal Grandfather     Celiac Disease Neg Hx     Colon Cancer Neg Hx     Crohn's Disease Neg Hx      Social History     Socioeconomic History    Marital status: Single     Spouse name: Not on file    Number of children: Not on file    Years of education: Not on file    Highest education level: Not on file   Occupational History    Not on file   Tobacco Use    Smoking status: Former Smoker     Quit date: 11/10/2017     Years since quitting: 3.6    Smokeless tobacco: Never Used   Vaping Use    Vaping Use: Never used   Substance and Sexual Activity    Alcohol use: Yes     Comment: rare    Drug use: Never    Sexual activity: Yes     Partners: Male   Other Topics Concern    Not on file   Social History Narrative    Not on file     Social Determinants of Health     Financial Resource Strain:     Difficulty of Paying Living Expenses:    Food Insecurity:     Worried About Running Out of Food in the Last Year:     [address] in the Last Year:    Transportation Needs:     Lack of Transportation (Medical):  Lack of Transportation (Non-Medical):    Physical Activity:     Days of Exercise per Week:     Minutes of Exercise per Session:    Stress:     Feeling of Stress :    Social Connections:     Frequency of Communication with Friends and Family:     Frequency of Social Gatherings with Friends and Family:     Attends Denominational Services:     Active Member of Clubs or Organizations:     Attends Club or Organization Meetings:     Marital Status:    Intimate Partner Violence:     Fear of Current or Ex-Partner:     Emotionally Abused:     Physically Abused:     Sexually Abused:        Contraceptive method:  none    Patient's medications, allergies, past medical, surgical,social and family histories were reviewed and updated as appropriate. Review of Systems  Review of Systems   All other systems reviewed and are negative. Review of Systems  Constitutional: Negative for chills and fever. Respiratory: Negative for coughand shortness of breath. Cardiovascular: Negative for chestpain and palpitations. Gastrointestinal: Negative for nauseaand vomiting. Genitourinary: Negative for dysuria, frequencyand urgency. Musculoskeletal: Negative for myalgias. Neurological: Negative for dizziness, seizures andheadaches. Psychiatric/Behavioral: Negativefor depression and suicidal ideas. Exam  Physical Exam  Exam   Constitutional: Sheis well-developed, well-nourished, and in no distress. Cardiovascular: Normal rate and regularrhythm. Pulmonary/Chest: Effort normal and breath sounds normal.  Abdominal: Soft. Bowel sounds are normal.   Incision/s intact clean and dry. Healingadequately.  :   Genitourinary Comments: deferred      Assessment:     Patient state:  Doingwell postoperatively. Operative findings again reviewed. Diagnosis Orders   1. Other ectopic pregnancy without intrauterine pregnancy  HCG, Quantitative, Pregnancy        Pathology report discussed. Plan:         Orders Placed This Encounter   Procedures    HCG, Quantitative, Pregnancy     Standing Status:   Future     Standing Expiration Date:   6/21/2022     No orders of the defined types were placed in this encounter.       Franklin Lynch MD

## 2022-01-17 ENCOUNTER — HOSPITAL ENCOUNTER (EMERGENCY)
Age: 27
Discharge: HOME | End: 2022-01-17
Attending: STUDENT IN AN ORGANIZED HEALTH CARE EDUCATION/TRAINING PROGRAM
Payer: COMMERCIAL

## 2022-01-17 VITALS
OXYGEN SATURATION: 97 % | WEIGHT: 293 LBS | TEMPERATURE: 98.2 F | BODY MASS INDEX: 48.82 KG/M2 | DIASTOLIC BLOOD PRESSURE: 74 MMHG | HEART RATE: 78 BPM | RESPIRATION RATE: 18 BRPM | HEIGHT: 65 IN | SYSTOLIC BLOOD PRESSURE: 124 MMHG

## 2022-01-17 DIAGNOSIS — R10.30 LOWER ABDOMINAL PAIN: ICD-10-CM

## 2022-01-17 DIAGNOSIS — Z32.02 PREGNANCY TEST NEGATIVE: Primary | ICD-10-CM

## 2022-01-17 LAB
ALBUMIN SERPL-MCNC: 3.7 G/DL (ref 3.5–4.6)
ALP BLD-CCNC: 75 U/L (ref 40–130)
ALT SERPL-CCNC: 29 U/L (ref 0–33)
ANION GAP SERPL CALCULATED.3IONS-SCNC: 12 MEQ/L (ref 9–15)
AST SERPL-CCNC: 26 U/L (ref 0–35)
BASOPHILS ABSOLUTE: 0.1 K/UL (ref 0–0.2)
BASOPHILS RELATIVE PERCENT: 0.8 %
BILIRUB SERPL-MCNC: <0.2 MG/DL (ref 0.2–0.7)
BILIRUBIN URINE: NEGATIVE
BLOOD, URINE: NEGATIVE
BUN BLDV-MCNC: 10 MG/DL (ref 6–20)
CALCIUM SERPL-MCNC: 8.5 MG/DL (ref 8.5–9.9)
CHLORIDE BLD-SCNC: 106 MEQ/L (ref 95–107)
CHP ED QC CHECK: NORMAL
CLARITY: ABNORMAL
CLUE CELLS: NORMAL
CO2: 22 MEQ/L (ref 20–31)
COLOR: YELLOW
CREAT SERPL-MCNC: 0.73 MG/DL (ref 0.5–0.9)
EOSINOPHILS ABSOLUTE: 0.3 K/UL (ref 0–0.7)
EOSINOPHILS RELATIVE PERCENT: 2.7 %
GFR AFRICAN AMERICAN: >60
GFR NON-AFRICAN AMERICAN: >60
GLOBULIN: 3.4 G/DL (ref 2.3–3.5)
GLUCOSE BLD-MCNC: 97 MG/DL (ref 70–99)
GLUCOSE URINE: NEGATIVE MG/DL
HCT VFR BLD CALC: 40.8 % (ref 37–47)
HEMOGLOBIN: 13.1 G/DL (ref 12–16)
KETONES, URINE: ABNORMAL MG/DL
LEUKOCYTE ESTERASE, URINE: NEGATIVE
LIPASE: 36 U/L (ref 12–95)
LYMPHOCYTES ABSOLUTE: 2.4 K/UL (ref 1–4.8)
LYMPHOCYTES RELATIVE PERCENT: 25 %
MAGNESIUM: 1.7 MG/DL (ref 1.7–2.4)
MCH RBC QN AUTO: 24.8 PG (ref 27–31.3)
MCHC RBC AUTO-ENTMCNC: 32 % (ref 33–37)
MCV RBC AUTO: 77.4 FL (ref 82–100)
MONOCYTES ABSOLUTE: 0.6 K/UL (ref 0.2–0.8)
MONOCYTES RELATIVE PERCENT: 6.3 %
NEUTROPHILS ABSOLUTE: 6.4 K/UL (ref 1.4–6.5)
NEUTROPHILS RELATIVE PERCENT: 65.2 %
NITRITE, URINE: NEGATIVE
PDW BLD-RTO: 15.9 % (ref 11.5–14.5)
PH UA: 5.5 (ref 5–9)
PLATELET # BLD: 348 K/UL (ref 130–400)
POTASSIUM SERPL-SCNC: 4.2 MEQ/L (ref 3.4–4.9)
PREGNANCY TEST URINE, POC: NEGATIVE
PROTEIN UA: ABNORMAL MG/DL
RBC # BLD: 5.27 M/UL (ref 4.2–5.4)
SODIUM BLD-SCNC: 140 MEQ/L (ref 135–144)
SPECIFIC GRAVITY UA: 1.03 (ref 1–1.03)
TOTAL PROTEIN: 7.1 G/DL (ref 6.3–8)
TRICHOMONAS PREP: NORMAL
TRICHOMONAS VAGINALIS SCREEN: NEGATIVE
URINE REFLEX TO CULTURE: ABNORMAL
UROBILINOGEN, URINE: 1 E.U./DL
WBC # BLD: 9.8 K/UL (ref 4.8–10.8)
YEAST WET PREP: NORMAL

## 2022-01-17 PROCEDURE — 80053 COMPREHEN METABOLIC PANEL: CPT

## 2022-01-17 PROCEDURE — 87808 TRICHOMONAS ASSAY W/OPTIC: CPT

## 2022-01-17 PROCEDURE — 96375 TX/PRO/DX INJ NEW DRUG ADDON: CPT

## 2022-01-17 PROCEDURE — 85025 COMPLETE CBC W/AUTO DIFF WBC: CPT

## 2022-01-17 PROCEDURE — 2580000003 HC RX 258: Performed by: STUDENT IN AN ORGANIZED HEALTH CARE EDUCATION/TRAINING PROGRAM

## 2022-01-17 PROCEDURE — 87491 CHLMYD TRACH DNA AMP PROBE: CPT

## 2022-01-17 PROCEDURE — 2500000003 HC RX 250 WO HCPCS: Performed by: STUDENT IN AN ORGANIZED HEALTH CARE EDUCATION/TRAINING PROGRAM

## 2022-01-17 PROCEDURE — 87591 N.GONORRHOEAE DNA AMP PROB: CPT

## 2022-01-17 PROCEDURE — 83735 ASSAY OF MAGNESIUM: CPT

## 2022-01-17 PROCEDURE — 83690 ASSAY OF LIPASE: CPT

## 2022-01-17 PROCEDURE — 36415 COLL VENOUS BLD VENIPUNCTURE: CPT

## 2022-01-17 PROCEDURE — 96374 THER/PROPH/DIAG INJ IV PUSH: CPT

## 2022-01-17 PROCEDURE — 87210 SMEAR WET MOUNT SALINE/INK: CPT

## 2022-01-17 PROCEDURE — 6360000002 HC RX W HCPCS: Performed by: STUDENT IN AN ORGANIZED HEALTH CARE EDUCATION/TRAINING PROGRAM

## 2022-01-17 PROCEDURE — 99284 EMERGENCY DEPT VISIT MOD MDM: CPT

## 2022-01-17 PROCEDURE — 81003 URINALYSIS AUTO W/O SCOPE: CPT

## 2022-01-17 RX ORDER — ONDANSETRON 4 MG/1
4 TABLET, ORALLY DISINTEGRATING ORAL 3 TIMES DAILY PRN
Qty: 21 TABLET | Refills: 0 | Status: SHIPPED | OUTPATIENT
Start: 2022-01-17 | End: 2022-02-04

## 2022-01-17 RX ORDER — ONDANSETRON 2 MG/ML
4 INJECTION INTRAMUSCULAR; INTRAVENOUS ONCE
Status: COMPLETED | OUTPATIENT
Start: 2022-01-17 | End: 2022-01-17

## 2022-01-17 RX ORDER — ACETAMINOPHEN 500 MG
1000 TABLET ORAL EVERY 6 HOURS PRN
Qty: 60 TABLET | Refills: 0 | Status: SHIPPED | OUTPATIENT
Start: 2022-01-17 | End: 2022-03-18

## 2022-01-17 RX ORDER — 0.9 % SODIUM CHLORIDE 0.9 %
1000 INTRAVENOUS SOLUTION INTRAVENOUS ONCE
Status: COMPLETED | OUTPATIENT
Start: 2022-01-17 | End: 2022-01-17

## 2022-01-17 RX ADMIN — FAMOTIDINE 20 MG: 10 INJECTION, SOLUTION INTRAVENOUS at 22:41

## 2022-01-17 RX ADMIN — SODIUM CHLORIDE 1000 ML: 9 INJECTION, SOLUTION INTRAVENOUS at 22:40

## 2022-01-17 RX ADMIN — ONDANSETRON 4 MG: 2 INJECTION INTRAMUSCULAR; INTRAVENOUS at 22:41

## 2022-01-17 ASSESSMENT — ENCOUNTER SYMPTOMS
COUGH: 0
VOMITING: 0
CONSTIPATION: 0
DIARRHEA: 0
ABDOMINAL PAIN: 1
NAUSEA: 1
SORE THROAT: 0
SHORTNESS OF BREATH: 0
ABDOMINAL DISTENTION: 0
RHINORRHEA: 0
BACK PAIN: 0
EYE PAIN: 0

## 2022-01-18 NOTE — ED NOTES
Pt verbalizes understanding of discharge instructions and follow up.  Pt ambulatory out of ED, vss, A&O X3      Ankur Woodson RN  01/17/22 [id number]

## 2022-01-18 NOTE — ED PROVIDER NOTES
[address] [facility] ED  eMERGENCY dEPARTMENT eNCOUnter      Pt Name: Shruthi Bartholomew  MRN: [id number]  [place] 1995  Date of evaluation: 1/17/2022  Provider: Delvis Ryan MD      HISTORY OF PRESENT ILLNESS      Chief Complaint   Patient presents with    Pelvic Pain       The history is provided by the Patient. Shruthi Bartholomew is a 33 y.o. female with a PMH clinically significant for ZAK, Morbid obesity, previous ectopic pregnancy s/p left-sided salpingectomy, BV, Depression and Anxiety presenting to the ED via EMS c/o pelvic pain, lower abdominal pain and left flank pain associated with nausea without vomiting with initial onset today. Characterizes pain as aching, cramping. Initially starting in the left lower quadrant and then radiating to the right lower quadrant and left flank. States no trauma or abnormal ingestions prior to onset of symptoms. Denies any associated F/C/D, sore throat, cough, SOB, CP, vomiting, diarrhea, constipation, vaginal discharge, vaginal bleeding, vaginal lesions, dysuria, hematuria or concern for sexually transmitted infections. States symptoms worse with nothing. Improved with nothing, nothing tried PTA. States no history of similar previous episodes. States symptoms are not similar to previous ectopic pregnancy that occurred 9 months ago. States last menstrual period December 20 and states that they are regular. Is having regular unprotected sex. States they have otherwise been feeling well. Per Chart Review: No recent evaluations noted in the ED. REVIEW OF SYSTEMS       Review of Systems   Constitutional: Negative for chills and fever. HENT: Negative for rhinorrhea and sore throat. Eyes: Negative for pain and visual disturbance. Respiratory: Negative for cough and shortness of breath. Cardiovascular: Negative for chest pain and palpitations. Gastrointestinal: Positive for abdominal pain and nausea.  Negative for abdominal distention, constipation, diarrhea and vomiting. Genitourinary: Positive for flank pain. Negative for difficulty urinating, dysuria, frequency, genital sores, pelvic pain, vaginal bleeding, vaginal discharge and vaginal pain. Musculoskeletal: Negative for back pain and neck pain. Skin: Negative for rash. Neurological: Negative for weakness, numbness and headaches.        PAST MEDICAL HISTORY     Past Medical History:   Diagnosis Date    Mental disorder     depression and anxiety    Postpartum depression        SURGICAL HISTORY       Past Surgical History:   Procedure Laterality Date    LAPAROSCOPY Left 6/10/2021    LAPAROSCOPIC ECTOPIC REMOVAL, LEFT SALPINGECTOMY performed by Rosalind Durant MD at [facility]      WISDOM TOOTH EXTRACTION  2017    WISDOM TOOTH EXTRACTION  2019       FAMILY HISTORY       Family History   Problem Relation Age of Onset    Diabetes Mother     Cancer Maternal Grandfather     Celiac Disease Neg Hx     Colon Cancer Neg Hx     Crohn's Disease Neg Hx        SOCIAL HISTORY       Social History     Socioeconomic History    Marital status: Single     Spouse name: None    Number of children: None    Years of education: None    Highest education level: None   Occupational History    None   Tobacco Use    Smoking status: Former Smoker     Quit date: 11/10/2017     Years since quittin.1    Smokeless tobacco: Never Used   Vaping Use    Vaping Use: Never used   Substance and Sexual Activity    Alcohol use: Yes     Comment: rare    Drug use: Never    Sexual activity: Yes     Partners: Male   Other Topics Concern    None   Social History Narrative    None     Social Determinants of Health     Financial Resource Strain:     Difficulty of Paying Living Expenses: Not on file   Food Insecurity:     Worried About Running Out of Food in the Last Year: Not on file    Adis of Food in the Last Year: Not on file   Transportation Needs:     Lack of Transportation (Medical): Not on file    Lack of Transportation (Non-Medical): Not on file   Physical Activity:     Days of Exercise per Week: Not on file    Minutes of Exercise per Session: Not on file   Stress:     Feeling of Stress : Not on file   Social Connections:     Frequency of Communication with Friends and Family: Not on file    Frequency of Social Gatherings with Friends and Family: Not on file    Attends [employer] Services: Not on file    Active Member of Clubs or Organizations: Not on file    Attends Club or Organization Meetings: Not on file    Marital Status: Not on file   Intimate Partner Violence:     Fear of Current or Ex-Partner: Not on file    Emotionally Abused: Not on file    Physically Abused: Not on file    Sexually Abused: Not on file   Housing Stability:     Unable to Pay for Housing in the Last Year: Not on file    Number of Places Lived in the Last Year: Not on file    Unstable Housing in the Last Year: Not on file       CURRENT MEDICATIONS       Previous Medications    ARIPIPRAZOLE (ABILIFY) 20 MG TABLET    TAKE 1 TABLET BY MOUTH IN THE EVENING AS DIRECTED FOR DEPRESSION MOOD    ASCORBIC ACID (VITAMIN C) 250 MG TABLET    TAKE 1 TABLET BY MOUTH EVERY DAY WITH IRON    B COMPLEX CAPS    TAKE 1 CAPSULE BY MOUTH EVERY DAY    BENZTROPINE (COGENTIN) 1 MG TABLET    TAKE 1 TABLET BY MOUTH ONCE DAILY AS DIRECTED FOR SIDE EFFECTS    DOCUSATE SODIUM (COLACE) 100 MG CAPSULE    Take 1 capsule by mouth 2 times daily as needed for Constipation    ESOMEPRAZOLE (NEXIUM) 20 MG DELAYED RELEASE CAPSULE    Take 1 capsule by mouth daily    GABAPENTIN (NEURONTIN) 400 MG CAPSULE    Take by mouth.     IBUPROFEN (ADVIL;MOTRIN) 800 MG TABLET    Take 1 tablet by mouth every 6 hours as needed for Pain    METFORMIN (GLUCOPHAGE) 500 MG TABLET    TAKE 1 TABLET BY MOUTH ONCE DAILY WITH FOOD *EMERGENCY REFIL*    ONDANSETRON (ZOFRAN) 4 MG TABLET    Take 1 tablet by mouth every 6 hours as needed for Nausea or Vomiting 1 tablet every 6 hrs prn for nausea and vomiting. POLYETHYLENE GLYCOL (MIRALAX) 17 GM/SCOOP POWDER    Use ONE CAPEFUL at 10 am and then 2 pm on day prior to procedure    SERTRALINE (ZOLOFT) 100 MG TABLET    TAKE 2 TABLETS BY MOUTH ONCE DAILY AT NIGHT AS DIRECTED FOR DEPRESSION ANXIETY    SIMETHICONE (MYLICON) 80 MG CHEWABLE TABLET    Take 1 tablet by mouth 4 times daily as needed for Flatulence    VRAYLAR 1.5 MG CAPSULE    TAKE 1 CAPSULE BY MOUTH IN THE MORNING AS DIRECTED FOR MOOD       ALLERGIES     Patient has no known allergies. PHYSICAL EXAM       ED Triage Vitals [01/17/22 2048]   BP Temp Temp Source Pulse Resp SpO2 Height Weight   127/71 98.2 °F (36.8 °C) Oral 82 18 98 % 5' 5\" (1.651 m) (!) 371 lb (168.3 kg)       Physical Exam  Vitals and nursing note reviewed. Constitutional:       General: She is not in acute distress. Appearance: She is morbidly obese. HENT:      Head: Normocephalic and atraumatic. Mouth/Throat:      Mouth: Mucous membranes are moist.      Pharynx: Oropharynx is clear. Eyes:      Extraocular Movements: Extraocular movements intact. Pupils: Pupils are equal, round, and reactive to light. Cardiovascular:      Rate and Rhythm: Normal rate and regular rhythm. Pulses: Normal pulses. Heart sounds: Normal heart sounds. Pulmonary:      Effort: Pulmonary effort is normal. No respiratory distress. Breath sounds: Normal breath sounds. Abdominal:      General: There is no distension. Palpations: Abdomen is soft. Tenderness: There is no abdominal tenderness. There is no right CVA tenderness, left CVA tenderness, guarding or rebound. Musculoskeletal:         General: No tenderness. Cervical back: Normal range of motion and neck supple. Right lower leg: No edema. Left lower leg: No edema. Skin:     General: Skin is warm and dry. Capillary Refill: Capillary refill takes less than 2 seconds.    Neurological: General: No focal deficit present. Mental Status: She is alert and oriented to person, place, and time. Psychiatric:         Mood and Affect: Mood is anxious.          Behavior: Behavior normal.         MDM:   Chart Reviewed: PMH and additional information as noted in HPI obtained from chart review    Vitals:    Vitals:    01/17/22 2048 01/17/22 2230   BP: 127/71 124/74   Pulse: 82 78   Resp: 18 18   Temp: 98.2 °F (36.8 °C)    TempSrc: Oral    SpO2: 98% 97%   Weight: (!) 371 lb (168.3 kg)    Height: 5' 5\" (1.651 m)        PROCEDURES:  Unless otherwise noted below, none  Procedures    LABS:  Labs Reviewed   URINE RT REFLEX TO CULTURE - Abnormal; Notable for the following components:       Result Value    Clarity, UA CLOUDY (*)     Ketones, Urine TRACE (*)     Protein, UA TRACE (*)     All other components within normal limits   CBC WITH AUTO DIFFERENTIAL - Abnormal; Notable for the following components:    MCV 77.4 (*)     MCH 24.8 (*)     MCHC 32.0 (*)     RDW 15.9 (*)     All other components within normal limits   POCT URINE PREGNANCY - Normal   WET PREP, GENITAL   COMPREHENSIVE METABOLIC PANEL   MAGNESIUM   LIPASE   TRICHOMONAS BY EIA       No orders to display       ED Course as of 01/17/22 2339 Mon Jan 17, 2022 2249 Pregnancy, Urine: negative [NA]   2304 TRICHOMONAS VAGINALIS SCREEN: Negative [NA]   2305 Clue Cells, Wet Prep: None Seen [NA]   2305 Yeast, Wet Prep: None Seen [NA]   2318 Comprehensive Metabolic Panel:    Sodium 140   Potassium 4.2   Chloride 106   CO2 22   Anion Gap 12   Glucose 97   BUN 10   Creatinine 0.73   GFR Non- >60.0   GFR  >60.0   CALCIUM, SERUM, 709125 8.5   Total Protein 7.1   Albumin 3.7   Bilirubin <0.2   Alk Phos 75   ALT 29   AST 26   Globulin 3.4  Unremarkable [NA]   2319 Lipase: 36  WNL, lower suspicion for acute pancreatitis [NA]   2319 CBC Auto Differential(!):    WBC 9.8   RBC 5.27   Hemoglobin Quant 13.1   Hematocrit 40.8   MCV 77.4(!)   MCH 24.8(!)   MCHC 32.0(!)   RDW 15.9(!)   Platelet Count 045   Neutrophils % 65.2   Lymphocyte % 25.0   Monocytes % 6.3   Eosinophils % 2.7   Basophils % 0.8   Neutrophils Absolute 6.4   Lymphocytes Absolute 2.4   Monocytes Absolute 0.6   Eosinophils Absolute 0.3   Basophils Absolute 0.1  Largely unremarkable, no evidence of leukocytosis, anemia or thrombocytopenia [NA]   2319 Urinalysis Reflex to Culture(!):    Color, UA Yellow   Clarity, UA CLOUDY(!)   Glucose, UA Negative   Bilirubin, Urine Negative   Ketones, Urine TRACE(!)   Specific Gravity, UA 1.034   Blood, Urine Negative   pH, UA 5.5   Protein, UA TRACE(!)   Urobilinogen, Urine 1.0   Nitrite, Urine Negative   Leukocyte Esterase, Urine Negative   Urine Reflex to Culture Not Indicated  Low suspicion for UTI [NA]   2319 Magnesium: 1.7  WNL [NA]      ED Course User Index  [NA] Chandler Lawson MD       33 y.o. female with a PMH clinically significant for ZAK, Morbid obesity, previous ectopic pregnancy s/p left-sided salpingectomy, BV, Depression and Anxiety presenting to the ED via EMS c/o pelvic pain, lower abdominal pain and left flank pain associated with nausea without vomiting with initial onset today. Upon initial evaluation, Pt Afebrile, HDS and in NAD. PE as noted above. Labs,  as noted above. Given findings, clinical presentation most likely consistent w/ nonspecific lower abdominal and pelvic cramping secondary to possible dysmenorrhea versus mittelschmerz versus bowel gas pains. Although considered, no evidence of UTI. Low suspicion for pyelonephritis, nephrolithiasis. Also with lower suspicion for cervicitis or PID given patient's lack of concern for sexually transmitted infections, no leukocytosis, afebrile and benign abdominal exam.  Although considered given patient's history, lower suspicion for ectopic pregnancy given negative hCG in the ED. Given findings, medications as noted below for symptomatic relief.   Moderately improved in the ED. Able to tolerate p.o. intake without difficulty. Stable for further evaluation management as an outpatient. Pt was administered   Medications   0.9 % sodium chloride bolus (1,000 mLs IntraVENous New Bag 1/17/22 2240)   famotidine (PEPCID) injection 20 mg (20 mg IntraVENous Given 1/17/22 2241)   ondansetron (ZOFRAN) injection 4 mg (4 mg IntraVENous Given 1/17/22 2241)       Plan: Discharge home in good condition with meds as noted below and instructions to follow up with PCP and OB. Pt stable and appropriate for further evaluation and management as an outpatient. and Patient understanding and amenable to the POC. CRITICAL CARE TIME   Total CriticalCare time was 0 minutes, excluding separately reportable procedures. There was a high probability of clinically significant/life threatening deterioration in the patient's condition which required my urgent intervention. FINAL IMPRESSION      1. Pregnancy test negative    2.  Lower abdominal pain          DISPOSITION/PLAN   DISPOSITION Decision To Discharge 01/17/2022 11:30:36 PM      Current Discharge Medication List      START taking these medications    Details   ondansetron (ZOFRAN-ODT) 4 MG disintegrating tablet Take 1 tablet by mouth 3 times daily as needed for Nausea or Vomiting  Qty: 21 tablet, Refills: 0              MD Rose Marie Dinero MD  01/17/22 4922

## 2022-01-18 NOTE — PROGRESS NOTES
Patient seen in ED, has no OBGYN provider on file. Call and check if needs follow up for Pregnancy Test Negative (Primary)  Lower abdominal pain   and to schedule with our office.

## 2022-01-20 LAB
C. TRACHOMATIS DNA ,URINE: NEGATIVE
N. GONORRHOEAE DNA, URINE: NEGATIVE

## 2022-01-25 ENCOUNTER — OFFICE VISIT (OUTPATIENT)
Dept: OBGYN CLINIC | Age: 27
End: 2022-01-25
Payer: COMMERCIAL

## 2022-01-25 VITALS
BODY MASS INDEX: 48.82 KG/M2 | SYSTOLIC BLOOD PRESSURE: 110 MMHG | WEIGHT: 293 LBS | HEIGHT: 65 IN | HEART RATE: 92 BPM | DIASTOLIC BLOOD PRESSURE: 68 MMHG

## 2022-01-25 DIAGNOSIS — N91.2 ABSENT MENSES: Primary | ICD-10-CM

## 2022-01-25 DIAGNOSIS — N91.2 ABSENT MENSES: ICD-10-CM

## 2022-01-25 LAB
GONADOTROPIN, CHORIONIC (HCG) QUANT: <0.1 MIU/ML
HCG, URINE, POC: NEGATIVE
Lab: NORMAL
NEGATIVE QC PASS/FAIL: NORMAL
POSITIVE QC PASS/FAIL: NORMAL

## 2022-01-25 PROCEDURE — G8484 FLU IMMUNIZE NO ADMIN: HCPCS | Performed by: OBSTETRICS & GYNECOLOGY

## 2022-01-25 PROCEDURE — 81025 URINE PREGNANCY TEST: CPT | Performed by: OBSTETRICS & GYNECOLOGY

## 2022-01-25 PROCEDURE — G8417 CALC BMI ABV UP PARAM F/U: HCPCS | Performed by: OBSTETRICS & GYNECOLOGY

## 2022-01-25 PROCEDURE — 1036F TOBACCO NON-USER: CPT | Performed by: OBSTETRICS & GYNECOLOGY

## 2022-01-25 PROCEDURE — G8427 DOCREV CUR MEDS BY ELIG CLIN: HCPCS | Performed by: OBSTETRICS & GYNECOLOGY

## 2022-01-25 PROCEDURE — 99213 OFFICE O/P EST LOW 20 MIN: CPT | Performed by: OBSTETRICS & GYNECOLOGY

## 2022-01-26 ENCOUNTER — TELEPHONE (OUTPATIENT)
Dept: OBGYN CLINIC | Age: 27
End: 2022-01-26

## 2022-01-26 NOTE — TELEPHONE ENCOUNTER
Pt was seen yesterday, she is requesting medication to help her get pregnant. Does she need appt to discuss, or do you want to send her something ?  She said to use [facility]

## 2022-01-29 NOTE — PROGRESS NOTES
Lan Levine is a 33 y.o. female who presents here today for complaints of Follow-Up from [facility], [facility] for severe abdominal pain. She states 3 days ago she started spotting. She said this started around when her period would typically start. She said she's not bleeding heavy and is still cramping so she's a little bit concerned. She's tried Tylenol and heat. She said it's mainly on the right side. )      . Vitals:  /68 (Site: Right Upper Arm, Position: Sitting, Cuff Size: Large Adult)   Pulse 92   Ht 5' 5\" (1.651 m)   Wt (!) 385 lb (174.6 kg)   LMP 2021 (Approximate)   BMI 64.07 kg/m²   Allergies:  Patient has no known allergies. Past Medical History:   Diagnosis Date    Mental disorder     depression and anxiety    Postpartum depression      Past Surgical History:   Procedure Laterality Date    LAPAROSCOPY Left 6/10/2021    LAPAROSCOPIC ECTOPIC REMOVAL, LEFT SALPINGECTOMY performed by Mirtha Jon MD at [facility]      WISDOM TOOTH EXTRACTION  2017    WISDOM TOOTH EXTRACTION  2019     OB History        4    Para   2    Term   2            AB        Living   2       SAB        IAB        Ectopic        Molar        Multiple        Live Births                  Family History   Problem Relation Age of Onset    Diabetes Mother     Cancer Maternal Grandfather     Celiac Disease Neg Hx     Colon Cancer Neg Hx     Crohn's Disease Neg Hx      Social History     Socioeconomic History    Marital status: Single     Spouse name: Not on file    Number of children: Not on file    Years of education: Not on file    Highest education level: Not on file   Occupational History    Not on file   Tobacco Use    Smoking status: Former Smoker     Quit date: 11/10/2017     Years since quittin.2    Smokeless tobacco: Never Used   Vaping Use    Vaping Use: Never used   Substance and Sexual Activity    Alcohol use:  Yes Comment: rare    Drug use: Never    Sexual activity: Yes     Partners: Male   Other Topics Concern    Not on file   Social History Narrative    Not on file     Social Determinants of Health     Financial Resource Strain:     Difficulty of Paying Living Expenses: Not on file   Food Insecurity:     Worried About Running Out of Food in the Last Year: Not on file    Adis of Food in the Last Year: Not on file   Transportation Needs:     Lack of Transportation (Medical): Not on file    Lack of Transportation (Non-Medical): Not on file   Physical Activity:     Days of Exercise per Week: Not on file    Minutes of Exercise per Session: Not on file   Stress:     Feeling of Stress : Not on file   Social Connections:     Frequency of Communication with Friends and Family: Not on file    Frequency of Social Gatherings with Friends and Family: Not on file    Attends Spiritism Services: Not on file    Active Member of [address] [place] [address] or Organizations: Not on file    Attends Club or Organization Meetings: Not on file    Marital Status: Not on file   Intimate Partner Violence:     Fear of Current or Ex-Partner: Not on file    Emotionally Abused: Not on file    Physically Abused: Not on file    Sexually Abused: Not on file   Housing Stability:     Unable to Pay for Housing in the Last Year: Not on file    Number of [place] in the Last Year: Not on file    Unstable Housing in the Last Year: Not on file       Contraceptive method:  none    Patient's medications, allergies, past medical, surgical, social and family histories were reviewed and updated as appropriate. Review of Systems  As per chief complaint   All other systems reviewed and are negative.     Physical Exam:  Vitals:  /68 (Site: Right Upper Arm, Position: Sitting, Cuff Size: Large Adult)   Pulse 92   Ht 5' 5\" (1.651 m)   Wt (!) 385 lb (174.6 kg)   LMP 12/24/2021 (Approximate)   BMI 64.07 kg/m²   Lungs: CTAB   Heart : Regular S1/S2, no M/R/G  Abdomen: Soft , NT, ND , + BS   Pelvic exam : deferred    Assessment:      Diagnosis Orders   1. Absent menses  POC Pregnancy Ur-Qual    HCG, Quantitative, Pregnancy       Plan:     Irregular menses  Concerned about possible pregnancy       Orders Placed This Encounter   Procedures    HCG, Quantitative, Pregnancy     Standing Status:   Future     Number of Occurrences:   1     Standing Expiration Date:   1/25/2023    POC Pregnancy Ur-Qual     No orders of the defined types were placed in this encounter. Follow Up:  Return in about 1 week (around 2/1/2022) for F/U for results.         Dina Brewster MD

## 2022-02-04 ENCOUNTER — HOSPITAL ENCOUNTER (EMERGENCY)
Age: 27
Discharge: HOME | End: 2022-02-04
Attending: EMERGENCY MEDICINE
Payer: COMMERCIAL

## 2022-02-04 ENCOUNTER — APPOINTMENT (OUTPATIENT)
Dept: CT IMAGING | Age: 27
End: 2022-02-04
Payer: COMMERCIAL

## 2022-02-04 VITALS
DIASTOLIC BLOOD PRESSURE: 65 MMHG | WEIGHT: 293 LBS | HEIGHT: 65 IN | BODY MASS INDEX: 48.82 KG/M2 | RESPIRATION RATE: 16 BRPM | SYSTOLIC BLOOD PRESSURE: 109 MMHG | HEART RATE: 64 BPM | OXYGEN SATURATION: 100 % | TEMPERATURE: 98.5 F

## 2022-02-04 DIAGNOSIS — R11.0 NAUSEA: ICD-10-CM

## 2022-02-04 DIAGNOSIS — R10.9 ABDOMINAL PAIN, UNSPECIFIED ABDOMINAL LOCATION: Primary | ICD-10-CM

## 2022-02-04 LAB
ALBUMIN SERPL-MCNC: 3.8 G/DL (ref 3.5–4.6)
ALP BLD-CCNC: 97 U/L (ref 40–130)
ALT SERPL-CCNC: 15 U/L (ref 0–33)
ANION GAP SERPL CALCULATED.3IONS-SCNC: 9 MEQ/L (ref 9–15)
AST SERPL-CCNC: 22 U/L (ref 0–35)
BASOPHILS ABSOLUTE: 0.1 K/UL (ref 0–0.2)
BASOPHILS RELATIVE PERCENT: 0.8 %
BILIRUB SERPL-MCNC: <0.2 MG/DL (ref 0.2–0.7)
BILIRUBIN URINE: NEGATIVE
BLOOD, URINE: NEGATIVE
BUN BLDV-MCNC: 15 MG/DL (ref 6–20)
CALCIUM SERPL-MCNC: 9.2 MG/DL (ref 8.5–9.9)
CHLORIDE BLD-SCNC: 102 MEQ/L (ref 95–107)
CHP ED QC CHECK: NORMAL
CLARITY: CLEAR
CO2: 25 MEQ/L (ref 20–31)
COLOR: YELLOW
CREAT SERPL-MCNC: 0.76 MG/DL (ref 0.5–0.9)
EOSINOPHILS ABSOLUTE: 0.3 K/UL (ref 0–0.7)
EOSINOPHILS RELATIVE PERCENT: 2.7 %
GFR AFRICAN AMERICAN: >60
GFR NON-AFRICAN AMERICAN: >60
GLOBULIN: 3.9 G/DL (ref 2.3–3.5)
GLUCOSE BLD-MCNC: 107 MG/DL (ref 70–99)
GLUCOSE URINE: NEGATIVE MG/DL
HCG QUALITATIVE: NEGATIVE
HCT VFR BLD CALC: 38 % (ref 37–47)
HEMOGLOBIN: 12.5 G/DL (ref 12–16)
KETONES, URINE: NEGATIVE MG/DL
LEUKOCYTE ESTERASE, URINE: NEGATIVE
LIPASE: 33 U/L (ref 12–95)
LYMPHOCYTES ABSOLUTE: 2.2 K/UL (ref 1–4.8)
LYMPHOCYTES RELATIVE PERCENT: 20.6 %
MAGNESIUM: 1.9 MG/DL (ref 1.7–2.4)
MCH RBC QN AUTO: 25.8 PG (ref 27–31.3)
MCHC RBC AUTO-ENTMCNC: 32.9 % (ref 33–37)
MCV RBC AUTO: 78.3 FL (ref 82–100)
MONOCYTES ABSOLUTE: 0.5 K/UL (ref 0.2–0.8)
MONOCYTES RELATIVE PERCENT: 4.9 %
NEUTROPHILS ABSOLUTE: 7.5 K/UL (ref 1.4–6.5)
NEUTROPHILS RELATIVE PERCENT: 71 %
NITRITE, URINE: NEGATIVE
PDW BLD-RTO: 16.5 % (ref 11.5–14.5)
PH UA: 6 (ref 5–9)
PLATELET # BLD: 325 K/UL (ref 130–400)
POTASSIUM SERPL-SCNC: 5.4 MEQ/L (ref 3.4–4.9)
PREGNANCY TEST URINE, POC: NEGATIVE
PROTEIN UA: NEGATIVE MG/DL
RBC # BLD: 4.86 M/UL (ref 4.2–5.4)
SODIUM BLD-SCNC: 136 MEQ/L (ref 135–144)
SPECIFIC GRAVITY UA: 1.02 (ref 1–1.03)
TOTAL PROTEIN: 7.7 G/DL (ref 6.3–8)
UROBILINOGEN, URINE: 0.2 E.U./DL
WBC # BLD: 10.6 K/UL (ref 4.8–10.8)

## 2022-02-04 PROCEDURE — 6360000002 HC RX W HCPCS: Performed by: EMERGENCY MEDICINE

## 2022-02-04 PROCEDURE — 2580000003 HC RX 258: Performed by: EMERGENCY MEDICINE

## 2022-02-04 PROCEDURE — 83735 ASSAY OF MAGNESIUM: CPT

## 2022-02-04 PROCEDURE — 36415 COLL VENOUS BLD VENIPUNCTURE: CPT

## 2022-02-04 PROCEDURE — 96376 TX/PRO/DX INJ SAME DRUG ADON: CPT

## 2022-02-04 PROCEDURE — 6360000004 HC RX CONTRAST MEDICATION: Performed by: EMERGENCY MEDICINE

## 2022-02-04 PROCEDURE — 6370000000 HC RX 637 (ALT 250 FOR IP): Performed by: EMERGENCY MEDICINE

## 2022-02-04 PROCEDURE — 74177 CT ABD & PELVIS W/CONTRAST: CPT

## 2022-02-04 PROCEDURE — 84703 CHORIONIC GONADOTROPIN ASSAY: CPT

## 2022-02-04 PROCEDURE — 96374 THER/PROPH/DIAG INJ IV PUSH: CPT

## 2022-02-04 PROCEDURE — 96375 TX/PRO/DX INJ NEW DRUG ADDON: CPT

## 2022-02-04 PROCEDURE — 80053 COMPREHEN METABOLIC PANEL: CPT

## 2022-02-04 PROCEDURE — 85025 COMPLETE CBC W/AUTO DIFF WBC: CPT

## 2022-02-04 PROCEDURE — 99285 EMERGENCY DEPT VISIT HI MDM: CPT

## 2022-02-04 PROCEDURE — 83690 ASSAY OF LIPASE: CPT

## 2022-02-04 PROCEDURE — 81003 URINALYSIS AUTO W/O SCOPE: CPT

## 2022-02-04 RX ORDER — ONDANSETRON 4 MG/1
4 TABLET, ORALLY DISINTEGRATING ORAL 3 TIMES DAILY PRN
Qty: 21 TABLET | Refills: 0 | Status: SHIPPED | OUTPATIENT
Start: 2022-02-04 | End: 2022-02-14 | Stop reason: SDUPTHER

## 2022-02-04 RX ORDER — SODIUM CHLORIDE 0.9 % (FLUSH) 0.9 %
10 SYRINGE (ML) INJECTION ONCE
Status: COMPLETED | OUTPATIENT
Start: 2022-02-04 | End: 2022-02-04

## 2022-02-04 RX ORDER — ONDANSETRON 2 MG/ML
4 INJECTION INTRAMUSCULAR; INTRAVENOUS ONCE
Status: COMPLETED | OUTPATIENT
Start: 2022-02-04 | End: 2022-02-04

## 2022-02-04 RX ORDER — MORPHINE SULFATE 4 MG/ML
4 INJECTION, SOLUTION INTRAMUSCULAR; INTRAVENOUS
Status: DISCONTINUED | OUTPATIENT
Start: 2022-02-04 | End: 2022-02-04 | Stop reason: HOSPADM

## 2022-02-04 RX ORDER — 0.9 % SODIUM CHLORIDE 0.9 %
1000 INTRAVENOUS SOLUTION INTRAVENOUS ONCE
Status: COMPLETED | OUTPATIENT
Start: 2022-02-04 | End: 2022-02-04

## 2022-02-04 RX ORDER — ACETAMINOPHEN 500 MG
1000 TABLET ORAL ONCE
Status: COMPLETED | OUTPATIENT
Start: 2022-02-04 | End: 2022-02-04

## 2022-02-04 RX ORDER — TRAMADOL HYDROCHLORIDE 50 MG/1
50 TABLET ORAL EVERY 4 HOURS PRN
Qty: 18 TABLET | Refills: 0 | Status: SHIPPED | OUTPATIENT
Start: 2022-02-04 | End: 2022-02-07

## 2022-02-04 RX ORDER — KETOROLAC TROMETHAMINE 15 MG/ML
30 INJECTION, SOLUTION INTRAMUSCULAR; INTRAVENOUS ONCE
Status: COMPLETED | OUTPATIENT
Start: 2022-02-04 | End: 2022-02-04

## 2022-02-04 RX ADMIN — ACETAMINOPHEN 1000 MG: 500 TABLET ORAL at 09:06

## 2022-02-04 RX ADMIN — ONDANSETRON 4 MG: 2 INJECTION INTRAMUSCULAR; INTRAVENOUS at 09:53

## 2022-02-04 RX ADMIN — KETOROLAC TROMETHAMINE 30 MG: 15 INJECTION, SOLUTION INTRAMUSCULAR; INTRAVENOUS at 09:53

## 2022-02-04 RX ADMIN — SODIUM CHLORIDE, PRESERVATIVE FREE 10 ML: 5 INJECTION INTRAVENOUS at 10:14

## 2022-02-04 RX ADMIN — IOPAMIDOL 100 ML: 612 INJECTION, SOLUTION INTRAVENOUS at 10:13

## 2022-02-04 RX ADMIN — ONDANSETRON 4 MG: 2 INJECTION INTRAMUSCULAR; INTRAVENOUS at 09:06

## 2022-02-04 RX ADMIN — SODIUM CHLORIDE 1000 ML: 9 INJECTION, SOLUTION INTRAVENOUS at 09:06

## 2022-02-04 ASSESSMENT — PAIN DESCRIPTION - ONSET
ONSET: GRADUAL
ONSET: GRADUAL

## 2022-02-04 ASSESSMENT — ENCOUNTER SYMPTOMS
SHORTNESS OF BREATH: 0
ABDOMINAL PAIN: 1
BACK PAIN: 0
NAUSEA: 1
COUGH: 0
SORE THROAT: 0
DIARRHEA: 0
VOMITING: 0

## 2022-02-04 ASSESSMENT — PAIN SCALES - GENERAL
PAINLEVEL_OUTOF10: 5
PAINLEVEL_OUTOF10: 5
PAINLEVEL_OUTOF10: 9
PAINLEVEL_OUTOF10: 5

## 2022-02-04 ASSESSMENT — PAIN DESCRIPTION - PROGRESSION
CLINICAL_PROGRESSION: GRADUALLY IMPROVING
CLINICAL_PROGRESSION: GRADUALLY IMPROVING

## 2022-02-04 ASSESSMENT — PAIN DESCRIPTION - PAIN TYPE
TYPE: ACUTE PAIN

## 2022-02-04 ASSESSMENT — PAIN DESCRIPTION - FREQUENCY
FREQUENCY: CONTINUOUS
FREQUENCY: CONTINUOUS

## 2022-02-04 ASSESSMENT — PAIN DESCRIPTION - ORIENTATION
ORIENTATION: MID
ORIENTATION: MID

## 2022-02-04 ASSESSMENT — PAIN DESCRIPTION - LOCATION
LOCATION: ABDOMEN

## 2022-02-04 ASSESSMENT — PAIN DESCRIPTION - DESCRIPTORS: DESCRIPTORS: DISCOMFORT

## 2022-02-04 NOTE — ED NOTES
Discharge instructions reviewed with patient. Medications reviewed and explained to patient. Patient denies any further questions at this time. Pt encouraged to make follow up appointments with PCP and any speciality referrals.         Amauri Wadsworth RN  02/04/22 [id number]

## 2022-02-04 NOTE — ED NOTES
Patient resting in bed with lights on and call light within reach. Equal rise and fall of chest. No distress noted. Nurse will continue to monitor. Bed in lowest position. No complaints at this time.         Arthur De RN  02/04/22 [id number]

## 2022-02-04 NOTE — ED NOTES
Bed: 11  Expected date: 2/4/22  Expected time:   Means of arrival:   Comments:  27F abd pain x3 days  No BM for 3 days  10 days late for her period  127/75, 86, 99%RA, OT [address] [facility], RN  02/04/22 [id number]

## 2022-02-04 NOTE — ED PROVIDER NOTES
[address] [facility] ED  eMERGENCYdEPARTMENT eNCOUnter      Pt Name: Lelia Ortiz  MRN: [id number]  [place] 1995  Date of evaluation: 2/4/2022  Rocky Odonnell MD    CHIEF COMPLAINT           HPI  Lelia Ortiz is a 33 y.o. female per chart review has a h/o depression, anxiety, two ectopic pregnancies presents to the ED with ab pain, nausea. Pt notes gradual onset, moderate, constant, suprapubic ab pressure x 2 days. +Nausea. Pt took pregnancy tests at home and it was negative. Pt denies fever, cp, sob, dysuria, diarrhea. ROS  Review of Systems   Constitutional: Negative for activity change, chills and fever. HENT: Negative for ear pain and sore throat. Eyes: Negative for visual disturbance. Respiratory: Negative for cough and shortness of breath. Cardiovascular: Negative for chest pain, palpitations and leg swelling. Gastrointestinal: Positive for abdominal pain and nausea. Negative for diarrhea and vomiting. Genitourinary: Negative for dysuria. Musculoskeletal: Negative for back pain. Skin: Negative for rash. Neurological: Negative for dizziness and weakness. Except as noted above the remainder of the review of systems was reviewed and negative.        PAST MEDICAL HISTORY     Past Medical History:   Diagnosis Date    Mental disorder     depression and anxiety    Postpartum depression          SURGICAL HISTORY       Past Surgical History:   Procedure Laterality Date    LAPAROSCOPY Left 6/10/2021    LAPAROSCOPIC ECTOPIC REMOVAL, LEFT SALPINGECTOMY performed by Triny Kingsley MD at [address] [place] [address]    WISDOM TOOTH EXTRACTION  2017    WISDOM TOOTH EXTRACTION  2019         CURRENTMEDICATIONS       Previous Medications    ACETAMINOPHEN (TYLENOL) 500 MG TABLET    Take 2 tablets by mouth every 6 hours as needed for Pain or Fever    ARIPIPRAZOLE (ABILIFY) 20 MG TABLET    TAKE 1 TABLET BY MOUTH IN THE EVENING AS DIRECTED FOR DEPRESSION MOOD    ASCORBIC ACID (VITAMIN C) 250 MG TABLET    TAKE 1 TABLET BY MOUTH EVERY DAY WITH IRON    B COMPLEX CAPS    TAKE 1 CAPSULE BY MOUTH EVERY DAY    BENZTROPINE (COGENTIN) 1 MG TABLET    TAKE 1 TABLET BY MOUTH ONCE DAILY AS DIRECTED FOR SIDE EFFECTS    DOCUSATE SODIUM (COLACE) 100 MG CAPSULE    Take 1 capsule by mouth 2 times daily as needed for Constipation    ESOMEPRAZOLE (NEXIUM) 20 MG DELAYED RELEASE CAPSULE    Take 1 capsule by mouth daily    GABAPENTIN (NEURONTIN) 400 MG CAPSULE    Take by mouth. IBUPROFEN (ADVIL;MOTRIN) 800 MG TABLET    Take 1 tablet by mouth every 6 hours as needed for Pain    METFORMIN (GLUCOPHAGE) 500 MG TABLET    TAKE 1 TABLET BY MOUTH ONCE DAILY WITH FOOD *EMERGENCY REFIL*    ONDANSETRON (ZOFRAN) 4 MG TABLET    Take 1 tablet by mouth every 6 hours as needed for Nausea or Vomiting 1 tablet every 6 hrs prn for nausea and vomiting. SERTRALINE (ZOLOFT) 100 MG TABLET    TAKE 2 TABLETS BY MOUTH ONCE DAILY AT NIGHT AS DIRECTED FOR DEPRESSION ANXIETY    VITAMIN D (CHOLECALCIFEROL) 04895 UNIT CAPS    Take 50,000 Units by mouth once a week    VRAYLAR 1.5 MG CAPSULE    TAKE 1 CAPSULE BY MOUTH IN THE MORNING AS DIRECTED FOR MOOD       ALLERGIES     Patient has no known allergies.     FAMILY HISTORY       Family History   Problem Relation Age of Onset    Diabetes Mother     Cancer Maternal Grandfather     Celiac Disease Neg Hx     Colon Cancer Neg Hx     Crohn's Disease Neg Hx           SOCIAL HISTORY       Social History     Socioeconomic History    Marital status: Single     Spouse name: None    Number of children: None    Years of education: None    Highest education level: None   Occupational History    None   Tobacco Use    Smoking status: Former Smoker     Quit date: 11/10/2017     Years since quittin.2    Smokeless tobacco: Never Used   Vaping Use    Vaping Use: Never used   Substance and Sexual Activity    Alcohol use: Yes     Comment: rare    Drug use: sounds. Pulmonary:      Effort: Pulmonary effort is normal.      Breath sounds: Normal breath sounds. Abdominal:      General: Bowel sounds are normal. There is no distension. Palpations: Abdomen is soft. Tenderness: There is abdominal tenderness in the suprapubic area. There is no guarding or rebound. Musculoskeletal:         General: Normal range of motion. Cervical back: Normal range of motion and neck supple. Skin:     General: Skin is warm and dry. Neurological:      Mental Status: She is alert and oriented to person, place, and time. MDM  32 yo female presents to the ED with ab pain, nausea. Pt is afebrile, hemodynamically stable. Pt given 1 L NS, PO tylenol, IV zofran in the ED. Labs unremarkable. UA negative. HCG blood and urine negative. Pt reassessed and still having pain. Pt given IV morphine, IV zofran, IV toradol with complete relief. CT AP negative. Unsure of etiology of ab pain, nausea. Pt given ab pain, nausea warning signs, prescription for tramadol, zofran. Pt will f/u with pcp. Pt understands plan. FINAL IMPRESSION      1. Abdominal pain, unspecified abdominal location    2.  Nausea          DISPOSITION/PLAN   DISPOSITION Decision To Discharge 02/04/2022 10:53:56 AM        DISCHARGE MEDICATIONS:  [unfilled]         Liyah Gibbs MD(electronically signed)  Attending Emergency Physician            Liyah Gibbs MD  02/04/22 [id number]

## 2022-02-12 ENCOUNTER — PATIENT MESSAGE (OUTPATIENT)
Dept: OBGYN CLINIC | Age: 27
End: 2022-02-12

## 2022-02-14 ENCOUNTER — TELEPHONE (OUTPATIENT)
Dept: OBGYN CLINIC | Age: 27
End: 2022-02-14

## 2022-02-14 ENCOUNTER — OFFICE VISIT (OUTPATIENT)
Dept: GASTROENTEROLOGY | Age: 27
End: 2022-02-14
Payer: COMMERCIAL

## 2022-02-14 VITALS
HEART RATE: 112 BPM | SYSTOLIC BLOOD PRESSURE: 118 MMHG | BODY MASS INDEX: 62.57 KG/M2 | RESPIRATION RATE: 12 BRPM | DIASTOLIC BLOOD PRESSURE: 80 MMHG | OXYGEN SATURATION: 98 % | WEIGHT: 293 LBS

## 2022-02-14 DIAGNOSIS — R11.2 NAUSEA AND VOMITING, INTRACTABILITY OF VOMITING NOT SPECIFIED, UNSPECIFIED VOMITING TYPE: Primary | ICD-10-CM

## 2022-02-14 DIAGNOSIS — R10.2 PELVIC PAIN: ICD-10-CM

## 2022-02-14 PROCEDURE — 99213 OFFICE O/P EST LOW 20 MIN: CPT | Performed by: NURSE PRACTITIONER

## 2022-02-14 PROCEDURE — G8427 DOCREV CUR MEDS BY ELIG CLIN: HCPCS | Performed by: NURSE PRACTITIONER

## 2022-02-14 PROCEDURE — G8417 CALC BMI ABV UP PARAM F/U: HCPCS | Performed by: NURSE PRACTITIONER

## 2022-02-14 PROCEDURE — G8484 FLU IMMUNIZE NO ADMIN: HCPCS | Performed by: NURSE PRACTITIONER

## 2022-02-14 PROCEDURE — 1036F TOBACCO NON-USER: CPT | Performed by: NURSE PRACTITIONER

## 2022-02-14 RX ORDER — ONDANSETRON 4 MG/1
4 TABLET, ORALLY DISINTEGRATING ORAL 3 TIMES DAILY PRN
Qty: 21 TABLET | Refills: 0 | Status: SHIPPED | OUTPATIENT
Start: 2022-02-14 | End: 2022-02-21

## 2022-02-14 ASSESSMENT — ENCOUNTER SYMPTOMS
CHEST TIGHTNESS: 0
PHOTOPHOBIA: 0
COLOR CHANGE: 0
ABDOMINAL DISTENTION: 0
DIARRHEA: 0
ABDOMINAL PAIN: 1
TROUBLE SWALLOWING: 0
VOICE CHANGE: 0
CONSTIPATION: 0
EYE PAIN: 0
ANAL BLEEDING: 0
WHEEZING: 0
VOMITING: 1
EYE REDNESS: 0
NAUSEA: 1
RECTAL PAIN: 0
SHORTNESS OF BREATH: 0

## 2022-02-14 NOTE — PROGRESS NOTES
Subjective:      Patient ID: Mikel Ron is a 33 y.o. female who presents today for:  Chief Complaint   Patient presents with    Abdominal Pain       HPI   Patient came in as follow-up and further evaluation of N/V. Symptoms started approximately 1 month ago no association to meals, no trigger foods, no cannabis use, reports rare associated right upper quadrant pain. Reports that she is actively trying to get pregnant-She  feels her symptoms are similar to her pregnancies in the past, however most recent pregnancy test was negative. Patient was previously seen for similar symptoms in May, however was pregnant at that time, this was a tubal pregnancy. In addition patient has complaints of pelvic pain, recent UA was normal, has follow-up with GYN in 1 week. Denies changes in appetite, unintentional weight loss, changes in bowel habits, no constipation or diarrhea, no blood or mucus. No family history of CRC or IBD. Interval OV 5/21/21  Patient came in as 1 month follow-up to generalized abdominal pain, primarily epigastric, and abnormal CT imaging, was initiated on double dose PPI, reports some improvement in symptoms. At last visit patient reported CT imaging at [facility] from March noted questionable splenomegaly, records were not available for review, our office obtained [facility] records which noted a spleen of 16.1 cm, no liver abnormalities, blood work from that emergency room visit was reviewed, noted no thrombocytopenia and normal LFTs, noted BMI 63, pt denies any trauma. Patient mentions that she recently found out she is pregnant. Otherwise denies nausea or vomiting, hematemesis, melena, or hematochezia.   OV 4/7/21  Patient came in for abdominal pain, she reports a 1 month history of generalized abdominal pain and anorexia did go to the emergency department at [facility] records are available for review she mentions that she had a CAT scan and blood work that showed questionable splenomegaly.  Has previously been seen for similar symptoms several months ago had EGD which was essentially normal with negative biopsies, has been on a PPI since her EGD.  She denies nausea or vomiting, hematemesis, melena, or hematochezia.  No changes to bowel habits, or unintentional weight loss.  Mentions that she is in the process of undergoing evaluation for bariatric surgery, currently being followed by nutritional services, has monthly appointments and when she completes her 6-month evaluation will be scheduled accordingly.   Background  This is a very pleasant 45-year-old who came in today and she reports epigastric pain.  Patient mentioned that she was told that she has hiatal hernia?  Also patient is in the process of getting bariatric surgery.  Patient mentioned that there is no triggering or relieving factors.  The pain is constant and point toward the epigastrium.  No associated nausea vomiting .  Patient denies any postprandial nature of the pain.  Denies any change of bowel movements in terms of diarrhea or constipation though she reported that her bowel been irregular for long time.  No hematemesis melena hematochezia.  Patient mentioned that her symptoms been there for at least 1 to 2 years. Ed Dobbs note CT scan in July 2019 was negative for any acute process.  It did show some partially ruptured right ovarian cyst as well as constipation.  Otherwise patient came in today to establish care and further evaluation. Patsy Keane patient's been evaluated for bariatric surgery current BMI 63    Past Medical History:   Diagnosis Date    Mental disorder     depression and anxiety    Postpartum depression      Past Surgical History:   Procedure Laterality Date    LAPAROSCOPY Left 6/10/2021    LAPAROSCOPIC ECTOPIC REMOVAL, LEFT SALPINGECTOMY performed by Triston Dunaway MD at [address] [place] [address]    WISDOM TOOTH EXTRACTION  2017    [address] EXTRACTION  2019     Social History     Socioeconomic History    Marital status: Single     Spouse name: Not on file    Number of children: Not on file    Years of education: Not on file    Highest education level: Not on file   Occupational History    Not on file   Tobacco Use    Smoking status: Former Smoker     Quit date: 11/10/2017     Years since quittin.2    Smokeless tobacco: Never Used   Vaping Use    Vaping Use: Never used   Substance and Sexual Activity    Alcohol use: Yes     Comment: rare    Drug use: Never    Sexual activity: Yes     Partners: Male   Other Topics Concern    Not on file   Social History Narrative    Not on file     Social Determinants of Health     Financial Resource Strain:     Difficulty of Paying Living Expenses: Not on file   Food Insecurity:     Worried About Running Out of Food in the Last Year: Not on file    Adis of Food in the Last Year: Not on file   Transportation Needs:     Lack of Transportation (Medical): Not on file    Lack of Transportation (Non-Medical):  Not on file   Physical Activity:     Days of Exercise per Week: Not on file    Minutes of Exercise per Session: Not on file   Stress:     Feeling of Stress : Not on file   Social Connections:     Frequency of Communication with Friends and Family: Not on file    Frequency of Social Gatherings with Friends and Family: Not on file    Attends Baptism Services: Not on file    Active Member of [address] [place] [address] [employer] or Organizations: Not on file    Attends Club or Organization Meetings: Not on file    Marital Status: Not on file   Intimate Partner Violence:     Fear of Current or Ex-Partner: Not on file    Emotionally Abused: Not on file    Physically Abused: Not on file    Sexually Abused: Not on file   Housing Stability:     Unable to Pay for Housing in the Last Year: Not on file    Number of [place] in the Last Year: Not on file    Unstable Housing in the Last Year: Not on file     Family History   Problem Relation Age of Onset    Diabetes Mother     Cancer Maternal Grandfather     Celiac Disease Neg Hx     Colon Cancer Neg Hx     Crohn's Disease Neg Hx      No Known Allergies      Review of Systems   Constitutional: Negative for appetite change, chills, fever and unexpected weight change. HENT: Negative for nosebleeds, tinnitus, trouble swallowing and voice change. Eyes: Negative for photophobia, pain and redness. Respiratory: Negative for chest tightness, shortness of breath and wheezing. Cardiovascular: Negative for chest pain, palpitations and leg swelling. Gastrointestinal: Positive for abdominal pain, nausea and vomiting. Negative for abdominal distention, anal bleeding, constipation, diarrhea and rectal pain. Blood in stool: RUQ pain. Endocrine: Negative for polydipsia, polyphagia and polyuria. Genitourinary: Negative for difficulty urinating and hematuria. Skin: Negative for color change, pallor and rash. Neurological: Negative for dizziness, speech difficulty and headaches. Psychiatric/Behavioral: Negative for confusion and suicidal ideas. Objective:   /80 (Site: Right Upper Arm, Position: Sitting, Cuff Size: Large Adult)   Pulse 112   Resp 12   Wt (!) 376 lb (170.6 kg)   LMP 03/25/2021   SpO2 98%   BMI 62.57 kg/m²     Physical Exam  Vitals reviewed. Constitutional:       General: She is not in acute distress. Appearance: Normal appearance. She is well-developed and well-groomed. HENT:      Head: Normocephalic and atraumatic. Nose: Nose normal.   Eyes:      General: No scleral icterus. Extraocular Movements: Extraocular movements intact. Conjunctiva/sclera: Conjunctivae normal.      Pupils: Pupils are equal, round, and reactive to light. Cardiovascular:      Rate and Rhythm: Normal rate and regular rhythm. Pulses: Normal pulses. Heart sounds: Normal heart sounds. Pulmonary:      Effort: Pulmonary effort is normal. No respiratory distress.       Breath sounds: Normal breath sounds. No wheezing or rales. Abdominal:      General: Abdomen is flat. Bowel sounds are normal. There is no distension. Palpations: Abdomen is soft. There is no hepatomegaly, splenomegaly or mass. Tenderness: There is no abdominal tenderness. There is no guarding or rebound. Musculoskeletal:         General: No tenderness or deformity. Normal range of motion. Cervical back: Neck supple. Right lower leg: No edema. Left lower leg: No edema. Skin:     General: Skin is warm and dry. Capillary Refill: Capillary refill takes less than 2 seconds. Coloration: Skin is not jaundiced. Findings: No erythema or rash. Neurological:      General: No focal deficit present. Mental Status: She is alert and oriented to person, place, and time. Psychiatric:         Mood and Affect: Mood normal.         Behavior: Behavior normal.         Laboratory, Pathology, Radiology reviewed in detail with relevantimportant investigations summarized below:  Lab Results   Component Value Date    WBC 10.6 02/04/2022    WBC 9.8 01/17/2022    WBC 12.9 06/09/2021    WBC 7.4 06/05/2021    WBC 8.7 06/02/2021    HGB 12.5 02/04/2022    HGB 13.1 01/17/2022    HGB 11.5 06/09/2021    HGB 12.5 06/05/2021    HGB 12.0 06/02/2021    HCT 38.0 02/04/2022    HCT 40.8 01/17/2022    HCT 35.6 06/09/2021    HCT 38.3 06/05/2021    HCT 37.0 06/02/2021    MCV 78.3 02/04/2022    MCV 77.4 01/17/2022    MCV 78.6 06/09/2021    MCV 78.0 06/05/2021    MCV 78.1 06/02/2021     02/04/2022     01/17/2022     06/09/2021     06/05/2021     06/02/2021    .   Lab Results   Component Value Date    ALT 15 02/04/2022    ALT 29 01/17/2022    ALT 14 06/09/2021    AST 22 02/04/2022    AST 26 01/17/2022    AST 13 06/09/2021    ALKPHOS 97 02/04/2022    ALKPHOS 75 01/17/2022    ALKPHOS 76 06/09/2021    BILITOT <0.2 02/04/2022    BILITOT <0.2 01/17/2022    BILITOT 0.4 06/09/2021       CT ABDOMEN PELVIS W IV CONTRAST Additional Contrast? None    Result Date: 2/4/2022  CT ABDOMEN PELVIS W IV CONTRAST HISTORY:   Lower abdominal and pelvic pain for 3 days with nausea, vomiting, and constipation. TECHNIQUE: CT of the abdomen and pelvis was performed using standard technique, scanning from just above the dome of the diaphragm to the symphysis pubis. Sagittal and coronal performed. Contrast: IV: 100 ml Isovue 300 Oral:  None. All CT scans at this facility use dose modulation, iterative reconstruction, and/or weight based dosing when appropriate to reduce radiation dose to as low as reasonably achievable. COMPARISON: None. RESULT: Some limitations from patient body habitus with associated artifact from portions of the abdominal soft tissues out of the field-of-view. Liver: No mass or lesion. Biliary: Gallbladder unremarkable. No bile duct dilation. Pancreas: No mass or duct dilation. Spleen: No mass or splenomegaly. Adrenals: No mass. Kidneys: No calculus or hydronephrosis. No suspicious renal lesions. GI tract: No dilation or wall thickening. Normal appendix. Feces throughout the colon. Lymph nodes: No abdominal or pelvic lymphadenopathy. Mesentery/Peritoneum/Retroperitoneum: No ascites or mass. Vasculature: The celiac axis and SMA are patent. The portal vein and branches, splenic vein, SMV, and hepatic veins are patent. No abdominal aortic or iliac artery aneurysm. Pelvis: No significant free fluid. Bladder decompressed. Low-attenuation adnexal cysts/follicles. Bones: No acute osseous findings. No destructive osseous lesions. Soft tissues: Unremarkable. Lower thorax: Unremarkable.      No acute process in the abdomen/pelvis. ==========     No results found for: IRON, TIBC, FERRITIN  Lab Results   Component Value Date    INR 1.0 06/09/2021    INR 1.0 05/28/2021     No components found for: ACUTEHEPATITISSCREEN  No components found for: CELIACPANEL  No components found for: STOOLCULTURE, C.DIFF, STOOLOVAPARASITE, STOOLLEUCOCYTE    Endoscopic hx:  EGD Dr Elena Stallworth 11/23/20  Irregular Z-line otherwise negative EGD  Bx:  A. DUODENAL BIOPSY-      DUODENAL MUCOSA WITH NO SIGNIFICANT PATHOLOGIC CHANGES. [address] MUCOSA WITH NO SIGNIFICANT PATHOLOGIC CHANGES.    NEGATIVE FOR [employer]GANISMS ON     Assessment:       Diagnosis Orders   1. Nausea and vomiting, intractability of vomiting not specified, unspecified vomiting type  NM HEPATOBILIARY   2. Pelvic pain                 Orders Placed This Encounter   Procedures    NM HEPATOBILIARY     Standing Status:   Future     Standing Expiration Date:   2/14/2023     Order Specific Question:   Reason for exam:     Answer:   nausea and vomiting with RUQ pain     Orders Placed This Encounter   Medications    ondansetron (ZOFRAN-ODT) 4 MG disintegrating tablet     Sig: Take 1 tablet by mouth 3 times daily as needed for Nausea or Vomiting     Dispense:  21 tablet     Refill:  0     Plan:  1. N/V , RUQ pain  1 month ago no association to meals, no trigger foods, no cannabis use, reports rare associated right upper quadrant pain. Reports that she is actively trying to get pregnant-She  feels her symptoms are similar to her pregnancies in the past, however most recent pregnancy test was negative. Patient was previously seen for similar symptoms in May, however was pregnant at that time, this was a tubal pregnancy. Recent CT of the abdomen and pelvis noted no acute process  -Obtain HIDA scan  -Further recommendations pending clinical course  -Zofran as needed  2. Pelvic Pain  1 mth hx, recent UA was normal, has follow-up with GYN in 1 week. Denies changes in appetite, unintentional weight loss, changes in bowel habits, no constipation or diarrhea, no blood or mucus. No family history of CRC or IBD.   Recent CT abdomen shows no acute process  -Follow-up with GYN as scheduled in 1 week for further evaluation  -Pending clinical course will consider colonoscopy  3. Associated medical conditions include but are not limited to class III obesity BMI 63, migraines, anxiety and depression, splenomegaly    Return in about 4 weeks (around 3/14/2022), or if symptoms worsen or fail to improve.       Umm Hanks, APRN - CNP

## 2022-02-14 NOTE — TELEPHONE ENCOUNTER
Patient states from [address] sent on 2/12/22 her symptoms as stated below,     I still haven't received my period and I'm now 20 days late and I've been having some pelvic pain and pressure, and I'm still really nauseous and still vomiting and having dry heaves. What other tests can you run to see what's going on with me? And to answer your questions yes I'm trying to get pregnant and I just really need help to figure out what's going on. She states that if you would like labs done she has an appointment today @ 1 pm and would like to get them done at that time.  Please advise

## 2022-02-16 NOTE — TELEPHONE ENCOUNTER
From: Lan Levine  To: Dr. Juan C Schulz: 2022 9:47 AM EST  Subject: Need advice     I still haven't received my period and I'm now 20 days late and I've been having some pelvic pain and pressure, and I'm still really nauseous and still vomiting and having dry heaves. What other tests can you run to see what's going on with me? And to answer your questions yes I'm trying to get pregnant and I just really need help to figure out what's going on.

## 2022-03-04 ENCOUNTER — TELEPHONE (OUTPATIENT)
Dept: GASTROENTEROLOGY | Age: 27
End: 2022-03-04

## 2022-03-04 DIAGNOSIS — R10.11 RUQ PAIN: Primary | ICD-10-CM

## 2022-03-08 ENCOUNTER — TELEMEDICINE (OUTPATIENT)
Dept: OBGYN CLINIC | Age: 27
End: 2022-03-08
Payer: COMMERCIAL

## 2022-03-08 DIAGNOSIS — N92.6 IRREGULAR BLEEDING: Primary | ICD-10-CM

## 2022-03-08 DIAGNOSIS — N97.0 ANOVULATORY (DYSFUNCTIONAL UTERINE) BLEEDING: ICD-10-CM

## 2022-03-08 PROCEDURE — 99213 OFFICE O/P EST LOW 20 MIN: CPT | Performed by: OBSTETRICS & GYNECOLOGY

## 2022-03-08 RX ORDER — PNV NO.95/FERROUS FUM/FOLIC AC 28MG-0.8MG
1 TABLET ORAL 2 TIMES DAILY
Qty: 60 TABLET | Refills: 2 | Status: SHIPPED | OUTPATIENT
Start: 2022-03-08

## 2022-03-08 NOTE — PROGRESS NOTES
Phone visit     Kelsea Cordero is a 33 y.o. female who presents here today for complaints of No chief complaint on file. F/u irregular bleeding . Trying to conceive for past 4 mnth. .      Vitals:  LMP 2021   Allergies:  Patient has no known allergies.   Past Medical History:   Diagnosis Date    Mental disorder     depression and anxiety    Postpartum depression      Past Surgical History:   Procedure Laterality Date    LAPAROSCOPY Left 6/10/2021    LAPAROSCOPIC ECTOPIC REMOVAL, LEFT SALPINGECTOMY performed by Ana Luisa Guy MD at [facility]      WISDOM TOOTH EXTRACTION      WISDOM TOOTH EXTRACTION  2019     OB History        4    Para   2    Term   2            AB        Living   2       SAB        IAB        Ectopic        Molar        Multiple        Live Births                  Family History   Problem Relation Age of Onset    Diabetes Mother     Cancer Maternal Grandfather     Celiac Disease Neg Hx     Colon Cancer Neg Hx     Crohn's Disease Neg Hx      Social History     Socioeconomic History    Marital status: Single     Spouse name: Not on file    Number of children: Not on file    Years of education: Not on file    Highest education level: Not on file   Occupational History    Not on file   Tobacco Use    Smoking status: Former Smoker     Quit date: 11/10/2017     Years since quittin.3    Smokeless tobacco: Never Used   Vaping Use    Vaping Use: Never used   Substance and Sexual Activity    Alcohol use: Yes     Comment: rare    Drug use: Never    Sexual activity: Yes     Partners: Male   Other Topics Concern    Not on file   Social History Narrative    Not on file     Social Determinants of Health     Financial Resource Strain:     Difficulty of Paying Living Expenses: Not on file   Food Insecurity:     Worried About Running Out of Food in the Last Year: Not on file    Adis of Food in the Last Year: Not on file   Transportation Needs:     Lack of Transportation (Medical): Not on file    Lack of Transportation (Non-Medical): Not on file   Physical Activity:     Days of Exercise per Week: Not on file    Minutes of Exercise per Session: Not on file   Stress:     Feeling of Stress : Not on file   Social Connections:     Frequency of Communication with Friends and Family: Not on file    Frequency of Social Gatherings with Friends and Family: Not on file    Attends Taoist Services: Not on file    Active Member of [address] [place] [address] [employer] or Organizations: Not on file    Attends Club or Organization Meetings: Not on file    Marital Status: Not on file   Intimate Partner Violence:     Fear of Current or Ex-Partner: Not on file    Emotionally Abused: Not on file    Physically Abused: Not on file    Sexually Abused: Not on file   Housing Stability:     Unable to Pay for Housing in the Last Year: Not on file    Number of [place] in the Last Year: Not on file    Unstable Housing in the Last Year: Not on file       Contraceptive method:  none    Patient's medications, allergies, past medical, surgical, social and family histories were reviewed and updated as appropriate. Review of Systems  As per chief complaint   All other systems reviewed and are negative. Physical Exam:  Phone visit     Assessment:      Diagnosis Orders   1. Irregular bleeding     2. Anovulatory (dysfunctional uterine) bleeding         Plan:     Discussed ovulation kits  Discussed semen analysis   Discussed to return to discuss ovulation induction after completing the above and keeping an ovulation diary . Advised to continue trying to conceieve naturally around ovulation days for at least 6 mnth before attempting ovulation induction with clomid or letrozole. Patient was seen with total phone time of 12 minutes. More than 50% of this visit was counseling and education regarding The primary encounter diagnosis was Irregular bleeding.  A diagnosis of Anovulatory (dysfunctional uterine) bleeding was also pertinent to this visit. and No chief complaint on file. as well as  counseling on preventative health maintenance follow-up. No orders of the defined types were placed in this encounter. Orders Placed This Encounter   Medications    Ferrous Sulfate (IRON) 325 (65 Fe) MG TABS     Sig: Take 1 tablet by mouth 2 times daily     Dispense:  60 tablet     Refill:  2       Follow Up:  No follow-ups on file.         Olga Stoddard MD

## 2022-03-09 ENCOUNTER — HOSPITAL ENCOUNTER (EMERGENCY)
Age: 27
Discharge: HOME | End: 2022-03-09
Attending: EMERGENCY MEDICINE
Payer: COMMERCIAL

## 2022-03-09 VITALS
HEART RATE: 92 BPM | HEIGHT: 65 IN | BODY MASS INDEX: 48.82 KG/M2 | OXYGEN SATURATION: 100 % | RESPIRATION RATE: 22 BRPM | DIASTOLIC BLOOD PRESSURE: 92 MMHG | WEIGHT: 293 LBS | TEMPERATURE: 98.9 F | SYSTOLIC BLOOD PRESSURE: 137 MMHG

## 2022-03-09 DIAGNOSIS — T78.40XA ALLERGIC REACTION, INITIAL ENCOUNTER: Primary | ICD-10-CM

## 2022-03-09 PROCEDURE — 6370000000 HC RX 637 (ALT 250 FOR IP): Performed by: EMERGENCY MEDICINE

## 2022-03-09 PROCEDURE — 94640 AIRWAY INHALATION TREATMENT: CPT

## 2022-03-09 PROCEDURE — 94761 N-INVAS EAR/PLS OXIMETRY MLT: CPT

## 2022-03-09 PROCEDURE — 96374 THER/PROPH/DIAG INJ IV PUSH: CPT

## 2022-03-09 PROCEDURE — 99284 EMERGENCY DEPT VISIT MOD MDM: CPT

## 2022-03-09 PROCEDURE — 2500000003 HC RX 250 WO HCPCS: Performed by: EMERGENCY MEDICINE

## 2022-03-09 RX ORDER — PREDNISONE 20 MG/1
40 TABLET ORAL DAILY
Qty: 10 TABLET | Refills: 0 | Status: SHIPPED | OUTPATIENT
Start: 2022-03-09 | End: 2022-03-14

## 2022-03-09 RX ORDER — IPRATROPIUM BROMIDE AND ALBUTEROL SULFATE 2.5; .5 MG/3ML; MG/3ML
1 SOLUTION RESPIRATORY (INHALATION) CONTINUOUS PRN
Status: DISCONTINUED | OUTPATIENT
Start: 2022-03-09 | End: 2022-03-09 | Stop reason: HOSPADM

## 2022-03-09 RX ADMIN — LIDOCAINE HYDROCHLORIDE: 20 SOLUTION ORAL; TOPICAL at 20:51

## 2022-03-09 RX ADMIN — FAMOTIDINE 20 MG: 10 INJECTION, SOLUTION INTRAVENOUS at 20:02

## 2022-03-09 RX ADMIN — IPRATROPIUM BROMIDE AND ALBUTEROL SULFATE 1 AMPULE: .5; 2.5 SOLUTION RESPIRATORY (INHALATION) at 19:32

## 2022-03-09 ASSESSMENT — ENCOUNTER SYMPTOMS
ABDOMINAL PAIN: 0
EYE DISCHARGE: 0
SHORTNESS OF BREATH: 1
ABDOMINAL DISTENTION: 0
VOMITING: 0
SORE THROAT: 0
WHEEZING: 1
CHEST TIGHTNESS: 0
COUGH: 0
PHOTOPHOBIA: 0

## 2022-03-10 NOTE — ED PROVIDER NOTES
[facility] ED  eMERGENCY dEPARTMENT eNCOUnter      Pt Name: Cyndie Del Rosario  MRN: [id number]  [place] 1995  Date of evaluation: 3/9/2022  Provider: Lazarus Clam, MD    CHIEF COMPLAINT       Chief Complaint   Patient presents with    Allergic Reaction     Rash and SOB after touching eggplant. Patient has no hx of allergies. HISTORY OF PRESENT ILLNESS   (Location/Symptom, Timing/Onset,Context/Setting, Quality, Duration, Modifying Factors, Severity)  Note limiting factors. Cyndie Del Rosario is a 33 y.o. female who presents to the emergency department evaluation of rash and short of breath feeling after touching eggplant. Patient works at [employer] and touched egg plant. Within 5 minutes she developed some rash like hives to her left arm and a short of breath feeling. She has history of seasonal allergies and occasional asthma-like symptoms. She states this did happen one other time when she touched eggplant in the past few months. No other documented eggplant allergy. No other complaints. No other new exposures. HPI    NursingNotes were reviewed. REVIEW OF SYSTEMS    (2-9 systems for level 4, 10 or more for level 5)     Review of Systems   Constitutional: Negative for chills and diaphoresis. HENT: Negative for congestion, ear pain, mouth sores and sore throat. Eyes: Negative for photophobia and discharge. Respiratory: Positive for shortness of breath and wheezing. Negative for cough and chest tightness. Cardiovascular: Negative for chest pain and palpitations. Gastrointestinal: Negative for abdominal distention, abdominal pain and vomiting. Endocrine: Negative for cold intolerance. Genitourinary: Negative for difficulty urinating. Musculoskeletal: Negative for arthralgias. Skin: Positive for rash. Negative for pallor. Allergic/Immunologic: Negative for immunocompromised state. Neurological: Negative for dizziness and syncope. Hematological: Negative for adenopathy. Psychiatric/Behavioral: Negative for agitation and hallucinations. All other systems reviewed and are negative. Except as noted above the remainder of the review of systems was reviewed and negative. PAST MEDICAL HISTORY     Past Medical History:   Diagnosis Date    Mental disorder     depression and anxiety    Postpartum depression          SURGICALHISTORY       Past Surgical History:   Procedure Laterality Date    LAPAROSCOPY Left 6/10/2021    LAPAROSCOPIC ECTOPIC REMOVAL, LEFT SALPINGECTOMY performed by Gokul Perez MD at [address] [place] [address]    WISDOM TOOTH EXTRACTION  2017    WISDOM TOOTH EXTRACTION  2019         CURRENT MEDICATIONS       Previous Medications    ACETAMINOPHEN (TYLENOL) 500 MG TABLET    Take 2 tablets by mouth every 6 hours as needed for Pain or Fever    ARIPIPRAZOLE (ABILIFY) 20 MG TABLET    TAKE 1 TABLET BY MOUTH IN THE EVENING AS DIRECTED FOR DEPRESSION MOOD    ASCORBIC ACID (VITAMIN C) 250 MG TABLET    TAKE 1 TABLET BY MOUTH EVERY DAY WITH IRON    B COMPLEX CAPS    TAKE 1 CAPSULE BY MOUTH EVERY DAY    BENZTROPINE (COGENTIN) 1 MG TABLET    TAKE 1 TABLET BY MOUTH ONCE DAILY AS DIRECTED FOR SIDE EFFECTS    DOCUSATE SODIUM (COLACE) 100 MG CAPSULE    Take 1 capsule by mouth 2 times daily as needed for Constipation    ESOMEPRAZOLE (NEXIUM) 20 MG DELAYED RELEASE CAPSULE    Take 1 capsule by mouth daily    FERROUS SULFATE (IRON) 325 (65 FE) MG TABS    Take 1 tablet by mouth 2 times daily    GABAPENTIN (NEURONTIN) 400 MG CAPSULE    Take by mouth. IBUPROFEN (ADVIL;MOTRIN) 800 MG TABLET    Take 1 tablet by mouth every 6 hours as needed for Pain    METFORMIN (GLUCOPHAGE) 500 MG TABLET    TAKE 1 TABLET BY MOUTH ONCE DAILY WITH FOOD *EMERGENCY REFIL*    ONDANSETRON (ZOFRAN) 4 MG TABLET    Take 1 tablet by mouth every 6 hours as needed for Nausea or Vomiting 1 tablet every 6 hrs prn for nausea and vomiting. SERTRALINE (ZOLOFT) 100 MG TABLET    TAKE 2 TABLETS BY MOUTH ONCE DAILY AT NIGHT AS DIRECTED FOR DEPRESSION ANXIETY    VITAMIN D (CHOLECALCIFEROL) 51467 UNIT CAPS    Take 50,000 Units by mouth once a week    VRAYLAR 1.5 MG CAPSULE    TAKE 1 CAPSULE BY MOUTH IN THE MORNING AS DIRECTED FOR MOOD       ALLERGIES     Patient has no known allergies. FAMILY HISTORY       Family History   Problem Relation Age of Onset    Diabetes Mother     Cancer Maternal Grandfather     Celiac Disease Neg Hx     Colon Cancer Neg Hx     Crohn's Disease Neg Hx           SOCIAL HISTORY       Social History     Socioeconomic History    Marital status: Single     Spouse name: None    Number of children: None    Years of education: None    Highest education level: None   Occupational History    None   Tobacco Use    Smoking status: Current Some Day Smoker     Types: Cigarettes    Smokeless tobacco: Never Used   Vaping Use    Vaping Use: Some days    Substances: CBD   Substance and Sexual Activity    Alcohol use: Not Currently     Comment: rare    Drug use: Not Currently    Sexual activity: Yes     Partners: Male   Other Topics Concern    None   Social History Narrative    None     Social Determinants of Health     Financial Resource Strain:     Difficulty of Paying Living Expenses: Not on file   Food Insecurity:     Worried About Running Out of Food in the Last Year: Not on file    Adis of Food in the Last Year: Not on file   Transportation Needs:     Lack of Transportation (Medical): Not on file    Lack of Transportation (Non-Medical):  Not on file   Physical Activity:     Days of Exercise per Week: Not on file    Minutes of Exercise per Session: Not on file   Stress:     Feeling of Stress : Not on file   Social Connections:     Frequency of Communication with Friends and Family: Not on file    Frequency of Social Gatherings with Friends and Family: Not on file    Attends Buddhism Services: Not on file   Lynda Austin Active Member of Clubs or Organizations: Not on file    Attends Club or Organization Meetings: Not on file    Marital Status: Not on file   Intimate Partner Violence:     Fear of Current or Ex-Partner: Not on file    Emotionally Abused: Not on file    Physically Abused: Not on file    Sexually Abused: Not on file   Housing Stability:     Unable to Pay for Housing in the Last Year: Not on file    Number of [place] in the Last Year: Not on file    Unstable Housing in the Last Year: Not on file       SCREENINGS    Himanshu Coma Scale  Eye Opening: Spontaneous  Best Verbal Response: Oriented  Best Motor Response: Obeys commands  Himanshu Coma Scale Score: 15 @FLOW([id number])@      PHYSICAL EXAM    (up to 7 for level 4, 8 or more for level 5)     ED Triage Vitals [03/09/22 1917]   BP Temp Temp Source Pulse Resp SpO2 Height Weight   (!) 137/92 98.9 °F (37.2 °C) Oral 92 22 100 % 5' 5\" (1.651 m) (!) 368 lb (166.9 kg)       Physical Exam  Vitals and nursing note reviewed. Constitutional:       Appearance: She is well-developed. HENT:      Head: Normocephalic. Nose: Nose normal.      Mouth/Throat:      Mouth: Mucous membranes are moist.   Eyes:      Conjunctiva/sclera: Conjunctivae normal.      Pupils: Pupils are equal, round, and reactive to light. Cardiovascular:      Rate and Rhythm: Normal rate and regular rhythm. Heart sounds: Normal heart sounds. Pulmonary:      Effort: Pulmonary effort is normal.      Breath sounds: Wheezing present. Abdominal:      General: Bowel sounds are normal.      Palpations: Abdomen is soft. Tenderness: There is no abdominal tenderness. There is no guarding. Musculoskeletal:         General: Normal range of motion. Cervical back: Normal range of motion and neck supple. Skin:     General: Skin is warm and dry. Capillary Refill: Capillary refill takes less than 2 seconds. Findings: Rash present.  Rash is urticarial.      Comments: Mild allergic-like skin rash to the left forearm. Mild hives. Neurological:      General: No focal deficit present. Mental Status: She is alert and oriented to person, place, and time. Psychiatric:         Mood and Affect: Mood normal.         DIAGNOSTIC RESULTS     EKG: All EKG's are interpreted by the Emergency Department Physician who either signs or Co-signsthis chart in the absence of a cardiologist.        RADIOLOGY:   Ruthellen Legions such as CT, Ultrasound and MRI are read by the radiologist. Plain radiographic images are visualized and preliminarily interpreted by the emergency physician with the below findings:      Interpretation per the Radiologist below, if available at the time ofthis note:    No orders to display         ED BEDSIDE ULTRASOUND:   Performed by ED Physician - none    LABS:  Labs Reviewed - No data to display    All other labs were within normal range or not returned as of this dictation. EMERGENCY DEPARTMENT COURSE and DIFFERENTIAL DIAGNOSIS/MDM:   Vitals:    Vitals:    03/09/22 1917 03/09/22 1934   BP: (!) 137/92    Pulse: 92    Resp: 22    Temp: 98.9 °F (37.2 °C)    TempSrc: Oral    SpO2: 100% 100%   Weight: (!) 368 lb (166.9 kg)    Height: 5' 5\" (1.651 m)         MDM patient was observed for couple hours. All symptoms improved. Discharged home on 5 days of prednisone. Turn for worsening symptoms      CONSULTS:  None    PROCEDURES:  Unless otherwise noted below, none     Procedures    FINAL IMPRESSION      1.  Allergic reaction, initial encounter          DISPOSITION/PLAN   DISPOSITION Decision To Discharge 03/09/2022 08:39:59 PM      PATIENT REFERRED TO:  VIPUL Nieves - CNP  [address] [facility]  [phone]    In 2 days        DISCHARGE MEDICATIONS:  New Prescriptions    No medications on file          (Please note that portions of this note were completed with a voice recognition program.  Efforts were made to edit the dictations but occasionally words are mis-transcribed.)    Kelly Andino MD (electronically signed)  Attending Emergency Physician          Kelly Andino MD  03/09/22 2040

## 2022-03-11 ENCOUNTER — HOSPITAL ENCOUNTER (OUTPATIENT)
Dept: NUCLEAR MEDICINE | Age: 27
Discharge: HOME | End: 2022-03-13
Payer: COMMERCIAL

## 2022-03-11 DIAGNOSIS — R10.11 RUQ PAIN: ICD-10-CM

## 2022-03-11 PROCEDURE — 78227 HEPATOBIL SYST IMAGE W/DRUG: CPT

## 2022-03-11 PROCEDURE — 3430000000 HC RX DIAGNOSTIC RADIOPHARMACEUTICAL: Performed by: NURSE PRACTITIONER

## 2022-03-11 PROCEDURE — A9537 TC99M MEBROFENIN: HCPCS | Performed by: NURSE PRACTITIONER

## 2022-03-11 RX ADMIN — Medication 8 MILLICURIE: at 07:26

## 2022-03-17 ENCOUNTER — HOSPITAL ENCOUNTER (EMERGENCY)
Age: 27
Discharge: HOME | End: 2022-03-18
Attending: STUDENT IN AN ORGANIZED HEALTH CARE EDUCATION/TRAINING PROGRAM
Payer: COMMERCIAL

## 2022-03-17 ENCOUNTER — APPOINTMENT (OUTPATIENT)
Dept: GENERAL RADIOLOGY | Age: 27
End: 2022-03-17
Payer: COMMERCIAL

## 2022-03-17 DIAGNOSIS — R07.9 CHEST PAIN, UNSPECIFIED TYPE: Primary | ICD-10-CM

## 2022-03-17 DIAGNOSIS — F41.1 ANXIETY STATE: ICD-10-CM

## 2022-03-17 LAB
HCG, URINE, POC: NEGATIVE
Lab: NORMAL
NEGATIVE QC PASS/FAIL: NORMAL
POSITIVE QC PASS/FAIL: NORMAL

## 2022-03-17 PROCEDURE — 93005 ELECTROCARDIOGRAM TRACING: CPT | Performed by: STUDENT IN AN ORGANIZED HEALTH CARE EDUCATION/TRAINING PROGRAM

## 2022-03-17 PROCEDURE — 71046 X-RAY EXAM CHEST 2 VIEWS: CPT

## 2022-03-17 PROCEDURE — 96374 THER/PROPH/DIAG INJ IV PUSH: CPT

## 2022-03-17 PROCEDURE — 99285 EMERGENCY DEPT VISIT HI MDM: CPT

## 2022-03-17 ASSESSMENT — PAIN SCALES - GENERAL: PAINLEVEL_OUTOF10: 8

## 2022-03-17 ASSESSMENT — PAIN - FUNCTIONAL ASSESSMENT: PAIN_FUNCTIONAL_ASSESSMENT: 0-10

## 2022-03-17 ASSESSMENT — PAIN DESCRIPTION - PAIN TYPE: TYPE: ACUTE PAIN

## 2022-03-17 ASSESSMENT — PAIN DESCRIPTION - LOCATION: LOCATION: CHEST;ARM

## 2022-03-17 ASSESSMENT — PAIN DESCRIPTION - ORIENTATION: ORIENTATION: LEFT

## 2022-03-17 ASSESSMENT — PAIN DESCRIPTION - DESCRIPTORS: DESCRIPTORS: SHARP;STABBING

## 2022-03-18 ENCOUNTER — APPOINTMENT (OUTPATIENT)
Dept: CT IMAGING | Age: 27
End: 2022-03-18
Payer: COMMERCIAL

## 2022-03-18 VITALS
DIASTOLIC BLOOD PRESSURE: 67 MMHG | SYSTOLIC BLOOD PRESSURE: 112 MMHG | TEMPERATURE: 97.2 F | OXYGEN SATURATION: 100 % | BODY MASS INDEX: 48.82 KG/M2 | RESPIRATION RATE: 18 BRPM | HEIGHT: 65 IN | WEIGHT: 293 LBS | HEART RATE: 81 BPM

## 2022-03-18 LAB
ALBUMIN SERPL-MCNC: 3.7 G/DL (ref 3.5–4.6)
ALP BLD-CCNC: 78 U/L (ref 40–130)
ALT SERPL-CCNC: 17 U/L (ref 0–33)
ANION GAP SERPL CALCULATED.3IONS-SCNC: 13 MEQ/L (ref 9–15)
AST SERPL-CCNC: 12 U/L (ref 0–35)
BASOPHILS ABSOLUTE: 0.2 K/UL (ref 0–0.2)
BASOPHILS RELATIVE PERCENT: 1.1 %
BILIRUB SERPL-MCNC: 0.3 MG/DL (ref 0.2–0.7)
BUN BLDV-MCNC: 18 MG/DL (ref 6–20)
CALCIUM SERPL-MCNC: 9.1 MG/DL (ref 8.5–9.9)
CHLORIDE BLD-SCNC: 104 MEQ/L (ref 95–107)
CO2: 20 MEQ/L (ref 20–31)
CREAT SERPL-MCNC: 0.74 MG/DL (ref 0.5–0.9)
D DIMER: 0.58 MG/L FEU (ref 0–0.5)
EKG ATRIAL RATE: 85 BPM
EKG P AXIS: -7 DEGREES
EKG P-R INTERVAL: 130 MS
EKG Q-T INTERVAL: 376 MS
EKG QRS DURATION: 80 MS
EKG QTC CALCULATION (BAZETT): 447 MS
EKG R AXIS: 21 DEGREES
EKG T AXIS: 44 DEGREES
EKG VENTRICULAR RATE: 85 BPM
EOSINOPHILS ABSOLUTE: 0.1 K/UL (ref 0–0.7)
EOSINOPHILS RELATIVE PERCENT: 1 %
GFR AFRICAN AMERICAN: >60
GFR NON-AFRICAN AMERICAN: >60
GLOBULIN: 3.2 G/DL (ref 2.3–3.5)
GLUCOSE BLD-MCNC: 97 MG/DL (ref 70–99)
HCT VFR BLD CALC: 36.5 % (ref 37–47)
HEMOGLOBIN: 11.6 G/DL (ref 12–16)
LYMPHOCYTES ABSOLUTE: 3.3 K/UL (ref 1–4.8)
LYMPHOCYTES RELATIVE PERCENT: 23.1 %
MAGNESIUM: 1.9 MG/DL (ref 1.7–2.4)
MCH RBC QN AUTO: 25.1 PG (ref 27–31.3)
MCHC RBC AUTO-ENTMCNC: 31.7 % (ref 33–37)
MCV RBC AUTO: 79.2 FL (ref 82–100)
MONOCYTES ABSOLUTE: 0.9 K/UL (ref 0.2–0.8)
MONOCYTES RELATIVE PERCENT: 6 %
NEUTROPHILS ABSOLUTE: 10 K/UL (ref 1.4–6.5)
NEUTROPHILS RELATIVE PERCENT: 68.8 %
PDW BLD-RTO: 16.2 % (ref 11.5–14.5)
PLATELET # BLD: 331 K/UL (ref 130–400)
POTASSIUM SERPL-SCNC: 3.9 MEQ/L (ref 3.4–4.9)
RBC # BLD: 4.61 M/UL (ref 4.2–5.4)
SODIUM BLD-SCNC: 137 MEQ/L (ref 135–144)
TOTAL PROTEIN: 6.9 G/DL (ref 6.3–8)
TROPONIN: <0.01 NG/ML (ref 0–0.01)
WBC # BLD: 14.5 K/UL (ref 4.8–10.8)

## 2022-03-18 PROCEDURE — 6370000000 HC RX 637 (ALT 250 FOR IP): Performed by: STUDENT IN AN ORGANIZED HEALTH CARE EDUCATION/TRAINING PROGRAM

## 2022-03-18 PROCEDURE — 6360000002 HC RX W HCPCS: Performed by: STUDENT IN AN ORGANIZED HEALTH CARE EDUCATION/TRAINING PROGRAM

## 2022-03-18 PROCEDURE — 84484 ASSAY OF TROPONIN QUANT: CPT

## 2022-03-18 PROCEDURE — 36415 COLL VENOUS BLD VENIPUNCTURE: CPT

## 2022-03-18 PROCEDURE — 71275 CT ANGIOGRAPHY CHEST: CPT

## 2022-03-18 PROCEDURE — 93010 ELECTROCARDIOGRAM REPORT: CPT | Performed by: INTERNAL MEDICINE

## 2022-03-18 PROCEDURE — 85025 COMPLETE CBC W/AUTO DIFF WBC: CPT

## 2022-03-18 PROCEDURE — 2580000003 HC RX 258: Performed by: STUDENT IN AN ORGANIZED HEALTH CARE EDUCATION/TRAINING PROGRAM

## 2022-03-18 PROCEDURE — 83735 ASSAY OF MAGNESIUM: CPT

## 2022-03-18 PROCEDURE — 6360000004 HC RX CONTRAST MEDICATION: Performed by: STUDENT IN AN ORGANIZED HEALTH CARE EDUCATION/TRAINING PROGRAM

## 2022-03-18 PROCEDURE — 80053 COMPREHEN METABOLIC PANEL: CPT

## 2022-03-18 PROCEDURE — 85379 FIBRIN DEGRADATION QUANT: CPT

## 2022-03-18 RX ORDER — KETOROLAC TROMETHAMINE 15 MG/ML
15 INJECTION, SOLUTION INTRAMUSCULAR; INTRAVENOUS ONCE
Status: COMPLETED | OUTPATIENT
Start: 2022-03-18 | End: 2022-03-18

## 2022-03-18 RX ORDER — LORATADINE 10 MG/1
10 TABLET ORAL DAILY
Qty: 30 TABLET | Refills: 0 | Status: SHIPPED | OUTPATIENT
Start: 2022-03-18 | End: 2022-04-17

## 2022-03-18 RX ORDER — ACETAMINOPHEN 500 MG
1000 TABLET ORAL EVERY 6 HOURS PRN
Qty: 60 TABLET | Refills: 0 | Status: SHIPPED | OUTPATIENT
Start: 2022-03-18

## 2022-03-18 RX ORDER — ACETAMINOPHEN 500 MG
1000 TABLET ORAL ONCE
Status: COMPLETED | OUTPATIENT
Start: 2022-03-18 | End: 2022-03-18

## 2022-03-18 RX ORDER — IBUPROFEN 400 MG/1
400 TABLET ORAL EVERY 6 HOURS PRN
Qty: 120 TABLET | Refills: 0 | Status: SHIPPED | OUTPATIENT
Start: 2022-03-18

## 2022-03-18 RX ORDER — 0.9 % SODIUM CHLORIDE 0.9 %
1000 INTRAVENOUS SOLUTION INTRAVENOUS ONCE
Status: COMPLETED | OUTPATIENT
Start: 2022-03-18 | End: 2022-03-18

## 2022-03-18 RX ADMIN — ACETAMINOPHEN 1000 MG: 500 TABLET ORAL at 00:17

## 2022-03-18 RX ADMIN — SODIUM CHLORIDE 1000 ML: 9 INJECTION, SOLUTION INTRAVENOUS at 00:17

## 2022-03-18 RX ADMIN — IOPAMIDOL 100 ML: 612 INJECTION, SOLUTION INTRAVENOUS at 01:23

## 2022-03-18 RX ADMIN — KETOROLAC TROMETHAMINE 15 MG: 15 INJECTION, SOLUTION INTRAMUSCULAR; INTRAVENOUS at 00:17

## 2022-03-18 ASSESSMENT — ENCOUNTER SYMPTOMS
CHEST TIGHTNESS: 1
SHORTNESS OF BREATH: 1
RHINORRHEA: 0
COUGH: 0
ABDOMINAL PAIN: 0
NAUSEA: 0
BACK PAIN: 0
VOMITING: 0
SORE THROAT: 0
DIARRHEA: 0
EYE PAIN: 0

## 2022-03-18 ASSESSMENT — PAIN SCALES - GENERAL: PAINLEVEL_OUTOF10: 7

## 2022-03-18 NOTE — ED NOTES
Pt back from CT. Pt sitting up in bed. Call bell within reach. Pt has no other needs or complaints.       [place] breeze, RN  03/18/22 4694

## 2022-03-18 NOTE — ED PROVIDER NOTES
[facility] ED  eMERGENCY dEPARTMENT eNCOUnter      Pt Name: Dennie Pata  MRN: [id number]  [place] 1995  Date of evaluation: 3/17/2022  Provider: Ever Galdamez MD      HISTORY OF PRESENT ILLNESS      Chief Complaint   Patient presents with    Chest Pain     SOB. Pain worse with deep inspiration. The history is provided by the Patient. Dennie Pata is a 33 y.o. female with a PMH clinically significant for Depression, Anxiety, ZAK and GERD presenting to the ED c/o chest pain, shortness of breath, palpitations and fatigue with initial onset approximately 3 to 4 hours ago. Patient states she was feeling a little bit fatigued throughout the day, but otherwise feeling well. Denies any recent illnesses. States chest pain started just while she was getting up from sitting down. Denies any significant exertional activities or trauma prior to the symptoms. Denies any associated cough, hemoptysis, abdominal pain, N/V/D/C, urinary symptoms. Also denies any new BLE edema/pain. Did not take anything for relief prior to arrival.  Denies any known history of DVT/PE, CAD or arrhythmias. Does state family history of clotting disorder in her biological mother. States she has never been tested for herself however. Characterizes pain as sternal, sharp, stabbing and worse with deep inspiration. REVIEW OF SYSTEMS       Review of Systems   Constitutional: Positive for fatigue. Negative for chills and fever. HENT: Negative for rhinorrhea and sore throat. Eyes: Negative for pain and visual disturbance. Respiratory: Positive for chest tightness and shortness of breath. Negative for cough. Cardiovascular: Positive for chest pain and palpitations. Negative for leg swelling. Gastrointestinal: Negative for abdominal pain, diarrhea, nausea and vomiting. Genitourinary: Negative for difficulty urinating and dysuria. Musculoskeletal: Negative for back pain and neck pain.    Skin:  Feeling of Stress : Not on file   Social Connections:     Frequency of Communication with Friends and Family: Not on file    Frequency of Social Gatherings with Friends and Family: Not on file    Attends Protestant Services: Not on file    Active Member of Clubs or Organizations: Not on file    Attends Club or Organization Meetings: Not on file    Marital Status: Not on file   Intimate Partner Violence:     Fear of Current or Ex-Partner: Not on file    Emotionally Abused: Not on file    Physically Abused: Not on file    Sexually Abused: Not on file   Housing Stability:     Unable to Pay for Housing in the Last Year: Not on file    Number of Places Lived in the Last Year: Not on file    Unstable Housing in the Last Year: Not on file       CURRENT MEDICATIONS       Previous Medications    ARIPIPRAZOLE (ABILIFY) 20 MG TABLET    TAKE 1 TABLET BY MOUTH IN THE EVENING AS DIRECTED FOR DEPRESSION MOOD    ASCORBIC ACID (VITAMIN C) 250 MG TABLET    TAKE 1 TABLET BY MOUTH EVERY DAY WITH IRON    B COMPLEX CAPS    TAKE 1 CAPSULE BY MOUTH EVERY DAY    BENZTROPINE (COGENTIN) 1 MG TABLET    TAKE 1 TABLET BY MOUTH ONCE DAILY AS DIRECTED FOR SIDE EFFECTS    DOCUSATE SODIUM (COLACE) 100 MG CAPSULE    Take 1 capsule by mouth 2 times daily as needed for Constipation    ESOMEPRAZOLE (NEXIUM) 20 MG DELAYED RELEASE CAPSULE    Take 1 capsule by mouth daily    FERROUS SULFATE (IRON) 325 (65 FE) MG TABS    Take 1 tablet by mouth 2 times daily    GABAPENTIN (NEURONTIN) 400 MG CAPSULE    Take by mouth. METFORMIN (GLUCOPHAGE) 500 MG TABLET    TAKE 1 TABLET BY MOUTH ONCE DAILY WITH FOOD *EMERGENCY REFIL*    ONDANSETRON (ZOFRAN) 4 MG TABLET    Take 1 tablet by mouth every 6 hours as needed for Nausea or Vomiting 1 tablet every 6 hrs prn for nausea and vomiting.     SERTRALINE (ZOLOFT) 100 MG TABLET    TAKE 2 TABLETS BY MOUTH ONCE DAILY AT NIGHT AS DIRECTED FOR DEPRESSION ANXIETY    VITAMIN D (CHOLECALCIFEROL) 11958 UNIT CAPS Take 50,000 Units by mouth once a week    VRAYLAR 1.5 MG CAPSULE    TAKE 1 CAPSULE BY MOUTH IN THE MORNING AS DIRECTED FOR MOOD       ALLERGIES     Patient has no known allergies. PHYSICAL EXAM       ED Triage Vitals [03/17/22 2205]   BP Temp Temp Source Pulse Resp SpO2 Height Weight   130/87 97.2 °F (36.2 °C) Temporal 96 22 98 % 5' 5\" (1.651 m) (!) 373 lb (169.2 kg)       Physical Exam  Vitals and nursing note reviewed. Constitutional:       General: She is not in acute distress. Appearance: She is morbidly obese. She is not ill-appearing, toxic-appearing or diaphoretic. HENT:      Head: Normocephalic and atraumatic. Mouth/Throat:      Mouth: Mucous membranes are moist.      Pharynx: Oropharynx is clear. No oropharyngeal exudate or posterior oropharyngeal erythema. Eyes:      Extraocular Movements: Extraocular movements intact. Pupils: Pupils are equal, round, and reactive to light. Cardiovascular:      Rate and Rhythm: Normal rate and regular rhythm. Pulses: Normal pulses. Heart sounds: Normal heart sounds. Pulmonary:      Effort: Pulmonary effort is normal.      Breath sounds: Normal breath sounds. Chest:      Chest wall: No tenderness. Abdominal:      General: There is no distension. Palpations: Abdomen is soft. Tenderness: There is no abdominal tenderness. There is no right CVA tenderness, left CVA tenderness, guarding or rebound. Musculoskeletal:         General: No swelling, tenderness or deformity. Cervical back: Normal range of motion and neck supple. No tenderness. Right lower leg: No edema. Left lower leg: No edema. Skin:     General: Skin is warm and dry. Capillary Refill: Capillary refill takes less than 2 seconds. Neurological:      General: No focal deficit present. Mental Status: She is alert and oriented to person, place, and time. Psychiatric:         Mood and Affect: Mood is anxious.          MDM:   Chart Reviewed: PMH and additional information as noted in HPI obtained from chart review    Vitals:    Vitals:    03/17/22 2205 03/18/22 0000 03/18/22 0145   BP: 130/87 111/70 (!) 104/54   Pulse: 96 86 80   Resp: 22 18 18   Temp: 97.2 °F (36.2 °C)     TempSrc: Temporal     SpO2: 98% 100% 100%   Weight: (!) 373 lb (169.2 kg)     Height: 5' 5\" (1.651 m)         PROCEDURES:  Unless otherwise noted below, none  Procedures    LABS:  Labs Reviewed   D-DIMER, QUANTITATIVE - Abnormal; Notable for the following components:       Result Value    D-Dimer, Quant 0.58 (*)     All other components within normal limits    Narrative:     CALL  Dong  [facility] tel. [phone],  DIMER results called to and read back by Angie Gibbs to [place], 03/18/2022 01:13, by  Gosia Rosa   CBC WITH AUTO DIFFERENTIAL - Abnormal; Notable for the following components:    WBC 14.5 (*)     Hemoglobin 11.6 (*)     Hematocrit 36.5 (*)     MCV 79.2 (*)     MCH 25.1 (*)     MCHC 31.7 (*)     RDW 16.2 (*)     Neutrophils Absolute 10.0 (*)     Monocytes Absolute 0.9 (*)     All other components within normal limits   TROPONIN   MAGNESIUM   COMPREHENSIVE METABOLIC PANEL   POC PREGNANCY UR-QUAL       XR CHEST (2 VW)    (Results Pending)   CTA Chest W WO  (PE study)    (Results Pending)       ED Course as of 03/18/22 0312   Thu Mar 17, 2022   2337 HCG, Urine, POC: Negative [NA]   2337 XR CHEST (2 VW)  Possibly mildly increased interstitial markings versus overpenetration. Otherwise no gross acute cardiopulmonary abnormalities. [NA]   2337 EKG 12 Lead  EKG showing normal sinus rhythm, rate of 85 bpm.  Normal axis, normal intervals, no acute ST-T wave abnormalities. Normal EKG. [NA]   Fri Mar 18, 2022   0049 WBC(!): 14.5  Mild leukocytosis without left shift. Nonspecific. [NA]   0049 Hemoglobin Quant(!): 11.6  Mild anemia consistent with baseline. [NA]   0049 Troponin: <0.010  WNL, very low suspicion for ACS. [NA]   0116 D-Dimer, Quant(!!): 0.58  Elevated.  CTA ordered [NA]   0112 Comprehensive Metabolic Panel:    Sodium 137   Potassium 3.9   Chloride 104   CO2 20   Anion Gap 13   GLUCOSE, FASTING,GF 97   BUN,BUNPL 18   Creatinine 0.74   GFR Non- >60.0   GFR  >60.0   CALCIUM, SERUM, 539316 9.1   Total Protein 6.9   Albumin 3.7   Bilirubin 0.3   Alk Phos 78   ALT 17   AST 12   Globulin 3.2  Unremarkable. [NA]   0116 Magnesium: 1.9  WNL [NA]   0307 CTA Chest W WO  (PE study)  Preliminary radiology read: Clear lung parenchyma with no acute cardiopulmonary process. No pulmonary embolus or aortic dissection. Remainder of findings as noted in full read and unremarkable. [NA]      ED Course User Index  [NA] Kimberly Stokes MD       33 y.o. female with a PMH clinically significant for Depression, Anxiety, ZAK and GERD presenting to the ED c/o chest pain, shortness of breath, palpitations and fatigue with initial onset approximately 3 to 4 hours ago. Upon initial evaluation, Pt Afebrile, HDS and in NAD. PE as noted above. Labs, , EKG, and Imaging as noted above. Given findings, clinical presentation most likely consistent w/ chest pain secondary to musculoskeletal etiology versus GERD versus anxiety. Given patient's characterization of the pain and stated family history of clotting disorders, did obtain D-dimer which was elevated. Therefore obtained CTA of the chest which did not note any evidence of PE and/or other acute cardiopulmonary disease. EKG unremarkable. Labs otherwise largely unremarkable, as noted above. Symptoms improved following meds in the ED. Given findings, stable for further evaluation and management as an outpatient.   Pt was administered   Medications   acetaminophen (TYLENOL) tablet 1,000 mg (1,000 mg Oral Given 3/18/22 0017)   ketorolac (TORADOL) injection 15 mg (15 mg IntraVENous Given 3/18/22 0017)   0.9 % sodium chloride bolus (0 mLs IntraVENous Stopped 3/18/22 0124)   iopamidol (ISOVUE-300) 61 % injection 100 mL (100 mLs IntraVENous Given 3/18/22 0123)       Plan: Discharge home in good condition with meds as noted below and instructions to follow up with PCP . Pt stable and appropriate for further evaluation and management as an outpatient. and Patient understanding and amenable to the POC. CRITICAL CARE TIME   Total CriticalCare time was 0 minutes, excluding separately reportable procedures. There was a high probability of clinically significant/life threatening deterioration in the patient's condition which required my urgent intervention. FINAL IMPRESSION      1. Chest pain, unspecified type    2.  Anxiety state          DISPOSITION/PLAN   DISPOSITION Decision To Discharge 03/18/2022 03:05:38 AM      Current Discharge Medication List      START taking these medications    Details   loratadine (CLARITIN) 10 MG tablet Take 1 tablet by mouth daily  Qty: 30 tablet, Refills: 0              MD Adam Doe MD  03/18/22 7744

## 2022-03-18 NOTE — ED NOTES
Pt states pain relief. Pt sitting up in bed on phone. Call bell within reach. No other concerns.       [place] breeze, RN  03/18/22 1938

## 2022-03-18 NOTE — ED TRIAGE NOTES
Patient was sitting in a chair after dinner, and when she went to stand up she had a sharp stabbing pain midsternal. Patient has hx of COPD/sleep apnea and tried to wear her CPAP with no relief. Patient A&Ox4. Skin p/w/d. Respirations even and unlabored, but when being observed starts breathing heavier and making a struggling type noise.

## 2022-03-18 NOTE — ED NOTES
Pt stable and ambulatory at this moment. D/c, follow up and medication discussed. Pt prescribed x 3 medications. Pt verbalized understanding,      [place] breeze, RN  03/18/22 6275

## 2022-05-05 ENCOUNTER — TELEPHONE (OUTPATIENT)
Dept: OBGYN CLINIC | Age: 27
End: 2022-05-05

## 2022-05-05 DIAGNOSIS — N91.2 AMENORRHEA: Primary | ICD-10-CM

## 2022-05-05 DIAGNOSIS — Z87.59 HISTORY OF ECTOPIC PREGNANCY: ICD-10-CM

## 2022-05-05 NOTE — TELEPHONE ENCOUNTER
Pt calling in . LMP 4-18-22. Hx of ectopic. + faint pregnancy test at home and pregnancy symptoms. Pt would like a confirmation of pregnancy. Per Dr. Tony Bryan verbal request standing order for HCG to b e placed and follow up as directed.  Pt made aware

## 2022-05-09 DIAGNOSIS — Z87.59 HISTORY OF ECTOPIC PREGNANCY: ICD-10-CM

## 2022-05-09 DIAGNOSIS — N91.2 AMENORRHEA: ICD-10-CM

## 2022-05-09 LAB — GONADOTROPIN, CHORIONIC (HCG) QUANT: <0.1 MIU/ML

## 2022-06-21 ENCOUNTER — HOSPITAL ENCOUNTER (EMERGENCY)
Age: 27
Discharge: HOME | End: 2022-06-21
Payer: COMMERCIAL

## 2022-06-21 ENCOUNTER — APPOINTMENT (OUTPATIENT)
Dept: CT IMAGING | Age: 27
End: 2022-06-21
Payer: COMMERCIAL

## 2022-06-21 VITALS
RESPIRATION RATE: 15 BRPM | SYSTOLIC BLOOD PRESSURE: 135 MMHG | OXYGEN SATURATION: 98 % | BODY MASS INDEX: 48.82 KG/M2 | WEIGHT: 293 LBS | HEIGHT: 65 IN | HEART RATE: 72 BPM | TEMPERATURE: 98.7 F | DIASTOLIC BLOOD PRESSURE: 74 MMHG

## 2022-06-21 DIAGNOSIS — N30.00 ACUTE CYSTITIS WITHOUT HEMATURIA: Primary | ICD-10-CM

## 2022-06-21 LAB
BILIRUBIN URINE: NEGATIVE
BLOOD, URINE: NEGATIVE
CHP ED QC CHECK: NORMAL
CLARITY: CLEAR
COLOR: YELLOW
GLUCOSE URINE: NEGATIVE MG/DL
KETONES, URINE: NEGATIVE MG/DL
LEUKOCYTE ESTERASE, URINE: NEGATIVE
NITRITE, URINE: NEGATIVE
PH UA: 7 (ref 5–9)
PREGNANCY TEST URINE, POC: NEGATIVE
PROTEIN UA: NEGATIVE MG/DL
SPECIFIC GRAVITY UA: 1.02 (ref 1–1.03)
URINE REFLEX TO CULTURE: NORMAL
UROBILINOGEN, URINE: 0.2 E.U./DL

## 2022-06-21 PROCEDURE — 74176 CT ABD & PELVIS W/O CONTRAST: CPT

## 2022-06-21 PROCEDURE — 81003 URINALYSIS AUTO W/O SCOPE: CPT

## 2022-06-21 PROCEDURE — 99284 EMERGENCY DEPT VISIT MOD MDM: CPT

## 2022-06-21 RX ORDER — PHENAZOPYRIDINE HYDROCHLORIDE 100 MG/1
100 TABLET, FILM COATED ORAL 3 TIMES DAILY PRN
Qty: 9 TABLET | Refills: 0 | Status: SHIPPED | OUTPATIENT
Start: 2022-06-21 | End: 2022-06-24

## 2022-06-21 RX ORDER — CIPROFLOXACIN 500 MG/1
500 TABLET, FILM COATED ORAL 2 TIMES DAILY
Qty: 6 TABLET | Refills: 0 | Status: SHIPPED | OUTPATIENT
Start: 2022-06-21 | End: 2022-06-24

## 2022-06-21 ASSESSMENT — ENCOUNTER SYMPTOMS
EYE DISCHARGE: 0
SHORTNESS OF BREATH: 0
COLOR CHANGE: 0
CONSTIPATION: 0
ABDOMINAL DISTENTION: 0
RHINORRHEA: 0
SORE THROAT: 0
ABDOMINAL PAIN: 0

## 2022-06-21 ASSESSMENT — PAIN - FUNCTIONAL ASSESSMENT: PAIN_FUNCTIONAL_ASSESSMENT: 0-10

## 2022-06-21 ASSESSMENT — PAIN DESCRIPTION - LOCATION: LOCATION: ABDOMEN

## 2022-06-21 ASSESSMENT — PAIN DESCRIPTION - DESCRIPTORS: DESCRIPTORS: ACHING;SHARP;SORE

## 2022-06-21 ASSESSMENT — PAIN DESCRIPTION - ORIENTATION: ORIENTATION: RIGHT;LOWER

## 2022-06-21 ASSESSMENT — PAIN DESCRIPTION - PAIN TYPE: TYPE: ACUTE PAIN

## 2022-06-21 ASSESSMENT — PAIN SCALES - GENERAL: PAINLEVEL_OUTOF10: 4

## 2022-06-21 NOTE — ED PROVIDER NOTES
[address] [facility] ED  eMERGENCY dEPARTMENT eNCOUnter      Pt Name: Lary Jimenez  MRN: [id number]  [place] 1995  Date of evaluation: 6/21/2022  Provider: Mercy Renee PA-C    CHIEF COMPLAINT       Chief Complaint   Patient presents with    Dizziness     room spinning    Urinary Frequency     feels like she has a full bladder and cant empty it. Pt denies any injury         HISTORY OF PRESENT ILLNESS   (Location/Symptom, Timing/Onset,Context/Setting, Quality, Duration, Modifying Factors, Severity)  Note limiting factors. Lary Jimenez is a 33 y.o. female who presents to the emergency department night of increasing urinary frequency, decreased urine output, which patient states been ongoing for the last 2 days, she states she feels like she does empty her bladder, when she pees only gets very small amounts out, she denies any fevers, no nausea vomiting, she states she does have some pain radiation to her right flank. She rates her current pain at this time is a 4 out of 10. There is no nausea vomiting. She denies acute injury. Has medical history significant for psychiatric disorder, depression. HPI    NursingNotes were reviewed. REVIEW OF SYSTEMS    (2-9 systems for level 4, 10 or more for level 5)     Review of Systems   Constitutional: Negative for activity change and appetite change. HENT: Negative for congestion, ear discharge, ear pain, nosebleeds, rhinorrhea and sore throat. Eyes: Negative for discharge. Respiratory: Negative for shortness of breath. Cardiovascular: Negative for chest pain, palpitations and leg swelling. Gastrointestinal: Negative for abdominal distention, abdominal pain and constipation. Genitourinary: Positive for difficulty urinating, dysuria, flank pain, frequency and urgency. Musculoskeletal: Negative for arthralgias. Skin: Negative for color change. Neurological: Negative for dizziness, syncope, numbness and headaches. Psychiatric/Behavioral: Negative for agitation and confusion. Except as noted above the remainder of the review of systems was reviewed and negative. PAST MEDICAL HISTORY     Past Medical History:   Diagnosis Date    Mental disorder     depression and anxiety    Postpartum depression          SURGICALHISTORY       Past Surgical History:   Procedure Laterality Date    LAPAROSCOPY Left 6/10/2021    LAPAROSCOPIC ECTOPIC REMOVAL, LEFT SALPINGECTOMY performed by Yuliana Russ MD at [address] [place] [address]    WISDOM TOOTH EXTRACTION  2017    WISDOM TOOTH EXTRACTION  2019         CURRENT MEDICATIONS       Previous Medications    ACETAMINOPHEN (TYLENOL) 500 MG TABLET    Take 2 tablets by mouth every 6 hours as needed for Pain or Fever    ARIPIPRAZOLE (ABILIFY) 20 MG TABLET    TAKE 1 TABLET BY MOUTH IN THE EVENING AS DIRECTED FOR DEPRESSION MOOD    ASCORBIC ACID (VITAMIN C) 250 MG TABLET    TAKE 1 TABLET BY MOUTH EVERY DAY WITH IRON    B COMPLEX CAPS    TAKE 1 CAPSULE BY MOUTH EVERY DAY    BENZTROPINE (COGENTIN) 1 MG TABLET    TAKE 1 TABLET BY MOUTH ONCE DAILY AS DIRECTED FOR SIDE EFFECTS    DOCUSATE SODIUM (COLACE) 100 MG CAPSULE    Take 1 capsule by mouth 2 times daily as needed for Constipation    ESOMEPRAZOLE (NEXIUM) 20 MG DELAYED RELEASE CAPSULE    Take 1 capsule by mouth daily    FERROUS SULFATE (IRON) 325 (65 FE) MG TABS    Take 1 tablet by mouth 2 times daily    GABAPENTIN (NEURONTIN) 400 MG CAPSULE    Take by mouth. IBUPROFEN (IBU) 400 MG TABLET    Take 1 tablet by mouth every 6 hours as needed for Pain    METFORMIN (GLUCOPHAGE) 500 MG TABLET    TAKE 1 TABLET BY MOUTH ONCE DAILY WITH FOOD *EMERGENCY REFIL*    ONDANSETRON (ZOFRAN) 4 MG TABLET    Take 1 tablet by mouth every 6 hours as needed for Nausea or Vomiting 1 tablet every 6 hrs prn for nausea and vomiting.     SERTRALINE (ZOLOFT) 100 MG TABLET    TAKE 2 TABLETS BY MOUTH ONCE DAILY AT NIGHT AS DIRECTED FOR DEPRESSION ANXIETY    VITAMIN D (CHOLECALCIFEROL) 25478 UNIT CAPS    Take 50,000 Units by mouth once a week    VRAYLAR 1.5 MG CAPSULE    TAKE 1 CAPSULE BY MOUTH IN THE MORNING AS DIRECTED FOR MOOD       ALLERGIES     Patient has no known allergies. FAMILY HISTORY       Family History   Problem Relation Age of Onset    Diabetes Mother     Cancer Maternal Grandfather     Celiac Disease Neg Hx     Colon Cancer Neg Hx     Crohn's Disease Neg Hx           SOCIAL HISTORY       Social History     Socioeconomic History    Marital status: Single     Spouse name: None    Number of children: None    Years of education: None    Highest education level: None   Occupational History    None   Tobacco Use    Smoking status: Current Some Day Smoker     Types: Cigarettes    Smokeless tobacco: Never Used   Vaping Use    Vaping Use: Some days    Substances: CBD   Substance and Sexual Activity    Alcohol use: Not Currently     Comment: rare    Drug use: Yes     Types: Marijuana Fronie Janeth)     Comment: Last used yesterday    Sexual activity: Yes     Partners: Male   Other Topics Concern    None   Social History Narrative    None     Social Determinants of Health     Financial Resource Strain:     Difficulty of Paying Living Expenses: Not on file   Food Insecurity:     Worried About Running Out of Food in the Last Year: Not on file    Adis of Food in the Last Year: Not on file   Transportation Needs:     Lack of Transportation (Medical): Not on file    Lack of Transportation (Non-Medical):  Not on file   Physical Activity:     Days of Exercise per Week: Not on file    Minutes of Exercise per Session: Not on file   Stress:     Feeling of Stress : Not on file   Social Connections:     Frequency of Communication with Friends and Family: Not on file    Frequency of Social Gatherings with Friends and Family: Not on file    Attends Mormonism Services: Not on file    Active Member of Clubs or Organizations: Not on file   [address] or Organization Meetings: Not on file    Marital Status: Not on file   Intimate Partner Violence:     Fear of Current or Ex-Partner: Not on file    Emotionally Abused: Not on file    Physically Abused: Not on file    Sexually Abused: Not on file   Housing Stability:     Unable to Pay for Housing in the Last Year: Not on file    Number of [place] in the Last Year: Not on file    Unstable Housing in the Last Year: Not on file       SCREENINGS    [place] Coma Scale  Eye Opening: Spontaneous  Best Verbal Response: Oriented  Best Motor Response: Obeys commands  Himanshu Coma Scale Score: 15 @FLOW([id number])@      PHYSICAL EXAM    (up to 7 for level 4, 8 or more for level 5)     ED Triage Vitals [06/21/22 1338]   BP Temp Temp Source Heart Rate Resp SpO2 Height Weight   135/74 98.7 °F (37.1 °C) Oral 72 15 98 % 5' 5\" (1.651 m) (!) 358 lb (162.4 kg)       Physical Exam  Vitals and nursing note reviewed. Constitutional:       General: She is not in acute distress. Appearance: She is well-developed. She is not ill-appearing, toxic-appearing or diaphoretic. HENT:      Head: Normocephalic. Nose: No congestion. Mouth/Throat:      Mouth: Mucous membranes are moist.      Pharynx: No oropharyngeal exudate or posterior oropharyngeal erythema. Eyes:      Extraocular Movements: Extraocular movements intact. Conjunctiva/sclera: Conjunctivae normal.      Pupils: Pupils are equal, round, and reactive to light. Neck:      Vascular: No JVD. Trachea: No tracheal deviation. Cardiovascular:      Rate and Rhythm: Normal rate. Pulses: Normal pulses. Heart sounds: Normal heart sounds. No murmur heard. No friction rub. No gallop. Pulmonary:      Effort: Pulmonary effort is normal. No tachypnea, accessory muscle usage, respiratory distress or retractions. Breath sounds: No stridor. No wheezing, rhonchi or rales. Chest:      Chest wall: No tenderness. Abdominal:      General: Abdomen is flat. Bowel sounds are normal. There is no distension or abdominal bruit. Palpations: Abdomen is soft. There is no shifting dullness, fluid wave, hepatomegaly, splenomegaly, mass or pulsatile mass. Tenderness: There is no abdominal tenderness. There is no right CVA tenderness, left CVA tenderness, guarding or rebound. Negative signs include Glover's sign, Rovsing's sign and McBurney's sign. Comments: Abdomen soft nondistended nontender no guarding mass rebound, no CVA tenderness. Musculoskeletal:         General: No deformity. Cervical back: Normal range of motion and neck supple. No rigidity. Skin:     General: Skin is warm and dry. Capillary Refill: Capillary refill takes less than 2 seconds. Coloration: Skin is not jaundiced. Neurological:      General: No focal deficit present. Mental Status: She is alert and oriented to person, place, and time. Mental status is at baseline. Cranial Nerves: No cranial nerve deficit. Sensory: No sensory deficit. Motor: No weakness. Coordination: Coordination normal.   Psychiatric:         Mood and Affect: Mood normal.         DIAGNOSTIC RESULTS     EKG: All EKG's are interpreted by the Emergency Department Physician who either signs or Co-signsthis chart in the absence of a cardiologist.        RADIOLOGY:   [facility] such as CT, Ultrasound and MRI are read by the radiologist. Plain radiographic images are visualized and preliminarily interpreted by the emergency physician with the below findings:        Interpretation per the Radiologist below, if available at the time ofthis note:    CT ABDOMEN PELVIS WO CONTRAST Additional Contrast? None   Final Result   No evidence of acute abdominal pathology is seen.                ED BEDSIDE ULTRASOUND:   Performed by ED Physician - none    LABS:  Labs Reviewed   POCT URINE PREGNANCY - Normal   URINALYSIS WITH REFLEX TO CULTURE All other labs were within normal range or not returned as of this dictation. EMERGENCY DEPARTMENT COURSE and DIFFERENTIAL DIAGNOSIS/MDM:   Vitals:    Vitals:    06/21/22 1338   BP: 135/74   Pulse: 72   Resp: 15   Temp: 98.7 °F (37.1 °C)   TempSrc: Oral   SpO2: 98%   Weight: (!) 358 lb (162.4 kg)   Height: 5' 5\" (1.651 m)          MDM  Number of Diagnoses or Management Options  Acute cystitis without hematuria  Diagnosis management comments: Presents emerged part with complaint of fullness in her bladder, and frequent urination, she is also also burning with urination for the last 2 days, she denies any fevers, complains also of right flank pain. Urine shows no leukoesterase, or nitrates. CT scan of the abdomen pelvis was completed which is negative for acute intra-abdominal pelvic process, patient has symptoms of a urinary tract infection without confirmation through laboratory. Patient was placed on Cipro, and Pyridium, and advised to contact her family doctor for follow-up next 2 to 3 days, should she have any worsening or change symptoms, she was vies return to ED. CRITICAL CARE TIME   Total Critical Care time was 0 minutes, excluding separately reportableprocedures. There was a high probability of clinicallysignificant/life threatening deterioration in the patient's condition which required my urgent intervention. CONSULTS:  None    PROCEDURES:  Unless otherwise noted below, none     Procedures    FINAL IMPRESSION      1.  Acute cystitis without hematuria          DISPOSITION/PLAN   DISPOSITION Decision To Discharge 06/21/2022 03:59:31 PM      PATIENT REFERRED TO:  VIPUL Avalos - CNP  [address] [address]  [phone]    In 3 days        DISCHARGE MEDICATIONS:  New Prescriptions    CIPROFLOXACIN (CIPRO) 500 MG TABLET    Take 1 tablet by mouth 2 times daily for 3 days    PHENAZOPYRIDINE (PYRIDIUM) 100 MG TABLET    Take 1 tablet by mouth 3 times daily as needed for Pain          (Please note that portions of this note were completed with a voice recognition program.  Efforts were made to edit the dictations but occasionally words are mis-transcribed.)    Thao Nielsen PA-C (electronically signed)  Attending Emergency Physician         Thao Nielsen PA-C  06/21/22 [address] One[address] PA-C  06/21/22 6715

## 2022-06-21 NOTE — ED TRIAGE NOTES
Pt to the ED via walk into triage with c/o urinary frequency for 3-4 days. Pt also having vertigo feeling like the room is spinning.   Pt alert and in no acute distress

## 2022-06-26 ENCOUNTER — HOSPITAL ENCOUNTER (EMERGENCY)
Age: 27
Discharge: HOME | End: 2022-06-26
Attending: FAMILY MEDICINE
Payer: COMMERCIAL

## 2022-06-26 ENCOUNTER — APPOINTMENT (OUTPATIENT)
Dept: GENERAL RADIOLOGY | Age: 27
End: 2022-06-26
Payer: COMMERCIAL

## 2022-06-26 VITALS
TEMPERATURE: 97.3 F | HEIGHT: 65 IN | SYSTOLIC BLOOD PRESSURE: 117 MMHG | HEART RATE: 81 BPM | DIASTOLIC BLOOD PRESSURE: 73 MMHG | RESPIRATION RATE: 20 BRPM | OXYGEN SATURATION: 100 % | BODY MASS INDEX: 48.82 KG/M2 | WEIGHT: 293 LBS

## 2022-06-26 VITALS
HEART RATE: 85 BPM | DIASTOLIC BLOOD PRESSURE: 72 MMHG | HEIGHT: 65 IN | SYSTOLIC BLOOD PRESSURE: 117 MMHG | RESPIRATION RATE: 16 BRPM | WEIGHT: 293 LBS | OXYGEN SATURATION: 98 % | TEMPERATURE: 98 F | BODY MASS INDEX: 48.82 KG/M2

## 2022-06-26 DIAGNOSIS — R07.89 CHEST WALL PAIN: Primary | ICD-10-CM

## 2022-06-26 DIAGNOSIS — F14.10 NONDEPENDENT COCAINE ABUSE (HCC): ICD-10-CM

## 2022-06-26 DIAGNOSIS — J98.2 PNEUMOMEDIASTINUM (HCC): Primary | ICD-10-CM

## 2022-06-26 LAB
ALBUMIN SERPL-MCNC: 3.5 G/DL (ref 3.5–4.6)
ALP BLD-CCNC: 75 U/L (ref 40–130)
ALT SERPL-CCNC: 12 U/L (ref 0–33)
AMPHETAMINE SCREEN, URINE: ABNORMAL
ANION GAP SERPL CALCULATED.3IONS-SCNC: 9 MEQ/L (ref 9–15)
AST SERPL-CCNC: 18 U/L (ref 0–35)
BARBITURATE SCREEN URINE: ABNORMAL
BENZODIAZEPINE SCREEN, URINE: ABNORMAL
BILIRUB SERPL-MCNC: 0.5 MG/DL (ref 0.2–0.7)
BUN BLDV-MCNC: 10 MG/DL (ref 6–20)
CALCIUM SERPL-MCNC: 8.8 MG/DL (ref 8.5–9.9)
CANNABINOID SCREEN URINE: ABNORMAL
CHLORIDE BLD-SCNC: 105 MEQ/L (ref 95–107)
CO2: 25 MEQ/L (ref 20–31)
COCAINE METABOLITE SCREEN URINE: POSITIVE
CREAT SERPL-MCNC: 0.8 MG/DL (ref 0.5–0.9)
ETHANOL PERCENT: NORMAL G/DL
ETHANOL: <10 MG/DL (ref 0–0.08)
GFR AFRICAN AMERICAN: >60
GFR NON-AFRICAN AMERICAN: >60
GLOBULIN: 3 G/DL (ref 2.3–3.5)
GLUCOSE BLD-MCNC: 107 MG/DL (ref 70–99)
Lab: ABNORMAL
METHADONE SCREEN, URINE: ABNORMAL
OPIATE SCREEN URINE: ABNORMAL
OXYCODONE URINE: ABNORMAL
PHENCYCLIDINE SCREEN URINE: ABNORMAL
POTASSIUM SERPL-SCNC: 4.5 MEQ/L (ref 3.4–4.9)
PRO-BNP: 174 PG/ML
PROPOXYPHENE SCREEN: ABNORMAL
SODIUM BLD-SCNC: 139 MEQ/L (ref 135–144)
TOTAL PROTEIN: 6.5 G/DL (ref 6.3–8)
TROPONIN: <0.01 NG/ML (ref 0–0.01)

## 2022-06-26 PROCEDURE — 96374 THER/PROPH/DIAG INJ IV PUSH: CPT

## 2022-06-26 PROCEDURE — 2500000003 HC RX 250 WO HCPCS: Performed by: FAMILY MEDICINE

## 2022-06-26 PROCEDURE — 74220 X-RAY XM ESOPHAGUS 1CNTRST: CPT

## 2022-06-26 PROCEDURE — 6360000002 HC RX W HCPCS: Performed by: FAMILY MEDICINE

## 2022-06-26 PROCEDURE — 99285 EMERGENCY DEPT VISIT HI MDM: CPT

## 2022-06-26 PROCEDURE — 84484 ASSAY OF TROPONIN QUANT: CPT

## 2022-06-26 PROCEDURE — 36415 COLL VENOUS BLD VENIPUNCTURE: CPT

## 2022-06-26 PROCEDURE — 93005 ELECTROCARDIOGRAM TRACING: CPT | Performed by: FAMILY MEDICINE

## 2022-06-26 PROCEDURE — 71045 X-RAY EXAM CHEST 1 VIEW: CPT

## 2022-06-26 PROCEDURE — 99283 EMERGENCY DEPT VISIT LOW MDM: CPT

## 2022-06-26 PROCEDURE — 82077 ASSAY SPEC XCP UR&BREATH IA: CPT

## 2022-06-26 PROCEDURE — 80053 COMPREHEN METABOLIC PANEL: CPT

## 2022-06-26 PROCEDURE — 6370000000 HC RX 637 (ALT 250 FOR IP): Performed by: FAMILY MEDICINE

## 2022-06-26 PROCEDURE — 2580000003 HC RX 258: Performed by: FAMILY MEDICINE

## 2022-06-26 PROCEDURE — 83880 ASSAY OF NATRIURETIC PEPTIDE: CPT

## 2022-06-26 PROCEDURE — 80307 DRUG TEST PRSMV CHEM ANLYZR: CPT

## 2022-06-26 RX ORDER — KETOROLAC TROMETHAMINE 30 MG/ML
30 INJECTION, SOLUTION INTRAMUSCULAR; INTRAVENOUS ONCE
Status: COMPLETED | OUTPATIENT
Start: 2022-06-26 | End: 2022-06-26

## 2022-06-26 RX ORDER — TRAMADOL HYDROCHLORIDE 50 MG/1
50 TABLET ORAL EVERY 4 HOURS PRN
Qty: 18 TABLET | Refills: 0 | Status: SHIPPED | OUTPATIENT
Start: 2022-06-26 | End: 2022-06-29

## 2022-06-26 RX ORDER — AMOXICILLIN AND CLAVULANATE POTASSIUM 875; 125 MG/1; MG/1
1 TABLET, FILM COATED ORAL 2 TIMES DAILY
Qty: 20 TABLET | Refills: 0 | Status: SHIPPED | OUTPATIENT
Start: 2022-06-26 | End: 2022-07-06

## 2022-06-26 RX ORDER — 0.9 % SODIUM CHLORIDE 0.9 %
1000 INTRAVENOUS SOLUTION INTRAVENOUS ONCE
Status: COMPLETED | OUTPATIENT
Start: 2022-06-26 | End: 2022-06-26

## 2022-06-26 RX ORDER — AMOXICILLIN AND CLAVULANATE POTASSIUM 875; 125 MG/1; MG/1
1 TABLET, FILM COATED ORAL ONCE
Status: COMPLETED | OUTPATIENT
Start: 2022-06-26 | End: 2022-06-26

## 2022-06-26 RX ADMIN — KETOROLAC TROMETHAMINE 30 MG: 30 INJECTION, SOLUTION INTRAMUSCULAR; INTRAVENOUS at 13:11

## 2022-06-26 RX ADMIN — SODIUM CHLORIDE 1000 ML: 9 INJECTION, SOLUTION INTRAVENOUS at 13:11

## 2022-06-26 RX ADMIN — AMOXICILLIN AND CLAVULANATE POTASSIUM 1 TABLET: 875; 125 TABLET, FILM COATED ORAL at 17:25

## 2022-06-26 RX ADMIN — BARIUM SULFATE 176 G: 960 POWDER, FOR SUSPENSION ORAL at 16:48

## 2022-06-26 ASSESSMENT — ENCOUNTER SYMPTOMS
RESPIRATORY NEGATIVE: 1
RESPIRATORY NEGATIVE: 1
CHEST TIGHTNESS: 0
SHORTNESS OF BREATH: 0
CHOKING: 0
ALLERGIC/IMMUNOLOGIC NEGATIVE: 1
COUGH: 0
ALLERGIC/IMMUNOLOGIC NEGATIVE: 1
WHEEZING: 0
APNEA: 0
EYES NEGATIVE: 1
STRIDOR: 0
EYES NEGATIVE: 1

## 2022-06-26 ASSESSMENT — PAIN - FUNCTIONAL ASSESSMENT
PAIN_FUNCTIONAL_ASSESSMENT: NONE - DENIES PAIN
PAIN_FUNCTIONAL_ASSESSMENT: 0-10
PAIN_FUNCTIONAL_ASSESSMENT: PREVENTS OR INTERFERES SOME ACTIVE ACTIVITIES AND ADLS
PAIN_FUNCTIONAL_ASSESSMENT: 0-10
PAIN_FUNCTIONAL_ASSESSMENT: PREVENTS OR INTERFERES SOME ACTIVE ACTIVITIES AND ADLS
PAIN_FUNCTIONAL_ASSESSMENT: 0-10

## 2022-06-26 ASSESSMENT — PAIN DESCRIPTION - LOCATION
LOCATION: CHEST
LOCATION: CHEST
LOCATION: CHEST;THROAT
LOCATION: CHEST;THROAT

## 2022-06-26 ASSESSMENT — PAIN DESCRIPTION - DESCRIPTORS
DESCRIPTORS: SHARP
DESCRIPTORS: DISCOMFORT
DESCRIPTORS: SHARP
DESCRIPTORS: PRESSURE

## 2022-06-26 ASSESSMENT — PAIN SCALES - GENERAL
PAINLEVEL_OUTOF10: 7
PAINLEVEL_OUTOF10: 7
PAINLEVEL_OUTOF10: 5
PAINLEVEL_OUTOF10: 7

## 2022-06-26 ASSESSMENT — PAIN DESCRIPTION - ONSET
ONSET: ON-GOING
ONSET: ON-GOING

## 2022-06-26 ASSESSMENT — PAIN DESCRIPTION - PAIN TYPE
TYPE: ACUTE PAIN

## 2022-06-26 ASSESSMENT — PAIN DESCRIPTION - FREQUENCY
FREQUENCY: CONTINUOUS

## 2022-06-26 ASSESSMENT — PAIN DESCRIPTION - ORIENTATION: ORIENTATION: MID

## 2022-06-26 NOTE — ED TRIAGE NOTES
Pt presents to ED from home with c/o mid-sternal chest pain. Pt reports onset at 1900 last night after smoking marijuana and crack. Pt reports that pain radiates into her throat. Upon assessment, pt is A/Ox4, skin p/w/d, respirations even and unlabored, msp's intact. Pt denies n/v/d, fever, and chills. Reports SOB d/t pain.

## 2022-06-26 NOTE — ED PROVIDER NOTES
[address] [facility] ED  eMERGENCY dEPARTMENT eNCOUnter      Pt Name: Neeta Garcia  MRN: [id number]  [place] 1995  Date of evaluation: 6/26/2022  Provider: Fred Terrell MD    CHIEF COMPLAINT       Chief Complaint   Patient presents with    Chest Pain     pt c/o chest and throat pain that increases with activity,          HISTORY OF PRESENT ILLNESS   (Location/Symptom, Timing/Onset,Context/Setting, Quality, Duration, Modifying Factors, Severity)  Note limiting factors. Neeta Garcia is a 33 y.o. female who presents to the emergency department  Chest pain     Patient returns to the ER today after she was called to come back chest x-ray per radiologist have pneumomediastinum per cardiothoracic surgery recommendation patient needed to come back to have an esophageal gram.  Patient states her pain have been improving since the left the ER denies any shortness of breath any fever or tachycardia denies any other complaint or concern    The history is provided by the patient. NursingNotes were reviewed. REVIEW OF SYSTEMS    (2-9 systems for level 4, 10 or more for level 5)     Review of Systems   Constitutional: Negative. HENT: Negative. Eyes: Negative. Respiratory: Negative. Negative for apnea, cough, choking, chest tightness, shortness of breath, wheezing and stridor. Cardiovascular: Positive for chest pain. Endocrine: Negative. Genitourinary: Negative. Skin: Negative. Allergic/Immunologic: Negative. Neurological: Negative. Hematological: Negative. Psychiatric/Behavioral: Negative. All other systems reviewed and are negative. Except as noted above the remainder of the review of systems was reviewed and negative.        PAST MEDICAL HISTORY     Past Medical History:   Diagnosis Date    Mental disorder     depression and anxiety    Postpartum depression          SURGICALHISTORY       Past Surgical History:   Procedure Laterality Date    LAPAROSCOPY Left 6/10/2021    LAPAROSCOPIC ECTOPIC REMOVAL, LEFT SALPINGECTOMY performed by Shiva De La Paz MD at [facility]  2020    WISDOM TOOTH EXTRACTION  2017    WISDOM TOOTH EXTRACTION  2019         CURRENT MEDICATIONS       Discharge Medication List as of 6/26/2022  5:31 PM      CONTINUE these medications which have NOT CHANGED    Details   acetaminophen (TYLENOL) 500 MG tablet Take 2 tablets by mouth every 6 hours as needed for Pain or Fever, Disp-60 tablet, R-0Normal      ibuprofen (IBU) 400 MG tablet Take 1 tablet by mouth every 6 hours as needed for Pain, Disp-120 tablet, R-0Normal      Ferrous Sulfate (IRON) 325 (65 Fe) MG TABS Take 1 tablet by mouth 2 times daily, Disp-60 tablet, R-2Normal      vitamin D (CHOLECALCIFEROL) 25699 UNIT CAPS Take 50,000 Units by mouth once a weekHistorical Med      docusate sodium (COLACE) 100 MG capsule Take 1 capsule by mouth 2 times daily as needed for Constipation, Disp-60 capsule, R-2Normal      ondansetron (ZOFRAN) 4 MG tablet Take 1 tablet by mouth every 6 hours as needed for Nausea or Vomiting 1 tablet every 6 hrs prn for nausea and vomiting., Disp-30 tablet, R-1Normal      metFORMIN (GLUCOPHAGE) 500 MG tablet TAKE 1 TABLET BY MOUTH ONCE DAILY WITH FOOD *EMERGENCY REFIL*Historical Med      Ascorbic Acid (VITAMIN C) 250 MG tablet TAKE 1 TABLET BY MOUTH EVERY DAY WITH IRONHistorical Med      B Complex CAPS TAKE 1 CAPSULE BY MOUTH EVERY DAYHistorical Med      ARIPiprazole (ABILIFY) 20 MG tablet TAKE 1 TABLET BY MOUTH IN THE EVENING AS DIRECTED FOR DEPRESSION MOODHistorical Med      VRAYLAR 1.5 MG capsule TAKE 1 CAPSULE BY MOUTH IN THE MORNING AS DIRECTED FOR MOOD, DAWHistorical Med      benztropine (COGENTIN) 1 MG tablet TAKE 1 TABLET BY MOUTH ONCE DAILY AS DIRECTED FOR SIDE EFFECTSHistorical Med      gabapentin (NEURONTIN) 400 MG capsule Take by mouth. Historical Med      sertraline (ZOLOFT) 100 MG tablet TAKE 2 TABLETS BY MOUTH ONCE DAILY AT NIGHT AS DIRECTED FOR DEPRESSION ANXIETYHistorical Med      esomeprazole (NEXIUM) 20 MG delayed release capsule Take 1 capsule by mouth daily, Disp-30 capsule,R-3Normal             ALLERGIES     Patient has no known allergies. FAMILY HISTORY       Family History   Problem Relation Age of Onset    Diabetes Mother     Cancer Maternal Grandfather     Celiac Disease Neg Hx     Colon Cancer Neg Hx     Crohn's Disease Neg Hx           SOCIAL HISTORY       Social History     Socioeconomic History    Marital status: Single     Spouse name: Not on file    Number of children: Not on file    Years of education: Not on file    Highest education level: Not on file   Occupational History    Not on file   Tobacco Use    Smoking status: Current Some Day Smoker     Types: Cigarettes    Smokeless tobacco: Never Used   Vaping Use    Vaping Use: Some days    Substances: CBD   Substance and Sexual Activity    Alcohol use: Not Currently     Comment: rare    Drug use: Yes     Types: Marijuana Lum Olden)     Comment: Last used yesterday    Sexual activity: Yes     Partners: Male   Other Topics Concern    Not on file   Social History Narrative    Not on file     Social Determinants of Health     Financial Resource Strain:     Difficulty of Paying Living Expenses: Not on file   Food Insecurity:     Worried About Running Out of Food in the Last Year: Not on file    Adis of Food in the Last Year: Not on file   Transportation Needs:     Lack of Transportation (Medical): Not on file    Lack of Transportation (Non-Medical):  Not on file   Physical Activity:     Days of Exercise per Week: Not on file    Minutes of Exercise per Session: Not on file   Stress:     Feeling of Stress : Not on file   Social Connections:     Frequency of Communication with Friends and Family: Not on file    Frequency of Social Gatherings with Friends and Family: Not on file    Attends Hoahaoism Services: Not on file   [employer] of Clubs or Organizations: Not on file    Attends Club or Organization Meetings: Not on file    Marital Status: Not on file   Intimate Partner Violence:     Fear of Current or Ex-Partner: Not on file    Emotionally Abused: Not on file    Physically Abused: Not on file    Sexually Abused: Not on file   Housing Stability:     Unable to Pay for Housing in the Last Year: Not on file    Number of [place] in the Last Year: Not on file    Unstable Housing in the Last Year: Not on file       SCREENINGS    Himanshu Coma Scale  Eye Opening: Spontaneous  Best Verbal Response: Oriented  Best Motor Response: Obeys commands  Himanshu Coma Scale Score: 15 @FLOW([id number])@      PHYSICAL EXAM    (up to 7 for level 4, 8 or more for level 5)     ED Triage Vitals   BP Temp Temp src Pulse Resp SpO2 Height Weight   -- -- -- -- -- -- -- --       Physical Exam  Vitals and nursing note reviewed. Constitutional:       Appearance: She is well-developed. HENT:      Head: Normocephalic and atraumatic. Right Ear: Tympanic membrane and external ear normal.      Left Ear: Tympanic membrane and external ear normal.      Nose: Nose normal.   Eyes:      Pupils: Pupils are equal, round, and reactive to light. Cardiovascular:      Rate and Rhythm: Normal rate and regular rhythm. Heart sounds: Normal heart sounds. Pulmonary:      Effort: Pulmonary effort is normal. No respiratory distress. Breath sounds: Normal breath sounds. No wheezing or rales. Chest:      Chest wall: No tenderness. Abdominal:      General: Bowel sounds are normal.      Palpations: Abdomen is soft. Musculoskeletal:         General: Normal range of motion. Cervical back: Normal range of motion and neck supple. Skin:     General: Skin is warm and dry. Neurological:      Mental Status: She is alert and oriented to person, place, and time. Cranial Nerves: No cranial nerve deficit. Sensory: No sensory deficit.       Motor: No abnormal muscle tone. Coordination: Coordination normal.      Deep Tendon Reflexes: Reflexes normal.   Psychiatric:         Behavior: Behavior normal.         Thought Content: Thought content normal.         Judgment: Judgment normal.         DIAGNOSTIC RESULTS     EKG: All EKG's are interpreted by the Emergency Department Physician who either signs or Co-signsthis chart in the absence of a cardiologist.        RADIOLOGY:   [place] such as CT, Ultrasound and MRI are read by the radiologist. Plain radiographic images are visualized and preliminarily interpreted by the emergency physician with the below findings:    Interpretation per the Radiologist below, if available at the time ofthis note:    [place] ESOPHAGRAM   Final Result      ESSENTIALLY NEGATIVE LIMITED ESOPHAGRAM.                     ED BEDSIDE ULTRASOUND:   Performed by ED Physician - none    LABS:  Labs Reviewed - No data to display    All other labs were within normal range or not returned as of this dictation. EMERGENCY DEPARTMENT COURSE and DIFFERENTIAL DIAGNOSIS/MDM:   Vitals:    Vitals:    06/26/22 1659 06/26/22 1703   BP:  117/72   Pulse: 85    Resp: 16    Temp: 98 °F (36.7 °C)    TempSrc: Oral    SpO2: 98%    Weight: (!) 358 lb (162.4 kg)    Height: 5' 5\" (1.651 m)               MDM  Number of Diagnoses or Management Options  Pneumomediastinum ([facility] [address]  Diagnosis management comments: 33years old was here earlier for chest pain after smoking cocaine yesterday. Per radiology her chest x-ray showed pneumomediastinum per cardiothoracic surgery recommendation she needed to come back for esophageal gram is a venogram was negative. Cardiothoracic surgery advised to discharge home with Augmentin advised to return to the ER immediately if any worsening pain tachycardia or fever develop follow-up with cardiothoracic surgery as an outpatient in 2 weeks.   Patient verbalized understanding and agreed with the       Amount and/or Complexity of Data Reviewed  Tests in the radiology section of CPT®: ordered and reviewed        CONSULTS:  None    PROCEDURES:  Unless otherwise noted below, none     Procedures    FINAL IMPRESSION      1. Pneumomediastinum [facility])          DISPOSITION/PLAN   DISPOSITION Decision To Discharge 06/26/2022 05:27:55 PM      PATIENT REFERRED TO:  MD Tara [address]  [address] [address]  [phone]    In 2 weeks        DISCHARGE MEDICATIONS:  Discharge Medication List as of 6/26/2022  5:31 PM      START taking these medications    Details   amoxicillin-clavulanate (AUGMENTIN) 875-125 MG per tablet Take 1 tablet by mouth 2 times daily for 10 days, Disp-20 tablet, R-0Normal      traMADol (ULTRAM) 50 MG tablet Take 1 tablet by mouth every 4 hours as needed for Pain for up to 3 days. Intended supply: 3 days.  Take lowest dose possible to manage pain, Disp-18 tablet, R-0Normal                (Please note thatportions of this note were completed with a voice recognition program.  Efforts were made to edit the dictations but occasionally words are mis-transcribed.)    Asael Kitchen MD (electronically signed)  Attending Emergency Physician          Reena Mota MD  06/26/22 2018

## 2022-06-26 NOTE — ED TRIAGE NOTES
Pt c/o chest and throat pain that increases with activity, Pt seen here earlier today for the same and instructed to come back to the ER for abnormal chest xray findings

## 2022-06-27 LAB
EKG ATRIAL RATE: 87 BPM
EKG P AXIS: 7 DEGREES
EKG P-R INTERVAL: 134 MS
EKG Q-T INTERVAL: 396 MS
EKG QRS DURATION: 78 MS
EKG QTC CALCULATION (BAZETT): 476 MS
EKG R AXIS: 41 DEGREES
EKG T AXIS: 59 DEGREES
EKG VENTRICULAR RATE: 87 BPM

## 2022-06-27 PROCEDURE — 93010 ELECTROCARDIOGRAM REPORT: CPT | Performed by: INTERNAL MEDICINE

## 2022-07-11 ENCOUNTER — OFFICE VISIT (OUTPATIENT)
Dept: CARDIOTHORACIC SURGERY | Age: 27
End: 2022-07-11
Payer: COMMERCIAL

## 2022-07-11 VITALS
WEIGHT: 293 LBS | TEMPERATURE: 97.2 F | HEART RATE: 91 BPM | HEIGHT: 65 IN | BODY MASS INDEX: 48.82 KG/M2 | OXYGEN SATURATION: 99 %

## 2022-07-11 DIAGNOSIS — J98.2 PNEUMOMEDIASTINUM (HCC): Primary | ICD-10-CM

## 2022-07-11 PROCEDURE — 99203 OFFICE O/P NEW LOW 30 MIN: CPT | Performed by: THORACIC SURGERY (CARDIOTHORACIC VASCULAR SURGERY)

## 2022-07-11 PROCEDURE — G8427 DOCREV CUR MEDS BY ELIG CLIN: HCPCS | Performed by: THORACIC SURGERY (CARDIOTHORACIC VASCULAR SURGERY)

## 2022-07-11 PROCEDURE — G8417 CALC BMI ABV UP PARAM F/U: HCPCS | Performed by: THORACIC SURGERY (CARDIOTHORACIC VASCULAR SURGERY)

## 2022-07-11 PROCEDURE — 4004F PT TOBACCO SCREEN RCVD TLK: CPT | Performed by: THORACIC SURGERY (CARDIOTHORACIC VASCULAR SURGERY)

## 2022-07-11 ASSESSMENT — ENCOUNTER SYMPTOMS
SORE THROAT: 0
STRIDOR: 0
CHOKING: 0
VOICE CHANGE: 0
COUGH: 0
CHEST TIGHTNESS: 0
ABDOMINAL PAIN: 0
DIARRHEA: 0
SHORTNESS OF BREATH: 0
ABDOMINAL DISTENTION: 0
TROUBLE SWALLOWING: 0
NAUSEA: 0
WHEEZING: 0
VOMITING: 0

## 2022-07-11 NOTE — PROGRESS NOTES
Subjective:      Patient ID: Connie Frank is a 33 y.o. female who presents today for:  Chief Complaint   Patient presents with    New Patient       HPI  Patient was smoking some crack and had a coughing fit. She then developed some chest pain. This all happened about 2 weeks ago. She went to the emergency department where as a part of her work-up a chest x-ray is obtained. This showed diffuse pneumomediastinum. An esophagram was performed which showed no evidence of esophageal perforation. There was no evidence of pneumothorax either. Patient wanted to be discharged so she was given a prescription for antibiotics and allowed to leave. Since discharge she says she did complete her antibiotic course. She says she is no longer smoking crack. She denies any fevers. She is able to swallow and resume her normal activities. She still has occasional pain in her chest and there is some mild discomfort when she hyperextends her neck, but all of this is slowly improving from 2 weeks ago. Past Medical History:   Diagnosis Date    Mental disorder     depression and anxiety    Postpartum depression       Past Surgical History:   Procedure Laterality Date    LAPAROSCOPY Left 6/10/2021    LAPAROSCOPIC ECTOPIC REMOVAL, [facility]    WISDOM TOOTH EXTRACTION  2017    WISDOM TOOTH EXTRACTION  2019     Social History     Socioeconomic History    Marital status: Single     Spouse name: Not on file    Number of children: Not on file    Years of education: Not on file    Highest education level: Not on file   Occupational History    Not on file   Tobacco Use    Smoking status: Current Some Day Smoker     Types: Cigarettes    Smokeless tobacco: Never Used   Vaping Use    Vaping Use: Some days    Substances: CBD   Substance and Sexual Activity    Alcohol use: Not Currently     Comment: rare    Drug use:  Yes Types: Marijuana Lauren Keith     Comment: Last used yesterday    Sexual activity: Yes     Partners: Male   Other Topics Concern    Not on file   Social History Narrative    Not on file     Social Determinants of Health     Financial Resource Strain:     Difficulty of Paying Living Expenses: Not on file   Food Insecurity:     Worried About Running Out of Food in the Last Year: Not on file    Adis of Food in the Last Year: Not on file   Transportation Needs:     Lack of Transportation (Medical): Not on file    Lack of Transportation (Non-Medical):  Not on file   Physical Activity:     Days of Exercise per Week: Not on file    Minutes of Exercise per Session: Not on file   Stress:     Feeling of Stress : Not on file   Social Connections:     Frequency of Communication with Friends and Family: Not on file    Frequency of Social Gatherings with Friends and Family: Not on file    Attends Spiritism Services: Not on file    Active Member of [address] [place] [address] or Organizations: Not on file    Attends Club or Organization Meetings: Not on file    Marital Status: Not on file   Intimate Partner Violence:     Fear of Current or Ex-Partner: Not on file    Emotionally Abused: Not on file    Physically Abused: Not on file    Sexually Abused: Not on file   Housing Stability:     Unable to Pay for Housing in the Last Year: Not on file    Number of [place] in the Last Year: Not on file    Unstable Housing in the Last Year: Not on file     Family History   Problem Relation Age of Onset    Diabetes Mother     Cancer Maternal Grandfather     Celiac Disease Neg Hx     Colon Cancer Neg Hx     Crohn's Disease Neg Hx      No Known Allergies  Current Outpatient Medications on File Prior to Visit   Medication Sig Dispense Refill    acetaminophen (TYLENOL) 500 MG tablet Take 2 tablets by mouth every 6 hours as needed for Pain or Fever 60 tablet 0    ibuprofen (IBU) 400 MG tablet Take 1 tablet by mouth every 6 hours as needed for Pain 120 tablet 0    Ferrous Sulfate (IRON) 325 (65 Fe) MG TABS Take 1 tablet by mouth 2 times daily 60 tablet 2    ondansetron (ZOFRAN) 4 MG tablet Take 1 tablet by mouth every 6 hours as needed for Nausea or Vomiting 1 tablet every 6 hrs prn for nausea and vomiting. 30 tablet 1    metFORMIN (GLUCOPHAGE) 500 MG tablet TAKE 1 TABLET BY MOUTH ONCE DAILY WITH FOOD *EMERGENCY REFIL*      ARIPiprazole (ABILIFY) 20 MG tablet TAKE 1 TABLET BY MOUTH IN THE EVENING AS DIRECTED FOR DEPRESSION MOOD      VRAYLAR 1.5 MG capsule TAKE 1 CAPSULE BY MOUTH IN THE MORNING AS DIRECTED FOR MOOD      benztropine (COGENTIN) 1 MG tablet TAKE 1 TABLET BY MOUTH ONCE DAILY AS DIRECTED FOR SIDE EFFECTS      sertraline (ZOLOFT) 100 MG tablet TAKE 2 TABLETS BY MOUTH ONCE DAILY AT NIGHT AS DIRECTED FOR DEPRESSION ANXIETY      vitamin D (CHOLECALCIFEROL) 55909 UNIT CAPS Take 50,000 Units by mouth once a week      docusate sodium (COLACE) 100 MG capsule Take 1 capsule by mouth 2 times daily as needed for Constipation (Patient not taking: Reported on 1/25/2022) 60 capsule 2    Ascorbic Acid (VITAMIN C) 250 MG tablet TAKE 1 TABLET BY MOUTH EVERY DAY WITH IRON (Patient not taking: Reported on 1/25/2022)      B Complex CAPS TAKE 1 CAPSULE BY MOUTH EVERY DAY (Patient not taking: Reported on 1/25/2022)      [DISCONTINUED] famotidine (PEPCID) 20 MG tablet Take 1 tablet by mouth 2 times daily 60 tablet 0    gabapentin (NEURONTIN) 400 MG capsule Take by mouth.  esomeprazole (NEXIUM) 20 MG delayed release capsule Take 1 capsule by mouth daily (Patient not taking: Reported on 1/25/2022) 30 capsule 3     No current facility-administered medications on file prior to visit. Review of Systems   Constitutional: Negative for activity change, appetite change, chills, diaphoresis, fatigue, fever and unexpected weight change. HENT: Negative for sore throat, trouble swallowing and voice change.     Eyes: Negative for visual disturbance. Respiratory: Negative for cough, choking, chest tightness, shortness of breath, wheezing and stridor. Cardiovascular: Negative for chest pain, palpitations and leg swelling. Gastrointestinal: Negative for abdominal distention, abdominal pain, diarrhea, nausea and vomiting. Genitourinary: Negative for difficulty urinating. Musculoskeletal: Negative for gait problem and joint swelling. Skin: Negative for rash and wound. Neurological: Negative for dizziness, seizures and light-headedness. Hematological: Negative for adenopathy. Does not bruise/bleed easily. Psychiatric/Behavioral: Negative for behavioral problems and confusion. Objective:   Pulse 91   Temp 97.2 °F (36.2 °C) (Temporal)   Ht 5' 5\" (1.651 m)   Wt (!) 358 lb (162.4 kg)   LMP 06/15/2022 (Exact Date)   SpO2 99%   BMI 59.57 kg/m²     Physical Exam  Constitutional:       General: She is not in acute distress. Appearance: She is obese. She is not ill-appearing, toxic-appearing or diaphoretic. HENT:      Head: Normocephalic and atraumatic. Nose: Nose normal.   Eyes:      Extraocular Movements: Extraocular movements intact. Conjunctiva/sclera: Conjunctivae normal.      Pupils: Pupils are equal, round, and reactive to light. Neck:      Vascular: No carotid bruit. Cardiovascular:      Rate and Rhythm: Normal rate and regular rhythm. Heart sounds: Normal heart sounds. No murmur heard. No gallop. Pulmonary:      Effort: Pulmonary effort is normal. No respiratory distress. Breath sounds: Normal breath sounds. No wheezing or rales. Abdominal:      General: Abdomen is flat. Bowel sounds are normal.      Palpations: Abdomen is soft. There is no mass. Tenderness: There is no abdominal tenderness. Musculoskeletal:         General: No swelling. Cervical back: Neck supple. Right lower leg: No edema. Left lower leg: No edema.    Lymphadenopathy:      Cervical: No cervical adenopathy. Skin:     General: Skin is warm and dry. Neurological:      General: No focal deficit present. Mental Status: She is alert and oriented to person, place, and time. Psychiatric:         Mood and Affect: Mood normal.         Behavior: Behavior normal.         Thought Content: Thought content normal.         Judgment: Judgment normal.     No palpable subcutaneous emphysema noted. Radiographs and Laboratory Studies:     Diagnostic Imaging Studies:    None        Assessment/Plan     Resolution of pneumomediastinum due to a coughing fit after smoking crack cocaine. As long as her symptoms continue to slowly improve then no further intervention would be needed. I will see her on an as-needed basis. Diagnosis Orders   1. Pneumomediastinum ([address]       No orders of the defined types were placed in this encounter. No orders of the defined types were placed in this encounter. Return if symptoms worsen or fail to improve.       Sandor Abraham MD

## 2022-08-04 ENCOUNTER — HOSPITAL ENCOUNTER (OUTPATIENT)
Dept: PHYSICAL THERAPY | Age: 27
Setting detail: THERAPIES SERIES
Discharge: HOME | End: 2022-08-04
Payer: COMMERCIAL

## 2022-08-04 PROCEDURE — 97750 PHYSICAL PERFORMANCE TEST: CPT
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
## 2022-11-27 ENCOUNTER — HOSPITAL ENCOUNTER (EMERGENCY)
Age: 27
Discharge: HOME | End: 2022-11-27
Payer: COMMERCIAL

## 2022-11-27 ENCOUNTER — APPOINTMENT (OUTPATIENT)
Dept: CT IMAGING | Age: 27
End: 2022-11-27
Payer: COMMERCIAL

## 2022-11-27 VITALS
RESPIRATION RATE: 18 BRPM | DIASTOLIC BLOOD PRESSURE: 75 MMHG | OXYGEN SATURATION: 98 % | WEIGHT: 293 LBS | HEIGHT: 65 IN | TEMPERATURE: 97.8 F | BODY MASS INDEX: 48.82 KG/M2 | SYSTOLIC BLOOD PRESSURE: 107 MMHG | HEART RATE: 95 BPM

## 2022-11-27 DIAGNOSIS — R10.11 ABDOMINAL PAIN, RIGHT UPPER QUADRANT: ICD-10-CM

## 2022-11-27 DIAGNOSIS — N30.00 ACUTE CYSTITIS WITHOUT HEMATURIA: ICD-10-CM

## 2022-11-27 DIAGNOSIS — K59.00 CONSTIPATION, UNSPECIFIED CONSTIPATION TYPE: Primary | ICD-10-CM

## 2022-11-27 LAB
ALBUMIN SERPL-MCNC: 3.5 G/DL (ref 3.5–4.6)
ALP BLD-CCNC: 58 U/L (ref 40–130)
ALT SERPL-CCNC: 10 U/L (ref 0–33)
ANION GAP SERPL CALCULATED.3IONS-SCNC: 13 MEQ/L (ref 9–15)
AST SERPL-CCNC: 12 U/L (ref 0–35)
BACTERIA: ABNORMAL /HPF
BASOPHILS ABSOLUTE: 0.1 K/UL (ref 0–0.2)
BASOPHILS RELATIVE PERCENT: 0.9 %
BILIRUB SERPL-MCNC: <0.2 MG/DL (ref 0.2–0.7)
BILIRUBIN URINE: NEGATIVE
BLOOD, URINE: NEGATIVE
BUN BLDV-MCNC: 7 MG/DL (ref 6–20)
CALCIUM SERPL-MCNC: 8.7 MG/DL (ref 8.5–9.9)
CHLORIDE BLD-SCNC: 102 MEQ/L (ref 95–107)
CLARITY: ABNORMAL
CO2: 22 MEQ/L (ref 20–31)
COLOR: YELLOW
CREAT SERPL-MCNC: 0.78 MG/DL (ref 0.5–0.9)
EOSINOPHILS ABSOLUTE: 0.3 K/UL (ref 0–0.7)
EOSINOPHILS RELATIVE PERCENT: 2.4 %
EPITHELIAL CELLS, UA: ABNORMAL /HPF (ref 0–5)
GFR SERPL CREATININE-BSD FRML MDRD: >60 ML/MIN/{1.73_M2}
GLOBULIN: 3 G/DL (ref 2.3–3.5)
GLUCOSE BLD-MCNC: 88 MG/DL (ref 70–99)
GLUCOSE URINE: NEGATIVE MG/DL
HCG, URINE, POC: NEGATIVE
HCT VFR BLD CALC: 39.6 % (ref 37–47)
HEMOGLOBIN: 12.9 G/DL (ref 12–16)
HYALINE CASTS: ABNORMAL /HPF (ref 0–5)
INFLUENZA A BY PCR: NEGATIVE
INFLUENZA B BY PCR: NEGATIVE
KETONES, URINE: ABNORMAL MG/DL
LACTIC ACID: 1.5 MMOL/L (ref 0.5–2.2)
LEUKOCYTE ESTERASE, URINE: ABNORMAL
LIPASE: 39 U/L (ref 12–95)
LYMPHOCYTES ABSOLUTE: 2.2 K/UL (ref 1–4.8)
LYMPHOCYTES RELATIVE PERCENT: 21.3 %
Lab: NORMAL
MCH RBC QN AUTO: 25.9 PG (ref 27–31.3)
MCHC RBC AUTO-ENTMCNC: 32.5 % (ref 33–37)
MCV RBC AUTO: 79.4 FL (ref 79.4–94.8)
MONOCYTES ABSOLUTE: 0.7 K/UL (ref 0.2–0.8)
MONOCYTES RELATIVE PERCENT: 6.5 %
NEGATIVE QC PASS/FAIL: NORMAL
NEUTROPHILS ABSOLUTE: 7.2 K/UL (ref 1.4–6.5)
NEUTROPHILS RELATIVE PERCENT: 68.9 %
NITRITE, URINE: NEGATIVE
PDW BLD-RTO: 16 % (ref 11.5–14.5)
PH UA: 6.5 (ref 5–9)
PLATELET # BLD: 372 K/UL (ref 130–400)
POSITIVE QC PASS/FAIL: NORMAL
POTASSIUM SERPL-SCNC: 3.9 MEQ/L (ref 3.4–4.9)
PROTEIN UA: ABNORMAL MG/DL
RBC # BLD: 4.98 M/UL (ref 4.2–5.4)
SODIUM BLD-SCNC: 137 MEQ/L (ref 135–144)
SPECIFIC GRAVITY UA: 1.03 (ref 1–1.03)
TOTAL PROTEIN: 6.5 G/DL (ref 6.3–8)
URINE REFLEX TO CULTURE: YES
UROBILINOGEN, URINE: 1 E.U./DL
WBC # BLD: 10.4 K/UL (ref 4.8–10.8)
WBC UA: ABNORMAL /HPF (ref 0–5)

## 2022-11-27 PROCEDURE — 96375 TX/PRO/DX INJ NEW DRUG ADDON: CPT

## 2022-11-27 PROCEDURE — 83605 ASSAY OF LACTIC ACID: CPT

## 2022-11-27 PROCEDURE — 6360000004 HC RX CONTRAST MEDICATION: Performed by: PHYSICIAN ASSISTANT

## 2022-11-27 PROCEDURE — 96372 THER/PROPH/DIAG INJ SC/IM: CPT

## 2022-11-27 PROCEDURE — 96374 THER/PROPH/DIAG INJ IV PUSH: CPT

## 2022-11-27 PROCEDURE — 74177 CT ABD & PELVIS W/CONTRAST: CPT

## 2022-11-27 PROCEDURE — 80053 COMPREHEN METABOLIC PANEL: CPT

## 2022-11-27 PROCEDURE — 81001 URINALYSIS AUTO W/SCOPE: CPT

## 2022-11-27 PROCEDURE — 99285 EMERGENCY DEPT VISIT HI MDM: CPT

## 2022-11-27 PROCEDURE — 87502 INFLUENZA DNA AMP PROBE: CPT

## 2022-11-27 PROCEDURE — 6360000002 HC RX W HCPCS: Performed by: PHYSICIAN ASSISTANT

## 2022-11-27 PROCEDURE — 2580000003 HC RX 258: Performed by: PHYSICIAN ASSISTANT

## 2022-11-27 PROCEDURE — 85025 COMPLETE CBC W/AUTO DIFF WBC: CPT

## 2022-11-27 PROCEDURE — 6370000000 HC RX 637 (ALT 250 FOR IP): Performed by: PHYSICIAN ASSISTANT

## 2022-11-27 PROCEDURE — 2500000003 HC RX 250 WO HCPCS: Performed by: PHYSICIAN ASSISTANT

## 2022-11-27 PROCEDURE — 36415 COLL VENOUS BLD VENIPUNCTURE: CPT

## 2022-11-27 PROCEDURE — 83690 ASSAY OF LIPASE: CPT

## 2022-11-27 PROCEDURE — A4216 STERILE WATER/SALINE, 10 ML: HCPCS | Performed by: PHYSICIAN ASSISTANT

## 2022-11-27 RX ORDER — CEPHALEXIN 500 MG/1
500 CAPSULE ORAL ONCE
Status: COMPLETED | OUTPATIENT
Start: 2022-11-27 | End: 2022-11-27

## 2022-11-27 RX ORDER — DICYCLOMINE HYDROCHLORIDE 10 MG/ML
20 INJECTION INTRAMUSCULAR ONCE
Status: COMPLETED | OUTPATIENT
Start: 2022-11-27 | End: 2022-11-27

## 2022-11-27 RX ORDER — KETOROLAC TROMETHAMINE 30 MG/ML
30 INJECTION, SOLUTION INTRAMUSCULAR; INTRAVENOUS ONCE
Status: COMPLETED | OUTPATIENT
Start: 2022-11-27 | End: 2022-11-27

## 2022-11-27 RX ORDER — ETODOLAC 400 MG/1
400 TABLET, FILM COATED ORAL 2 TIMES DAILY
Qty: 14 TABLET | Refills: 0 | Status: SHIPPED | OUTPATIENT
Start: 2022-11-27

## 2022-11-27 RX ORDER — LACTULOSE 10 G/15ML
20 SOLUTION ORAL 2 TIMES DAILY PRN
Qty: 300 ML | Refills: 1 | Status: SHIPPED | OUTPATIENT
Start: 2022-11-27

## 2022-11-27 RX ORDER — DICYCLOMINE HYDROCHLORIDE 10 MG/1
10 CAPSULE ORAL ONCE
Status: COMPLETED | OUTPATIENT
Start: 2022-11-27 | End: 2022-11-27

## 2022-11-27 RX ORDER — 0.9 % SODIUM CHLORIDE 0.9 %
1000 INTRAVENOUS SOLUTION INTRAVENOUS ONCE
Status: COMPLETED | OUTPATIENT
Start: 2022-11-27 | End: 2022-11-27

## 2022-11-27 RX ORDER — CEPHALEXIN 500 MG/1
500 CAPSULE ORAL 2 TIMES DAILY
Qty: 14 CAPSULE | Refills: 0 | Status: SHIPPED | OUTPATIENT
Start: 2022-11-27 | End: 2022-12-04

## 2022-11-27 RX ADMIN — IOPAMIDOL 70 ML: 612 INJECTION, SOLUTION INTRAVENOUS at 17:17

## 2022-11-27 RX ADMIN — KETOROLAC TROMETHAMINE 30 MG: 30 INJECTION, SOLUTION INTRAMUSCULAR at 16:28

## 2022-11-27 RX ADMIN — SODIUM CHLORIDE 20 MG: 9 INJECTION INTRAMUSCULAR; INTRAVENOUS; SUBCUTANEOUS at 16:28

## 2022-11-27 RX ADMIN — DICYCLOMINE HYDROCHLORIDE 20 MG: 10 INJECTION, SOLUTION INTRAMUSCULAR at 16:41

## 2022-11-27 RX ADMIN — SODIUM CHLORIDE 1000 ML: 9 INJECTION, SOLUTION INTRAVENOUS at 16:27

## 2022-11-27 RX ADMIN — DICYCLOMINE HYDROCHLORIDE 10 MG: 10 CAPSULE ORAL at 18:26

## 2022-11-27 RX ADMIN — CEPHALEXIN 500 MG: 500 CAPSULE ORAL at 18:26

## 2022-11-27 ASSESSMENT — ENCOUNTER SYMPTOMS
DIARRHEA: 1
SHORTNESS OF BREATH: 0
NAUSEA: 1
VOMITING: 1
ABDOMINAL PAIN: 1
COLOR CHANGE: 0
TROUBLE SWALLOWING: 0
APNEA: 0
EYE PAIN: 0
ALLERGIC/IMMUNOLOGIC NEGATIVE: 1

## 2022-11-27 ASSESSMENT — PAIN SCALES - GENERAL: PAINLEVEL_OUTOF10: 8

## 2022-11-27 ASSESSMENT — PAIN - FUNCTIONAL ASSESSMENT: PAIN_FUNCTIONAL_ASSESSMENT: 0-10

## 2022-11-27 ASSESSMENT — PAIN DESCRIPTION - PAIN TYPE: TYPE: ACUTE PAIN

## 2022-11-27 ASSESSMENT — PAIN DESCRIPTION - FREQUENCY: FREQUENCY: CONTINUOUS

## 2022-11-27 ASSESSMENT — LIFESTYLE VARIABLES: HOW OFTEN DO YOU HAVE A DRINK CONTAINING ALCOHOL: MONTHLY OR LESS

## 2022-11-27 ASSESSMENT — PAIN DESCRIPTION - LOCATION: LOCATION: ABDOMEN

## 2022-11-27 NOTE — ED PROVIDER NOTES
[address] [facility] ED  eMERGENCYdEPARTMENT eNCOUnter      Pt Name: Sharon Aguilera  MRN: [id number]  [place] 1995  Date of evaluation: 11/27/2022  Provider:Kev Pedersen PA-C    CHIEF COMPLAINT       Chief Complaint   Patient presents with    Abdominal Pain         HISTORY OF PRESENT ILLNESS  (Location/Symptom, Timing/Onset, Context/Setting, Quality, Duration, Modifying Factors, Severity.)   Sharon Aguilera is a 33 y.o. female who presents to the emergency department with 4-day history of right upper quadrant abdominal pain, left flank pain, nausea, vomiting and diarrhea. Patient states not able to tolerate p.o. due to the vomiting. Right upper quadrant pain is postprandial in nature. Patient has tried multiple over-the-counter medicines without improvement. No known fevers. No hematuria or dysuria. Last period was 1 week ago and no chance of pregnancy per patient    HPI    Nursing Notes were reviewed and I agree. REVIEW OF SYSTEMS    (2-9 systems for level 4, 10 or more for level 5)     Review of Systems   Constitutional:  Negative for diaphoresis and fever. HENT:  Negative for hearing loss and trouble swallowing. Eyes:  Negative for pain. Respiratory:  Negative for apnea and shortness of breath. Cardiovascular:  Negative for chest pain. Gastrointestinal:  Positive for abdominal pain, diarrhea, nausea and vomiting. Endocrine: Negative. Genitourinary:  Negative for hematuria. Musculoskeletal:  Negative for neck pain and neck stiffness. Skin:  Negative for color change. Allergic/Immunologic: Negative. Neurological:  Negative for dizziness and numbness. Hematological: Negative. Psychiatric/Behavioral: Negative. All other systems reviewed and are negative. Except as noted above the remainder of the review of systems was reviewed and negative.        PAST MEDICAL HISTORY     Past Medical History:   Diagnosis Date    Mental disorder     depression and anxiety    Postpartum depression          SURGICAL HISTORY       Past Surgical History:   Procedure Laterality Date    LAPAROSCOPY Left 6/10/2021    LAPAROSCOPIC ECTOPIC REMOVAL, LEFT SALPINGECTOMY performed by Lien Peace MD at [facility] [address]    WISDOM TOOTH EXTRACTION  2017    WISDOM TOOTH EXTRACTION  2019         CURRENT MEDICATIONS       Previous Medications    ACETAMINOPHEN (TYLENOL) 500 MG TABLET    Take 2 tablets by mouth every 6 hours as needed for Pain or Fever    ARIPIPRAZOLE (ABILIFY) 20 MG TABLET    TAKE 1 TABLET BY MOUTH IN THE EVENING AS DIRECTED FOR DEPRESSION MOOD    ASCORBIC ACID (VITAMIN C) 250 MG TABLET    TAKE 1 TABLET BY MOUTH EVERY DAY WITH IRON    B COMPLEX CAPS    TAKE 1 CAPSULE BY MOUTH EVERY DAY    BENZTROPINE (COGENTIN) 1 MG TABLET    TAKE 1 TABLET BY MOUTH ONCE DAILY AS DIRECTED FOR SIDE EFFECTS    DOCUSATE SODIUM (COLACE) 100 MG CAPSULE    Take 1 capsule by mouth 2 times daily as needed for Constipation    ESOMEPRAZOLE (NEXIUM) 20 MG DELAYED RELEASE CAPSULE    Take 1 capsule by mouth daily    FERROUS SULFATE (IRON) 325 (65 FE) MG TABS    Take 1 tablet by mouth 2 times daily    GABAPENTIN (NEURONTIN) 400 MG CAPSULE    Take by mouth. IBUPROFEN (IBU) 400 MG TABLET    Take 1 tablet by mouth every 6 hours as needed for Pain    METFORMIN (GLUCOPHAGE) 500 MG TABLET    TAKE 1 TABLET BY MOUTH ONCE DAILY WITH FOOD *EMERGENCY REFIL*    ONDANSETRON (ZOFRAN) 4 MG TABLET    Take 1 tablet by mouth every 6 hours as needed for Nausea or Vomiting 1 tablet every 6 hrs prn for nausea and vomiting. SERTRALINE (ZOLOFT) 100 MG TABLET    TAKE 2 TABLETS BY MOUTH ONCE DAILY AT NIGHT AS DIRECTED FOR DEPRESSION ANXIETY    VITAMIN D (CHOLECALCIFEROL) 23800 UNIT CAPS    Take 50,000 Units by mouth once a week    VRAYLAR 1.5 MG CAPSULE    TAKE 1 CAPSULE BY MOUTH IN THE MORNING AS DIRECTED FOR MOOD       ALLERGIES     Patient has no known allergies.     FAMILY HISTORY Family History   Problem Relation Age of Onset    Diabetes Mother     Cancer Maternal Grandfather     Celiac Disease Neg Hx     Colon Cancer Neg Hx     Crohn's Disease Neg Hx           SOCIAL HISTORY       Social History     Socioeconomic History    Marital status: Single     Spouse name: None    Number of children: None    Years of education: None    Highest education level: None   Tobacco Use    Smoking status: Some Days     Types: Cigarettes    Smokeless tobacco: Never   Vaping Use    Vaping Use: Some days    Substances: CBD   Substance and Sexual Activity    Alcohol use: Not Currently     Comment: rare    Drug use: Yes     Types: Marijuana (Weed)     Comment: Last used yesterday    Sexual activity: Yes     Partners: Male       SCREENINGS    Himanshu Coma Scale  Eye Opening: Spontaneous  Best Verbal Response: Oriented  Best Motor Response: Obeys commands  [place] Coma Scale Score: 15      PHYSICAL EXAM    (up to 7 forlevel 4, 8 or more for level 5)     ED Triage Vitals   BP Temp Temp src Pulse Resp SpO2 Height Weight   -- -- -- -- -- -- -- --       Physical Exam  Vitals and nursing note reviewed. Constitutional:       General: She is not in acute distress. Appearance: She is well-developed. She is not diaphoretic. HENT:      Head: Normocephalic and atraumatic. Mouth/Throat:      Mouth: Mucous membranes are moist.      Pharynx: No oropharyngeal exudate. Eyes:      General: No scleral icterus. Conjunctiva/sclera: Conjunctivae normal.      Pupils: Pupils are equal, round, and reactive to light. Neck:      Trachea: No tracheal deviation. Cardiovascular:      Rate and Rhythm: Normal rate. Heart sounds: Normal heart sounds. Pulmonary:      Effort: Pulmonary effort is normal. No respiratory distress. Breath sounds: Normal breath sounds. Abdominal:      General: Bowel sounds are normal. There is no distension. Palpations: Abdomen is soft. Tenderness:  There is abdominal tenderness in the right upper quadrant and left upper quadrant. There is left CVA tenderness. Musculoskeletal:         General: Normal range of motion. Cervical back: Normal range of motion and neck supple. No rigidity or tenderness. Skin:     General: Skin is warm and dry. Findings: No erythema or rash. Neurological:      Mental Status: She is alert and oriented to person, place, and time. Cranial Nerves: No cranial nerve deficit. Motor: No abnormal muscle tone. Psychiatric:         Behavior: Behavior normal.         Thought Content: Thought content normal.         Judgment: Judgment normal.         DIAGNOSTIC RESULTS     RADIOLOGY:   Non-plain film images such as CT, Ultrasound and MRI are read by the radiologist. Plain radiographic images are visualized and preliminarilyinterpreted by Juan Johnson PA-C with the below findings:      Interpretation per the Radiologist below, if available at the time of this note:    CT ABDOMEN PELVIS W IV CONTRAST Additional Contrast? None   Final Result   Diffuse colonic fecal retention. No urinary tract stones or hydronephrosis.              LABS:  Labs Reviewed   CBC WITH AUTO DIFFERENTIAL - Abnormal; Notable for the following components:       Result Value    MCH 25.9 (*)     MCHC 32.5 (*)     RDW 16.0 (*)     Neutrophils Absolute 7.2 (*)     All other components within normal limits   URINALYSIS WITH REFLEX TO CULTURE - Abnormal; Notable for the following components:    Clarity, UA CLOUDY (*)     Ketones, Urine TRACE (*)     Protein, UA TRACE (*)     Leukocyte Esterase, Urine SMALL (*)     All other components within normal limits   MICROSCOPIC URINALYSIS - Abnormal; Notable for the following components:    Bacteria, UA MODERATE (*)     WBC, UA 10-20 (*)     All other components within normal limits   RAPID INFLUENZA A/B ANTIGENS   CULTURE, URINE   COMPREHENSIVE METABOLIC PANEL   LIPASE   LACTIC ACID   POC PREGNANCY UR-QUAL       All other labs were within normal range or not returnedas of this dictation. EMERGENCYDEPARTMENT COURSE and DIFFERENTIAL DIAGNOSIS/MDM:   Vitals:    Vitals:    11/27/22 1559   BP: 107/75   Pulse: 95   Resp: 18   Temp: 97.8 °F (36.6 °C)   TempSrc: Oral   SpO2: 98%   Weight: (!) 350 lb (158.8 kg)   Height: 5' 5\" (1.651 m)       REASSESSMENT        Patient presented with right upper quadrant abdominal pain with left flank pain as well. CT scan reveals constipation but is otherwise unremarkable. Patient does have a urinary tract infection but laboratory testing otherwise unremarkable. Patient will states that MiraLAX does not work for her so I wrote her prescription for lactulose to help moving her bowels, Keflex antibiotic and Lodine for pain. Discussed following up with PCP to discuss possible testing to rule out cholelithiasis    MDM      PROCEDURES:    Procedures      FINAL IMPRESSION      1. Constipation, unspecified constipation type    2. Acute cystitis without hematuria    3.  Abdominal pain, right upper quadrant          DISPOSITION/PLAN   DISPOSITION Decision To Discharge 11/27/2022 05:57:37 PM      PATIENT REFERRED TO:  VIPUL Ngo - [facility]  [address] [address]  [phone]    Call in 2 days      DISCHARGE MEDICATIONS:  New Prescriptions    CEPHALEXIN (KEFLEX) 500 MG CAPSULE    Take 1 capsule by mouth 2 times daily for 7 days    ETODOLAC (LODINE) 400 MG TABLET    Take 1 tablet by mouth 2 times daily    LACTULOSE (CHRONULAC) 10 GM/15ML SOLUTION    Take 30 mLs by mouth 2 times daily as needed (constipation)       (Please note that portions of this note were completed with a voice recognition program.  Efforts were made to edit the dictations but occasionally words are mis-transcribed.)    DIAZ Heath PA-C  11/27/22 8174

## 2022-11-27 NOTE — ED NOTES
Pt understands discharge instructions.   Pt instructed to follow up with PCP   Prescriptions explained   Pt told to come back for new or worsening symptoms  No further questions         Nanette Mares RN  11/27/22 3681

## 2022-11-27 NOTE — ED TRIAGE NOTES
PT COMES TO THE ED TODAY WITH COMPLAINTS OF ABD PAIN IN THE URQ WITH FLANK PAIN ON THE LEFT PT REPORTS THE PAIN AS A STABBING PAIN    THE PAIN HAS BEEN GOING ON FOR 4 DAYS   PT HAS HAD N/V/D AND DIZZINESS SPELLS     PT HAS TRIED TO TREAT SYMPTOMS WITH OTC MEDICATION WITH NO RELIEF     PT IS A&OX 4   BREATHING IS EQUAL AND UNLABORED  SKIN W/P/D

## 2022-11-27 NOTE — ED NOTES
Pt understands discharge instructions.   Pt instructed to follow up with PCP   Prescriptions explained   Pt told to come back for new or worsening symptoms  No further questions         Anant Ross RN  11/27/22 5077

## 2022-11-28 LAB
GFR SERPL CREATININE-BSD FRML MDRD: >60 ML/MIN/{1.73_M2}
PERFORMED ON: NORMAL
POC CREATININE: 0.8 MG/DL (ref 0.6–1.2)
POC SAMPLE TYPE: NORMAL

## 2023-04-03 NOTE — ED PROVIDER NOTES
[address] [facility] ED  eMERGENCY dEPARTMENT eNCOUnter      Pt Name: Stefanie Morris  MRN: [id number]  [place] 1995  Date of evaluation: 6/26/2022  Provider: Nikolay Moore MD    CHIEF COMPLAINT       Chief Complaint   Patient presents with    Chest Pain     pt c/o mid-sternal chest pain that radiates into her throat - reports onset at 1900 while she was smoking marijuana and using crack          HISTORY OF PRESENT ILLNESS   (Location/Symptom, Timing/Onset,Context/Setting, Quality, Duration, Modifying Factors, Severity)  Note limiting factors. Stefanie Morris is a 33 y.o. female who presents to the emergency department chest pain     33years old admits to smoking cocaine yesterday at 5 PM states since 7 PM last night he started developing central chest pain radiating to the back prescribed as mild to moderate denies any fever chills body ache denies any shortness of breath any nausea or vomiting denies any other complaint or concern    The history is provided by the patient. Chest Pain  Pain location:  Substernal area  Pain quality: aching    Pain radiates to:  Neck  Pain severity:  Moderate  Onset quality:  Gradual  Duration:  12 hours  Chronicity:  New  Context: not breathing, not drug use, not eating, not intercourse, not lifting, not movement, not raising an arm, not at rest, not stress and not trauma    Relieved by:  Nothing      NursingNotes were reviewed. REVIEW OF SYSTEMS    (2-9 systems for level 4, 10 or more for level 5)     Review of Systems   Constitutional: Negative. HENT: Negative. Eyes: Negative. Respiratory: Negative. Cardiovascular: Positive for chest pain. Endocrine: Negative. Genitourinary: Negative. Musculoskeletal: Negative. Skin: Negative. Allergic/Immunologic: Negative. Neurological: Negative. Hematological: Negative. Psychiatric/Behavioral: Negative. All other systems reviewed and are negative.       Except as noted above the LEFT MESSAGE FOR PT TO CALL MADDIE BACK.  IT IS REGARDING PHYSICAL THERAPY. PLEASE TRANSFER .[phone]   remainder of the review of systems was reviewed and negative.        PAST MEDICAL HISTORY     Past Medical History:   Diagnosis Date    Mental disorder     depression and anxiety    Postpartum depression          SURGICALHISTORY       Past Surgical History:   Procedure Laterality Date    LAPAROSCOPY Left 6/10/2021    LAPAROSCOPIC ECTOPIC REMOVAL, LEFT SALPINGECTOMY performed by Caor Laguerre MD at [facility] 26 2020    WISDOM TOOTH EXTRACTION  2017    WISDOM TOOTH EXTRACTION  2019         CURRENT MEDICATIONS       Discharge Medication List as of 6/26/2022  1:35 PM      CONTINUE these medications which have NOT CHANGED    Details   acetaminophen (TYLENOL) 500 MG tablet Take 2 tablets by mouth every 6 hours as needed for Pain or Fever, Disp-60 tablet, R-0Normal      ibuprofen (IBU) 400 MG tablet Take 1 tablet by mouth every 6 hours as needed for Pain, Disp-120 tablet, R-0Normal      Ferrous Sulfate (IRON) 325 (65 Fe) MG TABS Take 1 tablet by mouth 2 times daily, Disp-60 tablet, R-2Normal      vitamin D (CHOLECALCIFEROL) 93153 UNIT CAPS Take 50,000 Units by mouth once a weekHistorical Med      docusate sodium (COLACE) 100 MG capsule Take 1 capsule by mouth 2 times daily as needed for Constipation, Disp-60 capsule, R-2Normal      ondansetron (ZOFRAN) 4 MG tablet Take 1 tablet by mouth every 6 hours as needed for Nausea or Vomiting 1 tablet every 6 hrs prn for nausea and vomiting., Disp-30 tablet, R-1Normal      metFORMIN (GLUCOPHAGE) 500 MG tablet TAKE 1 TABLET BY MOUTH ONCE DAILY WITH FOOD *EMERGENCY REFIL*Historical Med      Ascorbic Acid (VITAMIN C) 250 MG tablet TAKE 1 TABLET BY MOUTH EVERY DAY WITH IRONHistorical Med      B Complex CAPS TAKE 1 CAPSULE BY MOUTH EVERY DAYHistorical Med      ARIPiprazole (ABILIFY) 20 MG tablet TAKE 1 TABLET BY MOUTH IN THE EVENING AS DIRECTED FOR DEPRESSION MOODHistorical Med      VRAYLAR 1.5 MG capsule TAKE 1 CAPSULE BY MOUTH IN THE MORNING AS of Stress : Not on file   Social Connections:     Frequency of Communication with Friends and Family: Not on file    Frequency of Social Gatherings with Friends and Family: Not on file    Attends Taoism Services: Not on file    Active Member of [address] [place] [address] [employer] or Organizations: Not on file    Attends Club or Organization Meetings: Not on file    Marital Status: Not on file   Intimate Partner Violence:     Fear of Current or Ex-Partner: Not on file    Emotionally Abused: Not on file    Physically Abused: Not on file    Sexually Abused: Not on file   Housing Stability:     Unable to Pay for Housing in the Last Year: Not on file    Number of [place] in the Last Year: Not on file    Unstable Housing in the Last Year: Not on file       SCREENINGS    Himanshu Coma Scale  Eye Opening: Spontaneous  Best Verbal Response: Oriented  Best Motor Response: Obeys commands  [place] Coma Scale Score: 15 @FLOW([id number])@      PHYSICAL EXAM    (up to 7 for level 4, 8 or more for level 5)     ED Triage Vitals   BP Temp Temp Source Heart Rate Resp SpO2 Height Weight   06/26/22 1050 06/26/22 1048 06/26/22 1048 06/26/22 1048 06/26/22 1048 06/26/22 1048 06/26/22 1048 06/26/22 1048   (!) 136/99 97.3 °F (36.3 °C) Temporal 96 20 97 % 5' 5\" (1.651 m) (!) 358 lb (162.4 kg)       Physical Exam  Vitals and nursing note reviewed. Constitutional:       Appearance: Normal appearance. She is well-developed. HENT:      Head: Normocephalic and atraumatic. Right Ear: External ear normal.      Left Ear: External ear normal.      Nose: Nose normal.      Mouth/Throat:      Mouth: Mucous membranes are moist.   Eyes:      Pupils: Pupils are equal, round, and reactive to light. Cardiovascular:      Rate and Rhythm: Normal rate and regular rhythm. Pulses: Normal pulses. Heart sounds: Normal heart sounds. Pulmonary:      Effort: Pulmonary effort is normal. No respiratory distress. Breath sounds: Normal breath sounds.  No wheezing or rales. Chest:      Chest wall: No tenderness. Abdominal:      General: Bowel sounds are normal.      Palpations: Abdomen is soft. Musculoskeletal:         General: Normal range of motion. Cervical back: Normal range of motion and neck supple. Skin:     General: Skin is warm and dry. Neurological:      Mental Status: She is alert and oriented to person, place, and time. Mental status is at baseline. Cranial Nerves: No cranial nerve deficit. Sensory: No sensory deficit. Motor: No abnormal muscle tone. Coordination: Coordination normal.      Deep Tendon Reflexes: Reflexes normal.   Psychiatric:         Behavior: Behavior normal.         Thought Content: Thought content normal.         Judgment: Judgment normal.         DIAGNOSTIC RESULTS     EKG: All EKG's are interpreted by the Emergency Department Physician who either signs or Co-signsthis chart in the absence of a cardiologist.    EKG: normal EKG, normal sinus rhythm, rate 87 no acute ST or T wave changes. RADIOLOGY:   Non-plain filmimages such as CT, Ultrasound and MRI are read by the radiologist. Plain radiographic images are visualized and preliminarily interpreted by the emergency physician with the below findings:        Interpretation per the Radiologist below, if available at the time ofthis note:    XR CHEST PORTABLE   Final Result      MILD TO [place], OF UNCERTAIN ETIOLOGY. NO OTHER SIGNIFICANT CHANGES IDENTIFIED, WITHIN LIMITS OF THE STUDY. Critical communication:  The findings were discussed with Dr. Nick Knight  on 6/26/2022 at approximately 3:36 PM.                   ED BEDSIDE ULTRASOUND:   Performed by ED Physician - none    LABS:  Labs Reviewed   COMPREHENSIVE METABOLIC PANEL - Abnormal; Notable for the following components:       Result Value    Glucose 107 (*)     All other components within normal limits   URINE DRUG SCREEN - Abnormal; Notable for the following components:    Cocaine Metabolite Screen, Urine POSITIVE (*)     All other components within normal limits   ETHANOL   TROPONIN   BRAIN NATRIURETIC PEPTIDE       All other labs were within normal range or not returned as of this dictation. EMERGENCY DEPARTMENT COURSE and DIFFERENTIAL DIAGNOSIS/MDM:   Vitals:    Vitals:    06/26/22 1050 06/26/22 1115 06/26/22 1230 06/26/22 1300   BP: (!) 136/99 119/73 (!) 121/96 117/73   Pulse:  75 87 81   Resp:  20 20 20   Temp:       TempSrc:       SpO2:  96% 100%    Weight:       Height:                  MDM  Number of Diagnoses or Management Options  Chest wall pain  Nondependent cocaine abuse (HCC)  Diagnosis management comments: 33years old presented to the ER with central chest pain radiating to bilateral side of the neck after smoking cocaine yesterday cardiac work-up was negative blood work all negative. Patient felt better was able to eat and drink declined staying for IV fluids and was released from the ER before all results were back advised we will call her back for any abnormal result patient verbalized understanding and agreed to the plan       Amount and/or Complexity of Data Reviewed  Clinical lab tests: ordered and reviewed  Tests in the radiology section of CPT®: ordered and reviewed    ondition which required my urgent intervention. CONSULTS:  None    PROCEDURES:  Unless otherwise noted below, none     Procedures    FINAL IMPRESSION      1. Chest wall pain    2. Nondependent cocaine abuse [facility])          DISPOSITION/PLAN   DISPOSITION Decision To Discharge 06/26/2022 01:35:30 PM      PATIENT REFERRED TO:  No follow-up provider specified.     DISCHARGE MEDICATIONS:  Discharge Medication List as of 6/26/2022  1:35 PM             (Please note thatportions of this note were completed with a voice recognition program.  Efforts were made to edit the dictations but occasionally words are mis-transcribed.)    Vince Reyes MD (electronically signed)  Attending Emergency Physician          Ferd Bosworth Dardir, MD  06/26/22 6736       Ferd Bosworth Dardir, MD  06/26/22 2022

## 2023-04-17 ENCOUNTER — APPOINTMENT (OUTPATIENT)
Dept: CT IMAGING | Age: 28
End: 2023-04-17
Payer: COMMERCIAL

## 2023-04-17 ENCOUNTER — HOSPITAL ENCOUNTER (EMERGENCY)
Age: 28
Discharge: HOME | End: 2023-04-17
Payer: COMMERCIAL

## 2023-04-17 VITALS
OXYGEN SATURATION: 97 % | RESPIRATION RATE: 20 BRPM | SYSTOLIC BLOOD PRESSURE: 124 MMHG | HEART RATE: 110 BPM | HEIGHT: 65 IN | BODY MASS INDEX: 48.82 KG/M2 | WEIGHT: 293 LBS | DIASTOLIC BLOOD PRESSURE: 87 MMHG | TEMPERATURE: 97.9 F

## 2023-04-17 DIAGNOSIS — R10.9 RIGHT FLANK PAIN: Primary | ICD-10-CM

## 2023-04-17 LAB
ALBUMIN SERPL-MCNC: 3.7 G/DL (ref 3.5–4.6)
ALP SERPL-CCNC: 72 U/L (ref 40–130)
ALT SERPL-CCNC: 12 U/L (ref 0–33)
ANION GAP SERPL CALCULATED.3IONS-SCNC: 11 MEQ/L (ref 9–15)
AST SERPL-CCNC: 17 U/L (ref 0–35)
BASOPHILS # BLD: 0.1 K/UL (ref 0–0.2)
BASOPHILS NFR BLD: 0.8 %
BILIRUB SERPL-MCNC: <0.2 MG/DL (ref 0.2–0.7)
BILIRUB UR QL STRIP: NEGATIVE
BUN SERPL-MCNC: 12 MG/DL (ref 6–20)
CALCIUM SERPL-MCNC: 9.3 MG/DL (ref 8.5–9.9)
CHLORIDE SERPL-SCNC: 103 MEQ/L (ref 95–107)
CLARITY UR: CLEAR
CO2 SERPL-SCNC: 23 MEQ/L (ref 20–31)
COLOR UR: YELLOW
CREAT SERPL-MCNC: 0.81 MG/DL (ref 0.5–0.9)
EOSINOPHIL # BLD: 0.2 K/UL (ref 0–0.7)
EOSINOPHIL NFR BLD: 1.5 %
ERYTHROCYTE [DISTWIDTH] IN BLOOD BY AUTOMATED COUNT: 15.9 % (ref 11.5–14.5)
GLOBULIN SER CALC-MCNC: 3.3 G/DL (ref 2.3–3.5)
GLUCOSE SERPL-MCNC: 95 MG/DL (ref 70–99)
GLUCOSE UR STRIP-MCNC: NEGATIVE MG/DL
HCG, URINE, POC: NEGATIVE
HCT VFR BLD AUTO: 36.7 % (ref 37–47)
HGB BLD-MCNC: 11.9 G/DL (ref 12–16)
HGB UR QL STRIP: NEGATIVE
KETONES UR STRIP-MCNC: NEGATIVE MG/DL
LACTATE BLDV-SCNC: 1.1 MMOL/L (ref 0.5–2.2)
LEUKOCYTE ESTERASE UR QL STRIP: NEGATIVE
LYMPHOCYTES # BLD: 2.7 K/UL (ref 1–4.8)
LYMPHOCYTES NFR BLD: 20.2 %
Lab: NORMAL
MCH RBC QN AUTO: 25.1 PG (ref 27–31.3)
MCHC RBC AUTO-ENTMCNC: 32.3 % (ref 33–37)
MCV RBC AUTO: 77.8 FL (ref 79.4–94.8)
MONOCYTES # BLD: 0.7 K/UL (ref 0.2–0.8)
MONOCYTES NFR BLD: 4.9 %
NEGATIVE QC PASS/FAIL: NORMAL
NEUTROPHILS # BLD: 9.8 K/UL (ref 1.4–6.5)
NEUTS SEG NFR BLD: 72.6 %
NITRITE UR QL STRIP: NEGATIVE
PH UR STRIP: 7.5 [PH] (ref 5–9)
PLATELET # BLD AUTO: 358 K/UL (ref 130–400)
POSITIVE QC PASS/FAIL: NORMAL
POTASSIUM SERPL-SCNC: 4.6 MEQ/L (ref 3.4–4.9)
PROT SERPL-MCNC: 7 G/DL (ref 6.3–8)
PROT UR STRIP-MCNC: NEGATIVE MG/DL
RBC # BLD AUTO: 4.71 M/UL (ref 4.2–5.4)
SODIUM SERPL-SCNC: 137 MEQ/L (ref 135–144)
SP GR UR STRIP: 1.01 (ref 1–1.03)
URINE REFLEX TO CULTURE: NORMAL
UROBILINOGEN UR STRIP-ACNC: 0.2 E.U./DL
WBC # BLD AUTO: 13.5 K/UL (ref 4.8–10.8)

## 2023-04-17 PROCEDURE — 36415 COLL VENOUS BLD VENIPUNCTURE: CPT

## 2023-04-17 PROCEDURE — 85025 COMPLETE CBC W/AUTO DIFF WBC: CPT

## 2023-04-17 PROCEDURE — 83605 ASSAY OF LACTIC ACID: CPT

## 2023-04-17 PROCEDURE — 96374 THER/PROPH/DIAG INJ IV PUSH: CPT

## 2023-04-17 PROCEDURE — 81003 URINALYSIS AUTO W/O SCOPE: CPT

## 2023-04-17 PROCEDURE — 99284 EMERGENCY DEPT VISIT MOD MDM: CPT

## 2023-04-17 PROCEDURE — 74150 CT ABDOMEN W/O CONTRAST: CPT

## 2023-04-17 PROCEDURE — 80053 COMPREHEN METABOLIC PANEL: CPT

## 2023-04-17 PROCEDURE — 6360000002 HC RX W HCPCS: Performed by: PHYSICIAN ASSISTANT

## 2023-04-17 PROCEDURE — 2580000003 HC RX 258: Performed by: PHYSICIAN ASSISTANT

## 2023-04-17 RX ORDER — 0.9 % SODIUM CHLORIDE 0.9 %
1000 INTRAVENOUS SOLUTION INTRAVENOUS ONCE
Status: COMPLETED | OUTPATIENT
Start: 2023-04-17 | End: 2023-04-17

## 2023-04-17 RX ORDER — TIZANIDINE 4 MG/1
4 TABLET ORAL EVERY 8 HOURS PRN
Qty: 15 TABLET | Refills: 0 | Status: SHIPPED | OUTPATIENT
Start: 2023-04-17

## 2023-04-17 RX ORDER — KETOROLAC TROMETHAMINE 30 MG/ML
30 INJECTION, SOLUTION INTRAMUSCULAR; INTRAVENOUS ONCE
Status: COMPLETED | OUTPATIENT
Start: 2023-04-17 | End: 2023-04-17

## 2023-04-17 RX ADMIN — SODIUM CHLORIDE 1000 ML: 9 INJECTION, SOLUTION INTRAVENOUS at 19:21

## 2023-04-17 RX ADMIN — KETOROLAC TROMETHAMINE 30 MG: 30 INJECTION, SOLUTION INTRAMUSCULAR at 19:20

## 2023-04-17 ASSESSMENT — PAIN DESCRIPTION - ONSET: ONSET: ON-GOING

## 2023-04-17 ASSESSMENT — ENCOUNTER SYMPTOMS
DIARRHEA: 1
ABDOMINAL PAIN: 0
SHORTNESS OF BREATH: 0
EYE PAIN: 0
COLOR CHANGE: 0
ALLERGIC/IMMUNOLOGIC NEGATIVE: 1
APNEA: 0
TROUBLE SWALLOWING: 0

## 2023-04-17 ASSESSMENT — PAIN DESCRIPTION - ORIENTATION: ORIENTATION: RIGHT;LEFT

## 2023-04-17 ASSESSMENT — PAIN SCALES - GENERAL: PAINLEVEL_OUTOF10: 6

## 2023-04-17 ASSESSMENT — PAIN DESCRIPTION - DESCRIPTORS: DESCRIPTORS: SHARP;STABBING

## 2023-04-17 ASSESSMENT — PAIN DESCRIPTION - FREQUENCY: FREQUENCY: CONTINUOUS

## 2023-04-17 ASSESSMENT — PAIN DESCRIPTION - LOCATION: LOCATION: FLANK

## 2023-04-17 NOTE — ED PROVIDER NOTES
[address] [facility] ED  eMERGENCYdEPARTMENT eNCOUnter        Pt Name: Marisol Smith  MRN: [id number]  [place] 1995of evaluation: 4/17/2023  Provider:Kev Calhoun PA-C    CHIEF COMPLAINT       Chief Complaint   Patient presents with    Flank Pain     Both sides    Urinary Frequency         HISTORY OF PRESENT ILLNESS  (Location/Symptom, Timing/Onset, Context/Setting, Quality, Duration, Modifying Factors, Severity.)   Marisol Smith is a 33 y.o. female who presents to the emergency department with complaints of urinary frequency with malodorous urine and right flank pain, ongoing for the past 3 weeks. Patient states that 1 week ago she had gone to urgent care and had her urine tested which was negative for STDs and infections. Patient states that she saw her family doctor today and had a urine retested which did show blood in the urine and her doctor wanted her sent in for CT scan. Patient states diarrhea over the past 2 days only. Patient denies any vomiting. No recent rhinorrhea, nasal congestion, cough or chest congestion. Patient denies any radiation of pain into the abdomen, but states chronic abdominal pain second to a hiatal hernia. HPI    Nursing Notes were reviewed and I agree. REVIEW OF SYSTEMS    (2-9 systems for level 4, 10 or more for level 5)     Review of Systems   Constitutional:  Negative for diaphoresis and fever. HENT:  Negative for hearing loss and trouble swallowing. Eyes:  Negative for pain. Respiratory:  Negative for apnea and shortness of breath. Cardiovascular:  Negative for chest pain. Gastrointestinal:  Positive for diarrhea. Negative for abdominal pain. Endocrine: Negative. Genitourinary:  Positive for flank pain, frequency and hematuria. Musculoskeletal:  Negative for neck pain and neck stiffness. Skin:  Negative for color change. Allergic/Immunologic: Negative. Neurological:  Negative for dizziness and numbness.    Hematological:

## 2023-05-02 ENCOUNTER — PREP FOR PROCEDURE (OUTPATIENT)
Dept: GASTROENTEROLOGY | Age: 28
End: 2023-05-02

## 2023-05-02 ENCOUNTER — OFFICE VISIT (OUTPATIENT)
Dept: OBGYN CLINIC | Age: 28
End: 2023-05-02
Payer: COMMERCIAL

## 2023-05-02 ENCOUNTER — OFFICE VISIT (OUTPATIENT)
Dept: GASTROENTEROLOGY | Age: 28
End: 2023-05-02
Payer: COMMERCIAL

## 2023-05-02 ENCOUNTER — TELEPHONE (OUTPATIENT)
Dept: GASTROENTEROLOGY | Age: 28
End: 2023-05-02

## 2023-05-02 VITALS
DIASTOLIC BLOOD PRESSURE: 84 MMHG | HEIGHT: 65 IN | WEIGHT: 293 LBS | HEART RATE: 84 BPM | BODY MASS INDEX: 48.82 KG/M2 | SYSTOLIC BLOOD PRESSURE: 122 MMHG

## 2023-05-02 VITALS
OXYGEN SATURATION: 98 % | HEART RATE: 90 BPM | WEIGHT: 293 LBS | BODY MASS INDEX: 63.4 KG/M2 | SYSTOLIC BLOOD PRESSURE: 122 MMHG | DIASTOLIC BLOOD PRESSURE: 80 MMHG

## 2023-05-02 DIAGNOSIS — N92.1 MENOMETRORRHAGIA: Primary | ICD-10-CM

## 2023-05-02 DIAGNOSIS — E66.01 SEVERE OBESITY (HCC): ICD-10-CM

## 2023-05-02 DIAGNOSIS — R10.13 EPIGASTRIC PAIN: Primary | ICD-10-CM

## 2023-05-02 DIAGNOSIS — N92.1 MENOMETRORRHAGIA: ICD-10-CM

## 2023-05-02 DIAGNOSIS — Z87.59 HISTORY OF ECTOPIC PREGNANCY: ICD-10-CM

## 2023-05-02 LAB
BASOPHILS # BLD: 0.1 K/UL (ref 0–0.2)
BASOPHILS NFR BLD: 0.5 %
EOSINOPHIL # BLD: 0.1 K/UL (ref 0–0.7)
EOSINOPHIL NFR BLD: 1.3 %
ERYTHROCYTE [DISTWIDTH] IN BLOOD BY AUTOMATED COUNT: 15.5 % (ref 11.5–14.5)
GONADOTROPIN, CHORIONIC (HCG) QUANT: <0.1 MIU/ML
HCT VFR BLD AUTO: 39.3 % (ref 37–47)
HGB BLD-MCNC: 12.8 G/DL (ref 12–16)
LYMPHOCYTES # BLD: 1.5 K/UL (ref 1–4.8)
LYMPHOCYTES NFR BLD: 13.9 %
MCH RBC QN AUTO: 25.9 PG (ref 27–31.3)
MCHC RBC AUTO-ENTMCNC: 32.7 % (ref 33–37)
MCV RBC AUTO: 79.1 FL (ref 79.4–94.8)
MONOCYTES # BLD: 0.5 K/UL (ref 0.2–0.8)
MONOCYTES NFR BLD: 4.7 %
NEUTROPHILS # BLD: 8.4 K/UL (ref 1.4–6.5)
NEUTS SEG NFR BLD: 79.6 %
PLATELET # BLD AUTO: 369 K/UL (ref 130–400)
RBC # BLD AUTO: 4.96 M/UL (ref 4.2–5.4)
T4 FREE SERPL-MCNC: 1.22 NG/DL (ref 0.84–1.68)
TSH REFLEX: 3.35 UIU/ML (ref 0.44–3.86)
WBC # BLD AUTO: 10.6 K/UL (ref 4.8–10.8)

## 2023-05-02 PROCEDURE — G8417 CALC BMI ABV UP PARAM F/U: HCPCS | Performed by: INTERNAL MEDICINE

## 2023-05-02 PROCEDURE — 4004F PT TOBACCO SCREEN RCVD TLK: CPT | Performed by: INTERNAL MEDICINE

## 2023-05-02 PROCEDURE — 99213 OFFICE O/P EST LOW 20 MIN: CPT | Performed by: OBSTETRICS & GYNECOLOGY

## 2023-05-02 PROCEDURE — G8427 DOCREV CUR MEDS BY ELIG CLIN: HCPCS | Performed by: OBSTETRICS & GYNECOLOGY

## 2023-05-02 PROCEDURE — 99214 OFFICE O/P EST MOD 30 MIN: CPT | Performed by: INTERNAL MEDICINE

## 2023-05-02 PROCEDURE — 4004F PT TOBACCO SCREEN RCVD TLK: CPT | Performed by: OBSTETRICS & GYNECOLOGY

## 2023-05-02 PROCEDURE — G8427 DOCREV CUR MEDS BY ELIG CLIN: HCPCS | Performed by: INTERNAL MEDICINE

## 2023-05-02 PROCEDURE — G8417 CALC BMI ABV UP PARAM F/U: HCPCS | Performed by: OBSTETRICS & GYNECOLOGY

## 2023-05-02 RX ORDER — LURASIDONE HYDROCHLORIDE 40 MG/1
TABLET, FILM COATED ORAL
COMMUNITY
Start: 2023-04-27

## 2023-05-02 ASSESSMENT — ENCOUNTER SYMPTOMS
ABDOMINAL DISTENTION: 0
WHEEZING: 0
SHORTNESS OF BREATH: 0
EYE REDNESS: 0
CONSTIPATION: 0
DIARRHEA: 0
NAUSEA: 0
VOICE CHANGE: 0
EYE PAIN: 0
VOMITING: 0
PHOTOPHOBIA: 0
TROUBLE SWALLOWING: 0
BLOOD IN STOOL: 0
CHEST TIGHTNESS: 0
COLOR CHANGE: 0
RECTAL PAIN: 0
ABDOMINAL PAIN: 0

## 2023-05-02 NOTE — PROGRESS NOTES
Mirna Rangel is a 33 y.o. female who presents here today for complaints of Surgical Consult (Discuss tubal ligation. )  Patient comes in complaining of heavy and irregular vaginal bleeding since an ectopic pregnancy surgery that was done 2 years ago, which ended removal of the left tube. Patient mentions that her cycles are very heavy, and taking Depo-Provera which manages her symptoms is not helping anymore also given that the Depo-Provera has caused immense weight gain patient is currently 385 pounds. Patient has 2 previous pregnancies and 2 children, and is not interested in future childbearing, and is seeking definitive treatment as she does not want to take any hormonal treatments which are drastically affecting her health and her weight. Patient is adamant about definitive treatment and adamant about no future childbearing despite her young age. .      Vitals:  /84 (Site: Left Upper Arm, Position: Sitting, Cuff Size: Large Adult)   Pulse 84   Ht 5' 5\" (1.651 m)   Wt (!) 385 lb (174.6 kg)   BMI 64.07 kg/m²   Allergies:  Patient has no known allergies.   Past Medical History:   Diagnosis Date    Mental disorder     depression and anxiety    Postpartum depression      Past Surgical History:   Procedure Laterality Date    LAPAROSCOPY Left 6/10/2021    LAPAROSCOPIC ECTOPIC REMOVAL, LEFT SALPINGECTOMY performed by Domenico Hughes MD at [facility]      WISDOM TOOTH EXTRACTION  2017    WISDOM TOOTH EXTRACTION       OB History          4    Para   2    Term   2            AB        Living   2         SAB        IAB        Ectopic        Molar        Multiple        Live Births                  Family History   Problem Relation Age of Onset    Diabetes Mother     Cancer Maternal Grandfather     Celiac Disease Neg Hx     Colon Cancer Neg Hx     Crohn's Disease Neg Hx      Social History     Socioeconomic History    Marital status: Single

## 2023-05-02 NOTE — PROGRESS NOTES
Cervical back: Neck supple. Skin:     Coloration: Skin is not pale. Findings: No erythema or rash. Neurological:      Mental Status: She is alert and oriented to person, place, and time. Laboratory, Pathology, Radiology reviewed in detail with relevantimportant investigations summarized below:  Lab Results   Component Value Date/Time    WBC 13.5 04/17/2023 06:45 PM    WBC 10.4 11/27/2022 04:40 PM    WBC 14.5 03/18/2022 12:15 AM    WBC 10.6 02/04/2022 09:00 AM    WBC 9.8 01/17/2022 09:45 PM    HGB 11.9 04/17/2023 06:45 PM    HGB 12.9 11/27/2022 04:40 PM    HGB 11.6 03/18/2022 12:15 AM    HGB 12.5 02/04/2022 09:00 AM    HGB 13.1 01/17/2022 09:45 PM    HCT 36.7 04/17/2023 06:45 PM    HCT 39.6 11/27/2022 04:40 PM    HCT 36.5 03/18/2022 12:15 AM    HCT 38.0 02/04/2022 09:00 AM    HCT 40.8 01/17/2022 09:45 PM    MCV 77.8 04/17/2023 06:45 PM    MCV 79.4 11/27/2022 04:40 PM    MCV 79.2 03/18/2022 12:15 AM    MCV 78.3 02/04/2022 09:00 AM    MCV 77.4 01/17/2022 09:45 PM     04/17/2023 06:45 PM     11/27/2022 04:40 PM     03/18/2022 12:15 AM     02/04/2022 09:00 AM     01/17/2022 09:45 PM    .  Lab Results   Component Value Date/Time    ALT 12 04/17/2023 06:45 PM    ALT 10 11/27/2022 04:40 PM    ALT 12 06/26/2022 11:00 AM    AST 17 04/17/2023 06:45 PM    AST 12 11/27/2022 04:40 PM    AST 18 06/26/2022 11:00 AM    ALKPHOS 72 04/17/2023 06:45 PM    ALKPHOS 58 11/27/2022 04:40 PM    ALKPHOS 75 06/26/2022 11:00 AM    BILITOT <0.2 04/17/2023 06:45 PM    BILITOT <0.2 11/27/2022 04:40 PM    BILITOT 0.5 06/26/2022 11:00 AM       CT KIDNEY WO CONTRAST    Result Date: 4/17/2023  EXAMINATION: CT OF THE ABDOMEN AND PELVIS WITHOUT CONTRAST 4/17/2023 7:35 pm TECHNIQUE: CT of the abdomen and pelvis was performed without the administration of intravenous contrast. Oral contrast was administered. Multiplanar reformatted images are provided for review.  Automated exposure control, iterative

## 2023-05-12 ENCOUNTER — APPOINTMENT (OUTPATIENT)
Dept: ULTRASOUND IMAGING | Age: 28
End: 2023-05-12
Payer: COMMERCIAL

## 2023-05-22 ENCOUNTER — HOSPITAL ENCOUNTER (OUTPATIENT)
Dept: ULTRASOUND IMAGING | Age: 28
Discharge: HOME | End: 2023-05-24
Payer: COMMERCIAL

## 2023-05-22 DIAGNOSIS — N92.1 MENOMETRORRHAGIA: ICD-10-CM

## 2023-05-22 PROCEDURE — 76856 US EXAM PELVIC COMPLETE: CPT

## 2023-05-22 PROCEDURE — 76830 TRANSVAGINAL US NON-OB: CPT

## 2023-05-22 PROCEDURE — 93975 VASCULAR STUDY: CPT

## 2023-05-24 ENCOUNTER — TELEPHONE (OUTPATIENT)
Dept: OBGYN CLINIC | Age: 28
End: 2023-05-24

## 2023-05-30 ENCOUNTER — TELEMEDICINE (OUTPATIENT)
Dept: OBGYN CLINIC | Age: 28
End: 2023-05-30

## 2023-05-30 DIAGNOSIS — R10.2 CHRONIC PELVIC PAIN IN FEMALE: ICD-10-CM

## 2023-05-30 DIAGNOSIS — Z87.59 HISTORY OF ECTOPIC PREGNANCY: ICD-10-CM

## 2023-05-30 DIAGNOSIS — E66.01 SEVERE OBESITY (HCC): ICD-10-CM

## 2023-05-30 DIAGNOSIS — N92.1 MENOMETRORRHAGIA: Primary | ICD-10-CM

## 2023-05-30 DIAGNOSIS — G89.29 CHRONIC PELVIC PAIN IN FEMALE: ICD-10-CM

## 2023-05-30 NOTE — PROGRESS NOTES
Results Review-phone visit      Ankit Poon is a 33y.o. year old female here to discuss US results. PREVIOUS VISIT: complaining of heavy and irregular vaginal bleeding since an ectopic pregnancy surgery that was done 2 years ago, which ended removal of the left tube. Patient mentions that her cycles are very heavy, and taking Depo-Provera which manages her symptoms is not helping anymore also given that the Depo-Provera has caused immense weight gain patient is currently 385 pounds. Patient has 2 previous pregnancies and 2 children, and is not interested in future childbearing, and is seeking definitive treatment as she does not want to take any hormonal treatments which are drastically affecting her health and her weight. Patient is adamant about definitive treatment and adamant about no future childbearing despite her young age. Patient also with a history of ectopic pregnancy with a known risk of recurrent ectopic due to that history, given that she does not want any further children, and due to the risk of repeat ectopic patient is discussing definitive treatment for her bleeding today. Vitals: There were no vitals taken for this visit. Allergies:  Patient has no known allergies.   Past Medical History:   Diagnosis Date    Mental disorder     depression and anxiety    Postpartum depression      Past Surgical History:   Procedure Laterality Date    LAPAROSCOPY Left 6/10/2021    LAPAROSCOPIC ECTOPIC REMOVAL, LEFT SALPINGECTOMY performed by Sina Beltran MD at [address]      WISDOM TOOTH EXTRACTION  2017    WISDOM TOOTH EXTRACTION       OB History          4    Para   2    Term   2            AB        Living   2         SAB        IAB        Ectopic        Molar        Multiple        Live Births                     Family History   Problem Relation Age of Onset    Diabetes Mother     Cancer Maternal Grandfather     Celiac Disease Neg Hx

## 2023-05-31 ENCOUNTER — TELEPHONE (OUTPATIENT)
Dept: OBGYN CLINIC | Age: 28
End: 2023-05-31

## 2023-05-31 NOTE — TELEPHONE ENCOUNTER
Left message for pt to call. Will need to come to office to sign a bilateral salpingectomy form before we schedule.

## 2023-06-05 PROBLEM — E66.01 MORBID OBESITY (HCC): Status: ACTIVE | Noted: 2023-06-05

## 2023-06-05 PROBLEM — N92.1 MENOMETRORRHAGIA: Status: ACTIVE | Noted: 2023-06-05

## 2023-06-07 ENCOUNTER — APPOINTMENT (OUTPATIENT)
Dept: GENERAL RADIOLOGY | Age: 28
End: 2023-06-07
Payer: COMMERCIAL

## 2023-06-07 ENCOUNTER — HOSPITAL ENCOUNTER (EMERGENCY)
Age: 28
Discharge: HOME | End: 2023-06-07
Attending: EMERGENCY MEDICINE
Payer: COMMERCIAL

## 2023-06-07 VITALS
OXYGEN SATURATION: 97 % | SYSTOLIC BLOOD PRESSURE: 111 MMHG | HEART RATE: 89 BPM | DIASTOLIC BLOOD PRESSURE: 97 MMHG | RESPIRATION RATE: 19 BRPM | TEMPERATURE: 98 F

## 2023-06-07 DIAGNOSIS — R06.2 WHEEZING: Primary | ICD-10-CM

## 2023-06-07 DIAGNOSIS — F41.0 PANIC ATTACK: ICD-10-CM

## 2023-06-07 LAB
ALBUMIN SERPL-MCNC: 4.4 G/DL (ref 3.5–4.6)
ALP SERPL-CCNC: 85 U/L (ref 40–130)
ALT SERPL-CCNC: 13 U/L (ref 0–33)
ANION GAP SERPL CALCULATED.3IONS-SCNC: 15 MEQ/L (ref 9–15)
AST SERPL-CCNC: 14 U/L (ref 0–35)
BASOPHILS # BLD: 0 K/UL (ref 0–0.2)
BASOPHILS NFR BLD: 0.3 %
BILIRUB SERPL-MCNC: <0.2 MG/DL (ref 0.2–0.7)
BUN SERPL-MCNC: 17 MG/DL (ref 6–20)
CALCIUM SERPL-MCNC: 9.7 MG/DL (ref 8.5–9.9)
CHLORIDE SERPL-SCNC: 102 MEQ/L (ref 95–107)
CO2 SERPL-SCNC: 24 MEQ/L (ref 20–31)
CREAT SERPL-MCNC: 0.84 MG/DL (ref 0.5–0.9)
D DIMER PPP FEU-MCNC: 0.39 MG/L FEU (ref 0–0.5)
EOSINOPHIL # BLD: 0.3 K/UL (ref 0–0.7)
EOSINOPHIL NFR BLD: 2.8 %
ERYTHROCYTE [DISTWIDTH] IN BLOOD BY AUTOMATED COUNT: 14.8 % (ref 11.5–14.5)
GLOBULIN SER CALC-MCNC: 3.6 G/DL (ref 2.3–3.5)
GLUCOSE SERPL-MCNC: 82 MG/DL (ref 70–99)
HCT VFR BLD AUTO: 36.4 % (ref 37–47)
HGB BLD-MCNC: 12 G/DL (ref 12–16)
LYMPHOCYTES # BLD: 2.8 K/UL (ref 1–4.8)
LYMPHOCYTES NFR BLD: 23.2 %
MCH RBC QN AUTO: 25.4 PG (ref 27–31.3)
MCHC RBC AUTO-ENTMCNC: 33.1 % (ref 33–37)
MCV RBC AUTO: 76.9 FL (ref 79.4–94.8)
MONOCYTES # BLD: 0.7 K/UL (ref 0.2–0.8)
MONOCYTES NFR BLD: 5.9 %
NEUTROPHILS # BLD: 8.3 K/UL (ref 1.4–6.5)
NEUTS SEG NFR BLD: 67.8 %
PLATELET # BLD AUTO: 342 K/UL (ref 130–400)
POTASSIUM SERPL-SCNC: 4 MEQ/L (ref 3.4–4.9)
PROT SERPL-MCNC: 8 G/DL (ref 6.3–8)
RBC # BLD AUTO: 4.73 M/UL (ref 4.2–5.4)
SODIUM SERPL-SCNC: 141 MEQ/L (ref 135–144)
TROPONIN T SERPL-MCNC: <0.01 NG/ML (ref 0–0.01)
WBC # BLD AUTO: 12.2 K/UL (ref 4.8–10.8)

## 2023-06-07 PROCEDURE — 36415 COLL VENOUS BLD VENIPUNCTURE: CPT

## 2023-06-07 PROCEDURE — 93005 ELECTROCARDIOGRAM TRACING: CPT | Performed by: EMERGENCY MEDICINE

## 2023-06-07 PROCEDURE — 85025 COMPLETE CBC W/AUTO DIFF WBC: CPT

## 2023-06-07 PROCEDURE — 80053 COMPREHEN METABOLIC PANEL: CPT

## 2023-06-07 PROCEDURE — 71045 X-RAY EXAM CHEST 1 VIEW: CPT

## 2023-06-07 PROCEDURE — 85379 FIBRIN DEGRADATION QUANT: CPT

## 2023-06-07 PROCEDURE — 84484 ASSAY OF TROPONIN QUANT: CPT

## 2023-06-07 PROCEDURE — 6360000002 HC RX W HCPCS: Performed by: EMERGENCY MEDICINE

## 2023-06-07 PROCEDURE — 94640 AIRWAY INHALATION TREATMENT: CPT

## 2023-06-07 PROCEDURE — 6370000000 HC RX 637 (ALT 250 FOR IP): Performed by: EMERGENCY MEDICINE

## 2023-06-07 RX ORDER — LORAZEPAM 2 MG/ML
1 INJECTION INTRAMUSCULAR ONCE
Status: COMPLETED | OUTPATIENT
Start: 2023-06-07 | End: 2023-06-07

## 2023-06-07 RX ORDER — IPRATROPIUM BROMIDE AND ALBUTEROL SULFATE 2.5; .5 MG/3ML; MG/3ML
1 SOLUTION RESPIRATORY (INHALATION) CONTINUOUS PRN
Status: DISCONTINUED | OUTPATIENT
Start: 2023-06-07 | End: 2023-06-07 | Stop reason: HOSPADM

## 2023-06-07 RX ORDER — KETOROLAC TROMETHAMINE 30 MG/ML
30 INJECTION, SOLUTION INTRAMUSCULAR; INTRAVENOUS ONCE
Status: COMPLETED | OUTPATIENT
Start: 2023-06-07 | End: 2023-06-07

## 2023-06-07 RX ORDER — PREDNISONE 20 MG/1
40 TABLET ORAL DAILY
Qty: 10 TABLET | Refills: 0 | Status: SHIPPED | OUTPATIENT
Start: 2023-06-07 | End: 2023-06-12

## 2023-06-07 RX ORDER — METHYLPREDNISOLONE SODIUM SUCCINATE 125 MG/2ML
125 INJECTION, POWDER, LYOPHILIZED, FOR SOLUTION INTRAMUSCULAR; INTRAVENOUS ONCE
Status: COMPLETED | OUTPATIENT
Start: 2023-06-07 | End: 2023-06-07

## 2023-06-07 RX ADMIN — IPRATROPIUM BROMIDE AND ALBUTEROL SULFATE 1 DOSE: .5; 2.5 SOLUTION RESPIRATORY (INHALATION) at 20:10

## 2023-06-07 RX ADMIN — LORAZEPAM 1 MG: 2 INJECTION INTRAMUSCULAR; INTRAVENOUS at 20:03

## 2023-06-07 RX ADMIN — KETOROLAC TROMETHAMINE 30 MG: 30 INJECTION, SOLUTION INTRAMUSCULAR; INTRAVENOUS at 20:05

## 2023-06-07 RX ADMIN — METHYLPREDNISOLONE SODIUM SUCCINATE 125 MG: 125 INJECTION, POWDER, FOR SOLUTION INTRAMUSCULAR; INTRAVENOUS at 21:41

## 2023-06-07 ASSESSMENT — ENCOUNTER SYMPTOMS
SORE THROAT: 0
ABDOMINAL DISTENTION: 0
CHEST TIGHTNESS: 0
PHOTOPHOBIA: 0
WHEEZING: 0
EYE DISCHARGE: 0
COUGH: 0
SHORTNESS OF BREATH: 1
VOMITING: 0
ABDOMINAL PAIN: 0

## 2023-06-07 ASSESSMENT — PAIN SCALES - GENERAL: PAINLEVEL_OUTOF10: 7

## 2023-06-07 ASSESSMENT — PAIN DESCRIPTION - LOCATION: LOCATION: CHEST

## 2023-06-08 LAB
EKG ATRIAL RATE: 88 BPM
EKG P AXIS: 71 DEGREES
EKG P-R INTERVAL: 144 MS
EKG Q-T INTERVAL: 386 MS
EKG QRS DURATION: 80 MS
EKG QTC CALCULATION (BAZETT): 467 MS
EKG R AXIS: 38 DEGREES
EKG T AXIS: 45 DEGREES
EKG VENTRICULAR RATE: 88 BPM

## 2023-06-08 PROCEDURE — 93010 ELECTROCARDIOGRAM REPORT: CPT | Performed by: INTERNAL MEDICINE

## 2023-06-08 NOTE — ED PROVIDER NOTES
[facility] ED  eMERGENCY dEPARTMENT eNCOUnter      Pt Name: Richard Newsome  MRN: [id number]  [place] 1995  Date of evaluation: 6/7/2023  Provider: Bruce Burton MD    CHIEF COMPLAINT       Chief Complaint   Patient presents with    Chest Pain    Anxiety     Pt arrived via ems for c/o midsternal cp that radiates to the L shoulder. Pt states she was sitting on the couch when it happened. Reports sob, is 1oo% ra. Hx of anixiety, ran out of latuda. HISTORY OF PRESENT ILLNESS   (Location/Symptom, Timing/Onset,Context/Setting, Quality, Duration, Modifying Factors, Severity)  Note limiting factors. Richard Newsome is a 33 y.o. female who presents to the emergency department for evaluation of chest pain and shortness of breath. Symptoms started a few hours ago and she was not sure if it was an anxiety attack but seems have gotten progressively worse. She is wheezing now with no history of asthma. Patient denies leg pain or swelling or history of DVT but believes there is a family history of factor V deficiency with her mother. No associated fever. No cough. Mostly has shortness of breath with little chest discomfort upper chest.    HPI    NursingNotes were reviewed. REVIEW OF SYSTEMS    (2-9 systems for level 4, 10 or more for level 5)     Review of Systems   Constitutional:  Negative for chills and diaphoresis. HENT:  Negative for congestion, ear pain, mouth sores and sore throat. Eyes:  Negative for photophobia and discharge. Respiratory:  Positive for shortness of breath. Negative for cough, chest tightness and wheezing. Cardiovascular:  Positive for chest pain. Negative for palpitations. Gastrointestinal:  Negative for abdominal distention, abdominal pain and vomiting. Endocrine: Negative for cold intolerance. Genitourinary:  Negative for difficulty urinating. Musculoskeletal:  Negative for arthralgias. Skin:  Negative for pallor and rash.

## 2023-06-22 RX ORDER — SODIUM CHLORIDE 9 MG/ML
INJECTION, SOLUTION INTRAVENOUS PRN
Status: CANCELLED | OUTPATIENT
Start: 2023-06-22

## 2023-06-22 RX ORDER — SODIUM CHLORIDE 9 MG/ML
INJECTION, SOLUTION INTRAVENOUS CONTINUOUS
Status: CANCELLED | OUTPATIENT
Start: 2023-06-22

## 2023-06-22 RX ORDER — SODIUM CHLORIDE 0.9 % (FLUSH) 0.9 %
5-40 SYRINGE (ML) INJECTION EVERY 12 HOURS SCHEDULED
Status: CANCELLED | OUTPATIENT
Start: 2023-06-22

## 2023-06-23 ENCOUNTER — PREP FOR PROCEDURE (OUTPATIENT)
Dept: GASTROENTEROLOGY | Age: 28
End: 2023-06-23

## 2023-06-23 ENCOUNTER — ANESTHESIA EVENT (OUTPATIENT)
Dept: ENDOSCOPY | Age: 28
End: 2023-06-23
Payer: COMMERCIAL

## 2023-06-23 ENCOUNTER — HOSPITAL ENCOUNTER (OUTPATIENT)
Age: 28
Setting detail: OUTPATIENT SURGERY
Discharge: HOME | End: 2023-06-23
Attending: INTERNAL MEDICINE | Admitting: INTERNAL MEDICINE
Payer: COMMERCIAL

## 2023-06-23 ENCOUNTER — ANESTHESIA (OUTPATIENT)
Dept: ENDOSCOPY | Age: 28
End: 2023-06-23
Payer: COMMERCIAL

## 2023-06-23 VITALS
OXYGEN SATURATION: 98 % | BODY MASS INDEX: 48.82 KG/M2 | WEIGHT: 293 LBS | TEMPERATURE: 98.3 F | HEART RATE: 84 BPM | HEIGHT: 65 IN | RESPIRATION RATE: 18 BRPM | DIASTOLIC BLOOD PRESSURE: 66 MMHG | SYSTOLIC BLOOD PRESSURE: 116 MMHG

## 2023-06-23 DIAGNOSIS — R10.13 EPIGASTRIC PAIN: ICD-10-CM

## 2023-06-23 PROBLEM — K22.9 NODULE OF ESOPHAGUS: Status: ACTIVE | Noted: 2023-06-23

## 2023-06-23 LAB
HCG, URINE, POC: NEGATIVE
Lab: NORMAL
NEGATIVE QC PASS/FAIL: NORMAL
POSITIVE QC PASS/FAIL: NORMAL

## 2023-06-23 PROCEDURE — 88305 TISSUE EXAM BY PATHOLOGIST: CPT

## 2023-06-23 PROCEDURE — 2709999900 HC NON-CHARGEABLE SUPPLY: Performed by: INTERNAL MEDICINE

## 2023-06-23 PROCEDURE — 3609017100 HC EGD: Performed by: INTERNAL MEDICINE

## 2023-06-23 PROCEDURE — 2500000003 HC RX 250 WO HCPCS: Performed by: NURSE ANESTHETIST, CERTIFIED REGISTERED

## 2023-06-23 PROCEDURE — 43239 EGD BIOPSY SINGLE/MULTIPLE: CPT | Performed by: INTERNAL MEDICINE

## 2023-06-23 PROCEDURE — 3700000000 HC ANESTHESIA ATTENDED CARE: Performed by: INTERNAL MEDICINE

## 2023-06-23 PROCEDURE — 7100000010 HC PHASE II RECOVERY - FIRST 15 MIN: Performed by: INTERNAL MEDICINE

## 2023-06-23 PROCEDURE — 2580000003 HC RX 258

## 2023-06-23 PROCEDURE — 2580000003 HC RX 258: Performed by: INTERNAL MEDICINE

## 2023-06-23 PROCEDURE — 6370000000 HC RX 637 (ALT 250 FOR IP): Performed by: INTERNAL MEDICINE

## 2023-06-23 PROCEDURE — 7100000011 HC PHASE II RECOVERY - ADDTL 15 MIN: Performed by: INTERNAL MEDICINE

## 2023-06-23 PROCEDURE — 6360000002 HC RX W HCPCS: Performed by: NURSE ANESTHETIST, CERTIFIED REGISTERED

## 2023-06-23 PROCEDURE — 88342 IMHCHEM/IMCYTCHM 1ST ANTB: CPT

## 2023-06-23 RX ORDER — MAGNESIUM HYDROXIDE 1200 MG/15ML
LIQUID ORAL PRN
Status: DISCONTINUED | OUTPATIENT
Start: 2023-06-23 | End: 2023-06-23 | Stop reason: ALTCHOICE

## 2023-06-23 RX ORDER — PROPOFOL 10 MG/ML
INJECTION, EMULSION INTRAVENOUS PRN
Status: DISCONTINUED | OUTPATIENT
Start: 2023-06-23 | End: 2023-06-23 | Stop reason: SDUPTHER

## 2023-06-23 RX ORDER — PANTOPRAZOLE SODIUM 40 MG/1
40 TABLET, DELAYED RELEASE ORAL DAILY
Qty: 30 TABLET | Refills: 2 | Status: SHIPPED | OUTPATIENT
Start: 2023-06-23

## 2023-06-23 RX ORDER — SODIUM CHLORIDE 9 MG/ML
INJECTION, SOLUTION INTRAVENOUS CONTINUOUS
Status: DISCONTINUED | OUTPATIENT
Start: 2023-06-23 | End: 2023-06-23 | Stop reason: HOSPADM

## 2023-06-23 RX ORDER — SODIUM CHLORIDE 9 MG/ML
INJECTION, SOLUTION INTRAVENOUS
Status: COMPLETED
Start: 2023-06-23 | End: 2023-06-23

## 2023-06-23 RX ORDER — SODIUM CHLORIDE 0.9 % (FLUSH) 0.9 %
5-40 SYRINGE (ML) INJECTION EVERY 12 HOURS SCHEDULED
Status: DISCONTINUED | OUTPATIENT
Start: 2023-06-23 | End: 2023-06-23 | Stop reason: HOSPADM

## 2023-06-23 RX ORDER — SIMETHICONE 20 MG/.3ML
EMULSION ORAL PRN
Status: DISCONTINUED | OUTPATIENT
Start: 2023-06-23 | End: 2023-06-23 | Stop reason: ALTCHOICE

## 2023-06-23 RX ORDER — LIDOCAINE HYDROCHLORIDE 20 MG/ML
INJECTION, SOLUTION EPIDURAL; INFILTRATION; INTRACAUDAL; PERINEURAL PRN
Status: DISCONTINUED | OUTPATIENT
Start: 2023-06-23 | End: 2023-06-23 | Stop reason: SDUPTHER

## 2023-06-23 RX ORDER — SODIUM CHLORIDE 9 MG/ML
INJECTION, SOLUTION INTRAVENOUS PRN
Status: DISCONTINUED | OUTPATIENT
Start: 2023-06-23 | End: 2023-06-23 | Stop reason: HOSPADM

## 2023-06-23 RX ADMIN — SODIUM CHLORIDE: 9 INJECTION, SOLUTION INTRAVENOUS at 09:11

## 2023-06-23 RX ADMIN — PROPOFOL 100 MG: 10 INJECTION, EMULSION INTRAVENOUS at 09:51

## 2023-06-23 RX ADMIN — LIDOCAINE HYDROCHLORIDE 40 MG: 20 INJECTION, SOLUTION EPIDURAL; INFILTRATION; INTRACAUDAL; PERINEURAL at 09:51

## 2023-06-23 RX ADMIN — PROPOFOL 100 MG: 10 INJECTION, EMULSION INTRAVENOUS at 09:52

## 2023-06-23 ASSESSMENT — PAIN - FUNCTIONAL ASSESSMENT: PAIN_FUNCTIONAL_ASSESSMENT: 0-10

## 2023-06-23 ASSESSMENT — PAIN DESCRIPTION - FREQUENCY
FREQUENCY: CONTINUOUS
FREQUENCY: CONTINUOUS

## 2023-06-23 ASSESSMENT — PAIN SCALES - GENERAL
PAINLEVEL_OUTOF10: 6
PAINLEVEL_OUTOF10: 5
PAINLEVEL_OUTOF10: 6

## 2023-06-23 ASSESSMENT — PAIN DESCRIPTION - DESCRIPTORS
DESCRIPTORS: CRAMPING

## 2023-06-23 ASSESSMENT — PAIN DESCRIPTION - LOCATION
LOCATION: ABDOMEN
LOCATION: ABDOMEN

## 2023-06-23 NOTE — H&P
Patient Name: Viktor Ordonez  : 1995  MRN: [id number]  DATE: 23      ENDOSCOPY  History and Physical    Procedure:    [] Diagnostic Colonoscopy       [] Screening Colonoscopy  [x] EGD      [] ERCP      [] EUS       [] Other    [x] Previous office notes/History and Physical reviewed from the patients chart. Please see EMR for further details of HPI. I have examined the patient's status immediately prior to the procedure and:      Indications/HPI:    [x]Abdominal Pain   []Cancer- GI/Lung  []Fhx of colon CA/polyps  []History of Polyps   []Edwardss   []Melena  []Abnormal Imaging   []Dysphagia    []Persistent Pneumonia  []Anemia   []Food Impaction  []History of Polyps  []GI Bleed   []Pulmonary nodule/Mass  []Change in bowel habits  []Heartburn/Reflux  []Rectal Bleed (BRBPR)  []Chest Pain - Non Cardiac  []Heme (+) Stool  []Ulcers  []Constipation   []Hemoptysis   []Varices  []Diarrhea   []Hypoxemia  []Nausea/Vomiting   []Screening   []Crohns/Colitis  []Other:    Anesthesia:   [x] MAC [] Moderate Sedation   [] General   [] None     ROS: 12 pt Review of Symptoms was negative unless mentioned above    Medications:   Prior to Admission medications    Medication Sig Start Date End Date Taking?  Authorizing Provider   CPAP Machine MISC by Does not apply route Settings Start at 10.4 and fluctuates   Yes Historical Provider, MD   lurasidone (LATUDA) 40 MG TABS tablet TAKE 1 TABLET BY MOUTH IN THE EVENING TAKE WITH AT LEAST A 350 CALORIE MEAL  Patient not taking: Reported on 2023   Historical Provider, MD   famotidine (PEPCID) 20 MG tablet Take 1 tablet by mouth 2 times daily 21  VIPUL Baer - CNP       Allergies: No Known Allergies     History of allergic reaction to anesthesia:  No    Past Medical History:  Past Medical History:   Diagnosis Date    Mental disorder     depression and anxiety    Postpartum depression        Past Surgical History:  Past Surgical History: Patient is now seeing [facility]  Please remove provider  Thanks!

## 2023-06-23 NOTE — ANESTHESIA POSTPROCEDURE EVALUATION
Department of Anesthesiology  Postprocedure Note    Patient: Shellie Morejon  MRN: [id number]  YOB: 1995  Date of evaluation: 6/23/2023      Procedure Summary     Date: 06/23/23 Room / Location: [address] [place] [address]    Anesthesia Start: 0059 Anesthesia Stop: 5686    Procedure: EGD DIAGNOSTIC ONLY Diagnosis:       Epigastric pain      (Epigastric pain [R10.13])    Surgeons: Luca Noriega MD Responsible Provider: VIPUL Ma CRNA    Anesthesia Type: MAC ASA Status: 3          Anesthesia Type: No value filed.     Deondre Phase I: Deondre Score: 10    Deondre Phase II:        Anesthesia Post Evaluation    Patient location during evaluation: bedside  Patient participation: complete - patient participated  Nausea & Vomiting: no nausea and no vomiting  Complications: no  Cardiovascular status: blood pressure returned to baseline

## 2023-06-23 NOTE — ANESTHESIA PRE PROCEDURE
Department of Anesthesiology  Preprocedure Note       Name:  Von Clarke   Age:  33 y.o.  :  1995                                          MRN:  [id number]         Date:  2023      Surgeon: Maribeth Morales):  Diania Dandy, MD    Procedure: Procedure(s):  EGD DIAGNOSTIC ONLY    Medications prior to admission:   Prior to Admission medications    Medication Sig Start Date End Date Taking?  Authorizing Provider   CPAP Machine MISC by Does not apply route Settings Start at 10.4 and fluctuates   Yes Historical Provider, MD   lurasidone (LATUDA) 40 MG TABS tablet TAKE 1 TABLET BY MOUTH IN THE EVENING TAKE WITH AT LEAST A 350 CALORIE MEAL  Patient not taking: Reported on 2023   Historical Provider, MD   famotidine (PEPCID) 20 MG tablet Take 1 tablet by mouth 2 times daily 21  VIPUL Chapa - CNP       Current medications:    Current Facility-Administered Medications   Medication Dose Route Frequency Provider Last Rate Last Admin    0.9 % sodium chloride infusion   IntraVENous Continuous Diania Dandy, MD 75 mL/hr at 23 0911 New Bag at 23 0911    sodium chloride flush 0.9 % injection 5-40 mL  5-40 mL IntraVENous 2 times per day Diania Dandy, MD        0.9 % sodium chloride infusion   IntraVENous PRN Diania Dandy, MD           Allergies:  No Known Allergies    Problem List:    Patient Active Problem List   Diagnosis Code    ZAK (obstructive sleep apnea) G47.33    Epigastric pain R10.13    Heart burn R12    Generalized abdominal pain R10.84    Splenomegaly R16.1    Ectopic pregnancy of left ovary O00.202    Ectopic pregnancy without intrauterine pregnancy O00.90    Nausea and vomiting R11.2    Pelvic pain R10.2    Menometrorrhagia N92.1    Morbid obesity ([address] E66.01       Past Medical History:        Diagnosis Date    Mental disorder     depression and anxiety    Postpartum depression        Past Surgical History:        Procedure

## 2023-06-26 ENCOUNTER — TELEPHONE (OUTPATIENT)
Dept: GASTROENTEROLOGY | Age: 28
End: 2023-06-26

## 2023-07-05 ENCOUNTER — TELEPHONE (OUTPATIENT)
Dept: CARDIOLOGY CLINIC | Age: 28
End: 2023-07-05

## 2023-07-05 NOTE — TELEPHONE ENCOUNTER
Appointment canceled for Janes Sic ([id number])   Visit Type: NEW PATIENT   Date        Time      Length    Provider                  Department   7/6/2023    12:45 PM  15 mins. Dr. Johnathan Jauregui MD      [address] [place] [address]      Reason for Cancellation: Family emergency     Appointment Scheduled  (Newest Message First)  Janes Jin  Patient Appointment Cancel Request Pool 18 hours ago (12:59 PM)     MF  Appointment canceled for Cathanam Sic ([id number])  Visit Type: NEW PATIENT  Date        Time      Length    Provider                  Department  7/6/2023    12:45 PM  15 mins. Dr. Johnathan Jauregui MD      [address] [place] [address]     Reason for Cancellation: Family emergency       MD Janes Snow 2 days ago     DC  This is a reminder for your upcoming appointment. Location of Appointment: [address] [place] [address]  Provider: Johnathan Jauregui MD  Date: 7/06/23  Time: 12:45 PM     Please complete digital registration via the Druva        There are 2 questionnaires available for your appointment. [email or web address] your available questionnaires]      Please click [email or web address]] to view more details about your appointment. This Druva message has not been read.      Batch Job User Lennie  Lea weldon, [employer] S 3 weeks ago     [employer], Processor  [employer] Lea weldon 3 weeks ago     PM  Appointment Information:      Visit Type: New Patient Appointment          Date: 7/6/2023                  Dept: [facility]                  Provider: Jovany ROJAS                  Time: 12:45 PM                  Length: 15 min     Appt Status: Scheduled        Appt Instructions:      Please complete digital registration via the Druva

## 2023-07-06 ENCOUNTER — OFFICE VISIT (OUTPATIENT)
Dept: GASTROENTEROLOGY | Age: 28
End: 2023-07-06
Payer: COMMERCIAL

## 2023-07-06 VITALS
WEIGHT: 293 LBS | SYSTOLIC BLOOD PRESSURE: 118 MMHG | BODY MASS INDEX: 67.4 KG/M2 | OXYGEN SATURATION: 98 % | HEART RATE: 117 BPM | DIASTOLIC BLOOD PRESSURE: 86 MMHG

## 2023-07-06 DIAGNOSIS — R14.0 BLOATING SYMPTOM: Primary | ICD-10-CM

## 2023-07-06 DIAGNOSIS — K59.00 CONSTIPATION, UNSPECIFIED CONSTIPATION TYPE: ICD-10-CM

## 2023-07-06 DIAGNOSIS — K31.7 GASTRIC POLYP: ICD-10-CM

## 2023-07-06 PROCEDURE — G8417 CALC BMI ABV UP PARAM F/U: HCPCS | Performed by: NURSE PRACTITIONER

## 2023-07-06 PROCEDURE — 1036F TOBACCO NON-USER: CPT | Performed by: NURSE PRACTITIONER

## 2023-07-06 PROCEDURE — G8427 DOCREV CUR MEDS BY ELIG CLIN: HCPCS | Performed by: NURSE PRACTITIONER

## 2023-07-06 PROCEDURE — 99213 OFFICE O/P EST LOW 20 MIN: CPT | Performed by: NURSE PRACTITIONER

## 2023-07-06 RX ORDER — PANTOPRAZOLE SODIUM 40 MG/1
40 TABLET, DELAYED RELEASE ORAL DAILY
Qty: 30 TABLET | Refills: 2 | Status: SHIPPED | OUTPATIENT
Start: 2023-07-06

## 2023-07-06 NOTE — PROGRESS NOTES
Subjective:      Patient ID: Avila Perez is a 33 y.o. female who presents today for:  Chief Complaint   Patient presents with    Follow Up After Procedure       HPI  Patient came in as follow-up to abdominal bloating, had EGD which was notable for gastric polyp and gastritis, biopsies were negative for H. pylori, she is scheduled for an EUS with Dr. Isaiah Gale for polypectomy. Has persistent symptoms, denies constipation, had previous negative esophagram.  Was prescribed a PPI however did not start this. Patient is currently undergoing evaluation for hysterectomy, which is scheduled August 3. OV 5/2/23  Patient came in today for follow-up visit. She reports having abdominal bloating and gassiness and she point toward epigastric area. Patient mentioned that she she will have the bloating sensation even with water verbatim. Denies abdominal pain per se. Denies nausea or vomiting. Denies diarrhea or constipation. Though she reports may have loose stool depending on what she eats. Patient mentioned that she may have abdominal wall hernia. Of note patient had multiple imaging study with no acute process. Denies nausea medicine that she was post evaluated for nausea and vomiting. Patient mentioned that her symptoms and abdominal bloating gassiness has worsened over the last few weeks compared to previous. Patient came today for evaluation  Interval note  Patient came in as follow-up and further evaluation of N/V. Symptoms started approximately 1 month ago no association to meals, no trigger foods, no cannabis use, reports rare associated right upper quadrant pain. Reports that she is actively trying to get pregnant-She  feels her symptoms are similar to her pregnancies in the past, however most recent pregnancy test was negative. Patient was previously seen for similar symptoms in May, however was pregnant at that time, this was a tubal pregnancy.   In addition patient has complaints of pelvic pain,

## 2023-07-23 ENCOUNTER — ANESTHESIA EVENT (OUTPATIENT)
Dept: OPERATING ROOM | Age: 28
End: 2023-07-23
Payer: COMMERCIAL

## 2023-07-24 ENCOUNTER — TELEPHONE (OUTPATIENT)
Dept: OBGYN CLINIC | Age: 28
End: 2023-07-24

## 2023-07-24 NOTE — TELEPHONE ENCOUNTER
Patient called and has questions about her upcoming surgery on 8/3/2023. She would like to know about restrictions, what to expect for pain, how exactly the procedure is done and how long will she be down for recovery. Please call her back to go over all this with her.

## 2023-07-27 ENCOUNTER — HOSPITAL ENCOUNTER (OUTPATIENT)
Dept: PREADMISSION TESTING | Age: 28
Discharge: HOME | End: 2023-07-31
Payer: COMMERCIAL

## 2023-07-27 VITALS
TEMPERATURE: 98.5 F | WEIGHT: 293 LBS | HEIGHT: 65 IN | SYSTOLIC BLOOD PRESSURE: 114 MMHG | HEART RATE: 68 BPM | BODY MASS INDEX: 48.82 KG/M2 | RESPIRATION RATE: 18 BRPM | OXYGEN SATURATION: 100 % | DIASTOLIC BLOOD PRESSURE: 71 MMHG

## 2023-07-27 LAB
ABO + RH BLD: NORMAL
APTT PPP: 28.9 SEC (ref 24.4–36.8)
BLD GP AB SCN SERPL QL: NORMAL
INR PPP: 1
PROTHROMBIN TIME: 13.8 SEC (ref 12.3–14.9)

## 2023-07-27 PROCEDURE — 85610 PROTHROMBIN TIME: CPT

## 2023-07-27 PROCEDURE — 86901 BLOOD TYPING SEROLOGIC RH(D): CPT

## 2023-07-27 PROCEDURE — 86850 RBC ANTIBODY SCREEN: CPT

## 2023-07-27 PROCEDURE — 86900 BLOOD TYPING SEROLOGIC ABO: CPT

## 2023-07-27 PROCEDURE — 85730 THROMBOPLASTIN TIME PARTIAL: CPT

## 2023-07-27 RX ORDER — ERGOCALCIFEROL 1.25 MG/1
50000 CAPSULE ORAL WEEKLY
COMMUNITY
Start: 2023-06-07

## 2023-07-27 RX ORDER — LANOLIN ALCOHOL/MO/W.PET/CERES
1000 CREAM (GRAM) TOPICAL DAILY
COMMUNITY
Start: 2023-06-07

## 2023-07-31 ENCOUNTER — TELEPHONE (OUTPATIENT)
Dept: OBGYN CLINIC | Age: 28
End: 2023-07-31

## 2023-07-31 NOTE — TELEPHONE ENCOUNTER
Pt called in to cancel surgery 8/3/23. She rescheduled to 9-15-23 PAT 9-8-23. Pt is aware of new dates , and will mail new letter to pt.

## 2023-08-03 ENCOUNTER — ANESTHESIA (OUTPATIENT)
Dept: OPERATING ROOM | Age: 28
End: 2023-08-03
Payer: COMMERCIAL

## 2023-08-17 ENCOUNTER — HOSPITAL ENCOUNTER (OUTPATIENT)
Age: 28
Setting detail: OUTPATIENT SURGERY
Discharge: HOME | End: 2023-08-17
Attending: INTERNAL MEDICINE | Admitting: INTERNAL MEDICINE
Payer: COMMERCIAL

## 2023-08-17 ENCOUNTER — ANESTHESIA (OUTPATIENT)
Dept: ENDOSCOPY | Age: 28
End: 2023-08-17
Payer: COMMERCIAL

## 2023-08-17 ENCOUNTER — ANESTHESIA EVENT (OUTPATIENT)
Dept: ENDOSCOPY | Age: 28
End: 2023-08-17
Payer: COMMERCIAL

## 2023-08-17 VITALS
HEART RATE: 78 BPM | TEMPERATURE: 97.5 F | OXYGEN SATURATION: 97 % | RESPIRATION RATE: 18 BRPM | BODY MASS INDEX: 48.82 KG/M2 | WEIGHT: 293 LBS | DIASTOLIC BLOOD PRESSURE: 68 MMHG | HEIGHT: 65 IN | SYSTOLIC BLOOD PRESSURE: 109 MMHG

## 2023-08-17 DIAGNOSIS — K22.9 NODULE OF ESOPHAGUS: ICD-10-CM

## 2023-08-17 LAB
HCG, URINE, POC: NEGATIVE
Lab: NORMAL
NEGATIVE QC PASS/FAIL: NORMAL
POSITIVE QC PASS/FAIL: NORMAL

## 2023-08-17 PROCEDURE — 3609017100 HC EGD: Performed by: INTERNAL MEDICINE

## 2023-08-17 PROCEDURE — 7100000011 HC PHASE II RECOVERY - ADDTL 15 MIN: Performed by: INTERNAL MEDICINE

## 2023-08-17 PROCEDURE — 6370000000 HC RX 637 (ALT 250 FOR IP): Performed by: INTERNAL MEDICINE

## 2023-08-17 PROCEDURE — 2500000003 HC RX 250 WO HCPCS

## 2023-08-17 PROCEDURE — 43237 ENDOSCOPIC US EXAM ESOPH: CPT | Performed by: INTERNAL MEDICINE

## 2023-08-17 PROCEDURE — 6360000002 HC RX W HCPCS

## 2023-08-17 PROCEDURE — 2580000003 HC RX 258: Performed by: INTERNAL MEDICINE

## 2023-08-17 PROCEDURE — 3609018500 HC EGD US SCOPE W/ADJACENT STRUCTURES: Performed by: INTERNAL MEDICINE

## 2023-08-17 PROCEDURE — 3609013500 HC EGD REMOVAL TUMOR POLYP/OTHER LESION SNARE TECH: Performed by: INTERNAL MEDICINE

## 2023-08-17 PROCEDURE — 88305 TISSUE EXAM BY PATHOLOGIST: CPT

## 2023-08-17 PROCEDURE — 3700000000 HC ANESTHESIA ATTENDED CARE: Performed by: INTERNAL MEDICINE

## 2023-08-17 PROCEDURE — 2580000003 HC RX 258

## 2023-08-17 PROCEDURE — 43251 EGD REMOVE LESION SNARE: CPT | Performed by: INTERNAL MEDICINE

## 2023-08-17 PROCEDURE — 3700000001 HC ADD 15 MINUTES (ANESTHESIA): Performed by: INTERNAL MEDICINE

## 2023-08-17 PROCEDURE — C1753 CATH, INTRAVAS ULTRASOUND: HCPCS | Performed by: INTERNAL MEDICINE

## 2023-08-17 PROCEDURE — 2709999900 HC NON-CHARGEABLE SUPPLY: Performed by: INTERNAL MEDICINE

## 2023-08-17 PROCEDURE — 93005 ELECTROCARDIOGRAM TRACING: CPT | Performed by: INTERNAL MEDICINE

## 2023-08-17 PROCEDURE — 6360000002 HC RX W HCPCS: Performed by: INTERNAL MEDICINE

## 2023-08-17 PROCEDURE — 7100000010 HC PHASE II RECOVERY - FIRST 15 MIN: Performed by: INTERNAL MEDICINE

## 2023-08-17 RX ORDER — SODIUM CHLORIDE 9 MG/ML
INJECTION, SOLUTION INTRAVENOUS
Status: COMPLETED
Start: 2023-08-17 | End: 2023-08-17

## 2023-08-17 RX ORDER — SODIUM CHLORIDE 9 MG/ML
INJECTION, SOLUTION INTRAVENOUS PRN
Status: DISCONTINUED | OUTPATIENT
Start: 2023-08-17 | End: 2023-08-17 | Stop reason: HOSPADM

## 2023-08-17 RX ORDER — SIMETHICONE 20 MG/.3ML
40 EMULSION ORAL ONCE
Status: DISCONTINUED | OUTPATIENT
Start: 2023-08-17 | End: 2023-08-17 | Stop reason: HOSPADM

## 2023-08-17 RX ORDER — MAGNESIUM HYDROXIDE 1200 MG/15ML
LIQUID ORAL PRN
Status: DISCONTINUED | OUTPATIENT
Start: 2023-08-17 | End: 2023-08-17 | Stop reason: ALTCHOICE

## 2023-08-17 RX ORDER — SODIUM CHLORIDE 9 MG/ML
INJECTION, SOLUTION INTRAVENOUS PRN
Status: CANCELLED | OUTPATIENT
Start: 2023-08-17

## 2023-08-17 RX ORDER — ONDANSETRON 2 MG/ML
2 INJECTION INTRAMUSCULAR; INTRAVENOUS ONCE
Status: COMPLETED | OUTPATIENT
Start: 2023-08-17 | End: 2023-08-17

## 2023-08-17 RX ORDER — PROPOFOL 10 MG/ML
INJECTION, EMULSION INTRAVENOUS PRN
Status: DISCONTINUED | OUTPATIENT
Start: 2023-08-17 | End: 2023-08-17 | Stop reason: SDUPTHER

## 2023-08-17 RX ORDER — SODIUM CHLORIDE 9 MG/ML
INJECTION, SOLUTION INTRAVENOUS CONTINUOUS
Status: CANCELLED | OUTPATIENT
Start: 2023-08-17

## 2023-08-17 RX ORDER — LIDOCAINE HYDROCHLORIDE 20 MG/ML
INJECTION, SOLUTION INFILTRATION; PERINEURAL PRN
Status: DISCONTINUED | OUTPATIENT
Start: 2023-08-17 | End: 2023-08-17 | Stop reason: SDUPTHER

## 2023-08-17 RX ORDER — SIMETHICONE 20 MG/.3ML
EMULSION ORAL PRN
Status: DISCONTINUED | OUTPATIENT
Start: 2023-08-17 | End: 2023-08-17 | Stop reason: ALTCHOICE

## 2023-08-17 RX ORDER — LIDOCAINE HYDROCHLORIDE 20 MG/ML
15 SOLUTION OROPHARYNGEAL ONCE
Status: COMPLETED | OUTPATIENT
Start: 2023-08-17 | End: 2023-08-17

## 2023-08-17 RX ORDER — SODIUM CHLORIDE 0.9 % (FLUSH) 0.9 %
5-40 SYRINGE (ML) INJECTION EVERY 12 HOURS SCHEDULED
Status: DISCONTINUED | OUTPATIENT
Start: 2023-08-17 | End: 2023-08-17 | Stop reason: HOSPADM

## 2023-08-17 RX ORDER — SODIUM CHLORIDE 0.9 % (FLUSH) 0.9 %
5-40 SYRINGE (ML) INJECTION EVERY 12 HOURS SCHEDULED
Status: CANCELLED | OUTPATIENT
Start: 2023-08-17

## 2023-08-17 RX ORDER — SODIUM CHLORIDE 9 MG/ML
INJECTION, SOLUTION INTRAVENOUS CONTINUOUS
Status: DISCONTINUED | OUTPATIENT
Start: 2023-08-17 | End: 2023-08-17 | Stop reason: HOSPADM

## 2023-08-17 RX ADMIN — PROPOFOL 50 MG: 10 INJECTION, EMULSION INTRAVENOUS at 09:55

## 2023-08-17 RX ADMIN — ONDANSETRON 2 MG: 2 INJECTION INTRAMUSCULAR; INTRAVENOUS at 10:26

## 2023-08-17 RX ADMIN — SODIUM CHLORIDE: 9 INJECTION, SOLUTION INTRAVENOUS at 08:04

## 2023-08-17 RX ADMIN — Medication: at 10:39

## 2023-08-17 RX ADMIN — LIDOCAINE HYDROCHLORIDE 60 MG: 20 INJECTION, SOLUTION INFILTRATION; PERINEURAL at 09:37

## 2023-08-17 RX ADMIN — LIDOCAINE HYDROCHLORIDE 15 ML: 20 SOLUTION ORAL at 10:28

## 2023-08-17 RX ADMIN — PROPOFOL 100 MG: 10 INJECTION, EMULSION INTRAVENOUS at 09:41

## 2023-08-17 RX ADMIN — PROPOFOL 100 MG: 10 INJECTION, EMULSION INTRAVENOUS at 09:51

## 2023-08-17 RX ADMIN — PROPOFOL 50 MG: 10 INJECTION, EMULSION INTRAVENOUS at 09:39

## 2023-08-17 RX ADMIN — PROPOFOL 100 MG: 10 INJECTION, EMULSION INTRAVENOUS at 09:37

## 2023-08-17 RX ADMIN — SODIUM CHLORIDE: 9 INJECTION, SOLUTION INTRAVENOUS at 09:35

## 2023-08-17 RX ADMIN — PROPOFOL 50 MG: 10 INJECTION, EMULSION INTRAVENOUS at 09:47

## 2023-08-17 RX ADMIN — PROPOFOL 50 MG: 10 INJECTION, EMULSION INTRAVENOUS at 09:43

## 2023-08-17 RX ADMIN — PROPOFOL 50 MG: 10 INJECTION, EMULSION INTRAVENOUS at 09:53

## 2023-08-17 RX ADMIN — PROPOFOL 50 MG: 10 INJECTION, EMULSION INTRAVENOUS at 09:49

## 2023-08-17 RX ADMIN — PROPOFOL 100 MG: 10 INJECTION, EMULSION INTRAVENOUS at 09:45

## 2023-08-17 ASSESSMENT — ENCOUNTER SYMPTOMS: SHORTNESS OF BREATH: 1

## 2023-08-17 ASSESSMENT — PAIN - FUNCTIONAL ASSESSMENT
PAIN_FUNCTIONAL_ASSESSMENT: 0-10
PAIN_FUNCTIONAL_ASSESSMENT: 0-10

## 2023-08-17 NOTE — ANESTHESIA POSTPROCEDURE EVALUATION
Department of Anesthesiology  Postprocedure Note    Patient: Ras Sky  MRN: [id number]  YOB: 1995  Date of evaluation: 8/17/2023      Procedure Summary     Date: 08/17/23 Room / Location[facility] [address] [place] [address] [facility]    Anesthesia Start: 8516 Anesthesia Stop: 26    Procedures:       ENDOSCOPIC ULTRASOUND      EGD DIAGNOSTIC ONLY Diagnosis:       Nodule of esophagus      (Nodule of esophagus [K22.9])    Surgeons: Chris Nino MD Responsible Provider: VIPUL Mon CRNA    Anesthesia Type: MAC ASA Status: 4          Anesthesia Type: No value filed.     Deondre Phase I: Deondre Score: 10    Deondre Phase II:        Anesthesia Post Evaluation    Patient location during evaluation: bedside  Patient participation: complete - patient participated  Level of consciousness: awake and awake and alert  Airway patency: patent  Nausea & Vomiting: no nausea and no vomiting  Complications: no  Cardiovascular status: blood pressure returned to baseline and hemodynamically stable  Respiratory status: acceptable  Hydration status: euvolemic  Pain management: adequate

## 2023-08-18 ENCOUNTER — TELEPHONE (OUTPATIENT)
Dept: GASTROENTEROLOGY | Age: 28
End: 2023-08-18

## 2023-08-18 LAB
EKG ATRIAL RATE: 80 BPM
EKG P AXIS: 26 DEGREES
EKG P-R INTERVAL: 154 MS
EKG Q-T INTERVAL: 410 MS
EKG QRS DURATION: 84 MS
EKG QTC CALCULATION (BAZETT): 472 MS
EKG R AXIS: 23 DEGREES
EKG T AXIS: 29 DEGREES
EKG VENTRICULAR RATE: 80 BPM

## 2023-08-18 NOTE — TELEPHONE ENCOUNTER
Patient states that she had EUS w/ biopsies and is experiencing tenderness to touch on her throat, please advise

## 2023-08-18 NOTE — TELEPHONE ENCOUNTER
Likely some discomfort from the scope, this should get better over the next few days. Recommend patient using some over-the-counter lozenges to keep the throat moist and soothed.   Thanks  eTri Penn MD

## 2023-08-21 ENCOUNTER — TELEPHONE (OUTPATIENT)
Dept: GASTROENTEROLOGY | Age: 28
End: 2023-08-21

## 2023-09-08 ENCOUNTER — HOSPITAL ENCOUNTER (OUTPATIENT)
Dept: PREADMISSION TESTING | Age: 28
Discharge: HOME | End: 2023-09-12
Payer: COMMERCIAL

## 2023-09-08 VITALS
HEART RATE: 74 BPM | HEIGHT: 65 IN | TEMPERATURE: 98.2 F | BODY MASS INDEX: 48.82 KG/M2 | WEIGHT: 293 LBS | DIASTOLIC BLOOD PRESSURE: 80 MMHG | RESPIRATION RATE: 18 BRPM | OXYGEN SATURATION: 99 % | SYSTOLIC BLOOD PRESSURE: 125 MMHG

## 2023-09-08 LAB
ERYTHROCYTE [DISTWIDTH] IN BLOOD BY AUTOMATED COUNT: 15.8 % (ref 11.5–14.5)
HCT VFR BLD AUTO: 37.9 % (ref 37–47)
HGB BLD-MCNC: 12.2 G/DL (ref 12–16)
MCH RBC QN AUTO: 24.7 PG (ref 27–31.3)
MCHC RBC AUTO-ENTMCNC: 32.3 % (ref 33–37)
MCV RBC AUTO: 76.6 FL (ref 79.4–94.8)
PLATELET # BLD AUTO: 332 K/UL (ref 130–400)
RBC # BLD AUTO: 4.95 M/UL (ref 4.2–5.4)
WBC # BLD AUTO: 8.7 K/UL (ref 4.8–10.8)

## 2023-09-08 PROCEDURE — 85027 COMPLETE CBC AUTOMATED: CPT

## 2023-09-11 PROBLEM — R10.2 PELVIC PAIN IN FEMALE: Status: ACTIVE | Noted: 2023-05-30

## 2023-09-15 ENCOUNTER — HOSPITAL ENCOUNTER (OUTPATIENT)
Age: 28
Setting detail: OUTPATIENT SURGERY
Discharge: HOME | End: 2023-09-15
Attending: OBSTETRICS & GYNECOLOGY | Admitting: OBSTETRICS & GYNECOLOGY
Payer: COMMERCIAL

## 2023-09-15 VITALS
OXYGEN SATURATION: 98 % | BODY MASS INDEX: 48.82 KG/M2 | RESPIRATION RATE: 16 BRPM | TEMPERATURE: 98 F | HEART RATE: 90 BPM | SYSTOLIC BLOOD PRESSURE: 129 MMHG | WEIGHT: 293 LBS | DIASTOLIC BLOOD PRESSURE: 59 MMHG | HEIGHT: 65 IN

## 2023-09-15 DIAGNOSIS — N92.1 MENOMETRORRHAGIA: ICD-10-CM

## 2023-09-15 DIAGNOSIS — E66.01 MORBID OBESITY (HCC): ICD-10-CM

## 2023-09-15 LAB
AMPHET UR QL SCN: NORMAL
BARBITURATES UR QL SCN: NORMAL
BENZODIAZ UR QL SCN: NORMAL
CANNABINOIDS UR QL SCN: NORMAL
COCAINE UR QL SCN: NORMAL
DRUG SCREEN COMMENT UR-IMP: NORMAL
FENTANYL SCREEN, URINE: NORMAL
HCG, URINE, POC: NEGATIVE
Lab: NORMAL
METHADONE UR QL SCN: NORMAL
NEGATIVE QC PASS/FAIL: NORMAL
OPIATES UR QL SCN: NORMAL
OXYCODONE UR QL SCN: NORMAL
PCP UR QL SCN: NORMAL
POSITIVE QC PASS/FAIL: NORMAL
PROPOXYPH UR QL SCN: NORMAL

## 2023-09-15 PROCEDURE — 6360000002 HC RX W HCPCS: Performed by: ANESTHESIOLOGY

## 2023-09-15 PROCEDURE — 3600000004 HC SURGERY LEVEL 4 BASE: Performed by: OBSTETRICS & GYNECOLOGY

## 2023-09-15 PROCEDURE — 80307 DRUG TEST PRSMV CHEM ANLYZR: CPT

## 2023-09-15 PROCEDURE — 7100000001 HC PACU RECOVERY - ADDTL 15 MIN: Performed by: OBSTETRICS & GYNECOLOGY

## 2023-09-15 PROCEDURE — 64488 TAP BLOCK BI INJECTION: CPT | Performed by: ANESTHESIOLOGY

## 2023-09-15 PROCEDURE — 2580000003 HC RX 258: Performed by: OBSTETRICS & GYNECOLOGY

## 2023-09-15 PROCEDURE — 3700000001 HC ADD 15 MINUTES (ANESTHESIA): Performed by: OBSTETRICS & GYNECOLOGY

## 2023-09-15 PROCEDURE — 6370000000 HC RX 637 (ALT 250 FOR IP): Performed by: ANESTHESIOLOGY

## 2023-09-15 PROCEDURE — 7100000010 HC PHASE II RECOVERY - FIRST 15 MIN: Performed by: OBSTETRICS & GYNECOLOGY

## 2023-09-15 PROCEDURE — 6360000002 HC RX W HCPCS: Performed by: OBSTETRICS & GYNECOLOGY

## 2023-09-15 PROCEDURE — 2709999900 HC NON-CHARGEABLE SUPPLY: Performed by: OBSTETRICS & GYNECOLOGY

## 2023-09-15 PROCEDURE — 7100000000 HC PACU RECOVERY - FIRST 15 MIN: Performed by: OBSTETRICS & GYNECOLOGY

## 2023-09-15 PROCEDURE — 6360000002 HC RX W HCPCS

## 2023-09-15 PROCEDURE — 3600000014 HC SURGERY LEVEL 4 ADDTL 15MIN: Performed by: OBSTETRICS & GYNECOLOGY

## 2023-09-15 PROCEDURE — 3700000000 HC ANESTHESIA ATTENDED CARE: Performed by: OBSTETRICS & GYNECOLOGY

## 2023-09-15 PROCEDURE — 2500000003 HC RX 250 WO HCPCS

## 2023-09-15 PROCEDURE — 2720000010 HC SURG SUPPLY STERILE: Performed by: OBSTETRICS & GYNECOLOGY

## 2023-09-15 RX ORDER — LIDOCAINE HYDROCHLORIDE 10 MG/ML
INJECTION, SOLUTION EPIDURAL; INFILTRATION; INTRACAUDAL; PERINEURAL PRN
Status: DISCONTINUED | OUTPATIENT
Start: 2023-09-15 | End: 2023-09-15 | Stop reason: SDUPTHER

## 2023-09-15 RX ORDER — OXYCODONE HYDROCHLORIDE 5 MG/1
5 TABLET ORAL PRN
Status: COMPLETED | OUTPATIENT
Start: 2023-09-15 | End: 2023-09-15

## 2023-09-15 RX ORDER — SODIUM CHLORIDE 0.9 % (FLUSH) 0.9 %
5-40 SYRINGE (ML) INJECTION EVERY 12 HOURS SCHEDULED
Status: DISCONTINUED | OUTPATIENT
Start: 2023-09-15 | End: 2023-09-15 | Stop reason: HOSPADM

## 2023-09-15 RX ORDER — SODIUM CHLORIDE 9 MG/ML
INJECTION, SOLUTION INTRAVENOUS PRN
Status: DISCONTINUED | OUTPATIENT
Start: 2023-09-15 | End: 2023-09-15 | Stop reason: HOSPADM

## 2023-09-15 RX ORDER — DOXYCYCLINE HYCLATE 100 MG
100 TABLET ORAL 2 TIMES DAILY
Qty: 20 TABLET | Refills: 0 | Status: SHIPPED | OUTPATIENT
Start: 2023-09-15 | End: 2023-09-25

## 2023-09-15 RX ORDER — ACETAMINOPHEN 500 MG
1000 TABLET ORAL EVERY 8 HOURS PRN
Status: DISCONTINUED | OUTPATIENT
Start: 2023-09-15 | End: 2023-09-15 | Stop reason: HOSPADM

## 2023-09-15 RX ORDER — FENTANYL CITRATE 0.05 MG/ML
25 INJECTION, SOLUTION INTRAMUSCULAR; INTRAVENOUS EVERY 5 MIN PRN
Status: DISCONTINUED | OUTPATIENT
Start: 2023-09-15 | End: 2023-09-15 | Stop reason: HOSPADM

## 2023-09-15 RX ORDER — IPRATROPIUM BROMIDE AND ALBUTEROL SULFATE 2.5; .5 MG/3ML; MG/3ML
1 SOLUTION RESPIRATORY (INHALATION)
Status: DISCONTINUED | OUTPATIENT
Start: 2023-09-15 | End: 2023-09-15 | Stop reason: HOSPADM

## 2023-09-15 RX ORDER — ONDANSETRON 2 MG/ML
4 INJECTION INTRAMUSCULAR; INTRAVENOUS EVERY 6 HOURS PRN
Status: DISCONTINUED | OUTPATIENT
Start: 2023-09-15 | End: 2023-09-15 | Stop reason: HOSPADM

## 2023-09-15 RX ORDER — SODIUM CHLORIDE 0.9 % (FLUSH) 0.9 %
5-40 SYRINGE (ML) INJECTION PRN
Status: DISCONTINUED | OUTPATIENT
Start: 2023-09-15 | End: 2023-09-15 | Stop reason: HOSPADM

## 2023-09-15 RX ORDER — MAGNESIUM HYDROXIDE 1200 MG/15ML
LIQUID ORAL CONTINUOUS PRN
Status: COMPLETED | OUTPATIENT
Start: 2023-09-15 | End: 2023-09-15

## 2023-09-15 RX ORDER — SCOLOPAMINE TRANSDERMAL SYSTEM 1 MG/1
1 PATCH, EXTENDED RELEASE TRANSDERMAL ONCE
Status: COMPLETED | OUTPATIENT
Start: 2023-09-15 | End: 2023-09-15

## 2023-09-15 RX ORDER — ROCURONIUM BROMIDE 10 MG/ML
INJECTION, SOLUTION INTRAVENOUS PRN
Status: DISCONTINUED | OUTPATIENT
Start: 2023-09-15 | End: 2023-09-15 | Stop reason: SDUPTHER

## 2023-09-15 RX ORDER — OXYCODONE HYDROCHLORIDE 5 MG/1
10 CAPSULE ORAL EVERY 4 HOURS PRN
Status: DISCONTINUED | OUTPATIENT
Start: 2023-09-15 | End: 2023-09-15 | Stop reason: HOSPADM

## 2023-09-15 RX ORDER — HYDRALAZINE HYDROCHLORIDE 20 MG/ML
10 INJECTION INTRAMUSCULAR; INTRAVENOUS
Status: DISCONTINUED | OUTPATIENT
Start: 2023-09-15 | End: 2023-09-15 | Stop reason: HOSPADM

## 2023-09-15 RX ORDER — DEXTROSE MONOHYDRATE 100 MG/ML
INJECTION, SOLUTION INTRAVENOUS CONTINUOUS PRN
Status: DISCONTINUED | OUTPATIENT
Start: 2023-09-15 | End: 2023-09-15 | Stop reason: HOSPADM

## 2023-09-15 RX ORDER — KETOROLAC TROMETHAMINE 30 MG/ML
30 INJECTION, SOLUTION INTRAMUSCULAR; INTRAVENOUS EVERY 6 HOURS
Status: DISCONTINUED | OUTPATIENT
Start: 2023-09-15 | End: 2023-09-15 | Stop reason: HOSPADM

## 2023-09-15 RX ORDER — SUCCINYLCHOLINE/SOD CL,ISO/PF 100 MG/5ML
SYRINGE (ML) INTRAVENOUS PRN
Status: DISCONTINUED | OUTPATIENT
Start: 2023-09-15 | End: 2023-09-15 | Stop reason: SDUPTHER

## 2023-09-15 RX ORDER — FENTANYL CITRATE 50 UG/ML
INJECTION, SOLUTION INTRAMUSCULAR; INTRAVENOUS PRN
Status: DISCONTINUED | OUTPATIENT
Start: 2023-09-15 | End: 2023-09-15 | Stop reason: SDUPTHER

## 2023-09-15 RX ORDER — GLUCAGON 1 MG/ML
1 KIT INJECTION PRN
Status: DISCONTINUED | OUTPATIENT
Start: 2023-09-15 | End: 2023-09-15 | Stop reason: HOSPADM

## 2023-09-15 RX ORDER — PROPOFOL 10 MG/ML
INJECTION, EMULSION INTRAVENOUS PRN
Status: DISCONTINUED | OUTPATIENT
Start: 2023-09-15 | End: 2023-09-15 | Stop reason: SDUPTHER

## 2023-09-15 RX ORDER — IBUPROFEN 800 MG/1
800 TABLET ORAL EVERY 8 HOURS PRN
Qty: 60 TABLET | Refills: 0 | Status: SHIPPED | OUTPATIENT
Start: 2023-09-15

## 2023-09-15 RX ORDER — HYDROMORPHONE HYDROCHLORIDE 1 MG/ML
0.5 INJECTION, SOLUTION INTRAMUSCULAR; INTRAVENOUS; SUBCUTANEOUS
Status: DISCONTINUED | OUTPATIENT
Start: 2023-09-15 | End: 2023-09-15 | Stop reason: HOSPADM

## 2023-09-15 RX ORDER — OXYCODONE HYDROCHLORIDE 5 MG/1
5 CAPSULE ORAL EVERY 4 HOURS PRN
Status: DISCONTINUED | OUTPATIENT
Start: 2023-09-15 | End: 2023-09-15 | Stop reason: HOSPADM

## 2023-09-15 RX ORDER — ONDANSETRON 4 MG/1
4 TABLET, ORALLY DISINTEGRATING ORAL EVERY 8 HOURS PRN
Status: DISCONTINUED | OUTPATIENT
Start: 2023-09-15 | End: 2023-09-15 | Stop reason: HOSPADM

## 2023-09-15 RX ORDER — OXYCODONE HYDROCHLORIDE 5 MG/1
10 TABLET ORAL PRN
Status: COMPLETED | OUTPATIENT
Start: 2023-09-15 | End: 2023-09-15

## 2023-09-15 RX ORDER — HYDROMORPHONE HYDROCHLORIDE 1 MG/ML
0.25 INJECTION, SOLUTION INTRAMUSCULAR; INTRAVENOUS; SUBCUTANEOUS
Status: DISCONTINUED | OUTPATIENT
Start: 2023-09-15 | End: 2023-09-15 | Stop reason: HOSPADM

## 2023-09-15 RX ORDER — ONDANSETRON 2 MG/ML
4 INJECTION INTRAMUSCULAR; INTRAVENOUS
Status: DISCONTINUED | OUTPATIENT
Start: 2023-09-15 | End: 2023-09-15 | Stop reason: HOSPADM

## 2023-09-15 RX ORDER — METOCLOPRAMIDE HYDROCHLORIDE 5 MG/ML
10 INJECTION INTRAMUSCULAR; INTRAVENOUS
Status: DISCONTINUED | OUTPATIENT
Start: 2023-09-15 | End: 2023-09-15 | Stop reason: HOSPADM

## 2023-09-15 RX ORDER — DEXAMETHASONE SODIUM PHOSPHATE 10 MG/ML
INJECTION INTRAMUSCULAR; INTRAVENOUS PRN
Status: DISCONTINUED | OUTPATIENT
Start: 2023-09-15 | End: 2023-09-15 | Stop reason: SDUPTHER

## 2023-09-15 RX ORDER — MEPERIDINE HYDROCHLORIDE 25 MG/ML
12.5 INJECTION INTRAMUSCULAR; INTRAVENOUS; SUBCUTANEOUS EVERY 5 MIN PRN
Status: DISCONTINUED | OUTPATIENT
Start: 2023-09-15 | End: 2023-09-15 | Stop reason: HOSPADM

## 2023-09-15 RX ORDER — ONDANSETRON 2 MG/ML
INJECTION INTRAMUSCULAR; INTRAVENOUS PRN
Status: DISCONTINUED | OUTPATIENT
Start: 2023-09-15 | End: 2023-09-15 | Stop reason: SDUPTHER

## 2023-09-15 RX ORDER — LABETALOL HYDROCHLORIDE 5 MG/ML
10 INJECTION, SOLUTION INTRAVENOUS
Status: DISCONTINUED | OUTPATIENT
Start: 2023-09-15 | End: 2023-09-15 | Stop reason: HOSPADM

## 2023-09-15 RX ORDER — BUPIVACAINE HYDROCHLORIDE 2.5 MG/ML
INJECTION, SOLUTION EPIDURAL; INFILTRATION; INTRACAUDAL
Status: COMPLETED | OUTPATIENT
Start: 2023-09-15 | End: 2023-09-15

## 2023-09-15 RX ORDER — MIDAZOLAM HYDROCHLORIDE 1 MG/ML
INJECTION INTRAMUSCULAR; INTRAVENOUS PRN
Status: DISCONTINUED | OUTPATIENT
Start: 2023-09-15 | End: 2023-09-15 | Stop reason: SDUPTHER

## 2023-09-15 RX ADMIN — SCOPALAMINE 1 PATCH: 1 PATCH, EXTENDED RELEASE TRANSDERMAL at 14:25

## 2023-09-15 RX ADMIN — DEXAMETHASONE SODIUM PHOSPHATE 10 MG: 10 INJECTION INTRAMUSCULAR; INTRAVENOUS at 14:42

## 2023-09-15 RX ADMIN — Medication 100 MG: at 14:30

## 2023-09-15 RX ADMIN — FENTANYL CITRATE 50 MCG: 50 INJECTION, SOLUTION INTRAMUSCULAR; INTRAVENOUS at 14:30

## 2023-09-15 RX ADMIN — CEFOXITIN SODIUM 2000 MG: 2 POWDER, FOR SOLUTION INTRAVENOUS at 14:33

## 2023-09-15 RX ADMIN — FENTANYL CITRATE 25 MCG: 50 INJECTION INTRAMUSCULAR; INTRAVENOUS at 15:42

## 2023-09-15 RX ADMIN — ONDANSETRON 4 MG: 2 INJECTION INTRAMUSCULAR; INTRAVENOUS at 14:42

## 2023-09-15 RX ADMIN — FENTANYL CITRATE 25 MCG: 50 INJECTION INTRAMUSCULAR; INTRAVENOUS at 15:51

## 2023-09-15 RX ADMIN — BUPIVACAINE HYDROCHLORIDE 60 ML: 2.5 INJECTION, SOLUTION EPIDURAL; INFILTRATION; INTRACAUDAL; PERINEURAL at 14:34

## 2023-09-15 RX ADMIN — MIDAZOLAM HYDROCHLORIDE 2 MG: 1 INJECTION, SOLUTION INTRAMUSCULAR; INTRAVENOUS at 14:25

## 2023-09-15 RX ADMIN — SODIUM CHLORIDE: 9 INJECTION, SOLUTION INTRAVENOUS at 14:26

## 2023-09-15 RX ADMIN — LIDOCAINE HYDROCHLORIDE 40 MG: 10 INJECTION, SOLUTION EPIDURAL; INFILTRATION; INTRACAUDAL; PERINEURAL at 14:30

## 2023-09-15 RX ADMIN — PROPOFOL 300 MG: 10 INJECTION, EMULSION INTRAVENOUS at 14:30

## 2023-09-15 RX ADMIN — OXYCODONE 5 MG: 5 TABLET ORAL at 16:29

## 2023-09-15 RX ADMIN — ROCURONIUM BROMIDE 50 MG: 10 INJECTION, SOLUTION INTRAVENOUS at 14:33

## 2023-09-15 ASSESSMENT — PAIN DESCRIPTION - LOCATION: LOCATION: ABDOMEN

## 2023-09-15 ASSESSMENT — PAIN - FUNCTIONAL ASSESSMENT
PAIN_FUNCTIONAL_ASSESSMENT: ACTIVITIES ARE NOT PREVENTED
PAIN_FUNCTIONAL_ASSESSMENT: 0-10

## 2023-09-15 ASSESSMENT — PAIN SCALES - GENERAL
PAINLEVEL_OUTOF10: 9
PAINLEVEL_OUTOF10: 5
PAINLEVEL_OUTOF10: 10

## 2023-09-15 NOTE — PROGRESS NOTES
CLINICAL PHARMACY NOTE: MEDS TO BEDS    Total # of Prescriptions Filled: 2   The following medications were delivered to the patient:  Ibuprofen 800 mg tab  Doxycycline Hyclate 100 mg tab      Additional Documentation:

## 2023-09-15 NOTE — ANESTHESIA PROCEDURE NOTES
Peripheral Block    Patient location during procedure: pre-op  Reason for block: post-op pain management and at surgeon's request  Start time: 9/15/2023 2:34 PM  End time: 9/15/2023 2:36 PM  Staffing  Performed: anesthesiologist   Anesthesiologist: Mayte Valero MD  Performed by: Mayte Valero MD  Authorized by: Mayte Valero MD    Preanesthetic Checklist  Completed: patient identified, IV checked, site marked, risks and benefits discussed, surgical/procedural consents, equipment checked, pre-op evaluation, timeout performed, anesthesia consent given, oxygen available and monitors applied/VS acknowledged  Peripheral Block   Patient position: supine  Prep: ChloraPrep  Provider prep: mask and sterile gloves (Sterile probe cover)  Patient monitoring: cardiac monitor, continuous pulse ox, frequent blood pressure checks and IV access  Block type: TAP  Laterality: bilateral  Injection technique: single-shot  Guidance: nerve stimulator and ultrasound guided  Local infiltration: bupivacaine  Infiltration strength: 0.25 %  Local infiltration: bupivacaine  Dose: 60 mL    Needle   Needle type: combined needle/nerve stimulator   Needle gauge: 22 G  Needle localization: anatomical landmarks and ultrasound guidance  Needle length: 5 cm  Assessment   Injection assessment: negative aspiration for heme, no paresthesia on injection and local visualized surrounding nerve on ultrasound  Paresthesia pain: immediately resolved  Slow fractionated injection: yes  Hemodynamics: stable  Real-time US image taken/store: yes    Additional Notes  Ultrasound image printed and saved in patient chart.     Sterile probe cover used    Medications Administered  bupivacaine (MARCAINE) PF injection 0.25% - Perineural   60 mL - 9/15/2023 2:34:00 PM

## 2023-09-15 NOTE — ANESTHESIA POSTPROCEDURE EVALUATION
Department of Anesthesiology  Postprocedure Note    Patient: Bert Cano  MRN: [id number]  YOB: 1995  Date of evaluation: 9/15/2023      Procedure Summary     Date: 09/15/23 Room / Location: [address]    Anesthesia Start: 1426 Anesthesia Stop: 4961    Procedure: ATTEMPTED LAPAROSCOPIC BILATERAL SALPINGECTOMY WITH [address] (Abdomen/Perineum) Diagnosis:       Menometrorrhagia      Morbid obesity ([address])      Pelvic pain in female      (Menometrorrhagia [N92.1])      (Morbid obesity ([address]) [E66.01])      (Pelvic pain in female [R10.2])    Surgeons: Ravi Lucas MD Responsible Provider: Ken Dunn MD    Anesthesia Type: general ASA Status: 3          Anesthesia Type: No value filed.     Deondre Phase I: Deondre Score: 10    Deondre Phase II:        Anesthesia Post Evaluation    Patient location during evaluation: bedside  Patient participation: complete - patient participated  Level of consciousness: awake and awake and alert  Airway patency: patent  Nausea & Vomiting: no nausea and no vomiting  Complications: no  Cardiovascular status: blood pressure returned to baseline and hemodynamically stable  Respiratory status: acceptable  Hydration status: euvolemic  Pain management: adequate

## 2023-09-20 ENCOUNTER — OFFICE VISIT (OUTPATIENT)
Dept: OBGYN CLINIC | Age: 28
End: 2023-09-20
Payer: COMMERCIAL

## 2023-09-20 VITALS
WEIGHT: 293 LBS | HEIGHT: 65 IN | BODY MASS INDEX: 48.82 KG/M2 | SYSTOLIC BLOOD PRESSURE: 110 MMHG | HEART RATE: 84 BPM | DIASTOLIC BLOOD PRESSURE: 64 MMHG

## 2023-09-20 DIAGNOSIS — N93.9 ABNORMAL UTERINE BLEEDING (AUB): ICD-10-CM

## 2023-09-20 DIAGNOSIS — Z87.59 HX OF ECTOPIC PREGNANCY: ICD-10-CM

## 2023-09-20 PROCEDURE — 1036F TOBACCO NON-USER: CPT | Performed by: OBSTETRICS & GYNECOLOGY

## 2023-09-20 PROCEDURE — G8417 CALC BMI ABV UP PARAM F/U: HCPCS | Performed by: OBSTETRICS & GYNECOLOGY

## 2023-09-20 PROCEDURE — 99214 OFFICE O/P EST MOD 30 MIN: CPT | Performed by: OBSTETRICS & GYNECOLOGY

## 2023-09-20 PROCEDURE — G8427 DOCREV CUR MEDS BY ELIG CLIN: HCPCS | Performed by: OBSTETRICS & GYNECOLOGY

## 2023-09-20 RX ORDER — SODIUM CHLORIDE 9 MG/ML
INJECTION, SOLUTION INTRAVENOUS PRN
OUTPATIENT
Start: 2023-09-20

## 2023-09-20 RX ORDER — SODIUM CHLORIDE 0.9 % (FLUSH) 0.9 %
5-40 SYRINGE (ML) INJECTION EVERY 12 HOURS SCHEDULED
OUTPATIENT
Start: 2023-09-20

## 2023-09-20 RX ORDER — CLINDAMYCIN PHOSPHATE 20 MG/G
CREAM VAGINAL
Qty: 1 EACH | Refills: 0 | Status: SHIPPED | OUTPATIENT
Start: 2023-09-20

## 2023-09-20 RX ORDER — POLYETHYLENE GLYCOL 3350 17 G/17G
POWDER, FOR SOLUTION ORAL
Qty: 1020 G | Refills: 0 | Status: SHIPPED | OUTPATIENT
Start: 2023-09-20

## 2023-09-20 RX ORDER — SODIUM CHLORIDE, SODIUM LACTATE, POTASSIUM CHLORIDE, CALCIUM CHLORIDE 600; 310; 30; 20 MG/100ML; MG/100ML; MG/100ML; MG/100ML
INJECTION, SOLUTION INTRAVENOUS CONTINUOUS
OUTPATIENT
Start: 2023-09-20

## 2023-09-20 RX ORDER — SODIUM CHLORIDE 0.9 % (FLUSH) 0.9 %
5-40 SYRINGE (ML) INJECTION PRN
OUTPATIENT
Start: 2023-09-20

## 2023-09-20 NOTE — PROGRESS NOTES
codes     Answer:   Abnormal uterine bleeding (AUB) [[id number]]     Order Specific Question:   Diagnosis codes     Answer:   Hx of ectopic pregnancy [278565]     Order Specific Question:   Diagnosis codes     Answer: Morbid obesity ([address]) [278.01. ICD-9-CM]     Order Specific Question:   PAT Visit? Answer: Other (comment) [[id number]]     Order Specific Question:   PAT Visit - Other Comment     Answer:   IN-PERSON 10/17/2023 @ 8:00AM    Initiate PAT Protocol     Standing Status:   Future     Standing Expiration Date:   11/19/2023     Orders Placed This Encounter   Medications    polyethylene glycol (MIRALAX) 17 GM/SCOOP powder     Sig: Use ONE CAPEFUL at 10 am and then 2 pm on day prior to procedure     Dispense:  1020 g     Refill:  0    clindamycin (CLEOCIN) 2 % vaginal cream     Sig: USE one applicatorful vaginally nightly daily for 1 week prior to procedure     Dispense:  1 each     Refill:  0       Follow Up:  No follow-ups on file.         Johny Little MD

## 2023-09-22 NOTE — H&P
Result Date: 5/22/2023  EXAMINATION: PELVIC ULTRASOUND 5/22/2023 TECHNIQUE: Transabdominal and endovaginal duplex sonographic evaluation of the pelvis was performed. COMPARISON: CT abdomen and pelvis 04/17/2023. HISTORY: ORDERING SYSTEM PROVIDED HISTORY: Menometrorrhagia TECHNOLOGIST PROVIDED HISTORY: Reason for exam:->AUB What reading provider will be dictating this exam?->CRC FINDINGS: The examination was technically difficult due to patient body habitus. The uterus measures 8.3 x 3.0 x 3.9 cm in longitudinal, AP, and transverse dimensions. No uterine mass is identified. Endometrial stripe thickness measures 3 mm. No fluid is seen in the endometrial canal.  The right and left ovaries measure 2.5 x 1.4 x 2.2 cm and 2.5 x 1.9 x 1.9 cm, respectively. No solid or cystic adnexal mass is identified. Color Doppler and spectral waveform analysis reveal bilateral ovarian perfusion. No free fluid is noted in the pelvis. No evidence of endometrial pathology. No visible adnexal abnormality. No free fluid. US PELVIS COMPLETE     Result Date: 5/22/2023  EXAMINATION: PELVIC ULTRASOUND 5/22/2023 TECHNIQUE: Transabdominal and endovaginal duplex sonographic evaluation of the pelvis was performed. COMPARISON: CT abdomen and pelvis 04/17/2023. HISTORY: ORDERING SYSTEM PROVIDED HISTORY: Menometrorrhagia TECHNOLOGIST PROVIDED HISTORY: Reason for exam:->AUB What reading provider will be dictating this exam?->CRC FINDINGS: The examination was technically difficult due to patient body habitus. The uterus measures 8.3 x 3.0 x 3.9 cm in longitudinal, AP, and transverse dimensions. No uterine mass is identified. Endometrial stripe thickness measures 3 mm. No fluid is seen in the endometrial canal.  The right and left ovaries measure 2.5 x 1.4 x 2.2 cm and 2.5 x 1.9 x 1.9 cm, respectively. No solid or cystic adnexal mass is identified. Color Doppler and spectral waveform analysis reveal bilateral ovarian perfusion.   No tearful during the visit and refusing to discuss any other options, patient mentions that the bleeding and her symptoms are completely \" missing her life\" , patient is not sexually active due to her symptoms and with no partner at this time. Given the above patient opted for a NovaSure endometrial ablation with laparoscopic bilateral salpingectomy, and to be scheduled for the procedure. Counseling: The patient was counseled on all options both medical and surgical, conservative as well as definitive. She has elected to proceed with the procedure as stated above. The patient was counseled on the procedure. Risks and complications were reviewed in detail. The patients orders, labs, consents have been completed. The history and physical as well as all supporting surgical documentation will be forwarded to the pre-operative holding area. The patient is aware that this procedure may not alleviate her symptoms. That there may be a necessity for a second surgery and that there may be an incomplete removal of abnormal tissue. Discussed all risks and benefits of procedure in detail including risks of anesthesia, blood loss, need for transfusion, infection;  also potential for complication, injury, need for repair and/or removal of uterus, tubes, ovaries, bowel, bladder, ureters, blood vessels and nerves. Also discussed pre-operative and post-operative expectations. Patient verbalizes understanding.       Kev Aguirre MD

## 2023-09-27 NOTE — OP NOTE
Op Note       Pre-operative Diagnosis: menorrhagia , severe obesity     Post-operative Diagnosis: same     Operation:  attempted Novasure ablation, hysteroscopy    Surgeon: Get La M.D    Anesthesia: GETA    Findings: possible hypoplastic endometrial cavity , dimensions < 4cm x 4 cm . Estimated Blood Loss:  Minimal           Specimens: none                    Complications:  None; patient tolerated the procedure well. Disposition: PACU - hemodynamically stable. Procedure Details     Procedure Details   The patient was seen in the Holding Room. The risks, benefits, complications, treatment options, and expected outcomes were discussed with the patient. The patient concurred with the proposed plan, giving informed consent. The site of surgery properly noted/marked. The patient was taken to Operating Room,identified as name,  the procedure verified as Novasure ablation  . Valaria Daft A Time Out was held and the above information confirmed. After induction of anesthesia, the patient was draped and prepped in the usual sterile manner. Pt was placed in dorsal lithotomy position after anesthesia and draped and prepped in the usual sterile manner. A  catheter was inserted into her bladder. Two retractors were placed into the vagina. A single tooth tenaculum was used to grasp the anterior lip of the cervix. Valaria Daft Hysteroscopy was performed and we were able to access the uterine cavity , endometrium was noted to be normal , both ostia were visualized. The uterine length was then taken using the sound that comes with the novasure kit, and measured at 4 cm  . The cervical canal was further dilated. The hysteroscope was inserted into the vagina and the findings were normal endometrial cavity .   The Novasure device was then inserted into the uterus and seeded, upon attempting to measure the uterine width, the novasure device was deployed , but the width reading dial was registering at 3 to 3.5 cm , the device was

## 2023-10-17 ENCOUNTER — HOSPITAL ENCOUNTER (OUTPATIENT)
Dept: PREADMISSION TESTING | Age: 28
Discharge: HOME | End: 2023-10-21
Payer: COMMERCIAL

## 2023-10-17 VITALS
TEMPERATURE: 97 F | OXYGEN SATURATION: 98 % | DIASTOLIC BLOOD PRESSURE: 81 MMHG | SYSTOLIC BLOOD PRESSURE: 132 MMHG | HEIGHT: 65 IN | RESPIRATION RATE: 18 BRPM | HEART RATE: 74 BPM | WEIGHT: 293 LBS | BODY MASS INDEX: 48.82 KG/M2

## 2023-10-17 LAB
ABO + RH BLD: NORMAL
BLD GP AB SCN SERPL QL: NORMAL

## 2023-10-17 PROCEDURE — 86900 BLOOD TYPING SEROLOGIC ABO: CPT

## 2023-10-17 PROCEDURE — 86901 BLOOD TYPING SEROLOGIC RH(D): CPT

## 2023-10-17 PROCEDURE — 86850 RBC ANTIBODY SCREEN: CPT

## 2023-10-17 RX ORDER — PROPRANOLOL HYDROCHLORIDE 40 MG/1
TABLET ORAL
COMMUNITY
Start: 2023-10-05

## 2023-10-17 RX ORDER — FLUOXETINE HYDROCHLORIDE 20 MG/1
CAPSULE ORAL
COMMUNITY
Start: 2023-10-05

## 2023-10-19 ENCOUNTER — ANESTHESIA EVENT (OUTPATIENT)
Dept: OPERATING ROOM | Age: 28
End: 2023-10-19
Payer: COMMERCIAL

## 2023-10-20 ENCOUNTER — ANESTHESIA (OUTPATIENT)
Dept: OPERATING ROOM | Age: 28
End: 2023-10-20
Payer: COMMERCIAL

## 2023-10-20 ENCOUNTER — HOSPITAL ENCOUNTER (OUTPATIENT)
Age: 28
Setting detail: OUTPATIENT SURGERY
Discharge: HOME | End: 2023-10-20
Attending: OBSTETRICS & GYNECOLOGY | Admitting: OBSTETRICS & GYNECOLOGY
Payer: COMMERCIAL

## 2023-10-20 VITALS
BODY MASS INDEX: 48.82 KG/M2 | DIASTOLIC BLOOD PRESSURE: 82 MMHG | TEMPERATURE: 97.2 F | HEIGHT: 65 IN | OXYGEN SATURATION: 95 % | RESPIRATION RATE: 18 BRPM | HEART RATE: 64 BPM | WEIGHT: 293 LBS | SYSTOLIC BLOOD PRESSURE: 112 MMHG

## 2023-10-20 DIAGNOSIS — G89.18 POSTOPERATIVE PAIN: Primary | ICD-10-CM

## 2023-10-20 DIAGNOSIS — E66.01 MORBID OBESITY (HCC): ICD-10-CM

## 2023-10-20 DIAGNOSIS — N93.9 ABNORMAL UTERINE BLEEDING (AUB): ICD-10-CM

## 2023-10-20 DIAGNOSIS — Z87.59 HX OF ECTOPIC PREGNANCY: ICD-10-CM

## 2023-10-20 PROCEDURE — 6360000002 HC RX W HCPCS: Performed by: STUDENT IN AN ORGANIZED HEALTH CARE EDUCATION/TRAINING PROGRAM

## 2023-10-20 PROCEDURE — 3600000014 HC SURGERY LEVEL 4 ADDTL 15MIN: Performed by: OBSTETRICS & GYNECOLOGY

## 2023-10-20 PROCEDURE — 80307 DRUG TEST PRSMV CHEM ANLYZR: CPT

## 2023-10-20 PROCEDURE — 7100000010 HC PHASE II RECOVERY - FIRST 15 MIN: Performed by: OBSTETRICS & GYNECOLOGY

## 2023-10-20 PROCEDURE — 6360000002 HC RX W HCPCS: Performed by: OBSTETRICS & GYNECOLOGY

## 2023-10-20 PROCEDURE — 2580000003 HC RX 258: Performed by: OBSTETRICS & GYNECOLOGY

## 2023-10-20 PROCEDURE — 6370000000 HC RX 637 (ALT 250 FOR IP): Performed by: OBSTETRICS & GYNECOLOGY

## 2023-10-20 PROCEDURE — 3600000004 HC SURGERY LEVEL 4 BASE: Performed by: OBSTETRICS & GYNECOLOGY

## 2023-10-20 PROCEDURE — A4217 STERILE WATER/SALINE, 500 ML: HCPCS | Performed by: OBSTETRICS & GYNECOLOGY

## 2023-10-20 PROCEDURE — 88307 TISSUE EXAM BY PATHOLOGIST: CPT

## 2023-10-20 PROCEDURE — 3700000000 HC ANESTHESIA ATTENDED CARE: Performed by: OBSTETRICS & GYNECOLOGY

## 2023-10-20 PROCEDURE — 88309 TISSUE EXAM BY PATHOLOGIST: CPT

## 2023-10-20 PROCEDURE — 2709999900 HC NON-CHARGEABLE SUPPLY: Performed by: OBSTETRICS & GYNECOLOGY

## 2023-10-20 PROCEDURE — 58571 TLH W/T/O 250 G OR LESS: CPT | Performed by: OBSTETRICS & GYNECOLOGY

## 2023-10-20 PROCEDURE — 7100000011 HC PHASE II RECOVERY - ADDTL 15 MIN: Performed by: OBSTETRICS & GYNECOLOGY

## 2023-10-20 PROCEDURE — 64488 TAP BLOCK BI INJECTION: CPT | Performed by: STUDENT IN AN ORGANIZED HEALTH CARE EDUCATION/TRAINING PROGRAM

## 2023-10-20 PROCEDURE — 7100000000 HC PACU RECOVERY - FIRST 15 MIN: Performed by: OBSTETRICS & GYNECOLOGY

## 2023-10-20 PROCEDURE — 88302 TISSUE EXAM BY PATHOLOGIST: CPT

## 2023-10-20 PROCEDURE — 2720000010 HC SURG SUPPLY STERILE: Performed by: OBSTETRICS & GYNECOLOGY

## 2023-10-20 PROCEDURE — 2500000003 HC RX 250 WO HCPCS: Performed by: NURSE ANESTHETIST, CERTIFIED REGISTERED

## 2023-10-20 PROCEDURE — 3700000001 HC ADD 15 MINUTES (ANESTHESIA): Performed by: OBSTETRICS & GYNECOLOGY

## 2023-10-20 PROCEDURE — 6360000002 HC RX W HCPCS: Performed by: NURSE ANESTHETIST, CERTIFIED REGISTERED

## 2023-10-20 PROCEDURE — 6370000000 HC RX 637 (ALT 250 FOR IP)

## 2023-10-20 PROCEDURE — 7100000001 HC PACU RECOVERY - ADDTL 15 MIN: Performed by: OBSTETRICS & GYNECOLOGY

## 2023-10-20 RX ORDER — SODIUM CHLORIDE 0.9 % (FLUSH) 0.9 %
5-40 SYRINGE (ML) INJECTION EVERY 12 HOURS SCHEDULED
Status: DISCONTINUED | OUTPATIENT
Start: 2023-10-20 | End: 2023-10-20 | Stop reason: HOSPADM

## 2023-10-20 RX ORDER — KETOROLAC TROMETHAMINE 30 MG/ML
30 INJECTION, SOLUTION INTRAMUSCULAR; INTRAVENOUS EVERY 6 HOURS
Status: DISCONTINUED | OUTPATIENT
Start: 2023-10-20 | End: 2023-10-20 | Stop reason: HOSPADM

## 2023-10-20 RX ORDER — ONDANSETRON 2 MG/ML
INJECTION INTRAMUSCULAR; INTRAVENOUS PRN
Status: DISCONTINUED | OUTPATIENT
Start: 2023-10-20 | End: 2023-10-20 | Stop reason: SDUPTHER

## 2023-10-20 RX ORDER — ACETAMINOPHEN 500 MG
1000 TABLET ORAL EVERY 8 HOURS PRN
Status: DISCONTINUED | OUTPATIENT
Start: 2023-10-20 | End: 2023-10-20 | Stop reason: HOSPADM

## 2023-10-20 RX ORDER — PROPOFOL 10 MG/ML
INJECTION, EMULSION INTRAVENOUS PRN
Status: DISCONTINUED | OUTPATIENT
Start: 2023-10-20 | End: 2023-10-20 | Stop reason: SDUPTHER

## 2023-10-20 RX ORDER — ONDANSETRON 2 MG/ML
4 INJECTION INTRAMUSCULAR; INTRAVENOUS
Status: COMPLETED | OUTPATIENT
Start: 2023-10-20 | End: 2023-10-20

## 2023-10-20 RX ORDER — ONDANSETRON 4 MG/1
4 TABLET, ORALLY DISINTEGRATING ORAL EVERY 8 HOURS PRN
Status: DISCONTINUED | OUTPATIENT
Start: 2023-10-20 | End: 2023-10-20 | Stop reason: HOSPADM

## 2023-10-20 RX ORDER — ONDANSETRON 4 MG/1
4 TABLET, FILM COATED ORAL EVERY 6 HOURS PRN
Qty: 30 TABLET | Refills: 1 | Status: SHIPPED | OUTPATIENT
Start: 2023-10-20

## 2023-10-20 RX ORDER — SIMETHICONE 80 MG
80 TABLET,CHEWABLE ORAL 4 TIMES DAILY PRN
Qty: 180 TABLET | Refills: 1 | Status: SHIPPED | OUTPATIENT
Start: 2023-10-20

## 2023-10-20 RX ORDER — MAGNESIUM HYDROXIDE 1200 MG/15ML
LIQUID ORAL CONTINUOUS PRN
Status: DISCONTINUED | OUTPATIENT
Start: 2023-10-20 | End: 2023-10-20 | Stop reason: HOSPADM

## 2023-10-20 RX ORDER — SODIUM CHLORIDE 9 MG/ML
INJECTION, SOLUTION INTRAVENOUS PRN
Status: DISCONTINUED | OUTPATIENT
Start: 2023-10-20 | End: 2023-10-20 | Stop reason: HOSPADM

## 2023-10-20 RX ORDER — FENTANYL CITRATE 0.05 MG/ML
50 INJECTION, SOLUTION INTRAMUSCULAR; INTRAVENOUS EVERY 10 MIN PRN
Status: DISCONTINUED | OUTPATIENT
Start: 2023-10-20 | End: 2023-10-20 | Stop reason: HOSPADM

## 2023-10-20 RX ORDER — SODIUM CHLORIDE 0.9 % (FLUSH) 0.9 %
5-40 SYRINGE (ML) INJECTION PRN
Status: DISCONTINUED | OUTPATIENT
Start: 2023-10-20 | End: 2023-10-20 | Stop reason: HOSPADM

## 2023-10-20 RX ORDER — FENTANYL CITRATE 50 UG/ML
INJECTION, SOLUTION INTRAMUSCULAR; INTRAVENOUS PRN
Status: DISCONTINUED | OUTPATIENT
Start: 2023-10-20 | End: 2023-10-20 | Stop reason: SDUPTHER

## 2023-10-20 RX ORDER — IBUPROFEN 800 MG/1
800 TABLET ORAL EVERY 8 HOURS PRN
Qty: 60 TABLET | Refills: 0 | Status: SHIPPED | OUTPATIENT
Start: 2023-10-20

## 2023-10-20 RX ORDER — ROCURONIUM BROMIDE 10 MG/ML
INJECTION, SOLUTION INTRAVENOUS PRN
Status: DISCONTINUED | OUTPATIENT
Start: 2023-10-20 | End: 2023-10-20 | Stop reason: SDUPTHER

## 2023-10-20 RX ORDER — OXYCODONE HYDROCHLORIDE 5 MG/1
10 TABLET ORAL EVERY 4 HOURS PRN
Status: DISCONTINUED | OUTPATIENT
Start: 2023-10-20 | End: 2023-10-20 | Stop reason: HOSPADM

## 2023-10-20 RX ORDER — DEXAMETHASONE SODIUM PHOSPHATE 10 MG/ML
INJECTION INTRAMUSCULAR; INTRAVENOUS PRN
Status: DISCONTINUED | OUTPATIENT
Start: 2023-10-20 | End: 2023-10-20 | Stop reason: SDUPTHER

## 2023-10-20 RX ORDER — MEPERIDINE HYDROCHLORIDE 25 MG/ML
12.5 INJECTION INTRAMUSCULAR; INTRAVENOUS; SUBCUTANEOUS
Status: DISCONTINUED | OUTPATIENT
Start: 2023-10-20 | End: 2023-10-20 | Stop reason: HOSPADM

## 2023-10-20 RX ORDER — METOCLOPRAMIDE HYDROCHLORIDE 5 MG/ML
10 INJECTION INTRAMUSCULAR; INTRAVENOUS
Status: COMPLETED | OUTPATIENT
Start: 2023-10-20 | End: 2023-10-20

## 2023-10-20 RX ORDER — LIDOCAINE HYDROCHLORIDE 20 MG/ML
INJECTION, SOLUTION INTRAVENOUS PRN
Status: DISCONTINUED | OUTPATIENT
Start: 2023-10-20 | End: 2023-10-20 | Stop reason: SDUPTHER

## 2023-10-20 RX ORDER — ROPIVACAINE HYDROCHLORIDE 5 MG/ML
INJECTION, SOLUTION EPIDURAL; INFILTRATION; PERINEURAL
Status: COMPLETED | OUTPATIENT
Start: 2023-10-20 | End: 2023-10-20

## 2023-10-20 RX ORDER — ONDANSETRON 2 MG/ML
4 INJECTION INTRAMUSCULAR; INTRAVENOUS EVERY 6 HOURS PRN
Status: DISCONTINUED | OUTPATIENT
Start: 2023-10-20 | End: 2023-10-20 | Stop reason: HOSPADM

## 2023-10-20 RX ORDER — HYDROMORPHONE HYDROCHLORIDE 1 MG/ML
1 INJECTION, SOLUTION INTRAMUSCULAR; INTRAVENOUS; SUBCUTANEOUS
Status: DISCONTINUED | OUTPATIENT
Start: 2023-10-20 | End: 2023-10-20 | Stop reason: HOSPADM

## 2023-10-20 RX ORDER — ACETAMINOPHEN 500 MG
1000 TABLET ORAL EVERY 6 HOURS PRN
Qty: 60 TABLET | Refills: 0 | Status: SHIPPED | OUTPATIENT
Start: 2023-10-20

## 2023-10-20 RX ORDER — OXYCODONE HYDROCHLORIDE AND ACETAMINOPHEN 5; 325 MG/1; MG/1
2 TABLET ORAL NIGHTLY PRN
Qty: 12 TABLET | Refills: 0 | Status: SHIPPED | OUTPATIENT
Start: 2023-10-20 | End: 2023-10-25

## 2023-10-20 RX ORDER — OXYCODONE HYDROCHLORIDE 5 MG/1
15 TABLET ORAL EVERY 4 HOURS PRN
Status: DISCONTINUED | OUTPATIENT
Start: 2023-10-20 | End: 2023-10-20 | Stop reason: HOSPADM

## 2023-10-20 RX ORDER — SODIUM CHLORIDE, SODIUM LACTATE, POTASSIUM CHLORIDE, CALCIUM CHLORIDE 600; 310; 30; 20 MG/100ML; MG/100ML; MG/100ML; MG/100ML
INJECTION, SOLUTION INTRAVENOUS CONTINUOUS
Status: DISCONTINUED | OUTPATIENT
Start: 2023-10-20 | End: 2023-10-20 | Stop reason: HOSPADM

## 2023-10-20 RX ORDER — OXYCODONE HYDROCHLORIDE 5 MG/1
5 TABLET ORAL
Status: DISCONTINUED | OUTPATIENT
Start: 2023-10-20 | End: 2023-10-20 | Stop reason: HOSPADM

## 2023-10-20 RX ORDER — DIPHENHYDRAMINE HYDROCHLORIDE 50 MG/ML
12.5 INJECTION INTRAMUSCULAR; INTRAVENOUS
Status: DISCONTINUED | OUTPATIENT
Start: 2023-10-20 | End: 2023-10-20 | Stop reason: HOSPADM

## 2023-10-20 RX ORDER — DIPHENHYDRAMINE HYDROCHLORIDE 50 MG/ML
INJECTION INTRAMUSCULAR; INTRAVENOUS PRN
Status: DISCONTINUED | OUTPATIENT
Start: 2023-10-20 | End: 2023-10-20 | Stop reason: SDUPTHER

## 2023-10-20 RX ORDER — DOCUSATE SODIUM 100 MG/1
100 CAPSULE, LIQUID FILLED ORAL 2 TIMES DAILY PRN
Qty: 60 CAPSULE | Refills: 2 | Status: SHIPPED | OUTPATIENT
Start: 2023-10-20

## 2023-10-20 RX ADMIN — SUGAMMADEX 400 MG: 100 INJECTION, SOLUTION INTRAVENOUS at 09:10

## 2023-10-20 RX ADMIN — FENTANYL CITRATE 50 MCG: 50 INJECTION INTRAMUSCULAR; INTRAVENOUS at 09:50

## 2023-10-20 RX ADMIN — OXYCODONE HYDROCHLORIDE 10 MG: 5 TABLET ORAL at 10:34

## 2023-10-20 RX ADMIN — DEXAMETHASONE SODIUM PHOSPHATE 10 MG: 10 INJECTION INTRAMUSCULAR; INTRAVENOUS at 08:12

## 2023-10-20 RX ADMIN — SODIUM CHLORIDE, POTASSIUM CHLORIDE, SODIUM LACTATE AND CALCIUM CHLORIDE: 600; 310; 30; 20 INJECTION, SOLUTION INTRAVENOUS at 06:43

## 2023-10-20 RX ADMIN — DIPHENHYDRAMINE HYDROCHLORIDE 12.5 MG: 50 INJECTION INTRAMUSCULAR; INTRAVENOUS at 08:11

## 2023-10-20 RX ADMIN — ONDANSETRON 4 MG: 2 INJECTION INTRAMUSCULAR; INTRAVENOUS at 09:47

## 2023-10-20 RX ADMIN — PROPOFOL 100 MG: 10 INJECTION, EMULSION INTRAVENOUS at 09:10

## 2023-10-20 RX ADMIN — CEFOXITIN SODIUM 2000 MG: 2 POWDER, FOR SOLUTION INTRAVENOUS at 07:45

## 2023-10-20 RX ADMIN — METOCLOPRAMIDE HYDROCHLORIDE 10 MG: 5 INJECTION INTRAMUSCULAR; INTRAVENOUS at 09:54

## 2023-10-20 RX ADMIN — ONDANSETRON 4 MG: 2 INJECTION INTRAMUSCULAR; INTRAVENOUS at 08:11

## 2023-10-20 RX ADMIN — FENTANYL CITRATE 100 MCG: 50 INJECTION, SOLUTION INTRAMUSCULAR; INTRAVENOUS at 07:38

## 2023-10-20 RX ADMIN — PROPOFOL 300 MG: 10 INJECTION, EMULSION INTRAVENOUS at 07:38

## 2023-10-20 RX ADMIN — ROPIVACAINE HYDROCHLORIDE 60 ML: 5 INJECTION, SOLUTION EPIDURAL; INFILTRATION; PERINEURAL at 07:47

## 2023-10-20 RX ADMIN — ROCURONIUM BROMIDE 100 MG: 10 INJECTION, SOLUTION INTRAVENOUS at 07:38

## 2023-10-20 RX ADMIN — KETOROLAC TROMETHAMINE 30 MG: 30 INJECTION, SOLUTION INTRAMUSCULAR; INTRAVENOUS at 09:58

## 2023-10-20 RX ADMIN — LIDOCAINE HYDROCHLORIDE 100 MG: 20 INJECTION, SOLUTION INTRAVENOUS at 07:38

## 2023-10-20 ASSESSMENT — PAIN SCALES - GENERAL
PAINLEVEL_OUTOF10: 6
PAINLEVEL_OUTOF10: 8
PAINLEVEL_OUTOF10: 6
PAINLEVEL_OUTOF10: 8
PAINLEVEL_OUTOF10: 0

## 2023-10-20 ASSESSMENT — PAIN DESCRIPTION - ORIENTATION
ORIENTATION: ANTERIOR

## 2023-10-20 ASSESSMENT — PAIN DESCRIPTION - FREQUENCY: FREQUENCY: CONTINUOUS

## 2023-10-20 ASSESSMENT — PAIN DESCRIPTION - DESCRIPTORS
DESCRIPTORS: CRAMPING

## 2023-10-20 ASSESSMENT — PAIN DESCRIPTION - ONSET: ONSET: ON-GOING

## 2023-10-20 ASSESSMENT — PAIN - FUNCTIONAL ASSESSMENT
PAIN_FUNCTIONAL_ASSESSMENT: PREVENTS OR INTERFERES SOME ACTIVE ACTIVITIES AND ADLS
PAIN_FUNCTIONAL_ASSESSMENT: 0-10

## 2023-10-20 ASSESSMENT — PAIN DESCRIPTION - LOCATION
LOCATION: INCISION

## 2023-10-20 ASSESSMENT — PAIN DESCRIPTION - PAIN TYPE: TYPE: SURGICAL PAIN

## 2023-10-20 NOTE — OP NOTE
which required at least 20 mins to achieve . Also removal of the large 7 cm ovarian cyst and ovary required an additional step as described below which added to our usual operative time and difficulty completing the case , please see pics in epic as well. Attention was then towards the left infundibulo pelvic ligament , coagulated multiple times and transected using the harmonic ace all the way through the braod ligament. The ovary and mass was kept in the abdomen to be removed vaginally with the uterus as described below. Attention was then towards the left tube , which was excised using the harmonic ace device an removed. After the left utero-ovarian ligament , then the left round ligament was coagulated and cut, followed by the anterior and posterior leaf of the broad ligament which was coagulated and cut multiple times all the way down to the uterine vessels on the left side. The anterior leaf of the broad ligament was dissected all the way anteriorly over the lower uterine segment on the left side through the bladder flap which was also dissected from that side as well in the same dissection plain. The posterior leaf of the broad ligament was also dissected posteriorly arround the edge of the V-care cup. By completing the dissection of the broad ligament, the uterine vessels were skeletonized, i used the bipolar coagulator to grasp the vessels which then were coagulated multiple times, then the harmonic ace was used and the vessels were transected completely, hemostasis was intact. Same steps were carried out on the right side all the way down to the uterine vessels which were coagulated and cut in same fashion. The bladder flap dissection was then completed from the right side, and the bladder was then pushed down in the pelvis completely freeing the anterior lower uterine segment and anterior vaginal cuff border. The v-care cup edge was then seen clearly arround the vaginal cuff.  Uterus was noted to have blanched at that time. I then used the monopolar hook to cut through the vaginal cuff surrounding the cervix and uterus creating a colpotomy, arround the edge of the v-care cup used as guidance , pushing up on the v-care cup during the procedure to prevent any ureteral injury. After the uterus and cervix were completely freed from surrounding vaginal tissue, the uterus was exteriorized vaginally. A large endocatch bag was introduced vaginally , the cyst was noted to be bigger than the pouch , we had to drain some of the fluid in the cyst to be able to place the cyst in the bag , which was retracted vaginally with the cyst .     The vaginal cuff was then closed using 0-Stratifix locking suture in a continous fashion. Care was taken to incorporate distal portion of uterosacral ligaments with the cuff closure to prevent future prolapse. Irrigation was then carried out and pedicles rechecked and found to be hemostatic. The 12mm facial incision was approximated using the endo loop closure device with 0-vicryl suture utilized. At that point gas was emptied , All  the instruments were removed from the abdomen. The counts were correct . The skin incisions were closed with a subcuticular fashion and Dermabond. She was sent to the recovery room in good condition. Instrument, sponge, and needle counts were correct prior to  closure and at the conclusion of the case.      Nicole Stevenson M.D., F.A.C.O.G         Electronically signed by Irineo Franco MD on 10/20/2023 at 9:27 AM

## 2023-10-20 NOTE — PROGRESS NOTES
Patient discharged via wheelchair to mother. All personal belongings, meds and discharge instructions taken with patient.

## 2023-10-20 NOTE — PROGRESS NOTES
CLINICAL PHARMACY NOTE: MEDS TO BEDS    Total # of Prescriptions Filled: 7   The following medications were delivered to the patient:  Simethicone 60mg chew tab  Ondansetron 4mg tab  Ibuprofen 800mg tab  Milk of magnesia 1200mg/15ml susp  Acetaminophen 500mg tab  Docusate sod.  100mg cap  Oxycodone/APAP 5-325mg tab    Additional Documentation:

## 2023-10-20 NOTE — ANESTHESIA PROCEDURE NOTES
Peripheral Block    Patient location during procedure: OR  Reason for block: post-op pain management and at surgeon's request  Start time: 10/20/2023 7:47 AM  End time: 10/20/2023 8:54 AM  Staffing  Performed: anesthesiologist   Anesthesiologist: Dieudonne Lazar DO  Performed by: Dieudonne Lazar DO  Authorized by: Dieudonne Lazar DO    Preanesthetic Checklist  Completed: patient identified, IV checked, site marked, risks and benefits discussed, surgical/procedural consents, equipment checked, pre-op evaluation, timeout performed, anesthesia consent given, oxygen available and monitors applied/VS acknowledged  Peripheral Block   Patient position: supine  Prep: ChloraPrep  Provider prep: mask and sterile gloves  Patient monitoring: cardiac monitor, continuous pulse ox, frequent blood pressure checks and IV access  Block type: TAP  Laterality: bilateral  Injection technique: single-shot  Guidance: ultrasound guided  Local infiltration: ropivacaine  Infiltration strength: 0.5 %  Local infiltration: ropivacaine  Dose: 60 mL    Needle   Needle type: combined needle/nerve stimulator   Needle gauge: 22 G  Needle localization: anatomical landmarks and ultrasound guidance  Needle length: 10 cm  Assessment   Injection assessment: negative aspiration for heme, no paresthesia on injection and local visualized surrounding nerve on ultrasound  Paresthesia pain: immediately resolved  Slow fractionated injection: yes  Hemodynamics: stable  Real-time US image taken/store: yes    Additional Notes  Ultrasound image printed and saved in patient chart.     Sterile probe cover used    Ropivacaine 0.5% 60 ml + saline 40 ml    50 ml bilateral    Medications Administered  ropivacaine (NAROPIN) injection 0.5% - Perineural   60 mL - 10/20/2023 7:47:00 AM

## 2023-10-20 NOTE — ANESTHESIA POSTPROCEDURE EVALUATION
Department of Anesthesiology  Postprocedure Note    Patient: Basil Smith  MRN: [id number]  YOB: 1995  Date of evaluation: 10/20/2023      Procedure Summary     Date: 10/20/23 Room / Location: [address]    Anesthesia Start: 0730 Anesthesia Stop: 0932    Procedure: TOTAL LAPAROSCOPIC HYSTERECTOMY, RIGHT SALPINGECTOMY, LEFT OOPHERECTOMY Diagnosis:       Abnormal uterine bleeding (AUB)      Hx of ectopic pregnancy      Morbid obesity ([address])      (Abnormal uterine bleeding (AUB) [N93.9])      (Hx of ectopic pregnancy [Z87.59])      (Morbid obesity ([address]) [E66.01])    Surgeons: Sobia Scott MD Responsible Provider: Chelita Anthony DO    Anesthesia Type: general, regional ASA Status: 3          Anesthesia Type: No value filed.     Deondre Phase I: Deondre Score: 10    Deondre Phase II:        Anesthesia Post Evaluation    Patient location during evaluation: bedside  Patient participation: complete - patient participated  Level of consciousness: awake and awake and alert  Pain score: 0  Airway patency: patent  Nausea & Vomiting: no nausea and no vomiting  Complications: no  Cardiovascular status: blood pressure returned to baseline and hemodynamically stable  Respiratory status: acceptable  Hydration status: euvolemic  Pain management: adequate

## 2023-10-20 NOTE — PROGRESS NOTES
Discharge instructions and meds reviewed with patient and mother. Both verbalized understanding of instructions with no questions.

## 2023-10-27 ENCOUNTER — OFFICE VISIT (OUTPATIENT)
Dept: OBGYN CLINIC | Age: 28
End: 2023-10-27

## 2023-10-27 VITALS
HEIGHT: 65 IN | WEIGHT: 293 LBS | DIASTOLIC BLOOD PRESSURE: 72 MMHG | BODY MASS INDEX: 48.82 KG/M2 | HEART RATE: 72 BPM | SYSTOLIC BLOOD PRESSURE: 108 MMHG

## 2023-10-27 DIAGNOSIS — Z09 POSTOP CHECK: Primary | ICD-10-CM

## 2023-10-27 PROCEDURE — 99024 POSTOP FOLLOW-UP VISIT: CPT | Performed by: OBSTETRICS & GYNECOLOGY

## 2023-10-27 RX ORDER — GABAPENTIN 100 MG/1
100 CAPSULE ORAL 2 TIMES DAILY
Qty: 60 CAPSULE | Refills: 1 | Status: SHIPPED | OUTPATIENT
Start: 2023-10-27 | End: 2024-04-24

## 2023-10-27 NOTE — PROGRESS NOTES
Postop Progress Note    Subjective    presents to the office for postop follow up. 1 week     Objective    Vitals:    05/26/23 0852   BP: 106/64   Pulse: 76     General: alert, cooperative and no distress  Incision: healing well  Vag exam: deferred     Assessment  Doing well postoperatively. Plan    Continue postop restrictions including no heavy lifting or straining   No intercourse until next visit   Use stool softeners and pain meds   Return in 7 wks     Axel Talamantes M.D., F.A.C.O. G

## 2024-01-11 ENCOUNTER — OFFICE VISIT (OUTPATIENT)
Dept: BARIATRICS/WEIGHT MGMT | Age: 29
End: 2024-01-11
Payer: COMMERCIAL

## 2024-01-11 VITALS
BODY MASS INDEX: 50.02 KG/M2 | DIASTOLIC BLOOD PRESSURE: 68 MMHG | SYSTOLIC BLOOD PRESSURE: 130 MMHG | OXYGEN SATURATION: 97 % | WEIGHT: 293 LBS | HEART RATE: 119 BPM | HEIGHT: 64 IN

## 2024-01-11 DIAGNOSIS — E66.01 MORBID OBESITY (HCC): Primary | ICD-10-CM

## 2024-01-11 DIAGNOSIS — Z71.3 DIETARY COUNSELING AND SURVEILLANCE: ICD-10-CM

## 2024-01-11 DIAGNOSIS — K21.9 GASTROESOPHAGEAL REFLUX DISEASE WITHOUT ESOPHAGITIS: ICD-10-CM

## 2024-01-11 PROCEDURE — 1036F TOBACCO NON-USER: CPT | Performed by: SURGERY

## 2024-01-11 PROCEDURE — 97802 MEDICAL NUTRITION INDIV IN: CPT | Performed by: DIETITIAN, REGISTERED

## 2024-01-11 PROCEDURE — 99205 OFFICE O/P NEW HI 60 MIN: CPT | Performed by: SURGERY

## 2024-01-11 PROCEDURE — G8417 CALC BMI ABV UP PARAM F/U: HCPCS | Performed by: DIETITIAN, REGISTERED

## 2024-01-11 PROCEDURE — G8417 CALC BMI ABV UP PARAM F/U: HCPCS | Performed by: SURGERY

## 2024-01-11 PROCEDURE — G8428 CUR MEDS NOT DOCUMENT: HCPCS | Performed by: DIETITIAN, REGISTERED

## 2024-01-11 PROCEDURE — G8427 DOCREV CUR MEDS BY ELIG CLIN: HCPCS | Performed by: SURGERY

## 2024-01-11 PROCEDURE — G8484 FLU IMMUNIZE NO ADMIN: HCPCS | Performed by: SURGERY

## 2024-01-11 RX ORDER — PHENTERMINE HYDROCHLORIDE 37.5 MG/1
37.5 TABLET ORAL
Qty: 30 TABLET | Refills: 0 | Status: SHIPPED | OUTPATIENT
Start: 2024-01-11 | End: 2024-02-10

## 2024-01-11 RX ORDER — QUETIAPINE FUMARATE 50 MG/1
TABLET, FILM COATED ORAL
COMMUNITY
Start: 2023-11-10

## 2024-01-11 RX ORDER — FLUOXETINE HYDROCHLORIDE 40 MG/1
CAPSULE ORAL
COMMUNITY
Start: 2023-11-10

## 2024-01-11 NOTE — PATIENT INSTRUCTIONS
Make the following appointments:    Dietician, Milagros Urbina.  Psychologist, Dr. Morgan Newell.  Schedule screening upper endoscopy with Dr. Ng. No aspirin, ibuprofen or other non-steroidal anti-inflammatory drugs (NSAIDs) 7 days prior to surgery     No food or drink six hours prior to surgery.     Sleep Medicine referral for Sleep Apnea assessment and treatment.      If you have [facility] [place] and do not want to wait 6 months for surgery given that there is no scientific data to support such a delay, call [phone] to switch to a [employer] medicaid plan.    THE BIG FOUR GUIDELINES:  1.  Count calories!  People who count calories eat less.  Use the daily plate or other apps for your smart phone or computer.  The more you count the more of an expert you will become and will get easier and easier.  If you are trying to lose weight and exercising a lot, keep calories to around the thousand to 1200 a day.  If you are not exercising consistently try to limit them to around 800 a day.    2.  Separate liquids and solids.  Wait 30 minutes to an hour after you eat before drinking liquids.  This will reduce the total amount of food you eat in the meal.    3.  Exercise!  You need up to 5 hours a week with a combination of cardio and resistance or weight training in order to keep excess body fat off.    4.  Sleep!   Try to get 7 or more hours of sleep a night.  If you have obstructive sleep apnea definitely use your CPAP machine.    THE RULE OF 20'S:  For every meal, chew each bite 20 seconds.  Then put the fork down and wait 20 seconds after you swallow before picking up the fork again.  Each meal should take at least 20 minutes so your brain can register that you are full.

## 2024-01-11 NOTE — PROGRESS NOTES
06/07/2023 08:00 PM    CO2 24 06/07/2023 08:00 PM    AST 14 06/07/2023 08:00 PM    ALT 13 06/07/2023 08:00 PM    TSH 2.930 03/02/2020 05:21 PM     Lab Results   Component Value Date    HGB 12.2 09/08/2023    HCT 37.9 09/08/2023     Lab Results   Component Value Date/Time    INR 1.0 07/27/2023 11:13 AM     Radiology Reviewed:  [unfilled]     Diagnosis:      1. Morbid obesity (HCC)    2. Gastroesophageal reflux disease without esophagitis        Assessment/Plan:            Jeniffer Kennedy  is a 28 y.o. female with   1. Morbid obesity (HCC)    2. Gastroesophageal reflux disease without esophagitis       who reports multiple episodes of repeat weight-loss and re-gain with various programmatic weight loss programs such as Weight Watchers, Kanika Bishnu, and TOPS.     We discussed that the most effective treatment for morbid obesity is bariatric surgery.  We discussed the risks and benefits of laparoscopic or robotic Carlos-en-Y gastric bypass.  We discussed that over 80% of patients undergoing gastric bypass are able to maintain anywhere from half to all of her excess weight off and studies that exceed 25 years.  We discussed that there are risks for infection, bleeding, pain, need for reoperation, death, and vitamin and mineral deficiencies associated with the surgery.  We discussed that nicotine use is contraindicated after gastric bypass due to the risk of marginal ulcer formation.    We also discussed the risks and benefits of laparoscopic or robotic sleeve gastrectomy.  We discussed that most sleeve patients lose about 60 to 70% of their excess weight in a year and that review of multiple studies of 7 years in length reveal an average of 27% of sleeve patients regaining their lost weight and 19% requiring revision either for lack of weight loss or severe GERD.We discussed that there are risks for infection, bleeding, pain, need for reoperation, death, and vitamin and mineral deficiencies associated with the surgery.

## 2024-01-11 NOTE — PROGRESS NOTES
[facility]  Initial Nutrition Consultation    Patient is a 28 y.o. female seen for initial Medical Nutrition Therapy visit for  Carlos en Y gastric bypass.   Visit number:  1 out of 6    1/11/2024  Height:   Ht Readings from Last 1 Encounters:   01/11/24 1.63 m (5' 4.17\")     Weight:   Wt Readings from Last 1 Encounters:   01/11/24 (!) 199 kg (438 lb 12.8 oz)     BMI:   BMI Readings from Last 1 Encounters:   01/11/24 74.91 kg/m²     EBW: 292 lbs  Preoperative weight loss goal: 43 lbs (10%)    Weight History:   Wt Readings from Last 3 Encounters:   01/11/24 (!) 199 kg (438 lb 12.8 oz)   10/27/23 (!) 196.9 kg (434 lb)   10/17/23 (!) 192.9 kg (425 lb 3.2 oz)       Patient's lowest adult weight was 315 lbs at age 19.      Patient's highest adult weight was 438 lbs at age 28.      Patient has participated in the following weight loss programs  bariatric programs at  and [facility], didn't finish program at [facility] d/t pregnancy, states at  was told didn't lose enough weight.   Patient has not participated in meal replacement/liquid diets.    Patient has participated in weight loss medications.    Patient is not lactose intolerant.  Patient does not have Confucianist/cultural food concerns.  Patient does not have food allergies.    Patient dines out to a sit down restaurant 0 times per week.  Patient has fast food or picks up carry out 0 times per week.     Grocery Shopping in household done by: patient  Cooking in household done by: patient    24 hour recall/food frequency chart:  Patient has no consistent schedule. Brought in food journal which shows 5-6 meal times, large portions at some meals, other just brownie bites or chips.  Patient has reduced soda intake, was drinking > 100 oz regular soda/day but has reduced to 20 oz/day.  Patient has also reduced portions of eggs from 8 to 2/day and chicken nuggets from 25-30 t0 10-12/day.    Vitamin/Mineral supplementation:

## 2024-01-12 DIAGNOSIS — E66.01 MORBID OBESITY (HCC): Primary | ICD-10-CM

## 2024-01-12 NOTE — TELEPHONE ENCOUNTER
Patient would like this medication sent to [facility] instead of [facility]

## 2024-01-15 RX ORDER — PHENTERMINE HYDROCHLORIDE 37.5 MG/1
37.5 TABLET ORAL
Qty: 30 TABLET | Refills: 0 | Status: SHIPPED | OUTPATIENT
Start: 2024-01-15 | End: 2024-02-14

## 2024-01-17 VITALS — BODY MASS INDEX: 73.69 KG/M2 | WEIGHT: 293 LBS

## 2024-01-17 LAB
6-ACETYLMORPHINE, UR: NORMAL
ALBUMIN SERPL-MCNC: 4.6 G/DL
ALP BLD-CCNC: 93 U/L
ALT SERPL-CCNC: 21 U/L
AMPHETAMINE SCREEN, URINE: NORMAL
ANION GAP SERPL CALCULATED.3IONS-SCNC: 1.5 MMOL/L
AST SERPL-CCNC: 20 U/L
BARBITURATE SCREEN, URINE: NORMAL
BENZODIAZEPINE SCREEN, URINE: NORMAL
BILIRUB SERPL-MCNC: 0.3 MG/DL (ref 0.1–1.4)
BUN BLDV-MCNC: 23 MG/DL
CALCIUM SERPL-MCNC: 10 MG/DL
CANNABINOID SCREEN URINE: NORMAL
CHLORIDE BLD-SCNC: 103 MMOL/L
CHOLESTEROL, TOTAL: 169 MG/DL
CHOLESTEROL/HDL RATIO: ABNORMAL
CO2: 19 MMOL/L
COCAINE METABOLITE, URINE: NORMAL
CREAT SERPL-MCNC: 0.91 MG/DL
CREATININE URINE: 145 MG/DL
EDDP, URINE: NORMAL
EGFR: 88
ETHANOL URINE: NORMAL
GLUCOSE BLD-MCNC: 95 MG/DL
HDLC SERPL-MCNC: 35 MG/DL (ref 35–70)
LDL CHOLESTEROL CALCULATED: 111 MG/DL (ref 0–160)
MAGNESIUM: 1.9 MG/DL
MDMA URINE: NORMAL
METHADONE SCREEN, URINE: NORMAL
METHAMPHETAMINE, URINE: NORMAL
NONHDLC SERPL-MCNC: ABNORMAL MG/DL
OPIATES, URINE: NORMAL
OXYCODONE: NORMAL
PCP: NORMAL
PH, URINE: NORMAL
PHENCYCLIDINE, URINE: NORMAL
POTASSIUM SERPL-SCNC: 4.7 MMOL/L
PROPOXYPHENE, URINE: NORMAL
SODIUM BLD-SCNC: 139 MMOL/L
TOTAL PROTEIN: 7.7
TRICYCLIC ANTIDEPRESSANTS, UR: NORMAL
TRIGL SERPL-MCNC: 127 MG/DL
TSH SERPL DL<=0.05 MIU/L-ACNC: 3.15 UIU/ML
VITAMIN B-12: 235
VITAMIN D 25-HYDROXY: 28.5
VITAMIN D2, 25 HYDROXY: ABNORMAL
VITAMIN D3,25 HYDROXY: ABNORMAL
VLDLC SERPL CALC-MCNC: 23 MG/DL

## 2024-01-18 ENCOUNTER — OFFICE VISIT (OUTPATIENT)
Dept: PULMONOLOGY | Age: 29
End: 2024-01-18

## 2024-01-18 VITALS
SYSTOLIC BLOOD PRESSURE: 112 MMHG | OXYGEN SATURATION: 96 % | DIASTOLIC BLOOD PRESSURE: 70 MMHG | TEMPERATURE: 97.6 F | BODY MASS INDEX: 50.02 KG/M2 | HEART RATE: 66 BPM | WEIGHT: 293 LBS | HEIGHT: 64 IN

## 2024-01-18 DIAGNOSIS — J45.998 SEASONAL ASTHMA: ICD-10-CM

## 2024-01-18 DIAGNOSIS — E66.9 OBESITY, UNSPECIFIED CLASSIFICATION, UNSPECIFIED OBESITY TYPE, UNSPECIFIED WHETHER SERIOUS COMORBIDITY PRESENT: ICD-10-CM

## 2024-01-18 DIAGNOSIS — G47.33 OSA ON CPAP: Primary | ICD-10-CM

## 2024-01-18 NOTE — PROGRESS NOTES
NEW PATIENT VISIT-PULMONARY/SLEEP    1/18/2024     REFERRING PHYSICIAN:  Ori Ng MD    REASON FOR REFERRAL:  Bariatric surgery    HPI:     Jeniffer Kennedy is a 29 y.o. female who was referred to sleep and pulmonary clinic for evaluation.     She is in the process of going through weight loss surgery.  Has been diagnosed with obstructive sleep apnea couple years ago when she was getting ready for weight loss surgery but at that time she did not go through with due to becoming pregnant.  Had been prescribed AutoPap.  Gained at least 100 pounds since then.  Her machine was recalled and she got a new machine.  She is using her machine every night.  Compliance is not available.  Feels well with it.    Current symptoms-  has a very mild case of Asthma and uses rescue inhalers usually in the spring.  Has not been on maintenance inhaler.  Has shortness of breath with activities due to her weight.           Past Medical History   Past Medical History:   Diagnosis Date    Mental disorder     depression and anxiety    Postpartum depression     Sleep apnea        Past Surgical History  Past Surgical History:   Procedure Laterality Date    DILATION AND CURETTAGE OF UTERUS N/A 9/15/2023    ATTEMPTED LAPAROSCOPIC BILATERAL SALPINGECTOMY WITH NOVASURE ENDOMETRIAL ABLATION performed by Agustin Gregory MD at [facility]    HYSTERECTOMY, VAGINAL N/A 10/20/2023    TOTAL LAPAROSCOPIC HYSTERECTOMY, RIGHT SALPINGECTOMY, LEFT OOPHERECTOMY performed by Agustin Gregory MD at [facility]    LAPAROSCOPY Left 6/10/2021    LAPAROSCOPIC ECTOPIC REMOVAL, LEFT SALPINGECTOMY performed by Agustin Gregory MD at [facility]    UPPER GASTROINTESTINAL ENDOSCOPY  2020    UPPER GASTROINTESTINAL ENDOSCOPY N/A 6/23/2023    EGD DIAGNOSTIC ONLY performed by Sheridan Laughlin MD at [facility]    UPPER GASTROINTESTINAL ENDOSCOPY N/A 8/17/2023    ENDOSCOPIC ULTRASOUND with polypectomy performed by Rodri Thornton MD at [facility]

## 2024-01-22 ENCOUNTER — TELEPHONE (OUTPATIENT)
Dept: PULMONOLOGY | Age: 29
End: 2024-01-22

## 2024-01-22 RX ORDER — ONDANSETRON 4 MG/1
4 TABLET, FILM COATED ORAL EVERY 6 HOURS PRN
Qty: 30 TABLET | Refills: 1 | Status: SHIPPED | OUTPATIENT
Start: 2024-01-22

## 2024-01-22 NOTE — TELEPHONE ENCOUNTER
Regarding: CPAP data  Contact: [phone]  ----- Message from Malinda Hernadez MA sent at 1/19/2024  2:34 PM EST -----       ----- Message from Jeniffer Kennedy to Jovani Sy MD sent at 1/19/2024  7:33 AM -----   I found an sunny that is on my phone and connects to my CPAP machine and it transfers all the data to my phone. I just need to figure out how to possibly send it to you if that's a possibility in order for you to clear me for surgery

## 2024-02-02 ENCOUNTER — OFFICE VISIT (OUTPATIENT)
Dept: CARDIOLOGY CLINIC | Age: 29
End: 2024-02-02

## 2024-02-02 VITALS
HEART RATE: 84 BPM | HEIGHT: 64 IN | SYSTOLIC BLOOD PRESSURE: 124 MMHG | OXYGEN SATURATION: 95 % | DIASTOLIC BLOOD PRESSURE: 70 MMHG | BODY MASS INDEX: 50.02 KG/M2 | WEIGHT: 293 LBS

## 2024-02-02 DIAGNOSIS — Z00.00 PE (PHYSICAL EXAM), ANNUAL: Primary | ICD-10-CM

## 2024-02-02 DIAGNOSIS — I10 HYPERTENSION, UNSPECIFIED TYPE: ICD-10-CM

## 2024-02-02 DIAGNOSIS — G47.33 OSA (OBSTRUCTIVE SLEEP APNEA): ICD-10-CM

## 2024-02-02 DIAGNOSIS — Z01.810 PREOP CARDIOVASCULAR EXAM: ICD-10-CM

## 2024-02-02 NOTE — PROGRESS NOTES
(Site: Left Lower Arm, Position: Sitting, Cuff Size: Medium Adult)   Pulse 84   Ht 1.63 m (5' 4.17\")   Wt (!) 191 kg (421 lb) Comment: per pt  LMP 10/12/2023   SpO2 95%   BMI 71.88 kg/m²    Physical Exam    LABS:  CBC:   Lab Results   Component Value Date/Time    WBC 8.7 09/08/2023 10:36 AM    RBC 4.95 09/08/2023 10:36 AM    HGB 12.2 09/08/2023 10:36 AM    HCT 37.9 09/08/2023 10:36 AM    MCV 76.6 09/08/2023 10:36 AM    MCH 24.7 09/08/2023 10:36 AM    MCHC 32.3 09/08/2023 10:36 AM    RDW 15.8 09/08/2023 10:36 AM     09/08/2023 10:36 AM     Lipids:  Lab Results   Component Value Date    CHOL 157 03/02/2020     Lab Results   Component Value Date    TRIG 132 03/02/2020     Lab Results   Component Value Date    HDL 36 (L) 03/02/2020     Lab Results   Component Value Date    LDLCALC 95 03/02/2020     No results found for: \"VLDL\"  No results found for: \"CHOLHDLRATIO\"  CMP:    Lab Results   Component Value Date/Time     06/07/2023 08:00 PM    K 4.0 06/07/2023 08:00 PM     06/07/2023 08:00 PM    CO2 24 06/07/2023 08:00 PM    BUN 17 06/07/2023 08:00 PM    CREATININE 0.84 06/07/2023 08:00 PM    GFRAA >60.0 06/26/2022 11:00 AM    LABGLOM >60.0 06/07/2023 08:00 PM    GLUCOSE 82 06/07/2023 08:00 PM    GLUCOSE 136 07/04/2019 06:25 PM    PROT 8.0 06/07/2023 08:00 PM    LABALBU 4.4 06/07/2023 08:00 PM    LABALBU 4.3 07/04/2019 06:25 PM    CALCIUM 9.7 06/07/2023 08:00 PM    BILITOT <0.2 06/07/2023 08:00 PM    ALKPHOS 85 06/07/2023 08:00 PM    AST 14 06/07/2023 08:00 PM    ALT 13 06/07/2023 08:00 PM     BMP:    Lab Results   Component Value Date/Time     06/07/2023 08:00 PM    K 4.0 06/07/2023 08:00 PM     06/07/2023 08:00 PM    CO2 24 06/07/2023 08:00 PM    BUN 17 06/07/2023 08:00 PM    LABALBU 4.4 06/07/2023 08:00 PM    LABALBU 4.3 07/04/2019 06:25 PM    CREATININE 0.84 06/07/2023 08:00 PM    CALCIUM 9.7 06/07/2023 08:00 PM    GFRAA >60.0 06/26/2022 11:00 AM    LABGLOM >60.0 06/07/2023 08:00 PM

## 2024-02-05 ENCOUNTER — HOSPITAL ENCOUNTER (EMERGENCY)
Age: 29
Discharge: HOME | End: 2024-02-05
Payer: COMMERCIAL

## 2024-02-05 ENCOUNTER — APPOINTMENT (OUTPATIENT)
Dept: GENERAL RADIOLOGY | Age: 29
End: 2024-02-05
Payer: COMMERCIAL

## 2024-02-05 VITALS
RESPIRATION RATE: 16 BRPM | DIASTOLIC BLOOD PRESSURE: 87 MMHG | HEART RATE: 81 BPM | OXYGEN SATURATION: 100 % | TEMPERATURE: 97.8 F | WEIGHT: 293 LBS | HEIGHT: 64 IN | BODY MASS INDEX: 50.02 KG/M2 | SYSTOLIC BLOOD PRESSURE: 126 MMHG

## 2024-02-05 DIAGNOSIS — E66.01 MORBID OBESITY (HCC): ICD-10-CM

## 2024-02-05 DIAGNOSIS — K59.00 CONSTIPATION, UNSPECIFIED CONSTIPATION TYPE: Primary | ICD-10-CM

## 2024-02-05 LAB
BILIRUB UR QL STRIP: NEGATIVE
CLARITY UR: CLEAR
COLOR UR: YELLOW
GLUCOSE UR STRIP-MCNC: NEGATIVE MG/DL
HGB UR QL STRIP: NEGATIVE
KETONES UR STRIP-MCNC: NEGATIVE MG/DL
LEUKOCYTE ESTERASE UR QL STRIP: NEGATIVE
NITRITE UR QL STRIP: NEGATIVE
PH UR STRIP: 6 [PH] (ref 5–9)
PROT UR STRIP-MCNC: NEGATIVE MG/DL
SP GR UR STRIP: 1.03 (ref 1–1.03)
URINE REFLEX TO CULTURE: NORMAL
UROBILINOGEN UR STRIP-ACNC: 1 E.U./DL

## 2024-02-05 PROCEDURE — 99284 EMERGENCY DEPT VISIT MOD MDM: CPT

## 2024-02-05 PROCEDURE — 81003 URINALYSIS AUTO W/O SCOPE: CPT

## 2024-02-05 PROCEDURE — 74019 RADEX ABDOMEN 2 VIEWS: CPT

## 2024-02-05 RX ORDER — POLYETHYLENE GLYCOL 3350, SODIUM CHLORIDE, SODIUM BICARBONATE, POTASSIUM CHLORIDE 420; 11.2; 5.72; 1.48 G/4L; G/4L; G/4L; G/4L
4000 POWDER, FOR SOLUTION ORAL ONCE
Qty: 4000 ML | Refills: 0 | Status: SHIPPED | OUTPATIENT
Start: 2024-02-05 | End: 2024-02-05

## 2024-02-05 ASSESSMENT — LIFESTYLE VARIABLES
HOW OFTEN DO YOU HAVE A DRINK CONTAINING ALCOHOL: NEVER
HOW MANY STANDARD DRINKS CONTAINING ALCOHOL DO YOU HAVE ON A TYPICAL DAY: PATIENT DOES NOT DRINK

## 2024-02-05 ASSESSMENT — ENCOUNTER SYMPTOMS
NAUSEA: 1
COLOR CHANGE: 0
SHORTNESS OF BREATH: 0
COUGH: 0
CONSTIPATION: 1
BLOOD IN STOOL: 0
ABDOMINAL PAIN: 1
VOMITING: 1
SORE THROAT: 0
DIARRHEA: 0
RHINORRHEA: 0

## 2024-02-05 ASSESSMENT — PAIN - FUNCTIONAL ASSESSMENT: PAIN_FUNCTIONAL_ASSESSMENT: NONE - DENIES PAIN

## 2024-02-05 NOTE — ED PROVIDER NOTES
[facility] ED  eMERGENCY dEPARTMENT eNCOUnter      Pt Name: Jeniffer Kennedy  MRN: [id number]  Birthdate 1995  Date of evaluation: 2/5/2024  Provider: STEFFEN HYMAN    My attending is Dr. John    HISTORY OF PRESENT ILLNESS    Jeniffer Kennedy is a 29 y.o. female with PMHx of depression and anxiety, postpartum depression, sleep apnea presents to the emergency department with constipation. Pt states 8 days ago was her last good bowel movement.  For 3 days she has had abdominal pain throughout her mid abdomen laterally.  Yesterday had nausea which episodes of emesis.  Last bowel movement was yesterday, 1-2 small stools.  2 days ago she had trouble urinating.  She has tried magnesium citrate, MiraLAX, Colace, lactulose, Dulcolax, milk of magnesium without relief.  She denies fevers, chest pain, shortness of breath, dysuria.  Only recent change in medication was being placed on Adipex January 17. No rectal pain.     HPI    Nursing Notes were reviewed.    REVIEW OF SYSTEMS       Review of Systems   Constitutional:  Negative for appetite change, chills and fever.   HENT:  Negative for congestion, rhinorrhea and sore throat.    Respiratory:  Negative for cough and shortness of breath.    Cardiovascular:  Negative for chest pain.   Gastrointestinal:  Positive for abdominal pain, constipation, nausea and vomiting. Negative for blood in stool and diarrhea.   Genitourinary:  Negative for difficulty urinating.   Musculoskeletal:  Negative for neck stiffness.   Skin:  Negative for color change and rash.   Neurological:  Negative for dizziness, syncope, weakness, light-headedness, numbness and headaches.   All other systems reviewed and are negative.            PAST MEDICAL HISTORY     Past Medical History:   Diagnosis Date    Mental disorder     depression and anxiety    Postpartum depression     Sleep apnea          SURGICAL HISTORY       Past Surgical History:   Procedure Laterality Date    DILATION

## 2024-02-05 NOTE — TELEPHONE ENCOUNTER
Patient requesting medication refill. Please approve or deny this request.    Rx requested:  Requested Prescriptions     Pending Prescriptions Disp Refills    phentermine (ADIPEX-P) 37.5 MG tablet 30 tablet 0     Sig: Take 1 tablet by mouth every morning (before breakfast) for 30 days. Max Daily Amount: 37.5 mg         Last Office Visit:   1/11/2024      Next Visit Date:  Future Appointments   Date Time Provider Department Center   2/14/2024  9:30 AM Milagros Urbina RD [facility]   2/14/2024 10:30 AM Morgan Cervantes PSYD [facility]   2/21/2024  8:00 AM MLO STRESS LAB 1 [facility]  [facility]   2/26/2024  8:30 AM Jovani Sy MD [facility]   2/27/2024 12:00 PM SCHEDULE, [facility]   3/8/2024 10:30 AM Ori Ng MD [facility]   3/11/2024  9:30 AM MLO ECHO 1 [facility]  [facility]

## 2024-02-06 RX ORDER — PHENTERMINE HYDROCHLORIDE 37.5 MG/1
37.5 TABLET ORAL
Qty: 30 TABLET | Refills: 0 | Status: SHIPPED | OUTPATIENT
Start: 2024-02-06 | End: 2024-03-07

## 2024-02-07 RX ORDER — DOCUSATE SODIUM 100 MG/1
100 CAPSULE, LIQUID FILLED ORAL 2 TIMES DAILY PRN
Qty: 60 CAPSULE | Refills: 2 | Status: SHIPPED | OUTPATIENT
Start: 2024-02-07

## 2024-02-07 NOTE — TELEPHONE ENCOUNTER
[facility] calling in [facility]     Please sign and send if appropriate

## 2024-02-14 ENCOUNTER — OFFICE VISIT (OUTPATIENT)
Dept: BARIATRICS/WEIGHT MGMT | Age: 29
End: 2024-02-14
Payer: COMMERCIAL

## 2024-02-14 VITALS — BODY MASS INDEX: 50.02 KG/M2 | WEIGHT: 293 LBS | HEIGHT: 64 IN

## 2024-02-14 DIAGNOSIS — Z71.3 DIETARY COUNSELING AND SURVEILLANCE: ICD-10-CM

## 2024-02-14 DIAGNOSIS — E66.01 MORBID OBESITY (HCC): Primary | ICD-10-CM

## 2024-02-14 PROCEDURE — G8428 CUR MEDS NOT DOCUMENT: HCPCS | Performed by: DIETITIAN, REGISTERED

## 2024-02-14 PROCEDURE — G8417 CALC BMI ABV UP PARAM F/U: HCPCS | Performed by: DIETITIAN, REGISTERED

## 2024-02-14 PROCEDURE — 97803 MED NUTRITION INDIV SUBSEQ: CPT | Performed by: DIETITIAN, REGISTERED

## 2024-02-14 NOTE — PROGRESS NOTES
Nutrition follow up prior to surgery  Visit number:  2 out of 6 for Carlos en Y gastric bypass.    Initial Weight: 438 lbs    Wt Readings from Last 3 Encounters:   02/05/24 (!) 191 kg (421 lb)   02/02/24 (!) 191 kg (421 lb)   01/18/24 (!) 195.5 kg (431 lb)     lost 24 lbs over 1 month.    Previous nutrition goals:   Schedule meal times, 4-6/day  Include protein and one other food group (more often vegetable of fruit) with each  Add bariatric MVI and Calcium citrate in divided dose  Keep written food journal or on sunny  Read \"Patient Guide to Bariatric Surgery\" before next visit    Nutrition goals: partially met, keeping food and fluid journals both written and on sunny. Journal shows 2-4 meals/day, calorie range 800-2200 kcal,  gr protein, 36-60 oz fluids/day. Patient did go to ER with constipation, was taking several stool softeners and laxatives with no relief.  Now taking Colace daily and drinking plenty of fluids, has also switched to decaf coffee.  Patient taking calcium, has not purchased bariatric MVI yet. Patient increasing daily activity, walking, dancing, squats, etc.    PES Statement:  Overweight/Obesity related to a complex combination of decreased energy needs, disordered eating patterns, physical inactivity, and increased psychological/life stress as evidenced by BMI greater than 30 and inability to maintain a significant amount of weight loss through conventional weight loss interventions.    Intervention:  Reviewed previous goals and set new goals.  Stressed importance of preoperative weight loss or surgery may be postponed or cancelled.  Provided continued education regarding diet and lifestyle changes for optimal success post bariatric surgery.  Encouragement and support provided.    Education materials provided:   On line resources  Bariatric pal MVI sample    Plan/Recommendations:   Increase consistency of meal frequency and calorie intake  Add adult MVI or bariatric MVI  Continue daily food

## 2024-02-19 NOTE — TELEPHONE ENCOUNTER
HAVE NO CLUE HOW TO GET DATA WITHOUT KNOWING [facility]

## 2024-02-20 DIAGNOSIS — E66.01 MORBID OBESITY (HCC): ICD-10-CM

## 2024-02-26 ENCOUNTER — OFFICE VISIT (OUTPATIENT)
Dept: PULMONOLOGY | Age: 29
End: 2024-02-26
Payer: COMMERCIAL

## 2024-02-26 VITALS
WEIGHT: 293 LBS | HEART RATE: 76 BPM | SYSTOLIC BLOOD PRESSURE: 122 MMHG | DIASTOLIC BLOOD PRESSURE: 88 MMHG | HEIGHT: 64 IN | BODY MASS INDEX: 50.02 KG/M2 | OXYGEN SATURATION: 99 % | TEMPERATURE: 97.6 F

## 2024-02-26 DIAGNOSIS — G47.33 OSA ON CPAP: Primary | ICD-10-CM

## 2024-02-26 DIAGNOSIS — E66.9 OBESITY, UNSPECIFIED CLASSIFICATION, UNSPECIFIED OBESITY TYPE, UNSPECIFIED WHETHER SERIOUS COMORBIDITY PRESENT: ICD-10-CM

## 2024-02-26 DIAGNOSIS — J45.998 SEASONAL ASTHMA: ICD-10-CM

## 2024-02-26 PROCEDURE — 1036F TOBACCO NON-USER: CPT | Performed by: INTERNAL MEDICINE

## 2024-02-26 PROCEDURE — G8484 FLU IMMUNIZE NO ADMIN: HCPCS | Performed by: INTERNAL MEDICINE

## 2024-02-26 PROCEDURE — G8427 DOCREV CUR MEDS BY ELIG CLIN: HCPCS | Performed by: INTERNAL MEDICINE

## 2024-02-26 PROCEDURE — 99213 OFFICE O/P EST LOW 20 MIN: CPT | Performed by: INTERNAL MEDICINE

## 2024-02-26 PROCEDURE — G8417 CALC BMI ABV UP PARAM F/U: HCPCS | Performed by: INTERNAL MEDICINE

## 2024-02-26 NOTE — PROGRESS NOTES
advised.    Return in about 4 weeks (around 3/25/2024).       Electronically signed by Jovani Sy MD on 2/26/2024 at 8:58 AM  March.

## 2024-02-27 ENCOUNTER — NURSE ONLY (OUTPATIENT)
Dept: BARIATRICS/WEIGHT MGMT | Age: 29
End: 2024-02-27

## 2024-02-27 DIAGNOSIS — E66.01 MORBID OBESITY (HCC): Primary | ICD-10-CM

## 2024-03-04 ENCOUNTER — TELEPHONE (OUTPATIENT)
Dept: CARDIOLOGY CLINIC | Age: 29
End: 2024-03-04

## 2024-03-04 DIAGNOSIS — Z01.810 PREOP CARDIOVASCULAR EXAM: Primary | ICD-10-CM

## 2024-03-04 NOTE — TELEPHONE ENCOUNTER
[phone]  Patient calling in regards of her stress test. States she is unable to walk due to her knee due to a possible torn meniscus. Is there a way for her to be able to do the lexiscan?

## 2024-03-05 NOTE — TELEPHONE ENCOUNTER
New order for Dobutamine Stress Echo placed. Confirmed with Dr. Ga that he would still like patient to have Echo done too. Called and spoke to patient to notify her that no exercise/walking on treadmill is required for a dobutamine stress echo. Patient verbalized understanding. Patient's dobutamine stress echo and echo rescheduled for 03/19/2024 0745AM per Biometrics department. Called patient to notify her of new date and time. Patient confirmed appointment. No additional questions or concerns at this time.

## 2024-03-05 NOTE — TELEPHONE ENCOUNTER
Please call pt back regarding testing.    Pt states that she can not have an exercise stress test due to her left knee injury.    She is confused.     Please call pt back to discuss more.    Pt # [phone]

## 2024-03-08 DIAGNOSIS — E66.01 MORBID OBESITY DUE TO EXCESS CALORIES (HCC): Primary | ICD-10-CM

## 2024-03-08 RX ORDER — PHENTERMINE HYDROCHLORIDE 37.5 MG/1
37.5 TABLET ORAL
Qty: 30 TABLET | Refills: 0 | Status: SHIPPED | OUTPATIENT
Start: 2024-03-08 | End: 2024-04-07

## 2024-03-08 NOTE — TELEPHONE ENCOUNTER
Requested Prescriptions     Pending Prescriptions Disp Refills    phentermine (ADIPEX-P) 37.5 MG tablet 30 tablet 0     Sig: Take 1 tablet by mouth every morning (before breakfast) for 30 days. Max Daily Amount: 37.5 mg

## 2024-03-13 ENCOUNTER — OFFICE VISIT (OUTPATIENT)
Dept: BARIATRICS/WEIGHT MGMT | Age: 29
End: 2024-03-13
Payer: COMMERCIAL

## 2024-03-13 VITALS — BODY MASS INDEX: 50.02 KG/M2 | HEIGHT: 64 IN | WEIGHT: 293 LBS

## 2024-03-13 DIAGNOSIS — Z71.89 ENCOUNTER FOR PSYCHOLOGICAL ASSESSMENT PRIOR TO BARIATRIC SURGERY: Primary | ICD-10-CM

## 2024-03-13 DIAGNOSIS — F43.9 TRAUMA AND STRESSOR-RELATED DISORDER: ICD-10-CM

## 2024-03-13 DIAGNOSIS — E66.01 MORBID OBESITY DUE TO EXCESS CALORIES (HCC): Primary | ICD-10-CM

## 2024-03-13 DIAGNOSIS — F50.89 OTHER DISORDER OF EATING: ICD-10-CM

## 2024-03-13 DIAGNOSIS — E66.01 PSYCHOLOGICAL FACTORS AFFECTING MORBID OBESITY (HCC): ICD-10-CM

## 2024-03-13 DIAGNOSIS — E66.01 MORBID OBESITY DUE TO EXCESS CALORIES (HCC): ICD-10-CM

## 2024-03-13 DIAGNOSIS — F90.9 ATTENTION DEFICIT HYPERACTIVITY DISORDER (ADHD), UNSPECIFIED ADHD TYPE: ICD-10-CM

## 2024-03-13 DIAGNOSIS — F54 PSYCHOLOGICAL FACTORS AFFECTING MORBID OBESITY (HCC): ICD-10-CM

## 2024-03-13 DIAGNOSIS — Z71.3 DIETARY COUNSELING AND SURVEILLANCE: ICD-10-CM

## 2024-03-13 DIAGNOSIS — F39 UNSPECIFIED MOOD (AFFECTIVE) DISORDER (HCC): ICD-10-CM

## 2024-03-13 DIAGNOSIS — G47.33 OSA (OBSTRUCTIVE SLEEP APNEA): ICD-10-CM

## 2024-03-13 PROCEDURE — G8428 CUR MEDS NOT DOCUMENT: HCPCS | Performed by: DIETITIAN, REGISTERED

## 2024-03-13 PROCEDURE — 1036F TOBACCO NON-USER: CPT | Performed by: PSYCHOLOGIST

## 2024-03-13 PROCEDURE — G8417 CALC BMI ABV UP PARAM F/U: HCPCS | Performed by: DIETITIAN, REGISTERED

## 2024-03-13 PROCEDURE — 97803 MED NUTRITION INDIV SUBSEQ: CPT | Performed by: DIETITIAN, REGISTERED

## 2024-03-13 PROCEDURE — 90791 PSYCH DIAGNOSTIC EVALUATION: CPT | Performed by: PSYCHOLOGIST

## 2024-03-13 ASSESSMENT — PATIENT HEALTH QUESTIONNAIRE - PHQ9
SUM OF ALL RESPONSES TO PHQ QUESTIONS 1-9: 5
6. FEELING BAD ABOUT YOURSELF - OR THAT YOU ARE A FAILURE OR HAVE LET YOURSELF OR YOUR FAMILY DOWN: SEVERAL DAYS
SUM OF ALL RESPONSES TO PHQ QUESTIONS 1-9: 5
2. FEELING DOWN, DEPRESSED OR HOPELESS: MORE THAN HALF THE DAYS
SUM OF ALL RESPONSES TO PHQ9 QUESTIONS 1 & 2: 3
3. TROUBLE FALLING OR STAYING ASLEEP: NOT AT ALL
10. IF YOU CHECKED OFF ANY PROBLEMS, HOW DIFFICULT HAVE THESE PROBLEMS MADE IT FOR YOU TO DO YOUR WORK, TAKE CARE OF THINGS AT HOME, OR GET ALONG WITH OTHER PEOPLE: SOMEWHAT DIFFICULT
SUM OF ALL RESPONSES TO PHQ QUESTIONS 1-9: 5
1. LITTLE INTEREST OR PLEASURE IN DOING THINGS: SEVERAL DAYS
9. THOUGHTS THAT YOU WOULD BE BETTER OFF DEAD, OR OF HURTING YOURSELF: NOT AT ALL
4. FEELING TIRED OR HAVING LITTLE ENERGY: SEVERAL DAYS
SUM OF ALL RESPONSES TO PHQ QUESTIONS 1-9: 5
7. TROUBLE CONCENTRATING ON THINGS, SUCH AS READING THE NEWSPAPER OR WATCHING TELEVISION: NOT AT ALL
5. POOR APPETITE OR OVEREATING: NOT AT ALL
8. MOVING OR SPEAKING SO SLOWLY THAT OTHER PEOPLE COULD HAVE NOTICED. OR THE OPPOSITE, BEING SO FIGETY OR RESTLESS THAT YOU HAVE BEEN MOVING AROUND A LOT MORE THAN USUAL: NOT AT ALL

## 2024-03-13 ASSESSMENT — ANXIETY QUESTIONNAIRES
IF YOU CHECKED OFF ANY PROBLEMS ON THIS QUESTIONNAIRE, HOW DIFFICULT HAVE THESE PROBLEMS MADE IT FOR YOU TO DO YOUR WORK, TAKE CARE OF THINGS AT HOME, OR GET ALONG WITH OTHER PEOPLE: SOMEWHAT DIFFICULT
1. FEELING NERVOUS, ANXIOUS, OR ON EDGE: SEVERAL DAYS
3. WORRYING TOO MUCH ABOUT DIFFERENT THINGS: NOT AT ALL
5. BEING SO RESTLESS THAT IT IS HARD TO SIT STILL: NOT AT ALL
2. NOT BEING ABLE TO STOP OR CONTROL WORRYING: NOT AT ALL
7. FEELING AFRAID AS IF SOMETHING AWFUL MIGHT HAPPEN: NOT AT ALL
GAD7 TOTAL SCORE: 4
4. TROUBLE RELAXING: MORE THAN HALF THE DAYS
6. BECOMING EASILY ANNOYED OR IRRITABLE: SEVERAL DAYS

## 2024-03-13 NOTE — PROGRESS NOTES
Nutrition follow up prior to surgery  Visit number:  3 out of 6 for Carlos en Y gastric bypass.    Initial Weight: 438 lbs    Wt Readings from Last 3 Encounters:   03/13/24 (!) 184.2 kg (406 lb)   02/26/24 (!) 185.5 kg (409 lb)   02/14/24 (!) 187.9 kg (414 lb 3.2 oz)     lost 8 lbs over 1 month, down 32 lbs from initial.    Previous nutrition goals:   Increase consistency of meal frequency and calorie intake  Add adult MVI or bariatric MVI  Continue daily food journal  Increase daily exercise as tolerated    Nutrition goals: partially met, food journal shows better consistency of intake, 1200-1700kcal/day.  Fluid intake 40-60 oz water, taking calcium citrate, has bariatric pal MVI but not taking.  Walking 5672-7024 steps/day, line dancing with kids, and using hand weights.  Patient has not purchased CL protein benito/shots d/t fixed income.    PES Statement:  Overweight/Obesity related to a complex combination of decreased energy needs, disordered eating patterns, physical inactivity, and increased psychological/life stress as evidenced by BMI greater than 30 and inability to maintain a significant amount of weight loss through conventional weight loss interventions.    Intervention:  Reviewed previous goals and set new goals.  Stressed importance of preoperative weight loss or surgery may be postponed or cancelled.  Provided continued education regarding diet and lifestyle changes for optimal success post bariatric surgery.  Encouragement and support provided.    Education materials provided:   none    Plan/Recommendations:   Continue current intake  Continue to increase daily exercise  Sample CL protein benito/shots (try Vidafuel protein shot)    Follow up: 4 weeks     Total time involved in direct patient education: 30 minutes    Milagros Urbina RD

## 2024-03-13 NOTE — PROGRESS NOTES
Bariatric Presurgical Behavioral Health Assessment  Morgan Cervantes Psy.D., \[facility]\""  Licensed Clinical Psychologist ([place] [id number])        Date of Evaluation: 3/13/2024    Date of Report: 4/10/2024    Name: Jeniffer Kennedy    Date of Birth 1995    Patient provided informed consent for the behavioral health evaluation. Discussed with patient the limits of confidentiality (i.e., abuse reporting, suicide intervention, review by treatment team, review by insurance company, etc.) and purpose of the evaluation. Patient indicated understanding.    Purpose of Assessment: Jeniffer is a 29 y.o. female, who was seen for a pre-surgical psychological evaluation. Jeniffer weighs 406.0 pounds and BMI is 69.69. The patient is planning to have the Carlos-en-Y Gastric Bypass procedure.    Evaluation Procedure:  · Clinical diagnostic interview, Face to Face, and mental status exam  · Alcohol Use Disorders Identification Test (AUDIT-10)  · Patient Health Questionnaire -9 (PHQ-9)  · Generalized Anxiety Disorder - 7 (ASHLEY-7)  · Binge Eating Scale (BES)  · Brief Resiliency Scale (BRS)  · Mood Disorder Questionnaire (MDQ)  · Review of patient provided report of psychosocial history, medical history; and other available background information and medical records.  · Length of visit: 75 minutes      EVALUATION DETAILS:    Patient Active Problem List   Diagnosis    ZAK (obstructive sleep apnea)    Epigastric pain    Heart burn    Generalized abdominal pain    Splenomegaly    Ectopic pregnancy of left ovary    Ectopic pregnancy without intrauterine pregnancy    Nausea and vomiting    Pelvic pain    Menometrorrhagia    Morbid obesity (HCC)    Nodule of esophagus    Bloating symptom    Gastric polyp    Constipation    Pelvic pain in female    Abnormal uterine bleeding (AUB)    Hx of ectopic pregnancy    Left knee pain         Current Outpatient Medications on File Prior to Visit   Medication Sig Dispense Refill    docusate sodium

## 2024-03-16 SDOH — HEALTH STABILITY: PHYSICAL HEALTH: ON AVERAGE, HOW MANY MINUTES DO YOU ENGAGE IN EXERCISE AT THIS LEVEL?: 30 MIN

## 2024-03-16 SDOH — HEALTH STABILITY: PHYSICAL HEALTH: ON AVERAGE, HOW MANY DAYS PER WEEK DO YOU ENGAGE IN MODERATE TO STRENUOUS EXERCISE (LIKE A BRISK WALK)?: 3 DAYS

## 2024-03-19 ENCOUNTER — TELEPHONE (OUTPATIENT)
Dept: PULMONOLOGY | Age: 29
End: 2024-03-19

## 2024-03-19 ENCOUNTER — OFFICE VISIT (OUTPATIENT)
Dept: ORTHOPEDIC SURGERY | Age: 29
End: 2024-03-19
Payer: COMMERCIAL

## 2024-03-19 ENCOUNTER — HOSPITAL ENCOUNTER (OUTPATIENT)
Age: 29
Discharge: HOME | End: 2024-03-21
Attending: INTERNAL MEDICINE
Payer: COMMERCIAL

## 2024-03-19 VITALS
SYSTOLIC BLOOD PRESSURE: 122 MMHG | HEIGHT: 64 IN | WEIGHT: 293 LBS | DIASTOLIC BLOOD PRESSURE: 88 MMHG | BODY MASS INDEX: 50.02 KG/M2

## 2024-03-19 VITALS — HEIGHT: 64 IN | WEIGHT: 293 LBS | BODY MASS INDEX: 50.02 KG/M2

## 2024-03-19 DIAGNOSIS — Z01.810 PREOP CARDIOVASCULAR EXAM: ICD-10-CM

## 2024-03-19 DIAGNOSIS — M25.562 LEFT KNEE PAIN, UNSPECIFIED CHRONICITY: Primary | ICD-10-CM

## 2024-03-19 LAB
ECHO AO ROOT DIAM: 2.4 CM
ECHO AO ROOT INDEX: 0.91 CM/M2
ECHO AV AREA PEAK VELOCITY: 2 CM2
ECHO AV AREA VTI: 2.1 CM2
ECHO AV AREA/BSA PEAK VELOCITY: 0.8 CM2/M2
ECHO AV AREA/BSA VTI: 0.8 CM2/M2
ECHO AV CUSP MM: 2.1 CM
ECHO AV MEAN GRADIENT: 6 MMHG
ECHO AV MEAN VELOCITY: 1.1 M/S
ECHO AV PEAK GRADIENT: 9 MMHG
ECHO AV PEAK VELOCITY: 1.6 M/S
ECHO AV VELOCITY RATIO: 0.69
ECHO AV VTI: 33.1 CM
ECHO BSA: 2.88 M2
ECHO BSA: 2.88 M2
ECHO EST RA PRESSURE: 3 MMHG
ECHO LA DIAMETER INDEX: 1.36 CM/M2
ECHO LA DIAMETER: 3.6 CM
ECHO LA TO AORTIC ROOT RATIO: 1.5
ECHO LA VOL A-L A2C: 51 ML (ref 22–52)
ECHO LA VOL A-L A4C: 76 ML (ref 22–52)
ECHO LA VOL MOD A2C: 49 ML (ref 22–52)
ECHO LA VOL MOD A4C: 70 ML (ref 22–52)
ECHO LA VOLUME AREA LENGTH: 67 ML
ECHO LA VOLUME INDEX A-L A2C: 19 ML/M2 (ref 16–34)
ECHO LA VOLUME INDEX A-L A4C: 29 ML/M2 (ref 16–34)
ECHO LA VOLUME INDEX AREA LENGTH: 25 ML/M2 (ref 16–34)
ECHO LA VOLUME INDEX MOD A2C: 19 ML/M2 (ref 16–34)
ECHO LA VOLUME INDEX MOD A4C: 27 ML/M2 (ref 16–34)
ECHO LV E' LATERAL VELOCITY: 17 CM/S
ECHO LV E' SEPTAL VELOCITY: 16 CM/S
ECHO LV EDV A2C: 88 ML
ECHO LV EDV A4C: 82 ML
ECHO LV EDV BP: 85 ML (ref 56–104)
ECHO LV EDV INDEX A4C: 31 ML/M2
ECHO LV EDV INDEX BP: 32 ML/M2
ECHO LV EDV NDEX A2C: 33 ML/M2
ECHO LV EJECTION FRACTION A2C: 41 %
ECHO LV EJECTION FRACTION A4C: 49 %
ECHO LV EJECTION FRACTION BIPLANE: 44 % (ref 55–100)
ECHO LV ESV A2C: 53 ML
ECHO LV ESV A4C: 42 ML
ECHO LV ESV BP: 48 ML (ref 19–49)
ECHO LV ESV INDEX A2C: 20 ML/M2
ECHO LV ESV INDEX A4C: 16 ML/M2
ECHO LV ESV INDEX BP: 18 ML/M2
ECHO LV FRACTIONAL SHORTENING: 32 % (ref 28–44)
ECHO LV INTERNAL DIMENSION DIASTOLE INDEX: 2.12 CM/M2
ECHO LV INTERNAL DIMENSION DIASTOLIC: 5.6 CM (ref 3.9–5.3)
ECHO LV INTERNAL DIMENSION SYSTOLIC INDEX: 1.44 CM/M2
ECHO LV INTERNAL DIMENSION SYSTOLIC: 3.8 CM
ECHO LV IVSD: 1.1 CM (ref 0.6–0.9)
ECHO LV IVSS: 1.3 CM
ECHO LV MASS 2D: 264.7 G (ref 67–162)
ECHO LV MASS INDEX 2D: 100.3 G/M2 (ref 43–95)
ECHO LV POSTERIOR WALL DIASTOLIC: 1.2 CM (ref 0.6–0.9)
ECHO LV POSTERIOR WALL SYSTOLIC: 1.6 CM
ECHO LV RELATIVE WALL THICKNESS RATIO: 0.43
ECHO LVOT AREA: 3.1 CM2
ECHO LVOT AV VTI INDEX: 0.7
ECHO LVOT DIAM: 2 CM
ECHO LVOT MEAN GRADIENT: 2 MMHG
ECHO LVOT PEAK GRADIENT: 4 MMHG
ECHO LVOT PEAK VELOCITY: 1.1 M/S
ECHO LVOT STROKE VOLUME INDEX: 27.5 ML/M2
ECHO LVOT SV: 72.5 ML
ECHO LVOT VTI: 23.1 CM
ECHO MV A VELOCITY: 0.7 M/S
ECHO MV E DECELERATION TIME (DT): 179.6 MS
ECHO MV E VELOCITY: 1.01 M/S
ECHO MV E/A RATIO: 1.44
ECHO MV E/E' LATERAL: 5.94
ECHO MV E/E' RATIO (AVERAGED): 6.13
ECHO PV MAX VELOCITY: 1.1 M/S
ECHO PV PEAK GRADIENT: 4 MMHG
ECHO RIGHT VENTRICULAR SYSTOLIC PRESSURE (RVSP): 32 MMHG
ECHO RV INTERNAL DIMENSION: 2.9 CM
ECHO RVOT PEAK GRADIENT: 4 MMHG
ECHO RVOT PEAK VELOCITY: 1 M/S
ECHO TV REGURGITANT MAX VELOCITY: 2.67 M/S
ECHO TV REGURGITANT PEAK GRADIENT: 29 MMHG
STRESS BASELINE DIAS BP: 73 MMHG
STRESS BASELINE HR: 75 BPM
STRESS BASELINE ST DEPRESSION: 0 MM
STRESS BASELINE SYS BP: 121 MMHG
STRESS ESTIMATED WORKLOAD: 1 METS
STRESS PEAK DIAS BP: 97 MMHG
STRESS PEAK SYS BP: 175 MMHG
STRESS PERCENT HR ACHIEVED: 85 %
STRESS POST PEAK HR: 164 BPM
STRESS RATE PRESSURE PRODUCT: NORMAL BPM*MMHG
STRESS ST DEPRESSION: 0 MM
STRESS TARGET HR: 192 BPM

## 2024-03-19 PROCEDURE — 6360000002 HC RX W HCPCS: Performed by: INTERNAL MEDICINE

## 2024-03-19 PROCEDURE — 2580000003 HC RX 258: Performed by: INTERNAL MEDICINE

## 2024-03-19 PROCEDURE — G8417 CALC BMI ABV UP PARAM F/U: HCPCS | Performed by: ORTHOPAEDIC SURGERY

## 2024-03-19 PROCEDURE — 6360000004 HC RX CONTRAST MEDICATION: Performed by: INTERNAL MEDICINE

## 2024-03-19 PROCEDURE — G8484 FLU IMMUNIZE NO ADMIN: HCPCS | Performed by: ORTHOPAEDIC SURGERY

## 2024-03-19 PROCEDURE — 20610 DRAIN/INJ JOINT/BURSA W/O US: CPT | Performed by: ORTHOPAEDIC SURGERY

## 2024-03-19 PROCEDURE — 99204 OFFICE O/P NEW MOD 45 MIN: CPT | Performed by: ORTHOPAEDIC SURGERY

## 2024-03-19 PROCEDURE — 93017 CV STRESS TEST TRACING ONLY: CPT

## 2024-03-19 PROCEDURE — 1036F TOBACCO NON-USER: CPT | Performed by: ORTHOPAEDIC SURGERY

## 2024-03-19 PROCEDURE — 93306 TTE W/DOPPLER COMPLETE: CPT | Performed by: INTERNAL MEDICINE

## 2024-03-19 PROCEDURE — G8427 DOCREV CUR MEDS BY ELIG CLIN: HCPCS | Performed by: ORTHOPAEDIC SURGERY

## 2024-03-19 PROCEDURE — C8929 TTE W OR WO FOL WCON,DOPPLER: HCPCS

## 2024-03-19 PROCEDURE — 6360000004 HC RX CONTRAST MEDICATION

## 2024-03-19 RX ORDER — MELOXICAM 15 MG/1
15 TABLET ORAL DAILY
COMMUNITY
Start: 2024-02-22 | End: 2025-02-21

## 2024-03-19 RX ORDER — SODIUM CHLORIDE 9 MG/ML
INJECTION, SOLUTION INTRAVENOUS ONCE
Status: COMPLETED | OUTPATIENT
Start: 2024-03-19 | End: 2024-03-19

## 2024-03-19 RX ORDER — ATROPINE SULFATE 0.1 MG/ML
2 INJECTION INTRAVENOUS PRN
Status: DISCONTINUED | OUTPATIENT
Start: 2024-03-19 | End: 2024-03-22 | Stop reason: HOSPADM

## 2024-03-19 RX ORDER — METOPROLOL TARTRATE 1 MG/ML
5 INJECTION, SOLUTION INTRAVENOUS
Status: DISCONTINUED | OUTPATIENT
Start: 2024-03-19 | End: 2024-03-22 | Stop reason: HOSPADM

## 2024-03-19 RX ORDER — TRIAMCINOLONE ACETONIDE 40 MG/ML
80 INJECTION, SUSPENSION INTRA-ARTICULAR; INTRAMUSCULAR ONCE
Status: COMPLETED | OUTPATIENT
Start: 2024-03-19 | End: 2024-03-19

## 2024-03-19 RX ORDER — DOBUTAMINE HYDROCHLORIDE 200 MG/100ML
5 INJECTION INTRAVENOUS CONTINUOUS
Status: DISCONTINUED | OUTPATIENT
Start: 2024-03-19 | End: 2024-03-22 | Stop reason: HOSPADM

## 2024-03-19 RX ORDER — LIDOCAINE HYDROCHLORIDE 10 MG/ML
2 INJECTION, SOLUTION INFILTRATION; PERINEURAL ONCE
Status: COMPLETED | OUTPATIENT
Start: 2024-03-19 | End: 2024-03-19

## 2024-03-19 RX ADMIN — LIDOCAINE HYDROCHLORIDE 2 ML: 10 INJECTION, SOLUTION INFILTRATION; PERINEURAL at 10:29

## 2024-03-19 RX ADMIN — TRIAMCINOLONE ACETONIDE 80 MG: 40 INJECTION, SUSPENSION INTRA-ARTICULAR; INTRAMUSCULAR at 10:29

## 2024-03-19 RX ADMIN — DOBUTAMINE IN DEXTROSE 5 MCG/KG/MIN: 200 INJECTION, SOLUTION INTRAVENOUS at 08:24

## 2024-03-19 RX ADMIN — SODIUM CHLORIDE: 9 INJECTION, SOLUTION INTRAVENOUS at 08:10

## 2024-03-19 RX ADMIN — PERFLUTREN 5 ML: 6.52 INJECTION, SUSPENSION INTRAVENOUS at 08:55

## 2024-03-19 RX ADMIN — ATROPINE SULFATE 0.7 MG: 0.1 INJECTION, SOLUTION ENDOTRACHEAL; INTRAMUSCULAR; INTRAVENOUS; SUBCUTANEOUS at 08:30
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
## 2024-03-19 NOTE — TELEPHONE ENCOUNTER
PATIENT HAS BEEN DIAGNOSED WITH COVID TODAY.   SHE HAS BEEN UNABLE TO WEAR CPAP FOR A COUPLE DAYS DUE TO CONGESTION.      SHE HAS A PHONE CALL APPOINTMENT  ON 3/28/2024 FOR COMPLIANCE AND CLEARANCE FOR BARIATRIC SURGERY.      SHE WANTED TO MAKE SURE YOU WERE AWARE

## 2024-03-21 ENCOUNTER — TELEPHONE (OUTPATIENT)
Dept: CARDIOLOGY CLINIC | Age: 29
End: 2024-03-21

## 2024-03-21 NOTE — TELEPHONE ENCOUNTER
----- Message from Erik ROCHE MD sent at 3/20/2024 11:15 AM EDT -----  Stress Echo - no ischemia - low risk  ----- Message -----  From: Romel Tapia DO  Sent: 3/19/2024   7:01 PM EDT  To: Erik ROCHE MD

## 2024-03-21 NOTE — TELEPHONE ENCOUNTER
Called patient to notify her of her Stress Echo results per Dr. Ga. Patient wondering if she would can get a cardiac clearance letter for bariatric surgery with Dr. Ng.

## 2024-03-22 NOTE — TELEPHONE ENCOUNTER
Patient will call Dr. Reyes's office to fax us cardiac clearance. No additional questions or concerns.

## 2024-03-28 ENCOUNTER — TELEMEDICINE (OUTPATIENT)
Dept: PULMONOLOGY | Age: 29
End: 2024-03-28

## 2024-03-28 DIAGNOSIS — E66.9 OBESITY, UNSPECIFIED CLASSIFICATION, UNSPECIFIED OBESITY TYPE, UNSPECIFIED WHETHER SERIOUS COMORBIDITY PRESENT: ICD-10-CM

## 2024-03-28 DIAGNOSIS — G47.33 OSA ON CPAP: Primary | ICD-10-CM

## 2024-03-28 NOTE — PROGRESS NOTES
Exam    Alert and oriented x 3.      On this date 3/28/2024 I have spent 21 minutes reviewing previous notes, test results and face to face (virtual) with the patient discussing the diagnosis and importance of compliance with the treatment plan as well as documenting on the day of the visit.    Electronically signed by Jovani Sy MD on 3/28/2024 at 11:30 AM

## 2024-04-04 ENCOUNTER — HOSPITAL ENCOUNTER (OUTPATIENT)
Dept: MRI IMAGING | Age: 29
Discharge: HOME | End: 2024-04-06
Attending: ORTHOPAEDIC SURGERY
Payer: COMMERCIAL

## 2024-04-04 DIAGNOSIS — M25.562 LEFT KNEE PAIN, UNSPECIFIED CHRONICITY: ICD-10-CM

## 2024-04-04 PROCEDURE — 73721 MRI JNT OF LWR EXTRE W/O DYE: CPT

## 2024-04-22 ENCOUNTER — OFFICE VISIT (OUTPATIENT)
Dept: BARIATRICS/WEIGHT MGMT | Age: 29
End: 2024-04-22
Payer: COMMERCIAL

## 2024-04-22 ENCOUNTER — OFFICE VISIT (OUTPATIENT)
Dept: CARDIOLOGY CLINIC | Age: 29
End: 2024-04-22
Payer: COMMERCIAL

## 2024-04-22 VITALS
SYSTOLIC BLOOD PRESSURE: 116 MMHG | OXYGEN SATURATION: 99 % | WEIGHT: 293 LBS | BODY MASS INDEX: 50.02 KG/M2 | DIASTOLIC BLOOD PRESSURE: 68 MMHG | HEIGHT: 64 IN | HEART RATE: 80 BPM

## 2024-04-22 VITALS — WEIGHT: 293 LBS | BODY MASS INDEX: 50.02 KG/M2 | HEIGHT: 64 IN

## 2024-04-22 DIAGNOSIS — Z71.3 DIETARY COUNSELING AND SURVEILLANCE: ICD-10-CM

## 2024-04-22 DIAGNOSIS — E66.01 MORBID OBESITY DUE TO EXCESS CALORIES (HCC): Primary | ICD-10-CM

## 2024-04-22 DIAGNOSIS — I10 HYPERTENSION, UNSPECIFIED TYPE: ICD-10-CM

## 2024-04-22 DIAGNOSIS — G47.33 OSA (OBSTRUCTIVE SLEEP APNEA): Primary | ICD-10-CM

## 2024-04-22 PROCEDURE — 99214 OFFICE O/P EST MOD 30 MIN: CPT | Performed by: INTERNAL MEDICINE

## 2024-04-22 PROCEDURE — 1036F TOBACCO NON-USER: CPT | Performed by: INTERNAL MEDICINE

## 2024-04-22 PROCEDURE — 3078F DIAST BP <80 MM HG: CPT | Performed by: INTERNAL MEDICINE

## 2024-04-22 PROCEDURE — 3074F SYST BP LT 130 MM HG: CPT | Performed by: INTERNAL MEDICINE

## 2024-04-22 PROCEDURE — 97803 MED NUTRITION INDIV SUBSEQ: CPT | Performed by: DIETITIAN, REGISTERED

## 2024-04-22 PROCEDURE — G8417 CALC BMI ABV UP PARAM F/U: HCPCS | Performed by: DIETITIAN, REGISTERED

## 2024-04-22 PROCEDURE — G8427 DOCREV CUR MEDS BY ELIG CLIN: HCPCS | Performed by: INTERNAL MEDICINE

## 2024-04-22 PROCEDURE — G8428 CUR MEDS NOT DOCUMENT: HCPCS | Performed by: DIETITIAN, REGISTERED

## 2024-04-22 PROCEDURE — G8417 CALC BMI ABV UP PARAM F/U: HCPCS | Performed by: INTERNAL MEDICINE

## 2024-04-22 RX ORDER — LISINOPRIL 5 MG/1
5 TABLET ORAL DAILY
Qty: 90 TABLET | Refills: 3 | Status: SHIPPED | OUTPATIENT
Start: 2024-04-22

## 2024-04-22 ASSESSMENT — ENCOUNTER SYMPTOMS
GASTROINTESTINAL NEGATIVE: 1
RESPIRATORY NEGATIVE: 1
BLOOD IN STOOL: 0
STRIDOR: 0
CHEST TIGHTNESS: 0
COUGH: 0
WHEEZING: 0
NAUSEA: 0
SHORTNESS OF BREATH: 0
EYES NEGATIVE: 1

## 2024-04-22 NOTE — PROGRESS NOTES
Nutrition follow up prior to surgery  Visit number:  4 out of 6 for Carlos en Y gastric bypass.    Initial Weight: 438 lbs    Wt Readings from Last 3 Encounters:   03/19/24 (!) 184.2 kg (406 lb 1.4 oz)   03/19/24 (!) 184.2 kg (406 lb)   03/19/24 (!) 184.2 kg (406 lb)     lost 18 lbs over 1 month, 50 lbs from initial.    Previous nutrition goals:   Continue current intake  Continue to increase daily exercise  Sample CL protein benito/shots (try Vidafuel protein shot)    Nutrition goals: met, reports a few days of emotional eating d/t a stressful situation. Food journal shows average intake 1500kcal/day, water intake has decreased to 30-40 oz/day, patient aware. Patient has found Protein 2o and has purchased.  Averaging 4000 steps/day and doing exercises as home.    PES Statement:  Overweight/Obesity related to a complex combination of decreased energy needs, disordered eating patterns, physical inactivity, and increased psychological/life stress as evidenced by BMI greater than 30 and inability to maintain a significant amount of weight loss through conventional weight loss interventions.    Intervention:  Reviewed previous goals and set new goals.  Stressed importance of preoperative weight loss or surgery may be postponed or cancelled.  Provided continued education regarding diet and lifestyle changes for optimal success post bariatric surgery.  Encouragement and support provided.    Education materials provided:   Online bariatric resources    Plan/Recommendations:   Continue current intake  Increase fluids to 64 oz/day    Follow up: 4 weeks     Total time involved in direct patient education: 30 minutes    Milagros Urbina RD

## 2024-04-22 NOTE — PROGRESS NOTES
Results   Component Value Date    CHOL 169 01/17/2024    CHOL 157 03/02/2020     Lab Results   Component Value Date    TRIG 127 01/17/2024    TRIG 132 03/02/2020     Lab Results   Component Value Date    HDL 35 (L) 01/17/2024    HDL 36 (L) 03/02/2020     Lab Results   Component Value Date    LDLCALC 111 (H) 01/17/2024    LDLCALC 95 03/02/2020     Lab Results   Component Value Date    VLDL 23 01/17/2024     No results found for: \"CHOLHDLRATIO\"  CMP:    Lab Results   Component Value Date/Time     01/17/2024 09:41 AM    K 4.7 01/17/2024 09:41 AM     01/17/2024 09:41 AM    CO2 19 01/17/2024 09:41 AM    BUN 23 01/17/2024 09:41 AM    CREATININE 0.91 01/17/2024 09:41 AM    GFRAA >60.0 06/26/2022 11:00 AM    LABGLOM 88 01/17/2024 09:41 AM    GLUCOSE 95 01/17/2024 09:41 AM    GLUCOSE 136 07/04/2019 06:25 PM    PROT 8.0 06/07/2023 08:00 PM    CALCIUM 10.0 01/17/2024 09:41 AM    BILITOT 0.3 01/17/2024 09:41 AM    ALKPHOS 93 01/17/2024 09:41 AM    AST 20 01/17/2024 09:41 AM    ALT 21 01/17/2024 09:41 AM     BMP:    Lab Results   Component Value Date/Time     01/17/2024 09:41 AM    K 4.7 01/17/2024 09:41 AM     01/17/2024 09:41 AM    CO2 19 01/17/2024 09:41 AM    BUN 23 01/17/2024 09:41 AM    CREATININE 0.91 01/17/2024 09:41 AM    CALCIUM 10.0 01/17/2024 09:41 AM    GFRAA >60.0 06/26/2022 11:00 AM    LABGLOM 88 01/17/2024 09:41 AM    GLUCOSE 95 01/17/2024 09:41 AM    GLUCOSE 136 07/04/2019 06:25 PM     Magnesium:    Lab Results   Component Value Date/Time    MG 1.9 01/17/2024 09:41 AM     TSH:  Lab Results   Component Value Date    TSH 3.150 01/17/2024             Patient Active Problem List   Diagnosis    ZAK (obstructive sleep apnea)    Epigastric pain    Heart burn    Generalized abdominal pain    Splenomegaly    Ectopic pregnancy of left ovary    Ectopic pregnancy without intrauterine pregnancy    Nausea and vomiting    Pelvic pain    Menometrorrhagia    Morbid obesity (HCC)    Nodule of esophagus

## 2024-04-24 ENCOUNTER — OFFICE VISIT (OUTPATIENT)
Dept: ORTHOPEDIC SURGERY | Age: 29
End: 2024-04-24
Payer: COMMERCIAL

## 2024-04-24 DIAGNOSIS — M25.562 LEFT KNEE PAIN, UNSPECIFIED CHRONICITY: Primary | ICD-10-CM

## 2024-04-24 PROCEDURE — 20610 DRAIN/INJ JOINT/BURSA W/O US: CPT | Performed by: ORTHOPAEDIC SURGERY

## 2024-04-24 PROCEDURE — G8427 DOCREV CUR MEDS BY ELIG CLIN: HCPCS | Performed by: ORTHOPAEDIC SURGERY

## 2024-04-24 PROCEDURE — 99214 OFFICE O/P EST MOD 30 MIN: CPT | Performed by: ORTHOPAEDIC SURGERY

## 2024-04-24 PROCEDURE — 1036F TOBACCO NON-USER: CPT | Performed by: ORTHOPAEDIC SURGERY

## 2024-04-24 PROCEDURE — G8417 CALC BMI ABV UP PARAM F/U: HCPCS | Performed by: ORTHOPAEDIC SURGERY

## 2024-04-24 RX ORDER — TRIAMCINOLONE ACETONIDE 40 MG/ML
80 INJECTION, SUSPENSION INTRA-ARTICULAR; INTRAMUSCULAR ONCE
Status: COMPLETED | OUTPATIENT
Start: 2024-04-24 | End: 2024-04-24

## 2024-04-24 RX ORDER — LIDOCAINE HYDROCHLORIDE 10 MG/ML
2 INJECTION, SOLUTION INFILTRATION; PERINEURAL ONCE
Status: COMPLETED | OUTPATIENT
Start: 2024-04-24 | End: 2024-04-24

## 2024-04-24 RX ADMIN — LIDOCAINE HYDROCHLORIDE 2 ML: 10 INJECTION, SOLUTION INFILTRATION; PERINEURAL at 12:23

## 2024-04-24 RX ADMIN — TRIAMCINOLONE ACETONIDE 80 MG: 40 INJECTION, SUSPENSION INTRA-ARTICULAR; INTRAMUSCULAR at 12:24

## 2024-04-24 NOTE — PROGRESS NOTES
Subjective:      Patient ID: Jeniffer Kennedy is a 29 y.o. female who presents today for:  Chief Complaint   Patient presents with    Follow-up     Patient presents to clinic to review left knee MRI results from 4/4/24.       Subjective/Objective/Assessment/Plan:     SUBJECTIVE -patient comes in with left knee pain.  States that this is been ongoing since 2016.  She has done physical therapy and other therapy modalities.  Nothing is helped her.  She is scheduled for bariatric surgery.  X-rays were reviewed showing normal bony morphology.    OBJECTIVE -stable left knee exam.  Crepitus with the patella on range of motion.  She can fully flex and fully extend.  No effusion appreciated.  Difficult to do a exam due to body habitus.        ASSESSMENT -    Diagnosis Orders   1. Left knee pain, unspecified chronicity              PLAN -she would like a cortisone injection into the left knee.  MRI shows mild chondral fissuring in the patellofemoral articulation.  Inferior prepatellar bursal swelling.  She is not a candidate for    The patient has been diagnosed with left knee pain. I prepped the injection area with alcohol. Under aseptic technique, I then injected 80 mg of Kenalog and 2 cc of 1% lidocaine into the left knee. The patient tolerated the procedure well and there were no complications.  I would like to see the patient back in 2 weeks to assess her response to the injection.        --------------------------------------------------------------------------------------------------------------  Past Medical History:   Diagnosis Date    Mental disorder     depression and anxiety    Postpartum depression     Sleep apnea      --------------------------------------------------------------------------------------------------------------  Past Surgical History:   Procedure Laterality Date    DILATION AND CURETTAGE OF UTERUS N/A 9/15/2023    ATTEMPTED LAPAROSCOPIC BILATERAL SALPINGECTOMY WITH NOVASURE ENDOMETRIAL

## 2024-05-03 ENCOUNTER — TELEPHONE (OUTPATIENT)
Dept: BARIATRICS/WEIGHT MGMT | Age: 29
End: 2024-05-03

## 2024-05-03 DIAGNOSIS — E66.01 MORBID OBESITY DUE TO EXCESS CALORIES (HCC): Primary | ICD-10-CM

## 2024-05-03 RX ORDER — PHENTERMINE HYDROCHLORIDE 37.5 MG/1
37.5 TABLET ORAL
Qty: 30 TABLET | Refills: 0 | Status: SHIPPED | OUTPATIENT
Start: 2024-05-03 | End: 2024-06-02

## 2024-05-03 NOTE — TELEPHONE ENCOUNTER
Dr. Ng,     Patient called to see if you would be willing to prescribe Adipex. She has been out of the medication for about 5 days and has gained 10 pounds. She would like it be be sent to [facility] in [place].     Thank you,   Desirae HOWARD

## 2024-05-10 ENCOUNTER — OFFICE VISIT (OUTPATIENT)
Dept: BARIATRICS/WEIGHT MGMT | Age: 29
End: 2024-05-10
Payer: COMMERCIAL

## 2024-05-10 ENCOUNTER — TELEPHONE (OUTPATIENT)
Dept: CARDIOLOGY CLINIC | Age: 29
End: 2024-05-10

## 2024-05-10 VITALS
DIASTOLIC BLOOD PRESSURE: 70 MMHG | OXYGEN SATURATION: 100 % | HEIGHT: 64 IN | BODY MASS INDEX: 50.02 KG/M2 | SYSTOLIC BLOOD PRESSURE: 110 MMHG | WEIGHT: 293 LBS | HEART RATE: 100 BPM

## 2024-05-10 DIAGNOSIS — E66.01 MORBID OBESITY (HCC): Primary | ICD-10-CM

## 2024-05-10 DIAGNOSIS — K21.9 GASTROESOPHAGEAL REFLUX DISEASE WITHOUT ESOPHAGITIS: ICD-10-CM

## 2024-05-10 PROCEDURE — 99215 OFFICE O/P EST HI 40 MIN: CPT | Performed by: SURGERY

## 2024-05-10 PROCEDURE — G8427 DOCREV CUR MEDS BY ELIG CLIN: HCPCS | Performed by: SURGERY

## 2024-05-10 PROCEDURE — 1036F TOBACCO NON-USER: CPT | Performed by: SURGERY

## 2024-05-10 PROCEDURE — G8417 CALC BMI ABV UP PARAM F/U: HCPCS | Performed by: SURGERY

## 2024-05-10 RX ORDER — ALUMINUM ZIRCONIUM OCTACHLOROHYDREX GLY 16 G/100G
GEL TOPICAL
Qty: 2 EACH | Refills: 3 | Status: SHIPPED | OUTPATIENT
Start: 2024-05-10

## 2024-05-10 NOTE — PATIENT INSTRUCTIONS
Keep up the pre-op weight loss.      Use metamucil and water to improve constipation.    Ask Dr. Dr. Ga about stopping or reducing lisinopril.

## 2024-05-10 NOTE — PROGRESS NOTES
Surgery Consultation    Patient Name:  :  Hospital Day:   Jeniffer Kennedy 1995 [unfilled]     Date of Service: 5/10/2024    Subjective:      History of Present Illness:    Jeniffer Kennedy  is a 29 y.o. female referred by Dr. Gregory for morbid obesity.  She is in the preop process for surgery and has 1 or 2 more dietitian visits to complete as well as another psychologist visit.  She returns to the clinic today to ask if the process can be spit up because she is having rashes under her pannus.  She also reports having some dizziness on Adipex with normal or low blood pressure readings on lisinopril.  She has lost 64 pounds in 4 months on Adipex.    Past Medical History:   Diagnosis Date    Mental disorder     depression and anxiety    Postpartum depression     Sleep apnea     Past Surgical History:   Procedure Laterality Date    DILATION AND CURETTAGE OF UTERUS N/A 9/15/2023    ATTEMPTED LAPAROSCOPIC BILATERAL SALPINGECTOMY WITH NOVASURE ENDOMETRIAL ABLATION performed by Agustin Gregory MD at [facility]    HYSTERECTOMY, VAGINAL N/A 10/20/2023    TOTAL LAPAROSCOPIC HYSTERECTOMY, RIGHT SALPINGECTOMY, LEFT OOPHERECTOMY performed by Agustin Gregory MD at [facility]    LAPAROSCOPY Left 6/10/2021    LAPAROSCOPIC ECTOPIC REMOVAL, LEFT SALPINGECTOMY performed by Agustin Gregory MD at [facility]    UPPER GASTROINTESTINAL ENDOSCOPY      UPPER GASTROINTESTINAL ENDOSCOPY N/A 2023    EGD DIAGNOSTIC ONLY performed by Sheridan Laughlin MD at [facility]    UPPER GASTROINTESTINAL ENDOSCOPY N/A 2023    ENDOSCOPIC ULTRASOUND with polypectomy performed by Rodri Thornton MD at [facility]    WISDOM TOOTH EXTRACTION  2017    WISDOM TOOTH EXTRACTION  2019        Family History   Problem Relation Age of Onset    Clotting Disorder Mother     Depression Mother     Stroke Mother     Diabetes Mother     Unknown Father     Cancer Maternal Grandfather     Celiac Disease Neg Hx     Colon Cancer Neg Hx     Crohn's

## 2024-05-10 NOTE — TELEPHONE ENCOUNTER
PT says she is experiencing dizziness she wants to if we can lower her lisinopril 5mg to something lower.

## 2024-05-14 ENCOUNTER — NURSE ONLY (OUTPATIENT)
Dept: BARIATRICS/WEIGHT MGMT | Age: 29
End: 2024-05-14

## 2024-05-14 ENCOUNTER — TELEPHONE (OUTPATIENT)
Dept: CARDIOLOGY CLINIC | Age: 29
End: 2024-05-14

## 2024-05-14 ENCOUNTER — OFFICE VISIT (OUTPATIENT)
Dept: BARIATRICS/WEIGHT MGMT | Age: 29
End: 2024-05-14
Payer: COMMERCIAL

## 2024-05-14 VITALS — BODY MASS INDEX: 50.02 KG/M2 | HEIGHT: 64 IN | WEIGHT: 293 LBS

## 2024-05-14 DIAGNOSIS — Z01.818 PRE-OP EVALUATION: ICD-10-CM

## 2024-05-14 DIAGNOSIS — Z71.3 DIETARY COUNSELING AND SURVEILLANCE: ICD-10-CM

## 2024-05-14 DIAGNOSIS — E66.01 MORBID OBESITY (HCC): Primary | ICD-10-CM

## 2024-05-14 DIAGNOSIS — Z01.818 PRE-OP EVALUATION: Primary | ICD-10-CM

## 2024-05-14 DIAGNOSIS — E66.01 MORBID OBESITY (HCC): ICD-10-CM

## 2024-05-14 DIAGNOSIS — E66.01 PSYCHOLOGICAL FACTORS AFFECTING MORBID OBESITY (HCC): ICD-10-CM

## 2024-05-14 DIAGNOSIS — F43.9 TRAUMA AND STRESSOR-RELATED DISORDER: ICD-10-CM

## 2024-05-14 DIAGNOSIS — F31.76 BIPOLAR DISORDER, IN FULL REMISSION, MOST RECENT EPISODE DEPRESSED (HCC): ICD-10-CM

## 2024-05-14 DIAGNOSIS — F54 PSYCHOLOGICAL FACTORS AFFECTING MORBID OBESITY (HCC): ICD-10-CM

## 2024-05-14 DIAGNOSIS — Z01.818 PREOPERATIVE TESTING: ICD-10-CM

## 2024-05-14 DIAGNOSIS — F41.0 PANIC DISORDER: ICD-10-CM

## 2024-05-14 DIAGNOSIS — F90.2 ATTENTION DEFICIT HYPERACTIVITY DISORDER (ADHD), COMBINED TYPE: ICD-10-CM

## 2024-05-14 DIAGNOSIS — Z01.818 PREOPERATIVE TESTING: Primary | ICD-10-CM

## 2024-05-14 LAB — HCG UR QL: NEGATIVE

## 2024-05-14 PROCEDURE — G8428 CUR MEDS NOT DOCUMENT: HCPCS | Performed by: DIETITIAN, REGISTERED

## 2024-05-14 PROCEDURE — 97803 MED NUTRITION INDIV SUBSEQ: CPT | Performed by: DIETITIAN, REGISTERED

## 2024-05-14 PROCEDURE — G8417 CALC BMI ABV UP PARAM F/U: HCPCS | Performed by: DIETITIAN, REGISTERED

## 2024-05-14 PROCEDURE — 1036F TOBACCO NON-USER: CPT | Performed by: PSYCHOLOGIST

## 2024-05-14 PROCEDURE — 90832 PSYTX W PT 30 MINUTES: CPT | Performed by: PSYCHOLOGIST

## 2024-05-14 ASSESSMENT — ANXIETY QUESTIONNAIRES
6. BECOMING EASILY ANNOYED OR IRRITABLE: NOT AT ALL
GAD7 TOTAL SCORE: 2
1. FEELING NERVOUS, ANXIOUS, OR ON EDGE: SEVERAL DAYS
2. NOT BEING ABLE TO STOP OR CONTROL WORRYING: NOT AT ALL
7. FEELING AFRAID AS IF SOMETHING AWFUL MIGHT HAPPEN: NOT AT ALL
3. WORRYING TOO MUCH ABOUT DIFFERENT THINGS: NOT AT ALL
5. BEING SO RESTLESS THAT IT IS HARD TO SIT STILL: SEVERAL DAYS
4. TROUBLE RELAXING: NOT AT ALL
IF YOU CHECKED OFF ANY PROBLEMS ON THIS QUESTIONNAIRE, HOW DIFFICULT HAVE THESE PROBLEMS MADE IT FOR YOU TO DO YOUR WORK, TAKE CARE OF THINGS AT HOME, OR GET ALONG WITH OTHER PEOPLE: SOMEWHAT DIFFICULT

## 2024-05-14 ASSESSMENT — PATIENT HEALTH QUESTIONNAIRE - PHQ9
7. TROUBLE CONCENTRATING ON THINGS, SUCH AS READING THE NEWSPAPER OR WATCHING TELEVISION: NOT AT ALL
2. FEELING DOWN, DEPRESSED OR HOPELESS: NOT AT ALL
3. TROUBLE FALLING OR STAYING ASLEEP: NOT AT ALL
6. FEELING BAD ABOUT YOURSELF - OR THAT YOU ARE A FAILURE OR HAVE LET YOURSELF OR YOUR FAMILY DOWN: NOT AT ALL
5. POOR APPETITE OR OVEREATING: NOT AT ALL
SUM OF ALL RESPONSES TO PHQ QUESTIONS 1-9: 1
SUM OF ALL RESPONSES TO PHQ9 QUESTIONS 1 & 2: 0
10. IF YOU CHECKED OFF ANY PROBLEMS, HOW DIFFICULT HAVE THESE PROBLEMS MADE IT FOR YOU TO DO YOUR WORK, TAKE CARE OF THINGS AT HOME, OR GET ALONG WITH OTHER PEOPLE: SOMEWHAT DIFFICULT
4. FEELING TIRED OR HAVING LITTLE ENERGY: NOT AT ALL
1. LITTLE INTEREST OR PLEASURE IN DOING THINGS: NOT AT ALL
8. MOVING OR SPEAKING SO SLOWLY THAT OTHER PEOPLE COULD HAVE NOTICED. OR THE OPPOSITE, BEING SO FIGETY OR RESTLESS THAT YOU HAVE BEEN MOVING AROUND A LOT MORE THAN USUAL: SEVERAL DAYS
9. THOUGHTS THAT YOU WOULD BE BETTER OFF DEAD, OR OF HURTING YOURSELF: NOT AT ALL

## 2024-05-14 NOTE — PROGRESS NOTES
Nutrition follow up prior to surgery  Visit number:  5 out of 6 for Carlos en Y gastric bypass.    Initial Weight: 438 lbs    Wt Readings from Last 3 Encounters:   05/10/24 (!) 174.8 kg (385 lb 6.4 oz)   04/22/24 (!) 176.2 kg (388 lb 7.2 oz)   04/22/24 (!) 176.2 kg (388 lb 6.4 oz)     lost 6 lbs over 1 month, down 56 lbs from initial.    Previous nutrition goals:    Continue current intake  Increase fluids to 64 oz/day    Nutrition goals: met, food journal on sunny shows 3167-9846 kcal/day. Patient has increased fluid intake, still working on consistent intake of 64 oz/day. Patient has purchased all vitamins, protein benito, and protein shakes.  Walking 2500 steps/day.  Patient just purchased Metamucil for constipation as recommended by Dr. Ng.    Patient has completed required nutrition appointments and has met nutrition goals.  Reviewed importance of preoperative weight loss (10% of body weight) and reminded patient that surgery may be postponed or cancelled if weight loss is inadequate.  Patient is cleared from nutrition for bariatric surgery at this time.  Will follow up preoperatively as needed.       PES Statement:  Overweight/Obesity related to a complex combination of decreased energy needs, disordered eating patterns, physical inactivity, and increased psychological/life stress as evidenced by BMI greater than 30 and inability to maintain a significant amount of weight loss through conventional weight loss interventions.    Intervention:  Reviewed previous goals and set new goals.  Stressed importance of preoperative weight loss or surgery may be postponed or cancelled.  Provided continued education regarding diet and lifestyle changes for optimal success post bariatric surgery.  Encouragement and support provided.    Education materials provided:   none    Plan/Recommendations:   Continue current intake  Increase daily fluid intake 64 oz/day  Continue tracking daily intake on sunny    Follow up: 1 week post

## 2024-05-14 NOTE — TELEPHONE ENCOUNTER
Patient stopped into office today. Wanted to let Dr. Ga know that she has been having intermittent chest pain from the left breast area, radiating down her left arm to her elbow. Patient states that this has been happening periodically over the last 3 months. Patient states that the pain lasts from 24 hours- 2 days. Patient states that if she rests, the pain goes away.     Patient is having surgery with Dr. Ng, no date has been determined at this time. Patient realizes that she has been cleared for surgery already by Dr. Ga, however Dana CALDERA in Dr. Reyes's office needs Dr. Ga to be aware of chest pain and documented \"OK to proceed with surgery\" documented in chart due to chest pain.

## 2024-05-14 NOTE — PROGRESS NOTES
BARIATRIC PRE-SURGICAL BEHAVIORAL HEALTH FOLLOW UP  Morgan Cervantes Psy.D., [facility] [place]  Licensed Clinical Psychologist ([place] [id number])        Name: Jeniffer Kennedy  Date of Birth 1995  Visit Date: 5/14/2024   Time spent with Patient: 30 minutes.    Patient provided informed consent for behavioral health intervention. Discussed with patient the limits of confidentiality (i.e., abuse reporting, suicide intervention, review by treatment team, review by insurance company, etc.) and purpose of treatment. Patient indicated understanding.      SUBJECTIVE  Jeniffer presents for follow up of preoperative counseling, education, and behavioral support to assist with making behavior and lifestyle changes necessary for proposed bariatric surgery.      Previous Recommendations: Jeniffer must obtain a letter of support from her mental health treatment provider indicating good adherence to treatment, absence of any substance use, psychiatric hospitalization, and self-harm behavior in the past 12 months, and overall stability of mood and behavior; must demonstrate continued abstinence from use of crack cocaine for a total of 12 months prior to surgery (currently reporting 9 months), and will be confirmed by laboratory testing; must demonstrate reduction/elimination of emotionally-based eating, binge eating behaviors, and loss-of-control (LOC) eating.   If Jeniffer is referred for formal assessment of DID, she will need to obtain a copy of the report for this office to review.    Jeniffer reports:     Doing well overall; no significant changes since last visit.  Noted she had been out of Adipex for 5 days and stated she gained 10 lbs, despite maintaining current diet.     [facility] \"She said we'd be talking and she could feel my vibe completely change and the expression on my face would be totally different.\" Reports 4 alters; says all are in agreement with decision for surgery.     Mood:

## 2024-05-14 NOTE — PATIENT INSTRUCTIONS
the absorption of nutrients, such as thiamin, and this can cause deficiency after surgery. Furthermore, alcohol can cause your blood sugar levels to decline to dangerously low levels in the early postoperative periods, can lead to dehydration and electrolyte imbalance, and can impair judgement, potentially affecting food choices.       6. We advise all women of childbearing years (<50) to use an effective birth control option, such as an intrauterine device (IUD), for the first 2 years following bariatric surgery. Note that birth control pills may not be as effective due to malabsorption. During this time, the rapid weight loss and inability to consume normal food quantities impact a patient's nutritional status. The physical demands of a pregnancy in the first two years after surgery can compromise fetal health and may result in birth defects.       7. The following book recommendations may be helpful for you: \"50 Ways to Soothe Yourself Without Food,\" by Nhi Haynes; “But I Deserve This Chocolate! (The Fifty Most Common Diet-Derailing Excuses and How to Outwit Them),” by Nhi Haynes; \"Eat, Drink, and Be Mindful: How to End Your Struggle with Mindless Eating,\" by Nhi Haynes; \"The Emotional First Aid Kit: A Practical Guide to Life After Bariatric Surgery,\" by Audrey Ramirez; \"The Weight Loss Surgery Coping : A Practical Guide for Coping with Post-Surgery Emotions,\" by Natalie Baugh; \"The BIG Book on Bariatric Surgery: Living Your Best Life After Weight Loss Surgery,\" by Paco Del Toro & Brooklyn Ulloa; \"The Success Habits of Weight-Loss Surgery Patients,\" by Kimberly Cortez        8. You can return to this examiner at any time if you need additional assistance with making lifestyle changes or adjustments, before or after surgery.

## 2024-05-15 LAB
AMPHET CTO UR CFM-MCNC: NORMAL NG/ML
BARBITURATES CTO UR CFM-MCNC: NEGATIVE NG/ML
BENZODIAZ CTO UR CFM-MCNC: NEGATIVE NG/ML
CANNABINOIDS CTO UR CFM-MCNC: NEGATIVE NG/ML
COCAINE CTO UR CFM-MCNC: NEGATIVE NG/ML
CREAT UR-MCNC: 105 MG/DL (ref 20–400)
DRUGS OF ABUSE COMMENT: NORMAL
FOLATE: 8.7 NG/ML (ref 4.8–24.2)
IRON % SATURATION: 7 % (ref 20–55)
IRON: 28 UG/DL (ref 37–145)
METHADONE CTO UR CFM-MCNC: NEGATIVE NG/ML
OPIATES CTO UR CFM-MCNC: NEGATIVE NG/ML
PCP CTO UR CFM-MCNC: NEGATIVE NG/ML
PROPOXYPH CTO UR CFM-MCNC: NEGATIVE NG/ML
TOTAL IRON BINDING CAPACITY: 381 UG/DL (ref 250–450)
UNSATURATED IRON BINDING CAPACITY: 353 UG/DL (ref 112–347)

## 2024-05-15 NOTE — TELEPHONE ENCOUNTER
Per Dr. Ga, \"OK to proceed with planned surgery.\"    Called patient to notify her of Dr. Ga's response from her message yesterday. Patient verbalized understanding. No additional questions or concerns at this time.

## 2024-05-21 RX ORDER — UREA 10 %
325 LOTION (ML) TOPICAL 2 TIMES DAILY
Qty: 90 TABLET | Refills: 3 | Status: SHIPPED | OUTPATIENT
Start: 2024-05-21

## 2024-05-21 NOTE — PROGRESS NOTES
Iron tabs ordered for low iron levels.  I called Jeniffer and asked her to start Iron 325 mg BID.    Candido Ng MD, FACS, [facility]

## 2024-05-28 ENCOUNTER — TELEPHONE (OUTPATIENT)
Dept: BARIATRICS/WEIGHT MGMT | Age: 29
End: 2024-05-28

## 2024-05-28 RX ORDER — POLYETHYLENE GLYCOL 3350, SODIUM SULFATE ANHYDROUS, SODIUM BICARBONATE, SODIUM CHLORIDE, POTASSIUM CHLORIDE 236; 22.74; 6.74; 5.86; 2.97 G/4L; G/4L; G/4L; G/4L; G/4L
4 POWDER, FOR SOLUTION ORAL ONCE
Qty: 4000 ML | Refills: 0 | Status: SHIPPED | OUTPATIENT
Start: 2024-05-28 | End: 2024-05-28

## 2024-05-28 NOTE — TELEPHONE ENCOUNTER
Dr. Ng,     Patient is struggling with constipation. She stated that the psyllium husk is not working. She has also tried Miralax, Colace and an enema which produced a very small bowel movement. She is unsure what else she can try.     Desirae HOWARD

## 2024-05-30 ENCOUNTER — OFFICE VISIT (OUTPATIENT)
Dept: BARIATRICS/WEIGHT MGMT | Age: 29
End: 2024-05-30
Payer: COMMERCIAL

## 2024-05-30 VITALS
BODY MASS INDEX: 50.02 KG/M2 | HEIGHT: 64 IN | OXYGEN SATURATION: 100 % | WEIGHT: 293 LBS | DIASTOLIC BLOOD PRESSURE: 62 MMHG | HEART RATE: 99 BPM | SYSTOLIC BLOOD PRESSURE: 104 MMHG

## 2024-05-30 DIAGNOSIS — E66.01 MORBID OBESITY (HCC): Primary | ICD-10-CM

## 2024-05-30 PROCEDURE — G8417 CALC BMI ABV UP PARAM F/U: HCPCS | Performed by: SURGERY

## 2024-05-30 PROCEDURE — 1036F TOBACCO NON-USER: CPT | Performed by: SURGERY

## 2024-05-30 PROCEDURE — G8427 DOCREV CUR MEDS BY ELIG CLIN: HCPCS | Performed by: SURGERY

## 2024-05-30 PROCEDURE — 99215 OFFICE O/P EST HI 40 MIN: CPT | Performed by: SURGERY

## 2024-05-30 RX ORDER — APREPITANT 125 MG/1
125 CAPSULE ORAL ONCE
Qty: 1 CAPSULE | Refills: 0 | Status: SHIPPED | OUTPATIENT
Start: 2024-05-30 | End: 2024-05-30

## 2024-05-30 RX ORDER — SODIUM CHLORIDE, SODIUM LACTATE, POTASSIUM CHLORIDE, CALCIUM CHLORIDE 600; 310; 30; 20 MG/100ML; MG/100ML; MG/100ML; MG/100ML
INJECTION, SOLUTION INTRAVENOUS CONTINUOUS
OUTPATIENT
Start: 2024-05-30

## 2024-05-30 RX ORDER — HEPARIN SODIUM 5000 [USP'U]/ML
5000 INJECTION, SOLUTION INTRAVENOUS; SUBCUTANEOUS ONCE
OUTPATIENT
Start: 2024-05-30 | End: 2024-05-30

## 2024-05-30 RX ORDER — SODIUM CHLORIDE 0.9 % (FLUSH) 0.9 %
5-40 SYRINGE (ML) INJECTION EVERY 12 HOURS SCHEDULED
OUTPATIENT
Start: 2024-05-30

## 2024-05-30 RX ORDER — TOPIRAMATE 50 MG/1
50 TABLET, FILM COATED ORAL 2 TIMES DAILY
Qty: 60 TABLET | Refills: 3 | Status: SHIPPED | OUTPATIENT
Start: 2024-05-30 | End: 2024-05-30 | Stop reason: SINTOL

## 2024-05-30 RX ORDER — SODIUM CHLORIDE 0.9 % (FLUSH) 0.9 %
5-40 SYRINGE (ML) INJECTION PRN
OUTPATIENT
Start: 2024-05-30

## 2024-05-30 RX ORDER — POLYETHYLENE GLYCOL 3350, SODIUM SULFATE ANHYDROUS, SODIUM BICARBONATE, SODIUM CHLORIDE, POTASSIUM CHLORIDE 236; 22.74; 6.74; 5.86; 2.97 G/4L; G/4L; G/4L; G/4L; G/4L
4 POWDER, FOR SOLUTION ORAL ONCE
Qty: 4000 ML | Refills: 0 | Status: SHIPPED | OUTPATIENT
Start: 2024-05-30 | End: 2024-05-30

## 2024-05-30 RX ORDER — ONDANSETRON 2 MG/ML
4 INJECTION INTRAMUSCULAR; INTRAVENOUS ONCE
OUTPATIENT
Start: 2024-05-30 | End: 2024-05-30

## 2024-05-30 RX ORDER — SODIUM CHLORIDE 9 MG/ML
INJECTION, SOLUTION INTRAVENOUS PRN
OUTPATIENT
Start: 2024-05-30

## 2024-05-30 NOTE — H&P (VIEW-ONLY)
Surgery Consultation    Patient Name:  :  Hospital Day:   Jeniffer Kennedy 1995 [unfilled]     Date of Service: 2024    Subjective:      History of Present Illness:    Jeniffer Kennedy  is a 28 y.o. female referred by Dr. Gregory for morbid obesity.  Jeniffer Kennedy reports multiple episodes of weight loss and regain after multiple diet and exercise programs.  She has a hx of hysterectomy. She denies nicotine or Etoh use and reports she is 9 months sober from \"hard drug\" use.    Jeniffer Kennedy has completed the pre-op process for Bariatric surgery and is currently on the pre-op diet. Per our Psychologist, Dr. Martinez, there appears to be no active psychiatric contraindications to patient receiving bariatric surgery at this time; there is no evidence of untreated/undertreated clinically significant general psychopathology or substance/alcohol use disorders.        Past Medical History:   Diagnosis Date    Mental disorder     depression and anxiety    Postpartum depression     Sleep apnea     Past Surgical History:   Procedure Laterality Date    DILATION AND CURETTAGE OF UTERUS N/A 9/15/2023    ATTEMPTED LAPAROSCOPIC BILATERAL SALPINGECTOMY WITH NOVASURE ENDOMETRIAL ABLATION performed by Agustin Gregory MD at [facility]    HYSTERECTOMY, VAGINAL N/A 10/20/2023    TOTAL LAPAROSCOPIC HYSTERECTOMY, RIGHT SALPINGECTOMY, LEFT OOPHERECTOMY performed by Agustin Gregory MD at [facility]    LAPAROSCOPY Left 6/10/2021    LAPAROSCOPIC ECTOPIC REMOVAL, LEFT SALPINGECTOMY performed by Agustin Gregory MD at [facility]    UPPER GASTROINTESTINAL ENDOSCOPY      UPPER GASTROINTESTINAL ENDOSCOPY N/A 2023    EGD DIAGNOSTIC ONLY performed by Sheridan Laughlin MD at [facility]    UPPER GASTROINTESTINAL ENDOSCOPY N/A 2023    ENDOSCOPIC ULTRASOUND with polypectomy performed by Rodri Thornton MD at [facility]    WISDOM TOOTH EXTRACTION  2017    WISDOM TOOTH EXTRACTION  2019        Family History  severe GERD.We discussed that there are risks for infection, bleeding, pain, need for reoperation, death, and vitamin and mineral deficiencies associated with the surgery.     We discussed that if a hiatal hernia is observed at surgery, we would repair the hiatal hernia to avoid potential obstruction in the case of gastric bypass or severe GERD in the case of sleeve or duodenal switch.   We discussed the risks and benefits of laparoscopic or robotic hiatal hernia repair.  The risks include infection, bleeding, pain, possible diarrhea, need for re-operation, blood clotting, or cardiovascular problems.    We also discussed the risks and benefits of screening upper endoscopy to assess the degree of esophagitis, possible Edwards's esophagitis,dysplasia, gastrointestinal stromal tumors, or H. Pylori infection prior to surgery.    We discussed that there are risk for injury to the oropharynx, esophagus, stomach, and duodenum which include perforation and need for operative repair.   The patient consents to the endoscopic procedure.    We discussed the need for chronic vitamin and mineral supplementation after surgery to avoid deficiencies in vitamins B1 and/or B12 (which could result in neurologic disorders) and/or iron or calcium deficiencies.    We discussed that nicotine is contraindicated after bariatric surgery due to the risk of marginal ulceration which can result in perforation, stricture, and/or bleeding.       All patients of child bearing age and/or ability are advised to avoid pregnancy for at least 2 years after Bariatric surgery.  Patients with PCOS are advised that oral contraceptives are often not effective in the first 2 years after Bariatric surgery.      We discussed that NSAIDs are contraindicated with gastric bypass anatomy due to risk of marginal ulcer formation and perforation.  This does not include baby aspirin (81 mg a day).      More than 40 minutes were spent in face-to-face interaction with

## 2024-05-30 NOTE — PROGRESS NOTES
severe GERD.We discussed that there are risks for infection, bleeding, pain, need for reoperation, death, and vitamin and mineral deficiencies associated with the surgery.     We discussed that if a hiatal hernia is observed at surgery, we would repair the hiatal hernia to avoid potential obstruction in the case of gastric bypass or severe GERD in the case of sleeve or duodenal switch.   We discussed the risks and benefits of laparoscopic or robotic hiatal hernia repair.  The risks include infection, bleeding, pain, possible diarrhea, need for re-operation, blood clotting, or cardiovascular problems.    We also discussed the risks and benefits of screening upper endoscopy to assess the degree of esophagitis, possible Edwards's esophagitis,dysplasia, gastrointestinal stromal tumors, or H. Pylori infection prior to surgery.    We discussed that there are risk for injury to the oropharynx, esophagus, stomach, and duodenum which include perforation and need for operative repair.   The patient consents to the endoscopic procedure.    We discussed the need for chronic vitamin and mineral supplementation after surgery to avoid deficiencies in vitamins B1 and/or B12 (which could result in neurologic disorders) and/or iron or calcium deficiencies.    We discussed that nicotine is contraindicated after bariatric surgery due to the risk of marginal ulceration which can result in perforation, stricture, and/or bleeding.       All patients of child bearing age and/or ability are advised to avoid pregnancy for at least 2 years after Bariatric surgery.  Patients with PCOS are advised that oral contraceptives are often not effective in the first 2 years after Bariatric surgery.      We discussed that NSAIDs are contraindicated with gastric bypass anatomy due to risk of marginal ulcer formation and perforation.  This does not include baby aspirin (81 mg a day).      More than 40 minutes were spent in face-to-face interaction with

## 2024-05-30 NOTE — PATIENT INSTRUCTIONS
No aspirin, ibuprofen or other non-steroidal anti-inflammatory drugs (NSAIDs) 7 days prior to surgery.    No food or drink six hours prior to surgery.    Take your Emend  the morning of surgery    Keep carbohydrates to 20 grams or less a day pre-op.      The more weight you lose before surgery, the easier and safer your operation will be, and the easier your recovery will be.    Complete the bowel prep the day before surgery.

## 2024-06-03 ENCOUNTER — PATIENT MESSAGE (OUTPATIENT)
Dept: BARIATRICS/WEIGHT MGMT | Age: 29
End: 2024-06-03

## 2024-06-03 ENCOUNTER — NURSE ONLY (OUTPATIENT)
Dept: BARIATRICS/WEIGHT MGMT | Age: 29
End: 2024-06-03

## 2024-06-03 ENCOUNTER — PREP FOR PROCEDURE (OUTPATIENT)
Dept: BARIATRICS/WEIGHT MGMT | Age: 29
End: 2024-06-03

## 2024-06-03 ENCOUNTER — HOSPITAL ENCOUNTER (OUTPATIENT)
Dept: PREADMISSION TESTING | Age: 29
Discharge: HOME | End: 2024-06-07

## 2024-06-03 ENCOUNTER — ANESTHESIA EVENT (OUTPATIENT)
Dept: OPERATING ROOM | Age: 29
End: 2024-06-03
Payer: COMMERCIAL

## 2024-06-03 VITALS
TEMPERATURE: 98.9 F | OXYGEN SATURATION: 98 % | RESPIRATION RATE: 18 BRPM | SYSTOLIC BLOOD PRESSURE: 124 MMHG | WEIGHT: 293 LBS | HEART RATE: 85 BPM | BODY MASS INDEX: 50.02 KG/M2 | DIASTOLIC BLOOD PRESSURE: 74 MMHG | HEIGHT: 64 IN

## 2024-06-03 DIAGNOSIS — E66.01 MORBID (SEVERE) OBESITY DUE TO EXCESS CALORIES (HCC): ICD-10-CM

## 2024-06-03 DIAGNOSIS — E66.01 MORBID OBESITY (HCC): Primary | ICD-10-CM

## 2024-06-03 PROBLEM — K21.9 GERD (GASTROESOPHAGEAL REFLUX DISEASE): Status: ACTIVE | Noted: 2024-06-03

## 2024-06-03 RX ORDER — APREPITANT 125 MG/1
CAPSULE ORAL
Status: ON HOLD | COMMUNITY
Start: 2024-05-30 | End: 2024-06-05 | Stop reason: HOSPADM

## 2024-06-03 NOTE — PROGRESS NOTES
a. 32 ounces    b. 70 ounces    c. 64 ounces   d. 46 ounces    30. What eating behaviors are to be practiced prior to surgery to be prepared on proper eating post-surgery?  Eat protein first, next vegetables, fruit, and lastly whole grains.  Do not drink fluids with meals.  Wait 30 minuets after eating before drinking fluids  Stop eating when you are full  Rule of 20  All of the above        Krystal Hameed, RN, BSN

## 2024-06-04 ENCOUNTER — HOSPITAL ENCOUNTER (INPATIENT)
Age: 29
LOS: 1 days | Discharge: HOME | End: 2024-06-05
Attending: SURGERY | Admitting: SURGERY
Payer: COMMERCIAL

## 2024-06-04 ENCOUNTER — ANESTHESIA (OUTPATIENT)
Dept: OPERATING ROOM | Age: 29
End: 2024-06-04
Payer: COMMERCIAL

## 2024-06-04 LAB
AMPHET UR QL SCN: NORMAL
BARBITURATES UR QL SCN: NORMAL
BENZODIAZ UR QL SCN: NORMAL
CANNABINOIDS UR QL SCN: NORMAL
COCAINE UR QL SCN: NORMAL
DRUG SCREEN COMMENT UR-IMP: NORMAL
FENTANYL SCREEN, URINE: NORMAL
METHADONE UR QL SCN: NORMAL
OPIATES UR QL SCN: NORMAL
OXYCODONE UR QL SCN: NORMAL
PCP UR QL SCN: NORMAL
PROPOXYPH UR QL SCN: NORMAL

## 2024-06-04 PROCEDURE — 7100000000 HC PACU RECOVERY - FIRST 15 MIN: Performed by: SURGERY

## 2024-06-04 PROCEDURE — A4217 STERILE WATER/SALINE, 500 ML: HCPCS | Performed by: SURGERY

## 2024-06-04 PROCEDURE — 64488 TAP BLOCK BI INJECTION: CPT | Performed by: STUDENT IN AN ORGANIZED HEALTH CARE EDUCATION/TRAINING PROGRAM

## 2024-06-04 PROCEDURE — 3600000019 HC SURGERY ROBOT ADDTL 15MIN: Performed by: SURGERY

## 2024-06-04 PROCEDURE — 2709999900 HC NON-CHARGEABLE SUPPLY: Performed by: SURGERY

## 2024-06-04 PROCEDURE — 2580000003 HC RX 258: Performed by: SURGERY

## 2024-06-04 PROCEDURE — 2500000003 HC RX 250 WO HCPCS: Performed by: ANESTHESIOLOGY

## 2024-06-04 PROCEDURE — 3700000001 HC ADD 15 MINUTES (ANESTHESIA): Performed by: SURGERY

## 2024-06-04 PROCEDURE — 0BQT4ZZ REPAIR DIAPHRAGM, PERCUTANEOUS ENDOSCOPIC APPROACH: ICD-10-PCS | Performed by: SURGERY

## 2024-06-04 PROCEDURE — 3700000000 HC ANESTHESIA ATTENDED CARE: Performed by: SURGERY

## 2024-06-04 PROCEDURE — 6360000002 HC RX W HCPCS: Performed by: SURGERY

## 2024-06-04 PROCEDURE — 1210000000 HC MED SURG R&B

## 2024-06-04 PROCEDURE — 7100000001 HC PACU RECOVERY - ADDTL 15 MIN: Performed by: SURGERY

## 2024-06-04 PROCEDURE — 6360000002 HC RX W HCPCS

## 2024-06-04 PROCEDURE — 6360000002 HC RX W HCPCS: Performed by: STUDENT IN AN ORGANIZED HEALTH CARE EDUCATION/TRAINING PROGRAM

## 2024-06-04 PROCEDURE — 2720000010 HC SURG SUPPLY STERILE: Performed by: SURGERY

## 2024-06-04 PROCEDURE — 80307 DRUG TEST PRSMV CHEM ANLYZR: CPT

## 2024-06-04 PROCEDURE — 0D164ZA BYPASS STOMACH TO JEJUNUM, PERCUTANEOUS ENDOSCOPIC APPROACH: ICD-10-PCS | Performed by: SURGERY

## 2024-06-04 PROCEDURE — 6360000002 HC RX W HCPCS: Performed by: ANESTHESIOLOGY

## 2024-06-04 PROCEDURE — 2500000003 HC RX 250 WO HCPCS

## 2024-06-04 PROCEDURE — 3600000009 HC SURGERY ROBOT BASE: Performed by: SURGERY

## 2024-06-04 PROCEDURE — S2900 ROBOTIC SURGICAL SYSTEM: HCPCS | Performed by: SURGERY

## 2024-06-04 PROCEDURE — 8E0W4CZ ROBOTIC ASSISTED PROCEDURE OF TRUNK REGION, PERCUTANEOUS ENDOSCOPIC APPROACH: ICD-10-PCS | Performed by: SURGERY

## 2024-06-04 PROCEDURE — 43644 LAP GASTRIC BYPASS/ROUX-EN-Y: CPT | Performed by: SURGERY

## 2024-06-04 RX ORDER — ONDANSETRON 2 MG/ML
4 INJECTION INTRAMUSCULAR; INTRAVENOUS
Status: COMPLETED | OUTPATIENT
Start: 2024-06-04 | End: 2024-06-04

## 2024-06-04 RX ORDER — SODIUM CHLORIDE 9 MG/ML
INJECTION, SOLUTION INTRAVENOUS PRN
Status: DISCONTINUED | OUTPATIENT
Start: 2024-06-04 | End: 2024-06-04 | Stop reason: HOSPADM

## 2024-06-04 RX ORDER — ONDANSETRON 2 MG/ML
4 INJECTION INTRAMUSCULAR; INTRAVENOUS EVERY 6 HOURS PRN
Status: DISCONTINUED | OUTPATIENT
Start: 2024-06-04 | End: 2024-06-05 | Stop reason: HOSPADM

## 2024-06-04 RX ORDER — BUPIVACAINE HYDROCHLORIDE 2.5 MG/ML
INJECTION, SOLUTION EPIDURAL; INFILTRATION; INTRACAUDAL
Status: COMPLETED | OUTPATIENT
Start: 2024-06-04 | End: 2024-06-04

## 2024-06-04 RX ORDER — SODIUM CHLORIDE 0.9 % (FLUSH) 0.9 %
5-40 SYRINGE (ML) INJECTION EVERY 12 HOURS SCHEDULED
Status: DISCONTINUED | OUTPATIENT
Start: 2024-06-04 | End: 2024-06-04 | Stop reason: HOSPADM

## 2024-06-04 RX ORDER — NALOXONE HYDROCHLORIDE 0.4 MG/ML
INJECTION, SOLUTION INTRAMUSCULAR; INTRAVENOUS; SUBCUTANEOUS PRN
Status: DISCONTINUED | OUTPATIENT
Start: 2024-06-04 | End: 2024-06-04 | Stop reason: HOSPADM

## 2024-06-04 RX ORDER — HEPARIN SODIUM 5000 [USP'U]/ML
5000 INJECTION, SOLUTION INTRAVENOUS; SUBCUTANEOUS ONCE
Status: COMPLETED | OUTPATIENT
Start: 2024-06-04 | End: 2024-06-04

## 2024-06-04 RX ORDER — MAGNESIUM HYDROXIDE 1200 MG/15ML
LIQUID ORAL CONTINUOUS PRN
Status: DISCONTINUED | OUTPATIENT
Start: 2024-06-04 | End: 2024-06-04 | Stop reason: HOSPADM

## 2024-06-04 RX ORDER — SODIUM CHLORIDE 0.9 % (FLUSH) 0.9 %
5-40 SYRINGE (ML) INJECTION PRN
Status: DISCONTINUED | OUTPATIENT
Start: 2024-06-04 | End: 2024-06-04 | Stop reason: HOSPADM

## 2024-06-04 RX ORDER — METOCLOPRAMIDE HYDROCHLORIDE 5 MG/ML
10 INJECTION INTRAMUSCULAR; INTRAVENOUS
Status: DISCONTINUED | OUTPATIENT
Start: 2024-06-04 | End: 2024-06-04 | Stop reason: HOSPADM

## 2024-06-04 RX ORDER — MEPERIDINE HYDROCHLORIDE 25 MG/ML
12.5 INJECTION INTRAMUSCULAR; INTRAVENOUS; SUBCUTANEOUS
Status: DISCONTINUED | OUTPATIENT
Start: 2024-06-04 | End: 2024-06-04 | Stop reason: HOSPADM

## 2024-06-04 RX ORDER — OXYCODONE HYDROCHLORIDE 5 MG/1
10 TABLET ORAL EVERY 4 HOURS PRN
Status: DISCONTINUED | OUTPATIENT
Start: 2024-06-05 | End: 2024-06-05 | Stop reason: HOSPADM

## 2024-06-04 RX ORDER — ACETAMINOPHEN 325 MG/1
650 TABLET ORAL EVERY 6 HOURS
Status: DISCONTINUED | OUTPATIENT
Start: 2024-06-05 | End: 2024-06-05 | Stop reason: HOSPADM

## 2024-06-04 RX ORDER — SODIUM CHLORIDE 0.9 % (FLUSH) 0.9 %
5-40 SYRINGE (ML) INJECTION EVERY 12 HOURS SCHEDULED
Status: DISCONTINUED | OUTPATIENT
Start: 2024-06-04 | End: 2024-06-05 | Stop reason: HOSPADM

## 2024-06-04 RX ORDER — SUCCINYLCHOLINE/SOD CL,ISO/PF 100 MG/5ML
SYRINGE (ML) INTRAVENOUS PRN
Status: DISCONTINUED | OUTPATIENT
Start: 2024-06-04 | End: 2024-06-04 | Stop reason: SDUPTHER

## 2024-06-04 RX ORDER — FENTANYL CITRATE 0.05 MG/ML
50 INJECTION, SOLUTION INTRAMUSCULAR; INTRAVENOUS EVERY 10 MIN PRN
Status: DISCONTINUED | OUTPATIENT
Start: 2024-06-04 | End: 2024-06-04 | Stop reason: HOSPADM

## 2024-06-04 RX ORDER — DIPHENHYDRAMINE HYDROCHLORIDE 50 MG/ML
12.5 INJECTION INTRAMUSCULAR; INTRAVENOUS
Status: DISCONTINUED | OUTPATIENT
Start: 2024-06-04 | End: 2024-06-04 | Stop reason: HOSPADM

## 2024-06-04 RX ORDER — ROCURONIUM BROMIDE 10 MG/ML
INJECTION, SOLUTION INTRAVENOUS PRN
Status: DISCONTINUED | OUTPATIENT
Start: 2024-06-04 | End: 2024-06-04 | Stop reason: SDUPTHER

## 2024-06-04 RX ORDER — LIDOCAINE HYDROCHLORIDE 10 MG/ML
1 INJECTION, SOLUTION EPIDURAL; INFILTRATION; INTRACAUDAL; PERINEURAL
Status: DISCONTINUED | OUTPATIENT
Start: 2024-06-04 | End: 2024-06-04 | Stop reason: HOSPADM

## 2024-06-04 RX ORDER — ONDANSETRON 2 MG/ML
4 INJECTION INTRAMUSCULAR; INTRAVENOUS ONCE
Status: COMPLETED | OUTPATIENT
Start: 2024-06-04 | End: 2024-06-04

## 2024-06-04 RX ORDER — HYDROMORPHONE HYDROCHLORIDE 1 MG/ML
1 INJECTION, SOLUTION INTRAMUSCULAR; INTRAVENOUS; SUBCUTANEOUS
Status: DISCONTINUED | OUTPATIENT
Start: 2024-06-04 | End: 2024-06-05 | Stop reason: HOSPADM

## 2024-06-04 RX ORDER — FENTANYL CITRATE 50 UG/ML
INJECTION, SOLUTION INTRAMUSCULAR; INTRAVENOUS PRN
Status: DISCONTINUED | OUTPATIENT
Start: 2024-06-04 | End: 2024-06-04 | Stop reason: SDUPTHER

## 2024-06-04 RX ORDER — SODIUM CHLORIDE 0.9 % (FLUSH) 0.9 %
5-40 SYRINGE (ML) INJECTION PRN
Status: DISCONTINUED | OUTPATIENT
Start: 2024-06-04 | End: 2024-06-05 | Stop reason: HOSPADM

## 2024-06-04 RX ORDER — OXYCODONE HYDROCHLORIDE 5 MG/1
5 TABLET ORAL
Status: DISCONTINUED | OUTPATIENT
Start: 2024-06-04 | End: 2024-06-04 | Stop reason: HOSPADM

## 2024-06-04 RX ORDER — SODIUM CHLORIDE 9 MG/ML
INJECTION, SOLUTION INTRAVENOUS CONTINUOUS
Status: DISCONTINUED | OUTPATIENT
Start: 2024-06-04 | End: 2024-06-05 | Stop reason: HOSPADM

## 2024-06-04 RX ORDER — ONDANSETRON 4 MG/1
4 TABLET, ORALLY DISINTEGRATING ORAL EVERY 8 HOURS PRN
Status: DISCONTINUED | OUTPATIENT
Start: 2024-06-04 | End: 2024-06-05 | Stop reason: HOSPADM

## 2024-06-04 RX ORDER — HEPARIN SODIUM 5000 [USP'U]/ML
5000 INJECTION, SOLUTION INTRAVENOUS; SUBCUTANEOUS 2 TIMES DAILY
Status: DISCONTINUED | OUTPATIENT
Start: 2024-06-04 | End: 2024-06-05 | Stop reason: HOSPADM

## 2024-06-04 RX ORDER — SODIUM CHLORIDE, SODIUM LACTATE, POTASSIUM CHLORIDE, CALCIUM CHLORIDE 600; 310; 30; 20 MG/100ML; MG/100ML; MG/100ML; MG/100ML
INJECTION, SOLUTION INTRAVENOUS CONTINUOUS
Status: DISCONTINUED | OUTPATIENT
Start: 2024-06-04 | End: 2024-06-04 | Stop reason: HOSPADM

## 2024-06-04 RX ORDER — MIDAZOLAM HYDROCHLORIDE 1 MG/ML
INJECTION INTRAMUSCULAR; INTRAVENOUS PRN
Status: DISCONTINUED | OUTPATIENT
Start: 2024-06-04 | End: 2024-06-04 | Stop reason: SDUPTHER

## 2024-06-04 RX ORDER — ONDANSETRON 2 MG/ML
INJECTION INTRAMUSCULAR; INTRAVENOUS PRN
Status: DISCONTINUED | OUTPATIENT
Start: 2024-06-04 | End: 2024-06-04 | Stop reason: SDUPTHER

## 2024-06-04 RX ORDER — SODIUM CHLORIDE 9 MG/ML
INJECTION, SOLUTION INTRAVENOUS PRN
Status: DISCONTINUED | OUTPATIENT
Start: 2024-06-04 | End: 2024-06-05 | Stop reason: HOSPADM

## 2024-06-04 RX ORDER — BUPRENORPHINE HYDROCHLORIDE 0.32 MG/ML
INJECTION INTRAMUSCULAR; INTRAVENOUS PRN
Status: DISCONTINUED | OUTPATIENT
Start: 2024-06-04 | End: 2024-06-04 | Stop reason: SDUPTHER

## 2024-06-04 RX ORDER — OXYCODONE HYDROCHLORIDE 5 MG/1
5 TABLET ORAL EVERY 4 HOURS PRN
Status: DISCONTINUED | OUTPATIENT
Start: 2024-06-05 | End: 2024-06-05 | Stop reason: HOSPADM

## 2024-06-04 RX ORDER — DEXAMETHASONE SODIUM PHOSPHATE 10 MG/ML
INJECTION INTRAMUSCULAR; INTRAVENOUS PRN
Status: DISCONTINUED | OUTPATIENT
Start: 2024-06-04 | End: 2024-06-04 | Stop reason: SDUPTHER

## 2024-06-04 RX ORDER — PROPOFOL 10 MG/ML
INJECTION, EMULSION INTRAVENOUS PRN
Status: DISCONTINUED | OUTPATIENT
Start: 2024-06-04 | End: 2024-06-04 | Stop reason: SDUPTHER

## 2024-06-04 RX ADMIN — ROCURONIUM BROMIDE 50 MG: 10 INJECTION, SOLUTION INTRAVENOUS at 12:14

## 2024-06-04 RX ADMIN — FENTANYL CITRATE 50 MCG: 0.05 INJECTION, SOLUTION INTRAMUSCULAR; INTRAVENOUS at 15:34

## 2024-06-04 RX ADMIN — HYDROMORPHONE HYDROCHLORIDE 1 MG: 1 INJECTION, SOLUTION INTRAMUSCULAR; INTRAVENOUS; SUBCUTANEOUS at 21:46

## 2024-06-04 RX ADMIN — FENTANYL CITRATE 50 MCG: 50 INJECTION, SOLUTION INTRAMUSCULAR; INTRAVENOUS at 12:40

## 2024-06-04 RX ADMIN — DEXAMETHASONE SODIUM PHOSPHATE 10 MG: 10 INJECTION INTRAMUSCULAR; INTRAVENOUS at 12:26

## 2024-06-04 RX ADMIN — HYDROMORPHONE HYDROCHLORIDE 0.5 MG: 1 INJECTION, SOLUTION INTRAMUSCULAR; INTRAVENOUS; SUBCUTANEOUS at 15:55

## 2024-06-04 RX ADMIN — ONDANSETRON 4 MG: 2 INJECTION INTRAMUSCULAR; INTRAVENOUS at 11:13

## 2024-06-04 RX ADMIN — SODIUM CHLORIDE: 9 INJECTION, SOLUTION INTRAVENOUS at 17:20

## 2024-06-04 RX ADMIN — ONDANSETRON 4 MG: 2 INJECTION INTRAMUSCULAR; INTRAVENOUS at 21:02

## 2024-06-04 RX ADMIN — HEPARIN SODIUM 5000 UNITS: 5000 INJECTION INTRAVENOUS; SUBCUTANEOUS at 21:02

## 2024-06-04 RX ADMIN — ROCURONIUM BROMIDE 25 MG: 10 INJECTION, SOLUTION INTRAVENOUS at 12:39

## 2024-06-04 RX ADMIN — MIDAZOLAM HYDROCHLORIDE 2 MG: 1 INJECTION, SOLUTION INTRAMUSCULAR; INTRAVENOUS at 11:59

## 2024-06-04 RX ADMIN — FENTANYL CITRATE 50 MCG: 50 INJECTION, SOLUTION INTRAMUSCULAR; INTRAVENOUS at 12:04

## 2024-06-04 RX ADMIN — BUPIVACAINE HYDROCHLORIDE 60 ML: 2.5 INJECTION, SOLUTION EPIDURAL; INFILTRATION; INTRACAUDAL at 12:15

## 2024-06-04 RX ADMIN — PROPOFOL 200 MG: 10 INJECTION, EMULSION INTRAVENOUS at 12:04

## 2024-06-04 RX ADMIN — SODIUM CHLORIDE, POTASSIUM CHLORIDE, SODIUM LACTATE AND CALCIUM CHLORIDE: 600; 310; 30; 20 INJECTION, SOLUTION INTRAVENOUS at 09:00

## 2024-06-04 RX ADMIN — SODIUM CHLORIDE 3000 MG: 900 INJECTION INTRAVENOUS at 12:14

## 2024-06-04 RX ADMIN — ONDANSETRON 4 MG: 2 INJECTION INTRAMUSCULAR; INTRAVENOUS at 14:21

## 2024-06-04 RX ADMIN — Medication 100 MG: at 12:04

## 2024-06-04 RX ADMIN — SODIUM CHLORIDE: 9 INJECTION, SOLUTION INTRAVENOUS at 16:03

## 2024-06-04 RX ADMIN — BUPRENORPHINE HYDROCHLORIDE 0.3 MG: 0.3 INJECTION, SOLUTION INTRAMUSCULAR; INTRAVENOUS at 12:15

## 2024-06-04 RX ADMIN — SODIUM CHLORIDE 3000 MG: 900 INJECTION INTRAVENOUS at 21:05

## 2024-06-04 RX ADMIN — HEPARIN SODIUM 5000 UNITS: 5000 INJECTION INTRAVENOUS; SUBCUTANEOUS at 11:11

## 2024-06-04 RX ADMIN — SUGAMMADEX 350 MG: 100 INJECTION, SOLUTION INTRAVENOUS at 14:31

## 2024-06-04 RX ADMIN — ROCURONIUM BROMIDE 10 MG: 10 INJECTION, SOLUTION INTRAVENOUS at 14:03

## 2024-06-04 RX ADMIN — ONDANSETRON 4 MG: 2 INJECTION INTRAMUSCULAR; INTRAVENOUS at 16:36

## 2024-06-04 ASSESSMENT — PAIN SCALES - GENERAL
PAINLEVEL_OUTOF10: 7
PAINLEVEL_OUTOF10: 5
PAINLEVEL_OUTOF10: 8
PAINLEVEL_OUTOF10: 4
PAINLEVEL_OUTOF10: 0
PAINLEVEL_OUTOF10: 7

## 2024-06-04 ASSESSMENT — PAIN - FUNCTIONAL ASSESSMENT
PAIN_FUNCTIONAL_ASSESSMENT: 0-10
PAIN_FUNCTIONAL_ASSESSMENT: ACTIVITIES ARE NOT PREVENTED

## 2024-06-04 ASSESSMENT — PAIN DESCRIPTION - LOCATION
LOCATION: ABDOMEN;BACK
LOCATION: ABDOMEN

## 2024-06-04 NOTE — ANESTHESIA PROCEDURE NOTES
Peripheral Block    Patient location during procedure: pre-op  Reason for block: post-op pain management and at surgeon's request  Start time: 6/4/2024 12:15 PM  End time: 6/4/2024 12:23 PM  Staffing  Performed: anesthesiologist   Anesthesiologist: Laron Parra MD  Performed by: Laron Parra MD  Authorized by: Laron Parra MD    Preanesthetic Checklist  Completed: patient identified, IV checked, site marked, risks and benefits discussed, surgical/procedural consents, equipment checked, pre-op evaluation, timeout performed, anesthesia consent given, oxygen available and monitors applied/VS acknowledged  Peripheral Block   Patient position: supine  Prep: ChloraPrep  Provider prep: mask and sterile gloves (Sterile probe cover)  Patient monitoring: cardiac monitor, continuous pulse ox, frequent blood pressure checks and IV access  Block type: TAP  Laterality: bilateral  Injection technique: single-shot  Guidance: ultrasound guided  Local infiltration: lidocaine  Infiltration strength: 1 %  Local infiltration: lidocaine  Dose: 5 mL    Needle   Needle type: combined needle/nerve stimulator   Needle gauge: 22 G  Needle localization: anatomical landmarks and ultrasound guidance  Needle length: 5 cm  Assessment   Injection assessment: negative aspiration for heme, no paresthesia on injection and local visualized surrounding nerve on ultrasound  Paresthesia pain: immediately resolved  Slow fractionated injection: yes  Hemodynamics: stable    Additional Notes  Ultrasound guidance used to view needle for placement.    Ultrasound image stored,  printed and saved in patient chart.    Sterile probe cover used    Medications Administered  BUPivacaine (MARCAINE) PF injection 0.25% - Perineural   60 mL - 6/4/2024 12:15:00 PM

## 2024-06-04 NOTE — ANESTHESIA POSTPROCEDURE EVALUATION
Department of Anesthesiology  Postprocedure Note    Patient: Jeniffer Kennedy  MRN: [id number]  YOB: 1995  Date of evaluation: 6/4/2024    Procedure Summary       Date: 06/04/24 Room / Location: [address]    Anesthesia Start: 1159 Anesthesia Stop: 1448    Procedure: GASTRIC BYPASS CATIE-EN-Y LAPAROSCOPIC/ROBOTIC, HIATAL HERNIA REPAIR, MODIFIER 22 (Abdomen) Diagnosis:       ZAK (obstructive sleep apnea)      GERD (gastroesophageal reflux disease)      Morbid (severe) obesity due to excess calories (HCC)      (ZAK (obstructive sleep apnea) [G47.33])      (GERD (gastroesophageal reflux disease) [K21.9])      (Morbid (severe) obesity due to excess calories (HCC) [E66.01])    Surgeons: Ori Ng MD Responsible Provider: Eldon George DO    Anesthesia Type: general ASA Status: 3            Anesthesia Type: No value filed.    Deondre Phase I:      Deondre Phase II:      Anesthesia Post Evaluation    Patient location during evaluation: PACU  Patient participation: complete - patient participated  Level of consciousness: awake  Airway patency: patent  Nausea & Vomiting: no nausea and no vomiting  Cardiovascular status: blood pressure returned to baseline and hemodynamically stable  Respiratory status: acceptable  Hydration status: euvolemic  Pain management: adequate        No notable events documented.

## 2024-06-04 NOTE — OP NOTE
Operative Note      Patient: Jeniffer Kennedy  YOB: 1995  MRN: [id number]    Date of Procedure: 6/4/2024    Pre-Op Diagnosis Codes:     * ZAK (obstructive sleep apnea) [G47.33]     * GERD (gastroesophageal reflux disease) [K21.9]     * Morbid (severe) obesity due to excess calories (HCC) [E66.01]    Post-Op Diagnosis: Same       Procedure(s):  GASTRIC BYPASS CATIE-EN-Y LAPAROSCOPIC/ROBOTIC, HIATAL HERNIA REPAIR, MODIFIER 22    Surgeon(s):  Ori Ng MD    Assistant:   First Assistant: Irma Stapleton  Physician Assistant: Yudy Barnes PA-C    Anesthesia: General    Estimated Blood Loss (mL): Minimal    Complications: None    Specimens:   * No specimens in log *    Implants:  * No implants in log *      Drains:   Closed/Suction Drain LLQ Bulb (Active)       [REMOVED] Urinary Catheter 06/04/24 Ashraf-Temperature (Removed)       Findings:  Infection Present At Time Of Surgery (PATOS) (choose all levels that have infection present):  No infection present  Other Findings: Morbid obesity    Detailed Description of Procedure:   BMI 61.93    The patient was brought to the operating room placed in the supine position on the operating table. After obtaining adequate general endotracheal anesthesia, a TAP block was performed bilaterally by Anesthesia.  The patient was prepped and draped in usual sterile manner with chlorhexidine paint.  A proper timeout procedure was taken to correctly identify the patient and procedure.     The abdomen was then entered under videoscopic visualization using the robotic laparoscope and a 8 mm Visiport trocar. The trocar obturator and camera were removed and the abdomen was insufflated to 15 mmHg pressure with carbon dioxide. The 0 degree laparoscope was placed in the abdomen to search for any signs of injury from trocar placement, and there were no signs of injury from trocar placement.     The laparoscope was then used to guide one 8 mm trochar left lateral  the Da Cody cutting stapling device. The vertical staple line of the pouch was over-sewn in a Lembert manner with a 2-O Stratafix suture on SH needle.       A Ray-Omar sponge was put in the area of dissection to help with minor hemostasis. It was later removed from the abdomen.    The transverse colon was then elevated vertically and the ligament of Treitz was exposed.  A distance of 150 cm distal from the ligament of Treitz was counted off.   At the 150 cm position the jejunum was divided with a white load of the Intuitive cutting and stapling device. The mesentery was then divided towards the root with the vessel sealer.  The antimesenteric border of the proximal Carlos limb  of intestine was then sewn to the posterior aspect  of the gastric pouch with a running 2-0 Stratafix suture. An enterotomy was made in the small intestine using robotic scissor electrocautery. The VISI G-tube was then pushed down into the stomach pouch and the gastrotomy was made with scissor electrocautery. The VISI G-tube was then withdrawn a few inches and clamped.  Enterotomies were about 25 mm in length.    A posterior inner layer of the 2 layer handsewn gastrojejunostomy was then performed by running a 2-O Strattafix suture. All excess suture and needles were removed from the abdomen. An anterior internal layer was then accomplished by running a 2-0 Strattafix suture from lateral to medial. The original Strattafix suture was then run in a Lembert manner from medial to lateral on the anterior surface of the gastrojejunostomy to complete the anterior second layer of the closure.      A distance of 150 cm was then counted distally on the Carlos limb.   At the 150 cm position the Carlos limb and biliopancreatic limb were attached with two 2-O Stratafix sutures on the antimesenteric borders.  Enterotomies were made in both limbs with cautery scissors and a 60 mm white load of the Intuitive cutting-stapling device was used to create a partially

## 2024-06-04 NOTE — ANESTHESIA PRE PROCEDURE
Screening (If Applicable):   Lab Results   Component Value Date/Time    COVID19 Not Detected 06/10/2021 07:58 AM           Anesthesia Evaluation  Patient summary reviewed and Nursing notes reviewed   no history of anesthetic complications:   Airway: Mallampati: II  TM distance: >3 FB   Neck ROM: full  Mouth opening: > = 3 FB   Dental: normal exam         Pulmonary:normal exam    (+)     sleep apnea:                                  Cardiovascular:Negative CV ROS  Exercise tolerance: good (>4 METS)        ECG reviewed               Beta Blocker:  Not on Beta Blocker         Neuro/Psych:   (+) psychiatric history:            GI/Hepatic/Renal:   (+) GERD:, morbid obesity         ROS comment: BMI 62.   Endo/Other: Negative Endo/Other ROS             Pt had PAT visit.       Abdominal:             Vascular: negative vascular ROS.         Other Findings:             Anesthesia Plan      general     ASA 3     (ETT  TAP + Rectus Sheath)  Induction: intravenous.    MIPS: Postoperative opioids intended and Prophylactic antiemetics administered.  Anesthetic plan and risks discussed with patient.      Plan discussed with CRNA and CAA.    Attending anesthesiologist reviewed and agrees with Pre Eval content      Post-op pain plan if not by surgeon: dori George DO   6/4/2024

## 2024-06-04 NOTE — INTERVAL H&P NOTE
Update History & Physical    The patient's History and Physical of May 30, 2024 was reviewed with the patient and I examined the patient. There was no change. The surgical site was confirmed by the patient and me.     Plan: The risks, benefits, expected outcome, and alternative to the recommended procedure have been discussed with the patient. Patient understands and wants to proceed with the procedure.     Electronically signed by Ori Ng MD on 6/4/2024 at 12:00 PM

## 2024-06-04 NOTE — PROGRESS NOTES
ATTEMPTED TO HAVE PT. Stand at bed side-sat up -slow moving-but then became nauseated no dry heaves or emesis-basin provided.  1637-medicated for c/o nausea.  To 4 west via bed-condition stable.

## 2024-06-05 VITALS
HEIGHT: 64 IN | DIASTOLIC BLOOD PRESSURE: 73 MMHG | OXYGEN SATURATION: 100 % | WEIGHT: 293 LBS | SYSTOLIC BLOOD PRESSURE: 114 MMHG | TEMPERATURE: 97.2 F | RESPIRATION RATE: 16 BRPM | HEART RATE: 60 BPM | BODY MASS INDEX: 50.02 KG/M2

## 2024-06-05 LAB
BASOPHILS # BLD: 0 K/UL (ref 0–0.2)
BASOPHILS NFR BLD: 0.1 %
EOSINOPHIL # BLD: 0 K/UL (ref 0–0.7)
EOSINOPHIL NFR BLD: 0 %
ERYTHROCYTE [DISTWIDTH] IN BLOOD BY AUTOMATED COUNT: 16.3 % (ref 11.5–14.5)
HCT VFR BLD AUTO: 38 % (ref 37–47)
HGB BLD-MCNC: 12.1 G/DL (ref 12–16)
LYMPHOCYTES # BLD: 0.8 K/UL (ref 1–4.8)
LYMPHOCYTES NFR BLD: 8.2 %
MCH RBC QN AUTO: 25.5 PG (ref 27–31.3)
MCHC RBC AUTO-ENTMCNC: 31.8 % (ref 33–37)
MCV RBC AUTO: 80.2 FL (ref 79.4–94.8)
MONOCYTES # BLD: 0.4 K/UL (ref 0.2–0.8)
MONOCYTES NFR BLD: 4.5 %
NEUTROPHILS # BLD: 8.4 K/UL (ref 1.4–6.5)
NEUTS SEG NFR BLD: 87 %
PLATELET # BLD AUTO: 315 K/UL (ref 130–400)
RBC # BLD AUTO: 4.74 M/UL (ref 4.2–5.4)
WBC # BLD AUTO: 9.6 K/UL (ref 4.8–10.8)

## 2024-06-05 PROCEDURE — 36415 COLL VENOUS BLD VENIPUNCTURE: CPT

## 2024-06-05 PROCEDURE — 6360000002 HC RX W HCPCS: Performed by: SURGERY

## 2024-06-05 PROCEDURE — 85025 COMPLETE CBC W/AUTO DIFF WBC: CPT

## 2024-06-05 PROCEDURE — 2700000000 HC OXYGEN THERAPY PER DAY

## 2024-06-05 PROCEDURE — 99024 POSTOP FOLLOW-UP VISIT: CPT | Performed by: SURGERY

## 2024-06-05 PROCEDURE — 2580000003 HC RX 258: Performed by: SURGERY

## 2024-06-05 PROCEDURE — 6370000000 HC RX 637 (ALT 250 FOR IP): Performed by: SURGERY

## 2024-06-05 RX ORDER — ENOXAPARIN SODIUM 100 MG/ML
40 INJECTION SUBCUTANEOUS 2 TIMES DAILY
Qty: 24 EACH | Refills: 0 | Status: SHIPPED | OUTPATIENT
Start: 2024-06-05 | End: 2024-06-17

## 2024-06-05 RX ORDER — ONDANSETRON 4 MG/1
4 TABLET, ORALLY DISINTEGRATING ORAL EVERY 8 HOURS PRN
Qty: 30 TABLET | Refills: 3 | Status: SHIPPED | OUTPATIENT
Start: 2024-06-05

## 2024-06-05 RX ORDER — GABAPENTIN 300 MG/1
300 CAPSULE ORAL 2 TIMES DAILY
Qty: 14 CAPSULE | Refills: 0 | Status: SHIPPED | OUTPATIENT
Start: 2024-06-05 | End: 2024-06-14

## 2024-06-05 RX ORDER — ACETAMINOPHEN 500 MG
500 TABLET ORAL 4 TIMES DAILY PRN
Qty: 120 TABLET | Refills: 0 | Status: SHIPPED | OUTPATIENT
Start: 2024-06-05

## 2024-06-05 RX ADMIN — OXYCODONE 10 MG: 5 TABLET ORAL at 14:52

## 2024-06-05 RX ADMIN — ACETAMINOPHEN 325MG 650 MG: 325 TABLET ORAL at 08:47

## 2024-06-05 RX ADMIN — SODIUM CHLORIDE: 9 INJECTION, SOLUTION INTRAVENOUS at 00:24

## 2024-06-05 RX ADMIN — OXYCODONE 10 MG: 5 TABLET ORAL at 08:47

## 2024-06-05 RX ADMIN — SODIUM CHLORIDE: 9 INJECTION, SOLUTION INTRAVENOUS at 08:49

## 2024-06-05 RX ADMIN — SODIUM CHLORIDE 3000 MG: 900 INJECTION INTRAVENOUS at 05:06

## 2024-06-05 RX ADMIN — HEPARIN SODIUM 5000 UNITS: 5000 INJECTION INTRAVENOUS; SUBCUTANEOUS at 08:48

## 2024-06-05 RX ADMIN — ONDANSETRON 4 MG: 4 TABLET, ORALLY DISINTEGRATING ORAL at 08:48

## 2024-06-05 RX ADMIN — SODIUM CHLORIDE, PRESERVATIVE FREE 10 ML: 5 INJECTION INTRAVENOUS at 08:48

## 2024-06-05 RX ADMIN — ACETAMINOPHEN 325MG 650 MG: 325 TABLET ORAL at 14:52

## 2024-06-05 ASSESSMENT — PAIN DESCRIPTION - LOCATION
LOCATION: ABDOMEN
LOCATION: ABDOMEN

## 2024-06-05 ASSESSMENT — PAIN SCALES - GENERAL
PAINLEVEL_OUTOF10: 8
PAINLEVEL_OUTOF10: 7

## 2024-06-05 ASSESSMENT — PAIN DESCRIPTION - DESCRIPTORS: DESCRIPTORS: SORE

## 2024-06-05 NOTE — PROGRESS NOTES
Patient seen on unit prior to hospital discharge. Reviewed bariatric discharge instructions, post-op hydration and nutrition handouts, and  post-op bariatric zones handout with patient. Patient provided folder with all the handouts and contact numbers. Patient given the opportunity to ask questions.  Patient verbalizes understanding    Bariatric Surgery Discharge Instructions    ACTIVITY  You are encouraged to walk around as much as you are able to do so comfortably to prevent blood clots.   Do not push, pull, or lift more than 15 pounds for 2 weeks after surgery.   You are not allowed to drive while you are taking prescription narcotic pain medication.  DIET   Bariatric Clear Liquid - follow for 2 weeks.    Clear liquids- you should be able to see through the fluid.  No sugar, Low fat, Non-Carbonated, No Caffeine, and No Straw  Stop when you feel full.  Stay hydrated: Aim for at least of 64 oz fluids a day.  Sip throughout the day.  Sip do not gulp.   If you feel fullness, pain, or nausea, stop until passes.  Protein: Aim for 60 grams of protein per day    Approved Clear Liquids   Protein shots (refer to handbook page 22)  Clear protein drinks (refer to handbook page 22)  Water  Decaf tea (no creamer/milk)  Clear broth  Sugar-free Jell-O  Sugar free popsicles      MEDICATIONS  Your prescriptions (usually delivered to your room before you leave the hospital) include pain medication and anti-nausea medication.    You may also receive a prescription for Lovenox which is a medication injected under the skin (subcutaneously) to prevent blood clots. This medication is taken twice a day for 14 days. If you vomit blood, pass bloody stools or experienced uncontrollable bleeding, please [facility] or seek emergency care.    Schedule an appointment with your primary care provider and other specialists after surgery and regularly, as rapid weight loss can necessitate frequent adjustments to medications, such

## 2024-06-05 NOTE — PLAN OF CARE
Problem: Discharge Planning  Goal: Discharge to home or other facility with appropriate resources  6/4/2024 2215 by Shala Tan RN  Outcome: Progressing  6/4/2024 1812 by Mabel Degroot RN  Outcome: Progressing     Problem: Pain  Goal: Verbalizes/displays adequate comfort level or baseline comfort level  6/4/2024 1812 by Mabel Degroot RN  Outcome: Progressing     Problem: Skin/Tissue Integrity  Goal: Absence of new skin breakdown  Description: 1.  Monitor for areas of redness and/or skin breakdown  2.  Assess vascular access sites hourly  3.  Every 4-6 hours minimum:  Change oxygen saturation probe site  4.  Every 4-6 hours:  If on nasal continuous positive airway pressure, respiratory therapy assess nares and determine need for appliance change or resting period.  6/4/2024 2215 by Shala Tan RN  Outcome: Progressing  6/4/2024 1812 by Mabel Degroot RN  Outcome: Progressing     Problem: ABCDS Injury Assessment  Goal: Absence of physical injury  6/4/2024 2215 by Shala Tan RN  Outcome: Progressing  6/4/2024 1812 by Mabel Degroot RN  Outcome: Progressing     Problem: Safety - Adult  Goal: Free from fall injury  Outcome: Progressing

## 2024-06-05 NOTE — PROGRESS NOTES
CLINICAL PHARMACY NOTE: MEDS TO BEDS    Total # of Prescriptions Filled: 4   The following medications were delivered to the patient:  Ondansetron 4 mg ODT     Gabapentin 300 mg cap  Acetaminophen 500 mg tab  Enoxaparin 40mg/0.4ml syr    Additional Documentation:

## 2024-06-05 NOTE — CARE COORDINATION
current housing: NO  Potential Assistance needed at discharge: N/A            Potential DME:    Patient expects to discharge to: House  Plan for transportation at discharge:      Financial    Payor: [employer] PLAN / Plan: [employer] HE[employer] PLAN / Product Type: *No Product type* /     Does insurance require precert for SNF: Yes    Potential assistance Purchasing Medications:    Meds-to-Beds request: Yes      [facility] [address] [facility], [place] - [address] - P [phone] - F [phone]  [address]  [place] [address]  Phone: [phone] Fax: [phone]    [id number] - [place], [place] - [address] - P [phone] - F [phone]  [address]  [place] [address]  Phone: [phone] Fax: [phone]    [facility] [address] [facility], [place] - [address] - P [phone] - F [phone]  [address]  [place] [address]  Phone: [phone] Fax: [phone]    [place], [place] - [address] - P [phone] - F [phone]  [address]  [place] [address]  Phone: [phone] Fax: [phone]      Notes:    Factors facilitating achievement of predicted outcomes: Motivated    Barriers to discharge: Medical complications    Additional Case Management Notes: PT FROM HOME WITH CHILDREN.  INDEPENDENT, DRIVES.  PT DENIES HD, VA, O2 AND DME.  PT DENIES HOME GOING NEEDS.     The Plan for Transition of Care is related to the following treatment goals of ZAK (obstructive sleep apnea) [G47.33]  GERD (gastroesophageal reflux disease) [K21.9]  Morbid (severe) obesity due to excess calories (HCC) [E66.01]  Morbid obesity (HCC) [E66.01]    IF APPLICABLE: The Patient and/or patient representative Jeniffer and her family were provided with a choice of provider and agrees with the discharge plan. Freedom of choice list with basic dialogue that supports the patient's individualized plan of care/goals and shares the quality data  associated with the providers was provided to:     Patient Representative Name:       The Patient and/or Patient Representative Agree with the Discharge Plan?      Maxine Mallory  Case Management Department

## 2024-06-05 NOTE — PLAN OF CARE
Problem: Discharge Planning  Goal: Discharge to home or other facility with appropriate resources  Outcome: Adequate for Discharge     Problem: Pain  Goal: Verbalizes/displays adequate comfort level or baseline comfort level  Outcome: Adequate for Discharge  Flowsheets (Taken 6/5/2024 5014)  Verbalizes/displays adequate comfort level or baseline comfort level:   Encourage patient to monitor pain and request assistance   Assess pain using appropriate pain scale   Administer analgesics based on type and severity of pain and evaluate response   Implement non-pharmacological measures as appropriate and evaluate response   Consider cultural and social influences on pain and pain management   Notify Licensed Independent Practitioner if interventions unsuccessful or patient reports new pain     Problem: Skin/Tissue Integrity  Goal: Absence of new skin breakdown  Description: 1.  Monitor for areas of redness and/or skin breakdown  2.  Assess vascular access sites hourly  3.  Every 4-6 hours minimum:  Change oxygen saturation probe site  4.  Every 4-6 hours:  If on nasal continuous positive airway pressure, respiratory therapy assess nares and determine need for appliance change or resting period.  Outcome: Adequate for Discharge     Problem: ABCDS Injury Assessment  Goal: Absence of physical injury  Outcome: Adequate for Discharge     Problem: Safety - Adult  Goal: Free from fall injury  Outcome: Adequate for Discharge

## 2024-06-05 NOTE — DISCHARGE INSTRUCTIONS
INSTRUCTIONS FOR YOUR CARE AFTER SURGERY  You may remove the bandages 48 hours after your surgery.  You may shower any time. Do NOT take baths, use swimming pools, or use hot tubs for 2 weeks.  Keep your head and chest elevated 30 degrees when sleeping to avoid regurgitation.    USE A SIP TIMER AND TAKE A SMALL SIP OF PROTEIN WATER EVERY 10 TO 15 MINUTES DURING THE DAY.    You may use ice packs on your incisions as needed for pain.    You may take Tylenol as needed for pain, in addition to the pain medication that has been prescribed for you. Follow the directions on the bottle. Do not consume more than 3,500 mg of acetaminophen (Tylenol) a day.    Check your blood pressure daily.  Do not take lisinopril if your  top number is 130 or less, or if the bottom number is less than 80.      Administer Lovenox 40 mg subcutaneously every 12 hours for 14 days.  If you have persistent incisional bleeing, bloody emesis, bloody stool, or black stool, stop Lovenox and call our office.  Walk a little bit every hour while awake.    Follow your Bariatric CLEAR  Liquid Diet for 2 weeks. Use protein water or protein shots to supplement protein.  Avoid protein shakes for the first 2 weeks.  Try to get at least 64 ouncs of liquid in each day.    Do NOT lift anything more than 15 pounds (about a bag of groceries) or play contact sports for 2 weeks.   Do NOT drive if you are taking narcotic pain medication.  You may shower the day after discharge.  Replace band aids after showering  Keep your head elevated 30 degrees with sleep to avoid aspirating in your sleep.     DO NOT go to the emergency department first.  Call our office at any hour and the call will be directed to Dr. Ng.  Call if:  There is redness, swelling, or increasing pain in the wound.  There is fluid (pus) coming from the wound.    You have an oral temperature above 102° F (38.9° C).  You notice a bad smell coming from the wound or dressing.  The wounds break

## 2024-06-06 ENCOUNTER — TELEPHONE (OUTPATIENT)
Dept: BARIATRICS/WEIGHT MGMT | Age: 29
End: 2024-06-06

## 2024-06-06 DIAGNOSIS — E66.01 MORBID (SEVERE) OBESITY DUE TO EXCESS CALORIES (HCC): Primary | ICD-10-CM

## 2024-06-06 NOTE — DISCHARGE SUMMARY
Patient ID  Jeniffer Kennedy   [id number]  1995     Admit date: 6/4/2024    Discharge date and time: 6/5/2024  4:09 PM     Admitting Physician: Ori Ng MD     Discharge Physician: Hernandez    Admission Diagnoses: ZAK (obstructive sleep apnea) [G47.33]  GERD (gastroesophageal reflux disease) [K21.9]  Morbid (severe) obesity due to excess calories (HCC) [E66.01]  Morbid obesity (HCC) [E66.01]    Discharge Diagnoses: ssame    Admission Condition: fair    Discharged Condition: fair    Indication for Admission: morbid obesity    Surgical Procedures: Robotic Carlos en Y gastric bypass    Hospital Course: Hospital Course: The patient was admitted to the surgical menendez following the above operation, and recovered uneventfully on the surgical menendez . At the time of discharge the patient was tolerating a Bariatric clear liquid diet, pain was controlled on PO medications, and was ambulating and voiding.     Consults: none    Discharge Exam:  Gen: alert, NAD  Lungs: Breathing comfortably on RA. No tachypnea, retractions, or increased WOB.  Abd: soft, ND, appropriately tender  Incision:  healing well    Disposition: Home.    Patient Instructions:   See D/C instructions    Activity: Do not lift anything heavier than 15 pounds for 2 weeks.    Diet: Bariatric full liquids  Wound Care: may shower.  Cover staples with band aids.    Ori Ng MD   6/6/2024  8:50 AM      Ori Ng MD, FASMBS, FACS, MBSAQIP Verified Surgeon  [id number]-RET, Diplomate of The American Board of Surgery   
5

## 2024-06-06 NOTE — TELEPHONE ENCOUNTER
Bariatric Post-Op Discharge Follow-Up Call    Type of Surgery: Gastric Bypass Carlos-en-y Laparoscopic/robotic, Hiatal Hernia Repair, Modifier 22  Date of Surgery: 6/4/2024  Hospital Discharge Date: 6/5/2024    Post-op outreach call made to pt on 6/6/2024.    Nausea present No  Vomiting present No  Nausea and/or vomiting managed by medication Not Needed  Fever >101/chills No  Chest pain/dyspnea/tachycardia No  Dysphagia to liquids No  Pain Yes. Patient rates pain at a 5      Pt reports frequent ambulation around home.     Intake is described as Fair  Fluid intake: Patient tolerating clear liquid diet. , Taking small sips every 10-15 minuets. , <48 oz/day, and Goal to aim for 64 oz a day  Protein intake: Patient tolerating protein shots/drinks well. Goal is to aim for 60 grams a day. and <60 grams/day  Pt reports urine output of at least 4 times in a 24 hour period.     Passing flatus.  Agrees to monitor for BM. BM No .      Patient reports laparoscopic incision sites  no drainage and staples intact.   Drain removed prior to hospital discharge. Drainage at site: None    Lovenox Yes Taking as directed. Reviewed S/S of bleeding.  Checking BP  No .   Checking Blood Sugar No .            Have you scheduled post-op appointment with primary care provider?       Future Appointments   Date Time Provider Department Center   6/14/2024 11:00 AM Ori Ng MD [facility]   6/17/2024 10:00 AM Milagros Urbina RD [facility]   7/3/2024 11:00 AM Milagros Urbina RD [facility]   7/3/2024 11:30 AM Ori Ng MD [facility]   7/11/2024  1:00 PM Morgan Cervantes PSYD [facility]   4/22/2025  3:45 PM Erik Ga MD [place] [facility] [place]   .        Allowed pt the opportunity to ask questions.  Reminded patient to reference the patient education materials as needed. Reminded pt that they may phone the office or the \"after hours

## 2024-06-07 ENCOUNTER — TELEPHONE (OUTPATIENT)
Dept: BARIATRICS/WEIGHT MGMT | Age: 29
End: 2024-06-07

## 2024-06-07 DIAGNOSIS — E66.01 MORBID OBESITY (HCC): Primary | ICD-10-CM

## 2024-06-10 ENCOUNTER — TELEPHONE (OUTPATIENT)
Dept: BARIATRICS/WEIGHT MGMT | Age: 29
End: 2024-06-10

## 2024-06-10 DIAGNOSIS — E66.01 MORBID OBESITY (HCC): Primary | ICD-10-CM

## 2024-06-10 NOTE — TELEPHONE ENCOUNTER
Received call from patient this morning reporting feeling a pop in her right abdomen between the two incisions with pain she rated a \"12\" on a scale of 1-10. Patient stated this occurred last Thursday or Friday. Patient states pain is currently a \"0\". She states she is currently laying in bed. Patient reports she is taking Mylicon for gas. Patient states she is getting up frequently to walk. Patient states she is getting in around 48 oz of fluid in a 24 hour period. Last BM on Friday of loose stool.  Reinforced daily fluids, walking hourly, and taking prn medication as needed. Instructed to reports S/S of elevated temp, fast heart rate, SOB,  Nausea not improved by antinausea medication, and severe abdominal pain not improved with pain medication. Patient verbalizes understanding. Discussed sending a message to Dr Ng between surgeries today and calling her back with his instructions.    Called patient back. Informed her Dr Ng was updated . No new instructions. Patient states pain is currently a \"3\" on scale of 1-10. She states she is going to take Tylenol and rest. Reviewed fluid and activity goals. Reviewed S/S requiring immediate treatment. Reviewed upcoming office appointment. Instructed to call  office with any issues or concerns. Patient verbalized understanding.

## 2024-06-14 ENCOUNTER — OFFICE VISIT (OUTPATIENT)
Dept: BARIATRICS/WEIGHT MGMT | Age: 29
End: 2024-06-14

## 2024-06-14 VITALS
OXYGEN SATURATION: 99 % | HEIGHT: 64 IN | WEIGHT: 293 LBS | BODY MASS INDEX: 50.02 KG/M2 | HEART RATE: 99 BPM | SYSTOLIC BLOOD PRESSURE: 120 MMHG | DIASTOLIC BLOOD PRESSURE: 70 MMHG

## 2024-06-14 DIAGNOSIS — Z98.84 S/P GASTRIC BYPASS: Primary | ICD-10-CM

## 2024-06-14 PROCEDURE — 99024 POSTOP FOLLOW-UP VISIT: CPT | Performed by: SURGERY

## 2024-06-14 NOTE — PATIENT INSTRUCTIONS
Keep up the hard work!    Start multi-vitamin and calcium supplementation.    THE BIG FOUR GUIDELINES:  1.  Count calories!  People who count calories eat less.  Use the daily plate or other apps for your smart phone or computer.  The more you count the more of an expert you will become and will get easier and easier.  If you are trying to lose weight and exercising a lot, keep calories to around the thousand to 1200 a day.  If you are not exercising consistently try to limit them to around 800 a day.    2.  Separate liquids and solids.  Wait 30 minutes to an hour after you eat before drinking liquids.  This will reduce the total amount of food you eat in the meal.    3.  Exercise!  You need up to 5 hours a week with a combination of cardio and resistance or weight training in order to keep excess body fat off.    4.  Sleep!   Try to get 7 or more hours of sleep a night.  If you have obstructive sleep apnea definitely use your CPAP machine.    THE RULE OF 20'S:  For every meal, chew each bite 20 seconds.  Then put the fork down and wait 20 seconds after you swallow before picking up the fork again.  Each meal should take at least 20 minutes so your brain can register that you are full.    If you are happy with your care, please go  to [email or web address] and/or [employer] and leave a review for Ori Ng MD.  The charitable nature of our practice makes a marketing budget a challenge and your review could help us help more people.

## 2024-06-14 NOTE — PROGRESS NOTES
Post Op Clinic Visit      Jeniffer Kennedy  is 1 week  post-op from Robotic Carlos en Y gastric bypass.    There have not been any fevers or chills.  Abdominal pain has been minimal  There has been no incisional redness or discharge.      Fluid intake has been 64+ onces a day.  She is estimating 60 g of protein today with a protein water.      Blood pressure readings have been 120's/70's on lisinopril.    She is having some nausea that is managed with Zofran.      Physical Exam:    /70   Pulse 99   Ht 1.626 m (5' 4\")   Wt (!) 159.7 kg (352 lb)   LMP 02/14/2023   SpO2 99%   BMI 60.42 kg/m²     General: No acute distress  HEENT: P PERRLA, no scleral icterus.  Trachea midline.  Thoracic: Clear to auscultation bilaterally.  Cardiac: Regular rate and rhythm with no rubs clicks or murmurs.  Abdomen: The incisions are healing well. I removed the staples and placed steri-strips     Extremities: No clubbing cyanosis or edema.  Neurologic: No gross motor or sensory defects.        Assessment and Plan:      Jeniffer Kennedy   is 1 week  post-op from Robotic Carlos en Y gastric bypass.  and doing well.  Follow up in 3 weeks.    Candido Ng MD, FACS, [facility]

## 2024-06-17 ENCOUNTER — OFFICE VISIT (OUTPATIENT)
Dept: BARIATRICS/WEIGHT MGMT | Age: 29
End: 2024-06-17
Payer: COMMERCIAL

## 2024-06-17 VITALS — WEIGHT: 293 LBS | HEIGHT: 64 IN | BODY MASS INDEX: 50.02 KG/M2

## 2024-06-17 DIAGNOSIS — Z98.84 S/P GASTRIC BYPASS: ICD-10-CM

## 2024-06-17 DIAGNOSIS — E66.01 MORBID OBESITY (HCC): Primary | ICD-10-CM

## 2024-06-17 DIAGNOSIS — Z71.3 DIETARY COUNSELING AND SURVEILLANCE: ICD-10-CM

## 2024-06-17 PROCEDURE — G8417 CALC BMI ABV UP PARAM F/U: HCPCS | Performed by: DIETITIAN, REGISTERED

## 2024-06-17 PROCEDURE — G8428 CUR MEDS NOT DOCUMENT: HCPCS | Performed by: DIETITIAN, REGISTERED

## 2024-06-17 PROCEDURE — 97803 MED NUTRITION INDIV SUBSEQ: CPT | Performed by: DIETITIAN, REGISTERED

## 2024-06-17 NOTE — PROGRESS NOTES
Medical Nutrition Therapy  [facility]    Patient here for her 1 week post op Carlos en Y gastric bypass.    6/17/2024  Height:   Ht Readings from Last 1 Encounters:   06/14/24 1.626 m (5' 4\")     Weight:   Wt Readings from Last 1 Encounters:   06/14/24 (!) 159.7 kg (352 lb)     BMI:   BMI Readings from Last 1 Encounters:   06/14/24 60.42 kg/m²       Pre-Surgery Weight: 360 lbs  TBW lost: 7 lbs  % EBW lost: 3 %    Patient has lost a total of 85 lbs from start of program.    Labs reviewed: N/A    Intolerances/Difficulties: some nausea and fatigue but improving, tolerating CL diet    Vitamins:  Bariatric MVI with iron  and Calcium Citrate 1000-1500mg/day (to start today)    Protein goal: partially met, 50-60 grams/day    Fluid goal: met, 60-70 oz/day     Exercise: walking as tolerated    Nutrition Intervention:  Education provided to include review of post surgical diet progression, meal timing, protein and fluid intake, vitamins, and exercise.     Nutrition Monitoring and evaluation:  Protein intake: 65-82 grams/day (1.2-1.5gr/kg IBW)  Fluid intake: 64-90oz sugar free, calorie free, caffeine free, non carbonated beverages  Vitamins: Bariatric MVI with iron,  Calcium citrate 1000-1500mg/day in divided doses  Exercise: 45-60 minutes of exercise 5 days/week, including at least 2 days of strength training    Plan/Recommendation:   Advance to puree stage x 2 weeks  Begin taking Bariatric MVI with iron and Calcium citrate 500-600mg twice daily  Increase daily walking as tolerated  Keep daily food journal    Time spent with patient 30 minutes    Follow up per program protocol    Milagros Urbina, RD\

## 2024-06-18 ENCOUNTER — TELEPHONE (OUTPATIENT)
Dept: BARIATRICS/WEIGHT MGMT | Age: 29
End: 2024-06-18

## 2024-06-18 NOTE — TELEPHONE ENCOUNTER
Dr. Cervantes     Patient called to report that she will be seeing her trauma therapist on 6-26-24 and is waiting for a devin back from the medication therapist for an appointment. She plans to have any pertinent information sent to you after her appointments.      Desirae HOWARD

## 2024-06-27 ENCOUNTER — OFFICE VISIT (OUTPATIENT)
Dept: BARIATRICS/WEIGHT MGMT | Age: 29
End: 2024-06-27

## 2024-06-27 VITALS
HEART RATE: 89 BPM | BODY MASS INDEX: 50.02 KG/M2 | HEIGHT: 64 IN | SYSTOLIC BLOOD PRESSURE: 116 MMHG | WEIGHT: 293 LBS | OXYGEN SATURATION: 98 % | DIASTOLIC BLOOD PRESSURE: 82 MMHG

## 2024-06-27 DIAGNOSIS — F90.9 ATTENTION DEFICIT HYPERACTIVITY DISORDER (ADHD), UNSPECIFIED ADHD TYPE: ICD-10-CM

## 2024-06-27 DIAGNOSIS — F39 UNSPECIFIED MOOD (AFFECTIVE) DISORDER (HCC): ICD-10-CM

## 2024-06-27 DIAGNOSIS — E66.01 MORBID OBESITY (HCC): ICD-10-CM

## 2024-06-27 DIAGNOSIS — R30.0 DYSURIA: Primary | ICD-10-CM

## 2024-06-27 DIAGNOSIS — R30.0 DYSURIA: ICD-10-CM

## 2024-06-27 LAB
AMPHET UR QL SCN: NORMAL
BARBITURATES UR QL SCN: NORMAL
BENZODIAZ UR QL SCN: NORMAL
BILIRUB UR QL STRIP: ABNORMAL
CANNABINOIDS UR QL SCN: NORMAL
CLARITY UR: ABNORMAL
COCAINE UR QL SCN: NORMAL
COLOR UR: ABNORMAL
DRUG SCREEN COMMENT UR-IMP: NORMAL
FENTANYL SCREEN, URINE: NORMAL
GLUCOSE UR STRIP-MCNC: NEGATIVE MG/DL
HGB UR QL STRIP: NEGATIVE
KETONES UR STRIP-MCNC: ABNORMAL MG/DL
LEUKOCYTE ESTERASE UR QL STRIP: NEGATIVE
METHADONE UR QL SCN: NORMAL
NITRITE UR QL STRIP: NEGATIVE
OPIATES UR QL SCN: NORMAL
OXYCODONE UR QL SCN: NORMAL
PCP UR QL SCN: NORMAL
PH UR STRIP: 5.5 [PH] (ref 5–9)
PROPOXYPH UR QL SCN: NORMAL
PROT UR STRIP-MCNC: ABNORMAL MG/DL
SP GR UR STRIP: 1.04 (ref 1–1.03)
TSH REFLEX: 1.65 UIU/ML (ref 0.44–3.86)
UROBILINOGEN UR STRIP-ACNC: 1 E.U./DL

## 2024-06-27 PROCEDURE — 99024 POSTOP FOLLOW-UP VISIT: CPT | Performed by: SURGERY

## 2024-06-27 NOTE — PROGRESS NOTES
Demetrice returns today complaining of pain for her 12 mm trocar site was sutured shut.  There has not been any nausea or vomiting.  She is also concerned she may have a UTI.    /82   Pulse 89   Ht 1.626 m (5' 4\")   Wt (!) 159.4 kg (351 lb 6.4 oz)   LMP 02/14/2023   SpO2 98%   BMI 60.32 kg/m²     On exam, her abdomen is benign and there are no palpable hernias.    A/P:  She is doing well post op.    Apply ice packs to the painful area: 10 minutes on, 10 minutes off multiple times a day.    Obtain urinalysis.    Follow up as previously scheduled.    Candido Ng MD, FACS, [facility]

## 2024-06-27 NOTE — PATIENT INSTRUCTIONS
Apply ice packs to the painful area: 10 minutes on, 10 minutes off multiple times a day.    Obtain urinalysis.    Follow up as previously scheduled.

## 2024-06-28 LAB — VITAMIN B-12: 670 PG/ML (ref 232–1245)

## 2024-07-01 LAB
ANABASINE UR-MCNC: <5 NG/ML
COTININE UR-MCNC: <15 NG/ML
NICOTINE UR-MCNC: <15 NG/ML
TRANS-3-OH-COTININE UR-MCNC: <50 NG/ML

## 2024-07-03 ENCOUNTER — OFFICE VISIT (OUTPATIENT)
Dept: BARIATRICS/WEIGHT MGMT | Age: 29
End: 2024-07-03

## 2024-07-03 ENCOUNTER — OFFICE VISIT (OUTPATIENT)
Dept: BARIATRICS/WEIGHT MGMT | Age: 29
End: 2024-07-03
Payer: COMMERCIAL

## 2024-07-03 VITALS
HEIGHT: 64 IN | OXYGEN SATURATION: 98 % | HEART RATE: 81 BPM | BODY MASS INDEX: 50.02 KG/M2 | SYSTOLIC BLOOD PRESSURE: 128 MMHG | DIASTOLIC BLOOD PRESSURE: 86 MMHG | WEIGHT: 293 LBS

## 2024-07-03 VITALS — BODY MASS INDEX: 50.02 KG/M2 | WEIGHT: 293 LBS | HEIGHT: 64 IN

## 2024-07-03 DIAGNOSIS — Z71.3 DIETARY COUNSELING AND SURVEILLANCE: ICD-10-CM

## 2024-07-03 DIAGNOSIS — Z98.84 S/P GASTRIC BYPASS: Primary | ICD-10-CM

## 2024-07-03 DIAGNOSIS — E66.01 MORBID OBESITY (HCC): Primary | ICD-10-CM

## 2024-07-03 DIAGNOSIS — Z98.84 S/P GASTRIC BYPASS: ICD-10-CM

## 2024-07-03 PROCEDURE — 99024 POSTOP FOLLOW-UP VISIT: CPT | Performed by: SURGERY

## 2024-07-03 PROCEDURE — G8417 CALC BMI ABV UP PARAM F/U: HCPCS | Performed by: DIETITIAN, REGISTERED

## 2024-07-03 PROCEDURE — G8428 CUR MEDS NOT DOCUMENT: HCPCS | Performed by: DIETITIAN, REGISTERED

## 2024-07-03 PROCEDURE — 97803 MED NUTRITION INDIV SUBSEQ: CPT | Performed by: DIETITIAN, REGISTERED

## 2024-07-03 NOTE — PROGRESS NOTES
Medical Nutrition Therapy  [facility]    Patient here for her 1 month post op Carlos en Y gastric bypass.       Patient reports:    7/3/2024  Height:   Ht Readings from Last 1 Encounters:   06/27/24 1.626 m (5' 4\")     Weight:   Wt Readings from Last 1 Encounters:   06/27/24 (!) 159.4 kg (351 lb 6.4 oz)     BMI:   BMI Readings from Last 1 Encounters:   06/27/24 60.32 kg/m²       Pre-Surgery Weight: 360 lbs  TBW lost: 12 lbs  % EBW lost: 6 %    Patient has lost a total of 90 lbs from start of program.    Labs reviewed: N/A    Intolerances/Difficulties: craving carbohydrates, not tolerating FL or CL shakes, states \"can't stomach them.\"    Vitamins:  Bariatric MVI with iron  and Calcium Citrate 1000-1500mg/day    Protein goal: partially met, 60 grams/day    Fluid goal: met, 64 oz/day     Exercise:  walking 5346-8102 steps/day    Nutrition Intervention:  Education provided to include review of post surgical diet progression, meal timing, protein and fluid intake, vitamins, and exercise.     Nutrition Monitoring and evaluation:  Protein intake: 65-82 grams/day (1.2-1.5gr/kg IBW)  Fluid intake: 64-90oz sugar free, calorie free, caffeine free, non carbonated beverages  Vitamins: Bariatric MVI with iron,  Calcium citrate 1000-1500mg/day in divided doses  Exercise: 45-60 minutes of exercise 5 days/week, including at least 2 days of strength training    Plan/Recommendation:   Increase protein intake at meals, goal 80 grams/day  Increase daily steps to 4000/day, add strength training exercises 2-3 days/week  Continue daily food journal, track feelings    Time spent with patient 30 minutes    Follow up per program protocol    Milagros Urbina RD

## 2024-07-03 NOTE — PROGRESS NOTES
Post Op Clinic Visit      Jeniffer Kennedy  is 1 months  post-op from Robotic Carlos en Y gastric bypass.   Abdominal pain has been minimal.      Weight:       Wt Readings from Last 1 Encounters:   06/27/24 (!) 159.4 kg (351 lb 6.4 oz)      BMI:       BMI Readings from Last 1 Encounters:   06/27/24 60.32 kg/m²         Pre-Surgery Weight: 360 lbs  TBW lost: 12 lbs  % EBW lost: 6 %     Patient has lost a total of 90 lbs from start of program.     Labs reviewed: N/A     Intolerances/Difficulties: craving carbohydrates, not tolerating FL or CL shakes, states \"can't stomach them.\"     Vitamins:  Bariatric MVI with iron  and Calcium Citrate 1000-1500mg/day     Protein goal: partially met, 60 grams/day     Fluid goal: met, 64 oz/day     Exercise:  walking 2977-1109 steps/day     Nutrition Intervention:  Education provided to include review of post surgical diet progression, meal timing, protein and fluid intake, vitamins, and exercise.      Nutrition Monitoring and evaluation:  Protein intake: 65-82 grams/day (1.2-1.5gr/kg IBW)  Fluid intake: 64-90oz sugar free, calorie free, caffeine free, non carbonated beverages  Vitamins: Bariatric MVI with iron,  Calcium citrate 1000-1500mg/day in divided doses  Exercise: 45-60 minutes of exercise 5 days/week, including at least 2 days of strength training     Plan/Recommendation:   Increase protein intake at meals, goal 80 grams/day  Increase daily steps to 4000/day, add strength training exercises 2-3 days/week  Continue daily food journal, track feelings         Physical Exam:    /86   Pulse 81   Ht 1.626 m (5' 4\")   Wt (!) 157.9 kg (348 lb)   LMP 02/14/2023   SpO2 98%   BMI 59.73 kg/m²     General: No acute distress  HEENT: P PERRLA, no scleral icterus.  Trachea midline.  Thoracic: Clear to auscultation bilaterally.  Cardiac: Regular rate and rhythm with no rubs clicks or murmurs.  Abdomen: The incisions are healing well.There are no hernias    Extremities: No

## 2024-07-03 NOTE — PATIENT INSTRUCTIONS
Keep up the hard work!    Please obtain labs prior to 4 month visit.    THE BIG FOUR GUIDELINES:  1.  Count calories!  People count calories eat less.  Use the daily plate or other apps for your smart phone or computer.  The more you count the more of an expert you will become and will get easier and easier.  If you are trying to lose weight and exercising a lot, keep calories to around the thousand to 1200 a day.  If you are not exercising consistently try to limit them to around 800 a day.    2.  Separate liquids and solids.  Wait 30 minutes to an hour after you eat before drinking liquids.  This will reduce the total amount of food you eat in the meal.    3.  Exercise!  Any amount makes a big difference.    4.  Sleep!   Try to get 7 or more hours of sleep a night.  If you have obstructive sleep apnea definitely use your CPAP machine.    THE RULE OF 20'S:  For every meal, chew each bite 20 seconds.  Then put the fork down and wait 20 seconds after you swallow before picking up the fork again.  Each meal should take at least 20 minutes so your brain can register that you are full.    If you are happy with your care, please go  to [email or web address] and/or [employer] and leave a review for Ori Ng MD.  The charitable nature of our practice makes a marketing budget a challenge and your review could help us help more people.

## 2024-07-09 DIAGNOSIS — R14.0 BLOATING SYMPTOM: Primary | ICD-10-CM

## 2024-07-09 RX ORDER — SIMETHICONE 80 MG
TABLET,CHEWABLE ORAL
Qty: 262 TABLET | OUTPATIENT
Start: 2024-07-09

## 2024-07-09 NOTE — TELEPHONE ENCOUNTER
Patient requesting medication refill. Please approve or deny this request.    Rx requested:  Requested Prescriptions     Pending Prescriptions Disp Refills    simethicone (MYLICON) 80 MG chewable tablet 120 tablet 2     Sig: Take 1 tablet by mouth 4 times daily as needed for Flatulence         Last Office Visit:   7/3/2024      Next Visit Date:  Future Appointments   Date Time Provider Department Center   7/11/2024  1:00 PM Morgan Cervantes PSYD [facility]   7/19/2024 10:00 AM Sushant Smith DO [facility]   8/23/2024  9:45 AM Agustin Gregory MD [facility]   10/11/2024  8:30 AM Milagros Urbina RD [facility]   10/11/2024  9:15 AM Ori Ng MD [facility]   10/18/2024  9:30 AM Morgan Cervantes PSYD [facility]   4/22/2025  3:45 PM Erik Ga MD [facility]

## 2024-07-12 RX ORDER — SIMETHICONE 80 MG
80 TABLET,CHEWABLE ORAL 4 TIMES DAILY PRN
Qty: 120 TABLET | Refills: 2 | Status: SHIPPED | OUTPATIENT
Start: 2024-07-12

## 2024-07-16 ENCOUNTER — TELEPHONE (OUTPATIENT)
Dept: BARIATRICS/WEIGHT MGMT | Age: 29
End: 2024-07-16

## 2024-07-16 NOTE — TELEPHONE ENCOUNTER
Patient called asking what could cause a gastric bypass leak. Patient states that she had nausea and vomiting after eating breakfast. Patient reports emesis consisted of bile, no food particles. Explaining it was not the \"typical foamies\". Patient denies, fast heart rate, elevated temp, or pain. Reports temperature of 98.0 and HR of 99. Patient states she had 1 1/2 eggs for breakfast. Reports compliance with small bites and meal lasting at leas 20 minutes. Patient denies taking anti nausea medication stating she can't swallow the pill due to nausea. Instructed that the Zofran can be taken under the tongue to dissolve, it does not require swallowing pill with liquid. Patient reports she is getting in 72-74 oz of fluids daily. Reports regular BM. Reviewed causes of post-op nausea after gastric bypass surgery. Reviewed causes of nausea and tips for prevention. Instructed patient on S/S of leak. Instructed on symptoms of fast heart rate, elevated temperature, abdominal and/or left shoulder pain. Patient also reports swelling of her feet and requesting a water pill. Discussed speaking with Dr Ng this afternoon between surgical cases for direction. Office will contact her with any new instructions.      -Note above from AZAM Hameed.    I attempted to call all 3 phone numbers listed for Jeniffer Kennedy.  There was no answer or voice mail option for any of the numbers.    Candido Ng MD, FACS, [facility]

## 2024-07-19 ENCOUNTER — OFFICE VISIT (OUTPATIENT)
Dept: ORTHOPEDIC SURGERY | Age: 29
End: 2024-07-19

## 2024-07-19 VITALS — HEIGHT: 64 IN | BODY MASS INDEX: 50.02 KG/M2 | WEIGHT: 293 LBS

## 2024-07-19 DIAGNOSIS — M25.562 LEFT KNEE PAIN, UNSPECIFIED CHRONICITY: Primary | ICD-10-CM

## 2024-07-19 RX ORDER — TRIAMCINOLONE ACETONIDE 40 MG/ML
80 INJECTION, SUSPENSION INTRA-ARTICULAR; INTRAMUSCULAR ONCE
Status: COMPLETED | OUTPATIENT
Start: 2024-07-19 | End: 2024-07-19

## 2024-07-19 RX ORDER — LIDOCAINE HYDROCHLORIDE 10 MG/ML
2 INJECTION, SOLUTION INFILTRATION; PERINEURAL ONCE
Status: COMPLETED | OUTPATIENT
Start: 2024-07-19 | End: 2024-07-19

## 2024-07-19 RX ADMIN — TRIAMCINOLONE ACETONIDE 80 MG: 40 INJECTION, SUSPENSION INTRA-ARTICULAR; INTRAMUSCULAR at 15:01

## 2024-07-19 RX ADMIN — LIDOCAINE HYDROCHLORIDE 2 ML: 10 INJECTION, SOLUTION INFILTRATION; PERINEURAL at 15:01

## 2024-07-19 NOTE — PROGRESS NOTES
Subjective:      Patient ID: Jeniffer Kennedy is a 29 y.o. female who presents today for:  Chief Complaint   Patient presents with    Follow-up     Patient presents to clinic for left knee follow-up. She reports the injection she received last visit did help. Patient states she had bariatric surgery on 6/4/24. Her right knee is also bothering her today after hyper-extending it and feeling a pop.       Subjective/Objective/Assessment/Plan:     SUBJECTIVE -patient comes in with left knee pain.  States that this is been ongoing since 2016.  She has done physical therapy and other therapy modalities.  Nothing is helped her.  She is scheduled for bariatric surgery.  X-rays were reviewed showing normal bony morphology.    OBJECTIVE -stable left knee exam.  Crepitus with the patella on range of motion.  She can fully flex and fully extend.  No effusion appreciated.  Difficult to do a exam due to body habitus.        ASSESSMENT -    Diagnosis Orders   1. Left knee pain, unspecified chronicity                PLAN -she would like a cortisone injection into the left knee.  MRI shows mild chondral fissuring in the patellofemoral articulation.  Inferior prepatellar bursal swelling.  She has lost 100 pounds since January 2024.    The patient has been diagnosed with left knee pain. I prepped the injection area with alcohol. Under aseptic technique, I then injected 80 mg of Kenalog and 2 cc of 1% lidocaine into the left knee. The patient tolerated the procedure well and there were no complications.  I would like to see the patient back in 2 weeks to assess her response to the injection.        --------------------------------------------------------------------------------------------------------------  Past Medical History:   Diagnosis Date    Mental disorder     depression and anxiety    Postpartum depression     Sleep apnea

## 2024-08-15 ENCOUNTER — OFFICE VISIT (OUTPATIENT)
Dept: BARIATRICS/WEIGHT MGMT | Age: 29
End: 2024-08-15
Payer: COMMERCIAL

## 2024-08-15 VITALS — HEIGHT: 64 IN | WEIGHT: 293 LBS | BODY MASS INDEX: 50.02 KG/M2

## 2024-08-15 DIAGNOSIS — Z98.84 S/P GASTRIC BYPASS: Primary | ICD-10-CM

## 2024-08-15 DIAGNOSIS — F54 PSYCHOLOGICAL FACTORS AFFECTING MORBID OBESITY (HCC): ICD-10-CM

## 2024-08-15 DIAGNOSIS — E66.01 PSYCHOLOGICAL FACTORS AFFECTING MORBID OBESITY (HCC): ICD-10-CM

## 2024-08-15 DIAGNOSIS — G47.33 OSA (OBSTRUCTIVE SLEEP APNEA): ICD-10-CM

## 2024-08-15 DIAGNOSIS — E66.01 MORBID (SEVERE) OBESITY DUE TO EXCESS CALORIES (HCC): ICD-10-CM

## 2024-08-15 DIAGNOSIS — F31.76 BIPOLAR DISORDER, IN FULL REMISSION, MOST RECENT EPISODE DEPRESSED (HCC): ICD-10-CM

## 2024-08-15 DIAGNOSIS — F43.9 TRAUMA AND STRESSOR-RELATED DISORDER: ICD-10-CM

## 2024-08-15 DIAGNOSIS — F90.2 ATTENTION DEFICIT HYPERACTIVITY DISORDER (ADHD), COMBINED TYPE: ICD-10-CM

## 2024-08-15 DIAGNOSIS — F41.0 PANIC DISORDER: ICD-10-CM

## 2024-08-15 PROCEDURE — 1036F TOBACCO NON-USER: CPT | Performed by: PSYCHOLOGIST

## 2024-08-15 PROCEDURE — 90832 PSYTX W PT 30 MINUTES: CPT | Performed by: PSYCHOLOGIST

## 2024-08-15 ASSESSMENT — PATIENT HEALTH QUESTIONNAIRE - PHQ9
10. IF YOU CHECKED OFF ANY PROBLEMS, HOW DIFFICULT HAVE THESE PROBLEMS MADE IT FOR YOU TO DO YOUR WORK, TAKE CARE OF THINGS AT HOME, OR GET ALONG WITH OTHER PEOPLE: NOT DIFFICULT AT ALL
9. THOUGHTS THAT YOU WOULD BE BETTER OFF DEAD, OR OF HURTING YOURSELF: NOT AT ALL
3. TROUBLE FALLING OR STAYING ASLEEP: NOT AT ALL
5. POOR APPETITE OR OVEREATING: NOT AT ALL
SUM OF ALL RESPONSES TO PHQ QUESTIONS 1-9: 0
4. FEELING TIRED OR HAVING LITTLE ENERGY: NOT AT ALL
SUM OF ALL RESPONSES TO PHQ9 QUESTIONS 1 & 2: 0
6. FEELING BAD ABOUT YOURSELF - OR THAT YOU ARE A FAILURE OR HAVE LET YOURSELF OR YOUR FAMILY DOWN: NOT AT ALL
SUM OF ALL RESPONSES TO PHQ QUESTIONS 1-9: 0
7. TROUBLE CONCENTRATING ON THINGS, SUCH AS READING THE NEWSPAPER OR WATCHING TELEVISION: NOT AT ALL
SUM OF ALL RESPONSES TO PHQ QUESTIONS 1-9: 0
2. FEELING DOWN, DEPRESSED OR HOPELESS: NOT AT ALL
SUM OF ALL RESPONSES TO PHQ QUESTIONS 1-9: 0
8. MOVING OR SPEAKING SO SLOWLY THAT OTHER PEOPLE COULD HAVE NOTICED. OR THE OPPOSITE, BEING SO FIGETY OR RESTLESS THAT YOU HAVE BEEN MOVING AROUND A LOT MORE THAN USUAL: NOT AT ALL
1. LITTLE INTEREST OR PLEASURE IN DOING THINGS: NOT AT ALL

## 2024-08-15 ASSESSMENT — ANXIETY QUESTIONNAIRES
7. FEELING AFRAID AS IF SOMETHING AWFUL MIGHT HAPPEN: NOT AT ALL
IF YOU CHECKED OFF ANY PROBLEMS ON THIS QUESTIONNAIRE, HOW DIFFICULT HAVE THESE PROBLEMS MADE IT FOR YOU TO DO YOUR WORK, TAKE CARE OF THINGS AT HOME, OR GET ALONG WITH OTHER PEOPLE: NOT DIFFICULT AT ALL
2. NOT BEING ABLE TO STOP OR CONTROL WORRYING: NOT AT ALL
5. BEING SO RESTLESS THAT IT IS HARD TO SIT STILL: NOT AT ALL
4. TROUBLE RELAXING: NOT AT ALL
3. WORRYING TOO MUCH ABOUT DIFFERENT THINGS: NOT AT ALL
6. BECOMING EASILY ANNOYED OR IRRITABLE: NOT AT ALL
GAD7 TOTAL SCORE: 0
1. FEELING NERVOUS, ANXIOUS, OR ON EDGE: NOT AT ALL

## 2024-08-15 NOTE — PROGRESS NOTES
BARIATRIC POST-OPERATIVE BEHAVIORAL HEALTH FOLLOW UP  Morgan Cervantes Psy.D., [facility]  Licensed Clinical Psychologist ([id number])        Name: Jeniffer Kennedy  Date of Birth 1995  Visit Date: 8/15/2024   Time spent with Patient: 30 minutes.    Patient provided informed consent for behavioral health intervention. Discussed with patient the limits of confidentiality (i.e., abuse reporting, suicide intervention, review by treatment team, review by insurance company, etc.) and purpose of treatment. Patient indicated understanding.      SUBJECTIVE  Jeniffer presents for 2 month post-operative follow up with Bariatric Psychologist.    Initial Surgical Consultation 438.8 lbs BMI: 74.91   Pre-surgical Visit 364.2 lbs BMI: 62.51   Surgery 360.8 lbs BMI: 61.92   1 Month Post-op 348.0 lbs BMI: 59.73   2 Months Post-op 325.6 lbs BMI: 55.89     Jeniffer has lost 113.2 pounds since the initial surgical consultation with Dr. Ng, and 35.2 pounds since completion of the Carlos-en-Y Gastric Bypass procedure.      Jeniffer reports doing well overall; notes increased energy and activity.    Mood: \"Great\"  Sleep: \"good\" -using CPAP occasionally  Medication: continues/restarted Prozac and Inderal; Seroquel has been discontinued  Appetite: \"Touch and go\" -appropriate for current post-op stage    Eating Habits: 3-5x per day; lunch meat, turkey, chicken  Snacking: pistachios, sugar-free jello/pudding  Fluid: 32-80 oz water per day, and decaf coffee/tea, 64 oz of sugar-free \"fruit juice mix\" (flavored water)  Protein:  g per day; says cannot tolerate protein shake/water; getting protein from food  Vitamins: taking bariatric MVI and calcium citrate daily  Alcohol: none  Nicotine: none  Caffeine: decaf coffee  Activity/Exercise: walking (7,000-10,000 steps per day)    Difficulties: denied  Problems/concerns: denied  Struggles: noted she has been experiencing some cravings, but has been successful in utilizing

## 2024-08-15 NOTE — PATIENT INSTRUCTIONS
It is possible to gain weight back after surgery; the equation of energy-in vs. energy-out will always apply. Bariatric surgery is a powerful tool that will only be successful in the long term if you use it properly. Returning to old behaviors such as snacking, grazing, drinking carbonated beverages, drinking alcohol, choosing high-calorie foods, eating and drinking at the same time, or even not exercising will have weight gain effects. The good habits you form during the first year will be the foundation that helps you stay successful for the long term.     Don't weigh yourself daily; once per week is sufficient! Remember, the number on the scale is not the whole story -your body composition is changing. So it is also important to pay attention to other factors, like how your clothes are fitting, how you feel, etc.    Attend support group meetings, as available. Patients who belong to a support group are most likely to succeed in their weight loss regimen and maintain their weight loss long term.     Attend all follow-up appointments: Regular follow-up appointments with the weight management office are important for monitoring your progress, addressing any concerns or challenges, and provide ongoing guidance and support. These appointments can also help you stay motivated and on track with your weight loss goals.attend follow-up appointments: Regular follow-up appointments with the weight management office are important for monitoring your progress, addressing any concerns or challenges, and provide ongoing guidance and support. These appointments can also help you stay motivated and on track with your weight loss goals.    Regular exercise is important for maintaining weight loss and improving overall health. Start with low-impact exercises and gradually increase your intensity as your body adapts and heals. Aim for a well-rounded exercise routine that includes cardiovascular exercises (e.g., walking, swimming,

## 2024-09-09 DIAGNOSIS — R14.0 BLOATING SYMPTOM: ICD-10-CM

## 2024-09-10 RX ORDER — SIMETHICONE 80 MG
80 TABLET,CHEWABLE ORAL 4 TIMES DAILY PRN
Qty: 120 TABLET | Refills: 2 | Status: SHIPPED | OUTPATIENT
Start: 2024-09-10

## 2024-09-12 ENCOUNTER — TELEPHONE (OUTPATIENT)
Dept: GASTROENTEROLOGY | Age: 29
End: 2024-09-12

## 2024-09-12 DIAGNOSIS — K21.9 GASTROESOPHAGEAL REFLUX DISEASE WITHOUT ESOPHAGITIS: Primary | ICD-10-CM

## 2024-09-12 DIAGNOSIS — K21.9 GASTROESOPHAGEAL REFLUX DISEASE WITHOUT ESOPHAGITIS: ICD-10-CM

## 2024-09-12 RX ORDER — OMEPRAZOLE 40 MG/1
40 CAPSULE, DELAYED RELEASE ORAL
Qty: 90 CAPSULE | Refills: 3 | Status: SHIPPED | OUTPATIENT
Start: 2024-09-12 | End: 2025-09-07

## 2024-09-12 RX ORDER — OMEPRAZOLE 40 MG/1
40 CAPSULE, DELAYED RELEASE ORAL
Qty: 90 CAPSULE | Refills: 3 | Status: SHIPPED | OUTPATIENT
Start: 2024-09-12 | End: 2024-09-12 | Stop reason: SDUPTHER

## 2024-10-09 ENCOUNTER — TELEPHONE (OUTPATIENT)
Dept: ORTHOPEDIC SURGERY | Age: 29
End: 2024-10-09

## 2024-10-09 DIAGNOSIS — M25.562 LEFT KNEE PAIN, UNSPECIFIED CHRONICITY: Primary | ICD-10-CM

## 2024-10-09 RX ORDER — HYDROCODONE BITARTRATE AND ACETAMINOPHEN 5; 325 MG/1; MG/1
1 TABLET ORAL EVERY 8 HOURS PRN
Qty: 15 TABLET | Refills: 0 | Status: SHIPPED | OUTPATIENT
Start: 2024-10-09 | End: 2024-10-14

## 2024-10-09 NOTE — TELEPHONE ENCOUNTER
Pt states that she just fell and hit her knee on a concrete floor, she is using ice and taking tylenol she is wondering if you could prescribe some pain medication. She states that she is not able to come in the office for a cortisone injection.      [id number] - [place], [place] - [address] - P [phone] - F [phone] [address] [place] [address]  Phone: [phone]  Fax: [phone]

## 2024-10-14 ENCOUNTER — TELEPHONE (OUTPATIENT)
Dept: BARIATRICS/WEIGHT MGMT | Age: 29
End: 2024-10-14

## 2024-10-14 NOTE — TELEPHONE ENCOUNTER
Patient called in to the office with C/O laryngitis, sinus congestion, and headache.  Advised patient to contact her PCP for an appointment.  Patient states that she has tried calling them but the call drops and she never receives a call back.  Advised patient that she can try an OTC sinus medication but needs to make sure that is is free from any NSAIDS and is Tylenol based.  Also advised if no improvement in the next few days to again call her PCP for an appointment as an antibiotic may be needed.  Patient is in agreement.

## 2024-10-29 ENCOUNTER — TELEPHONE (OUTPATIENT)
Dept: ORTHOPEDIC SURGERY | Age: 29
End: 2024-10-29

## 2024-10-29 NOTE — TELEPHONE ENCOUNTER
Patient is requesting another pain medication refill to be sent to [facility]. Patient is currently working on transportation to come in for another appointment. Patient states she should have transportation within the next week or 2.     Please review and advise if able to refill another 5-day supply of pain medication.

## 2024-11-07 ENCOUNTER — OFFICE VISIT (OUTPATIENT)
Dept: BARIATRICS/WEIGHT MGMT | Age: 29
End: 2024-11-07

## 2024-11-07 ENCOUNTER — OFFICE VISIT (OUTPATIENT)
Dept: BARIATRICS/WEIGHT MGMT | Age: 29
End: 2024-11-07
Payer: COMMERCIAL

## 2024-11-07 VITALS — WEIGHT: 289 LBS | BODY MASS INDEX: 49.34 KG/M2 | HEIGHT: 64 IN

## 2024-11-07 DIAGNOSIS — F31.76 BIPOLAR DISORDER, IN FULL REMISSION, MOST RECENT EPISODE DEPRESSED (HCC): ICD-10-CM

## 2024-11-07 DIAGNOSIS — E66.01 MORBID OBESITY: Primary | ICD-10-CM

## 2024-11-07 DIAGNOSIS — F41.0 PANIC DISORDER: ICD-10-CM

## 2024-11-07 DIAGNOSIS — Z98.84 S/P GASTRIC BYPASS: ICD-10-CM

## 2024-11-07 DIAGNOSIS — E66.01 MORBID OBESITY DUE TO EXCESS CALORIES: ICD-10-CM

## 2024-11-07 DIAGNOSIS — F90.2 ATTENTION DEFICIT HYPERACTIVITY DISORDER (ADHD), COMBINED TYPE: ICD-10-CM

## 2024-11-07 DIAGNOSIS — G47.33 OSA (OBSTRUCTIVE SLEEP APNEA): ICD-10-CM

## 2024-11-07 DIAGNOSIS — F43.9 TRAUMA AND STRESSOR-RELATED DISORDER: ICD-10-CM

## 2024-11-07 DIAGNOSIS — F54 PSYCHOLOGICAL FACTORS AFFECTING MORBID OBESITY: Primary | ICD-10-CM

## 2024-11-07 DIAGNOSIS — E66.01 PSYCHOLOGICAL FACTORS AFFECTING MORBID OBESITY: Primary | ICD-10-CM

## 2024-11-07 DIAGNOSIS — Z71.3 DIETARY COUNSELING AND SURVEILLANCE: ICD-10-CM

## 2024-11-07 LAB
ALBUMIN SERPL-MCNC: 3.5 G/DL (ref 3.5–4.6)
ERYTHROCYTE [DISTWIDTH] IN BLOOD BY AUTOMATED COUNT: 14.1 % (ref 11.5–14.5)
ESTIMATED AVERAGE GLUCOSE: 82 MG/DL
HBA1C MFR BLD: 4.5 % (ref 4–6)
HCT VFR BLD AUTO: 39.3 % (ref 37–47)
HGB BLD-MCNC: 12.7 G/DL (ref 12–16)
IRON % SATURATION: 11 % (ref 20–55)
IRON: 26 UG/DL (ref 37–145)
MCH RBC QN AUTO: 29.6 PG (ref 27–31.3)
MCHC RBC AUTO-ENTMCNC: 32.3 % (ref 33–37)
MCV RBC AUTO: 91.6 FL (ref 79.4–94.8)
PLATELET # BLD AUTO: 272 K/UL (ref 130–400)
PTH-INTACT SERPL-MCNC: 37 PG/ML (ref 15–65)
RBC # BLD AUTO: 4.29 M/UL (ref 4.2–5.4)
TOTAL IRON BINDING CAPACITY: 243 UG/DL (ref 250–450)
UNSATURATED IRON BINDING CAPACITY: 217 UG/DL (ref 112–347)
VITAMIN D 25-HYDROXY: 26.2 NG/ML (ref 30–100)
WBC # BLD AUTO: 9.9 K/UL (ref 4.8–10.8)

## 2024-11-07 PROCEDURE — G8428 CUR MEDS NOT DOCUMENT: HCPCS | Performed by: DIETITIAN, REGISTERED

## 2024-11-07 PROCEDURE — 97803 MED NUTRITION INDIV SUBSEQ: CPT | Performed by: DIETITIAN, REGISTERED

## 2024-11-07 PROCEDURE — G8417 CALC BMI ABV UP PARAM F/U: HCPCS | Performed by: DIETITIAN, REGISTERED

## 2024-11-07 ASSESSMENT — PATIENT HEALTH QUESTIONNAIRE - PHQ9
9. THOUGHTS THAT YOU WOULD BE BETTER OFF DEAD, OR OF HURTING YOURSELF: NOT AT ALL
10. IF YOU CHECKED OFF ANY PROBLEMS, HOW DIFFICULT HAVE THESE PROBLEMS MADE IT FOR YOU TO DO YOUR WORK, TAKE CARE OF THINGS AT HOME, OR GET ALONG WITH OTHER PEOPLE: SOMEWHAT DIFFICULT
SUM OF ALL RESPONSES TO PHQ QUESTIONS 1-9: 2
5. POOR APPETITE OR OVEREATING: MORE THAN HALF THE DAYS
1. LITTLE INTEREST OR PLEASURE IN DOING THINGS: NOT AT ALL
SUM OF ALL RESPONSES TO PHQ QUESTIONS 1-9: 2
3. TROUBLE FALLING OR STAYING ASLEEP: NOT AT ALL
SUM OF ALL RESPONSES TO PHQ QUESTIONS 1-9: 2
2. FEELING DOWN, DEPRESSED OR HOPELESS: NOT AT ALL
4. FEELING TIRED OR HAVING LITTLE ENERGY: NOT AT ALL
SUM OF ALL RESPONSES TO PHQ QUESTIONS 1-9: 2
7. TROUBLE CONCENTRATING ON THINGS, SUCH AS READING THE NEWSPAPER OR WATCHING TELEVISION: NOT AT ALL
6. FEELING BAD ABOUT YOURSELF - OR THAT YOU ARE A FAILURE OR HAVE LET YOURSELF OR YOUR FAMILY DOWN: NOT AT ALL
SUM OF ALL RESPONSES TO PHQ9 QUESTIONS 1 & 2: 0
8. MOVING OR SPEAKING SO SLOWLY THAT OTHER PEOPLE COULD HAVE NOTICED. OR THE OPPOSITE, BEING SO FIGETY OR RESTLESS THAT YOU HAVE BEEN MOVING AROUND A LOT MORE THAN USUAL: NOT AT ALL

## 2024-11-07 ASSESSMENT — ANXIETY QUESTIONNAIRES
2. NOT BEING ABLE TO STOP OR CONTROL WORRYING: NOT AT ALL
1. FEELING NERVOUS, ANXIOUS, OR ON EDGE: SEVERAL DAYS
6. BECOMING EASILY ANNOYED OR IRRITABLE: SEVERAL DAYS
7. FEELING AFRAID AS IF SOMETHING AWFUL MIGHT HAPPEN: NOT AT ALL
IF YOU CHECKED OFF ANY PROBLEMS ON THIS QUESTIONNAIRE, HOW DIFFICULT HAVE THESE PROBLEMS MADE IT FOR YOU TO DO YOUR WORK, TAKE CARE OF THINGS AT HOME, OR GET ALONG WITH OTHER PEOPLE: SOMEWHAT DIFFICULT
4. TROUBLE RELAXING: SEVERAL DAYS
5. BEING SO RESTLESS THAT IT IS HARD TO SIT STILL: SEVERAL DAYS
GAD7 TOTAL SCORE: 5
3. WORRYING TOO MUCH ABOUT DIFFERENT THINGS: SEVERAL DAYS

## 2024-11-07 NOTE — PROGRESS NOTES
coffee  Activity/Exercise: walking daily, with average of 10,000 steps per day      Struggles: reports due to being busy with care giving at home (parents and children) often forgets to eat or is eating less favorable items out of convenience      Has had difficulty connecting with counselor at [facility], who had previously noted issues with patient's insurance; requesting referral list today.    Current Stressors:   Family health issues, parenting stress, financial stressors      OBJECTIVE  MENTAL STATUS EXAM:  Mood was \"good, just stressed\" with Neutral/Euthymic(normal) affect.   Suicidal ideation was denied.   Homicidal ideation was denied.   Hygiene was good .  Dress was appropriate.   Behavior was Within Normal Limits with No observation or self-report of difficulties ambulating.   Attitude was Cooperative, Engageable, and Friendly.  Eye-contact was good.  Speech: rate - WNL, rhythm - WNL, volume - WNL.  Verbalizations were goal directed and coherent.  Thought processes were intact and goal-oriented without evidence of delusions, hallucinations, obsessions, or nancy.   Associations were characterized by intact cognitive processes.  Patient was oriented to person, place, time, and general circumstances  Memory: recent:  good and remote:  good.  Insight: good to fair  Judgment: good to fair.   Attention span and concentration appear within functional limits      ASSESSMENT   Administered the PHQ-9 and ASHLEY-7 to measure depression and anxiety; Jeniffer obtained a score of 2 on the PHQ-9, indicating minimal depression, and a score of 5 on the ASHLEY-7, indicating mild anxiety.        11/7/2024     9:11 AM 8/15/2024     2:11 PM 5/14/2024     4:06 PM 3/13/2024     1:47 PM   PHQ Scores   PHQ2 Score 0 0 0 3   PHQ9 Score 2 0 1 5     Interpretation of Total Score Depression Severity: 1-4 = Minimal depression, 5-9 = Mild depression, 10-14 = Moderate depression, 15-19 = Moderately severe depression, 20-27 = Severe depression

## 2024-11-07 NOTE — PATIENT INSTRUCTIONS
[facility]      Emergency or crisis issues for mental health concerns should be handled through the 24-Hour Hotline 1 (761) 750-2023      Mental Health, Addiction and Recovery Services MHARS Board [facility] -    Mental health services non-emergency line (Navigator): 922.444.7294   Alcohol and other drug services non-emergency line: 573.412.6442   [facility] mental health crisis hotline:  1-477.726.6732   National suicide prevention lifeline: 628   Crisis text line: “4hope” to 379 939   Disaster distress helpline: 1-904.534.5508 or text “TalkWithUs” to 78221.      [facility]        Warmline   [address]       [phone]   [place], [place] [address] [phone]     The Warmline is a peer resource available Monday through Friday, from 1pm-8pm. The Warmline provides a safe, confidential place to talk and be heard. To speak to a , call [phone] and ask to be connected to the Warm Line.     [facility] (formally [facility])  [address] [place] [address]   [phone]     [facility] (2 locations)      [address]    [address] [place] [address]      [place] [address]   [phone]     [facility]*   [address] [place] [address]   [phone]          [facility]          [facility]   [address]            [address]   [place] [address]           [place] [address]   [phone]      [facility] ([facility] locations)     [address]        [address] [place] [address]       [place], [place] [id number] [phone]      Private Providers      Jadiel Grajeda & Larry*          [facility]*   [address]           [address]

## 2024-11-09 LAB
CA-I ADJ PH7.4 SERPL-SCNC: 1.26 MMOL/L (ref 1.09–1.3)
CA-I SERPL ISE-SCNC: 1.25 MMOL/L (ref 1.09–1.3)

## 2024-11-12 LAB — VIT B1 BLD-MCNC: 124 NMOL/L (ref 70–180)

## 2024-12-13 ENCOUNTER — PATIENT MESSAGE (OUTPATIENT)
Dept: BARIATRICS/WEIGHT MGMT | Age: 29
End: 2024-12-13

## 2024-12-13 ENCOUNTER — OFFICE VISIT (OUTPATIENT)
Dept: BARIATRICS/WEIGHT MGMT | Age: 29
End: 2024-12-13
Payer: COMMERCIAL

## 2024-12-13 VITALS
HEART RATE: 98 BPM | DIASTOLIC BLOOD PRESSURE: 82 MMHG | OXYGEN SATURATION: 98 % | HEIGHT: 64 IN | BODY MASS INDEX: 46.71 KG/M2 | SYSTOLIC BLOOD PRESSURE: 118 MMHG | WEIGHT: 273.6 LBS

## 2024-12-13 DIAGNOSIS — E61.1 IRON DEFICIENCY: Primary | ICD-10-CM

## 2024-12-13 PROCEDURE — G8427 DOCREV CUR MEDS BY ELIG CLIN: HCPCS | Performed by: SURGERY

## 2024-12-13 PROCEDURE — G8417 CALC BMI ABV UP PARAM F/U: HCPCS | Performed by: SURGERY

## 2024-12-13 PROCEDURE — 1036F TOBACCO NON-USER: CPT | Performed by: SURGERY

## 2024-12-13 PROCEDURE — 99215 OFFICE O/P EST HI 40 MIN: CPT | Performed by: SURGERY

## 2024-12-13 PROCEDURE — G8484 FLU IMMUNIZE NO ADMIN: HCPCS | Performed by: SURGERY

## 2024-12-13 RX ORDER — FERROUS SULFATE 325(65) MG
325 TABLET, DELAYED RELEASE (ENTERIC COATED) ORAL 2 TIMES DAILY
Qty: 90 TABLET | Refills: 3 | Status: SHIPPED | OUTPATIENT
Start: 2024-12-13

## 2024-12-13 NOTE — PROGRESS NOTES
Post Op Clinic Visit      Jeniffer Kennedy  is 6 months  post-op from Robotic Carlos en Y gastric bypass.   Abdominal pain has been absent, except for sharp pains where her skin hangs.      Jeniffer has lost 91 pounds since surgery and 165 since beginning the program 11 months ago.  She reports that she is compliant with and tolerating her vitamins and calcium daily.      Labs were normal last month except low iron.          Physical Exam:    /82   Pulse 98   Ht 1.626 m (5' 4\")   Wt 124.1 kg (273 lb 9.6 oz)   LMP 02/14/2023   SpO2 98%   BMI 46.96 kg/m²     General: No acute distress  HEENT: P PERRLA, no scleral icterus.  Trachea midline.  Thoracic: Clear to auscultation bilaterally.  Cardiac: Regular rate and rhythm with no rubs clicks or murmurs.  Abdomen: The incisions are healing well.There are no hernias    Extremities: No clubbing cyanosis or edema.  Neurologic: No gross motor or sensory defects.        Assessment and Plan:      Jeniffer Kennedy   is 6 months  post-op from Robotic Carlos en Y gastric bypass.  and doing well.  Follow up in 3 months with iron re-check at 12 months.    We are starting iron 325 mg BID with vitamin C for improved absorption.    In addition to the plan above, we discussed the following:    THE BIG FOUR GUIDELINES:  1.  Count calories!  People count calories eat less.  Use the daily plate or other apps for your smart phone or computer.  The more you count the more of an expert you will become and will get easier and easier.  If you are trying to lose weight and exercising a lot, keep calories to around the thousand to 1200 a day.  If you are not exercising consistently try to limit them to around 800 a day.    2.  Separate liquids and solids.  Wait 30 minutes to an hour after you eat before drinking liquids.  This will reduce the total amount of food you eat in the meal.    3.  Exercise!  You need up to 5 hours a week with a combination of cardio and resistance or

## 2024-12-13 NOTE — PATIENT INSTRUCTIONS
Take 325 mg iron every 12 hours with 250 mg of vitamin C.      Follow up in 3 months for your 9 month post op visit.    THE BIG FOUR GUIDELINES:    1.  Count calories!  People who count calories eat less.  Use the daily plate or other apps for your smart phone or computer.  The more you count the more of an expert you will become and will get easier and easier.  If you are trying to lose weight and exercising a lot, keep calories to around the thousand to 1200 a day.  If you are not exercising consistently try to limit them to around 800 a day.    2.  Separate liquids and solids.  Wait 30 minutes to an hour after you eat before drinking liquids.  This will reduce the total amount of food you eat in the meal.    3.  Exercise!  Optimally,  up to 5 hours a week with a combination of cardio and resistance or weight training will dramatically help to keep excess body fat off.  But any bit of exercise makes a big, long-term difference.    4.  Sleep!   Try to get 7 or more hours of sleep a night.  If you have obstructive sleep apnea definitely use your CPAP machine.    THE RULE OF 20'S:  For every meal, chew each bite 20 seconds.  Then put the fork down and wait 20 seconds after you swallow before picking up the fork again.  Each meal should take at least 20 minutes so your brain can register that you are full.    If you are happy with your care, please go  to [email or web address]  and/or [employer] and leave a review for Ori Ng MD.  The charitable nature of our practice makes a marketing budget a challenge and your review could help us help more people.

## 2024-12-30 RX ORDER — ONDANSETRON 4 MG/1
4 TABLET, ORALLY DISINTEGRATING ORAL 3 TIMES DAILY PRN
Qty: 30 TABLET | Refills: 3 | Status: SHIPPED | OUTPATIENT
Start: 2024-12-30

## 2024-12-30 NOTE — TELEPHONE ENCOUNTER
Requested Prescriptions     Pending Prescriptions Disp Refills    ondansetron (ZOFRAN-ODT) 4 MG disintegrating tablet 30 tablet 3     Sig: Take 1 tablet by mouth 3 times daily as needed for Nausea or Vomiting

## 2025-01-02 RX ORDER — ONDANSETRON 4 MG/1
TABLET, ORALLY DISINTEGRATING ORAL
Qty: 30 TABLET | Refills: 3 | OUTPATIENT
Start: 2025-01-02

## 2025-01-06 ENCOUNTER — TELEPHONE (OUTPATIENT)
Dept: BARIATRICS/WEIGHT MGMT | Age: 30
End: 2025-01-06

## 2025-01-06 NOTE — TELEPHONE ENCOUNTER
Patient C/O having a migraine for the past 3 hours.  She states that she has taken 4 Tylenol and 3 baby ASA with no relief.  Patient would like to know if you would be willing to call in something for her.      Please advise.    Patient is currently in [place].  I advised her to also contact her PCP.

## 2025-01-07 DIAGNOSIS — G43.911 INTRACTABLE MIGRAINE WITH STATUS MIGRAINOSUS, UNSPECIFIED MIGRAINE TYPE: Primary | ICD-10-CM

## 2025-01-07 RX ORDER — SUMATRIPTAN 50 MG/1
50 TABLET, FILM COATED ORAL
Qty: 5 TABLET | Refills: 1 | Status: SHIPPED | OUTPATIENT
Start: 2025-01-07 | End: 2025-01-07

## 2025-01-24 ENCOUNTER — PATIENT MESSAGE (OUTPATIENT)
Dept: BARIATRICS/WEIGHT MGMT | Age: 30
End: 2025-01-24

## 2025-01-24 ENCOUNTER — OFFICE VISIT (OUTPATIENT)
Dept: ORTHOPEDIC SURGERY | Age: 30
End: 2025-01-24

## 2025-01-24 VITALS — WEIGHT: 247 LBS | HEART RATE: 90 BPM | OXYGEN SATURATION: 98 % | BODY MASS INDEX: 42.17 KG/M2 | HEIGHT: 64 IN

## 2025-01-24 DIAGNOSIS — G89.29 CHRONIC PAIN OF RIGHT KNEE: ICD-10-CM

## 2025-01-24 DIAGNOSIS — M25.562 LEFT KNEE PAIN, UNSPECIFIED CHRONICITY: Primary | ICD-10-CM

## 2025-01-24 DIAGNOSIS — M25.561 CHRONIC PAIN OF RIGHT KNEE: ICD-10-CM

## 2025-01-24 DIAGNOSIS — E66.01 MORBID OBESITY: ICD-10-CM

## 2025-01-24 RX ORDER — LIDOCAINE HYDROCHLORIDE 10 MG/ML
8 INJECTION, SOLUTION INFILTRATION; PERINEURAL ONCE
Status: COMPLETED | OUTPATIENT
Start: 2025-01-24 | End: 2025-01-24

## 2025-01-24 RX ORDER — ALBUTEROL SULFATE 90 UG/1
1 INHALANT RESPIRATORY (INHALATION) EVERY 4 HOURS PRN
COMMUNITY
Start: 2024-11-13

## 2025-01-24 RX ORDER — TRIAMCINOLONE ACETONIDE 40 MG/ML
80 INJECTION, SUSPENSION INTRA-ARTICULAR; INTRAMUSCULAR ONCE
Status: COMPLETED | OUTPATIENT
Start: 2025-01-24 | End: 2025-01-24

## 2025-01-24 RX ORDER — OXYCODONE AND ACETAMINOPHEN 5; 325 MG/1; MG/1
1 TABLET ORAL EVERY 8 HOURS PRN
Qty: 21 TABLET | Refills: 0 | Status: SHIPPED | OUTPATIENT
Start: 2025-01-24 | End: 2025-01-31

## 2025-01-24 RX ADMIN — TRIAMCINOLONE ACETONIDE 80 MG: 40 INJECTION, SUSPENSION INTRA-ARTICULAR; INTRAMUSCULAR at 09:30

## 2025-01-24 RX ADMIN — LIDOCAINE HYDROCHLORIDE 8 ML: 10 INJECTION, SOLUTION INFILTRATION; PERINEURAL at 09:30

## 2025-01-24 ASSESSMENT — ENCOUNTER SYMPTOMS
EYES NEGATIVE: 1
GASTROINTESTINAL NEGATIVE: 1
RESPIRATORY NEGATIVE: 1

## 2025-01-24 NOTE — PROGRESS NOTES
Gastrointestinal: Negative.    Genitourinary: Negative.    Musculoskeletal: Negative.    Psychiatric/Behavioral: Negative.            Results  - MRI:    - Old MRI showed linear tearing in the meniscus      Objective   Physical Exam  Constitutional:       Appearance: Normal appearance.   HENT:      Head: Normocephalic and atraumatic.      Mouth/Throat:      Mouth: Mucous membranes are moist.   Eyes:      Extraocular Movements: Extraocular movements intact.   Musculoskeletal:      Cervical back: Normal range of motion.      Comments: Right knee-joint effusion present.  Popliteal cyst present no warmth, erythema, fluctuance or sign of infection.  Full extension, flexions 120 degrees.  The knee joint is stable, there is no laxity with valgus or varus stress.  Tenderness with palpation at the medial femoral condyle.  Patellofemoral crepitus with range of motion.  Calf is soft and nontender.    Left knee-joint effusion present.  Popliteal cyst present.  No warmth, erythema, fluctuance or sign of infection.  Full extension, flexions to 120 degrees.  Knee joint is stable, there is no laxity with valgus or varus stress.  Tenderness with palpation at the medial femoral condyle.  Patellofemoral crepitus with range of motion.  Calf soft nontender.   Skin:     General: Skin is warm and dry.   Neurological:      General: No focal deficit present.      Mental Status: She is oriented to person, place, and time.   Psychiatric:         Mood and Affect: Mood normal.       Physical Exam  Time Out  [x] Patient Verified  [x] Site Verified  [x] Laterality Verified  [x] Procedure Verified    Before aspiration/injection risks/benefits of a cortisone  injection including infection, local skin irritation, skin atrophy, continued pain/discomfort, elevated blood sugar, burning, failure to relieve pain, possible late infection were discussed with patient.   Patient verbalized understanding and wanted to proceed with planned procedure.    After

## 2025-01-24 NOTE — ASSESSMENT & PLAN NOTE
Chronic, at goal (stable), patient had bariatric surgery in June 2024 has lost almost 200 pounds, medication adherence emphasized, and lifestyle modifications recommended

## 2025-01-24 NOTE — ASSESSMENT & PLAN NOTE
Chronic, not at goal (unstable), changes made today: Disown injection today after aspiration    Orders:    oxyCODONE-acetaminophen (PERCOCET) 5-325 MG per tablet; Take 1 tablet by mouth every 8 hours as needed for Pain for up to 7 days. Intended supply: 7 days. Take lowest dose possible to manage pain Max Daily Amount: 3 tablets    DRAIN/INJECT LARGE JOINT/BURSA    lidocaine 1 % injection 8 mL    triamcinolone acetonide (KENALOG-40) injection 80 mg

## 2025-02-24 ENCOUNTER — PATIENT MESSAGE (OUTPATIENT)
Dept: ORTHOPEDIC SURGERY | Age: 30
End: 2025-02-24

## 2025-03-27 ENCOUNTER — OFFICE VISIT (OUTPATIENT)
Dept: BARIATRICS/WEIGHT MGMT | Age: 30
End: 2025-03-27
Payer: COMMERCIAL

## 2025-03-27 VITALS
BODY MASS INDEX: 37.63 KG/M2 | DIASTOLIC BLOOD PRESSURE: 60 MMHG | OXYGEN SATURATION: 98 % | HEIGHT: 64 IN | SYSTOLIC BLOOD PRESSURE: 120 MMHG | HEART RATE: 75 BPM | WEIGHT: 220.4 LBS

## 2025-03-27 DIAGNOSIS — Z98.84 BARIATRIC SURGERY STATUS: ICD-10-CM

## 2025-03-27 DIAGNOSIS — Z98.84 BARIATRIC SURGERY STATUS: Primary | ICD-10-CM

## 2025-03-27 LAB
ALBUMIN SERPL-MCNC: 3.5 G/DL (ref 3.5–4.6)
ERYTHROCYTE [DISTWIDTH] IN BLOOD BY AUTOMATED COUNT: 14.7 % (ref 11.5–14.5)
HCT VFR BLD AUTO: 36.8 % (ref 37–47)
HGB BLD-MCNC: 11.9 G/DL (ref 12–16)
MCH RBC QN AUTO: 29.4 PG (ref 27–31.3)
MCHC RBC AUTO-ENTMCNC: 32.3 % (ref 33–37)
MCV RBC AUTO: 90.9 FL (ref 79.4–94.8)
PLATELET # BLD AUTO: 332 K/UL (ref 130–400)
PTH-INTACT SERPL-MCNC: 26.2 PG/ML (ref 15–65)
RBC # BLD AUTO: 4.05 M/UL (ref 4.2–5.4)
WBC # BLD AUTO: 8.7 K/UL (ref 4.8–10.8)

## 2025-03-27 PROCEDURE — G8427 DOCREV CUR MEDS BY ELIG CLIN: HCPCS | Performed by: SURGERY

## 2025-03-27 PROCEDURE — 99215 OFFICE O/P EST HI 40 MIN: CPT | Performed by: SURGERY

## 2025-03-27 PROCEDURE — G8417 CALC BMI ABV UP PARAM F/U: HCPCS | Performed by: SURGERY

## 2025-03-27 PROCEDURE — 1036F TOBACCO NON-USER: CPT | Performed by: SURGERY

## 2025-03-27 NOTE — PATIENT INSTRUCTIONS
Follow up in 3 months for your 12 month post op visit.    Obtain labs today.    THE BIG FOUR GUIDELINES:    1.  Count calories!  People who count calories eat less.  Use the daily plate or other apps for your smart phone or computer.  The more you count the more of an expert you will become and will get easier and easier.  If you are trying to lose weight and exercising a lot, keep calories to around the thousand to 1200 a day.  If you are not exercising consistently try to limit them to around 800 a day.    2.  Separate liquids and solids.  Wait 30 minutes to an hour after you eat before drinking liquids.  This will reduce the total amount of food you eat in the meal.    3.  Exercise!  Optimally,  up to 5 hours a week with a combination of cardio and resistance or weight training will dramatically help to keep excess body fat off.  But any bit of exercise makes a big, long-term difference.    4.  Sleep!   Try to get 7 or more hours of sleep a night.  If you have obstructive sleep apnea definitely use your CPAP machine.    THE RULE OF 20'S:  For every meal, chew each bite 20 seconds.  Then put the fork down and wait 20 seconds after you swallow before picking up the fork again.  Each meal should take at least 20 minutes so your brain can register that you are full.    If you are happy with your care, please go  to [email or web address]  and/or [employer] Reviews and leave a review for Ori Ng MD.  The charitable nature of our practice makes a marketing budget a challenge and your review could help us help more people.

## 2025-03-27 NOTE — PROGRESS NOTES
Post Op Clinic Visit      Jeniffer Kennedy  is 21 months  post-op from Robotic Carlos en Y gastric bypass.   Abdominal pain has been absent.     Jeniffer has lost 218 pounds since starting the program and 144 pounds since surgery (40% of total body weight).    Needs labs done.  Reports some non-compliance with MVI and calcium citrate.        Physical Exam:    /60   Pulse 75   Ht 1.626 m (5' 4\")   Wt 100 kg (220 lb 6.4 oz)   LMP 02/14/2023   SpO2 98%   BMI 37.83 kg/m²     General: No acute distress  HEENT: P PERRLA, no scleral icterus.  Trachea midline.  Thoracic: Clear to auscultation bilaterally.  Cardiac: Regular rate and rhythm with no rubs clicks or murmurs.  Abdomen: The incisions are healing well.There are no hernias    Extremities: No clubbing cyanosis or edema.  Neurologic: No gross motor or sensory defects.        Assessment and Plan:      Jeniffer Kennedy   is 21 months  post-op from Robotic Carlos en Y gastric bypass.  and doing well.      Jeniffer has lost 218 pounds since starting the program and 144 pounds since surgery (40% of total body weight).    Follow up in 3 months with labs now.    In addition to the plan above, we discussed the following:      Obtain Bariatric labs for review.  We strongly recommend at least yearly follow up and review of the following labs:    B12 level   Thiamine level   PTH and ionized calcium   CMP   Prealbumin   Vitamin A level (if anemic despite normal iron levels)  Copper level (if anemic despite normal iron levels)  Zinc level   Vitamin D level   HgA1c   CBC   And Iron Studies      Be aware that prolonged deficiencies in vitamin B12 and/or Thiamine (vitamin B1) can cause irreversible dementia and other neurologic damage.  As a patient, it is also your responsibility to review these labs yearly with your PCP, Bariatric Surgeon, or other provider.      THE BIG FOUR GUIDELINES:  1.  Count calories!  People count calories eat less.  Use the daily plate or

## 2025-03-28 LAB
CA-I ADJ PH7.4 SERPL-SCNC: 1.28 MMOL/L (ref 1.09–1.3)
CA-I SERPL ISE-SCNC: 1.26 MMOL/L (ref 1.09–1.3)
ESTIMATED AVERAGE GLUCOSE: 71 MG/DL
HBA1C MFR BLD: 4.1 % (ref 4–6)
IRON % SATURATION: 25 % (ref 20–55)
IRON: 56 UG/DL (ref 37–145)
TOTAL IRON BINDING CAPACITY: 220 UG/DL (ref 250–450)
UNSATURATED IRON BINDING CAPACITY: 164 UG/DL (ref 112–347)
VITAMIN B-12: 156 PG/ML (ref 232–1245)
VITAMIN D 25-HYDROXY: 20.9 NG/ML (ref 30–100)

## 2025-03-31 LAB — VIT B1 BLD-MCNC: 91 NMOL/L (ref 70–180)

## 2025-04-05 ENCOUNTER — HOSPITAL ENCOUNTER (EMERGENCY)
Age: 30
Discharge: HOME | End: 2025-04-05
Payer: COMMERCIAL

## 2025-04-05 ENCOUNTER — APPOINTMENT (OUTPATIENT)
Dept: GENERAL RADIOLOGY | Age: 30
End: 2025-04-05
Payer: COMMERCIAL

## 2025-04-05 VITALS
TEMPERATURE: 98.2 F | HEART RATE: 82 BPM | WEIGHT: 220 LBS | DIASTOLIC BLOOD PRESSURE: 84 MMHG | OXYGEN SATURATION: 96 % | BODY MASS INDEX: 37.76 KG/M2 | RESPIRATION RATE: 16 BRPM | SYSTOLIC BLOOD PRESSURE: 128 MMHG

## 2025-04-05 DIAGNOSIS — S63.91XA SPRAIN OF RIGHT HAND, INITIAL ENCOUNTER: Primary | ICD-10-CM

## 2025-04-05 DIAGNOSIS — M79.601 PAIN OF RIGHT UPPER EXTREMITY: ICD-10-CM

## 2025-04-05 LAB
HCG, URINE, POC: NEGATIVE
Lab: 0
NEGATIVE QC PASS/FAIL: NORMAL
POSITIVE QC PASS/FAIL: NORMAL

## 2025-04-05 PROCEDURE — 99283 EMERGENCY DEPT VISIT LOW MDM: CPT

## 2025-04-05 PROCEDURE — 73090 X-RAY EXAM OF FOREARM: CPT

## 2025-04-05 PROCEDURE — 73130 X-RAY EXAM OF HAND: CPT

## 2025-04-05 PROCEDURE — 73060 X-RAY EXAM OF HUMERUS: CPT

## 2025-04-05 PROCEDURE — 6370000000 HC RX 637 (ALT 250 FOR IP): Performed by: PHYSICIAN ASSISTANT

## 2025-04-05 RX ORDER — HYDROCODONE BITARTRATE AND ACETAMINOPHEN 5; 325 MG/1; MG/1
1 TABLET ORAL EVERY 6 HOURS PRN
Qty: 16 TABLET | Refills: 0 | Status: SHIPPED | OUTPATIENT
Start: 2025-04-05 | End: 2025-04-09

## 2025-04-05 RX ORDER — ACETAMINOPHEN 325 MG/1
650 TABLET ORAL ONCE
Status: COMPLETED | OUTPATIENT
Start: 2025-04-05 | End: 2025-04-05

## 2025-04-05 RX ADMIN — ACETAMINOPHEN 650 MG: 325 TABLET ORAL at 22:13

## 2025-04-05 ASSESSMENT — LIFESTYLE VARIABLES
HOW MANY STANDARD DRINKS CONTAINING ALCOHOL DO YOU HAVE ON A TYPICAL DAY: 5 OR 6
HOW OFTEN DO YOU HAVE A DRINK CONTAINING ALCOHOL: MONTHLY OR LESS

## 2025-04-05 ASSESSMENT — PAIN DESCRIPTION - LOCATION: LOCATION: ARM;HAND

## 2025-04-05 ASSESSMENT — ENCOUNTER SYMPTOMS
ABDOMINAL PAIN: 0
BACK PAIN: 0
NAUSEA: 0
EYE DISCHARGE: 0
ABDOMINAL DISTENTION: 0
VOMITING: 0
APNEA: 0

## 2025-04-05 ASSESSMENT — PAIN SCALES - GENERAL: PAINLEVEL_OUTOF10: 8

## 2025-04-05 ASSESSMENT — PAIN DESCRIPTION - ORIENTATION: ORIENTATION: RIGHT

## 2025-04-06 NOTE — DISCHARGE INSTRUCTIONS
Do not drive or operate equipment while taking pain medication clued and Norco as it may cause drowsiness.  Follow-up with your orthopedic surgeon and OB/GYN.

## 2025-04-06 NOTE — ED PROVIDER NOTES
[facility] EMERGENCY DEPARTMENT  EMERGENCY DEPARTMENT ENCOUNTER      Pt Name: Jeniffer Kennedy  MRN: [id number]  Birthdate 1995  Date of evaluation: 4/5/2025  Provider: Sunny Santiago PA-C  10:08 PM EDT    CHIEF COMPLAINT       Chief Complaint   Patient presents with    Hand Injury     Loss of father, punched wall due to anger. Right hand injury. No menstrual period last was 4 years ago. Faint positive test 1 week ago. Pain 7/10. Pain from fingers to bicep right side.          HISTORY OF PRESENT ILLNESS   (Location/Symptom, Timing/Onset, Context/Setting, Quality, Duration, Modifying Factors, Severity)  Note limiting factors.   Jeniffer Kennedy is a 30 y.o. female who presents to the emergency department patient punched a wall has arm pain and hand pain.  Patient also states she had a faint positive pregnancy test 1 week ago.  Patient denies any head injury denies any abdominal pain chest pain neck pain back pain urinary bleeding vaginal bleeding.  Symptoms mild to moderate severity worse with touch or motion to hand.    HPI    Nursing Notes were reviewed.    REVIEW OF SYSTEMS    (2-9 systems for level 4, 10 or more for level 5)     Review of Systems   Constitutional:  Negative for activity change, appetite change and fever.   HENT:  Negative for ear discharge and nosebleeds.    Eyes:  Negative for discharge.   Respiratory:  Negative for apnea.    Cardiovascular:  Negative for chest pain.   Gastrointestinal:  Negative for abdominal distention, abdominal pain, nausea and vomiting.   Genitourinary:  Negative for hematuria and vaginal bleeding.   Musculoskeletal:  Positive for arthralgias. Negative for back pain, joint swelling, neck pain and neck stiffness.   Skin:  Negative for pallor.   Neurological:  Negative for seizures and facial asymmetry.   All other systems reviewed and are negative.      Except as noted above the remainder of the review of systems was reviewed and negative.       PAST MEDICAL

## 2025-04-06 NOTE — ED NOTES
This nurse handed pt her water cup for her medication and pt grasped the cup without difficulty with effective wrist.

## 2025-04-08 ENCOUNTER — OFFICE VISIT (OUTPATIENT)
Dept: ORTHOPEDIC SURGERY | Facility: CLINIC | Age: 30
End: 2025-04-08
Payer: COMMERCIAL

## 2025-04-08 DIAGNOSIS — S60.221A CONTUSION OF DORSUM OF RIGHT HAND: Primary | ICD-10-CM

## 2025-04-08 PROCEDURE — 29085 APPL CAST HAND&LWR FOREARM: CPT | Performed by: ORTHOPAEDIC SURGERY

## 2025-04-08 PROCEDURE — 99214 OFFICE O/P EST MOD 30 MIN: CPT | Mod: 25 | Performed by: ORTHOPAEDIC SURGERY

## 2025-04-08 PROCEDURE — 99203 OFFICE O/P NEW LOW 30 MIN: CPT | Performed by: ORTHOPAEDIC SURGERY

## 2025-04-08 NOTE — PROGRESS NOTES
History present illness: Patient presents status post punching injury injuring the right hand 2 days ago.  She is left-hand dominant.  Otherwise healthy.  She punched a wall and believes that she caught the stud behind.      Past medical history: The patient's past medical history, family history, social history, and review of systems were documented on the patient medical intake.  The updated data was reviewed in the electronic medical record.  History is negative except otherwise stated in history of present illness.        Physical examination:  General: Alert and oriented to person, place, and time.  No acute distress and breathing comfortably: Pleasant and cooperative with examination.  HEENT: Head is normocephalic and atraumatic.  Neck: Supple, no visible swelling.  Cardiovascular: No palpable tachycardia  Lungs: No audible wheezing or labored breathing  Abdomen: Nondistended.  Extremities: Evaluation of the right upper extremity finds the patient had palpable radial artery at the wrist with brisk capillary refill to all digits.  Patient has intact sensation to axillary radial median and ulnar nerves.  There are no open wounds.  There are no signs of infection.  There is no evidence of lymphedema or lymphatic streaking.  The patient has supple compartments to right arm forearm and hand.  Diffuse ecchymosis and swelling with focal tenderness best localized to MCPs of 3rd, 4th and 5th.      Radiology: No acute fracture or dislocation      Assessment: Right hand contusion      Plan: Treatment options were discussed.  We talked about removable splint immobilization versus casting.  Recommendations were made for splinting to allow for some early gentle range of motion.  Patient prefers casting.  3-week follow-up.  X-rays of right hand out of cast upon return.  Nonweightbearing in the interim.  She was given home exercise program to preserve PIP motion.  Fiberglass casting material was used to [place]ell well-padded well  molded right short arm cast holding MCPs intrinsic plus allowing for PIP motion including index long ring and small.        Procedure:

## 2025-04-21 ENCOUNTER — OFFICE VISIT (OUTPATIENT)
Dept: ORTHOPEDIC SURGERY | Facility: CLINIC | Age: 30
End: 2025-04-21
Payer: COMMERCIAL

## 2025-04-21 NOTE — PROGRESS NOTES
Acute Injury New Patient Visit    CC: No chief complaint on file.      HPI: Eva is a 30 y.o. female ***        Review of Systems   GENERAL: Negative for malaise, significant weight loss, fever  MUSCULOSKELETAL: See HPI  NEURO:  Negative for numbness / tingling     Past Medical History  Medical History[1]    Medication review  Medication Documentation Review Audit    **Prior to Admission medications have not yet been reviewed**         Allergies  RX Allergies[2]    Social History  Social History     Socioeconomic History    Marital status: Single     Spouse name: Not on file    Number of children: Not on file    Years of education: Not on file    Highest education level: Not on file   Occupational History    Not on file   Tobacco Use    Smoking status: Not on file    Smokeless tobacco: Not on file   Substance and Sexual Activity    Alcohol use: Not on file    Drug use: Not on file    Sexual activity: Not on file   Other Topics Concern    Not on file   Social History Narrative    Not on file     Social Drivers of Health     Financial Resource Strain: Not on file   Food Insecurity: No Food Insecurity (6/4/2024)    Received from [facility]    Hunger Vital Sign     Worried About Running Out of Food in the Last Year: Never true     Ran Out of Food in the Last Year: Never true   Transportation Needs: No Transportation Needs (6/4/2024)    Received from [facility]    PRAPARE - Transportation     Lack of Transportation (Medical): No     Lack of Transportation (Non-Medical): No   Physical Activity: Insufficiently Active (3/16/2024)    Received from [facility]    Exercise Vital Sign     Days of Exercise per Week: 3 days     Minutes of Exercise per Session: 30 min   Stress: Not on file   Social Connections: Not on file   Intimate Partner Violence: Not on file   Housing Stability: Low Risk  (6/4/2024)    Received from [facility]     Housing Stability Vital Sign     Unable to Pay for Housing in the Last Year: No     Number of Places Lived in the Last Year: 1     Unstable Housing in the Last Year: No       Surgical History  Surgical History[3]    Physical Exam:  GENERAL:  Patient is awake, alert, and oriented to person place and time.  Patient appears well nourished and well kept.  Affect Calm, Not Acutely Distressed.  HEENT:  Normocephalic, Atraumatic, EOMI  CARDIOVASCULAR:  Hemodynamically stable.  RESPIRATORY:  Normal respirations with unlabored breathing.  Extremity: ***      Diagnostics: ***  OUTSIDE IMAGING SCAN  Ordered by an unspecified provider.      Procedure: ***    Assessment: ***    Plan: Eva ***    No orders of the defined types were placed in this encounter.     At the conclusion of the visit there were no further questions by the patient/family regarding their plan of care.  Patient was instructed to call or return with any issues, questions, or concerns regarding their injury and/or treatment plan described above.     04/21/25 at 12:47 PM - Marlen Kelly MD    Office: [phone]    This note was prepared using voice recognition software.  The details of this note are correct and have been reviewed, and corrected to the best of my ability.  Some grammatical errors may persist related to the Dragon software.       [1]   Past Medical History:  Diagnosis Date    Acute vaginitis     Bacterial vaginitis    Acute vaginitis     Bacterial vaginosis    Encounter for immunization     Need for Tdap vaccination    Encounter for insertion of intrauterine contraceptive device     Encounter for IUD insertion    Encounter for routine postpartum follow-up ([facility])     6 weeks postpartum follow-up    Encounter for screening for infections with a predominantly sexual mode of transmission     Screening for STD (sexually transmitted disease)    Encounter for supervision of normal pregnancy, unspecified, unspecified trimester ([facility])      Encounter for supervision of normal pregnancy    Obesity complicating pregnancy, third trimester ([facility])     Obesity in pregnancy, antepartum, third trimester    Other abnormal findings in urine     Leukocytes in urine    Other disorders of lactation ([facility])     Lactation symptom    Other mental disorders complicating pregnancy, third trimester ([facility])     Depression affecting pregnancy in third trimester, antepartum    Other skin changes     Skin irritation    Other specified diseases and conditions complicating pregnancy ([facility])     Bacteriuria during pregnancy in third trimester    Other specified noninflammatory disorders of vagina     Vaginal odor    Pelvic and perineal pain     Pelvic pressure in female    Personal history of other diseases of the digestive system     History of constipation    Personal history of other diseases of the digestive system     History of gastroesophageal reflux (GERD)    Personal history of other diseases of the digestive system     History of ventral hernia    Personal history of other diseases of the female genital tract     History of amenorrhea    Personal history of other diseases of the female genital tract     History of vaginal discharge    Personal history of other diseases of the musculoskeletal system and connective tissue     History of low back pain    Personal history of other diseases of the respiratory system     History of upper respiratory infection    Personal history of other mental and behavioral disorders     History of depression    Personal history of other specified conditions     History of nausea    Personal history of other specified conditions     History of gross hematuria    Unspecified infection of urinary tract in pregnancy, unspecified trimester ([facility])     UTI in pregnancy   [2] Not on File  [3]   Past Surgical History:  Procedure Laterality Date    OTHER SURGICAL HISTORY  03/01/2021    Dazey tooth extraction    OTHER SURGICAL  HISTORY  03/01/2021    Esophagogastroduodenoscopy

## 2025-04-26 ENCOUNTER — HOSPITAL ENCOUNTER (EMERGENCY)
Age: 30
Discharge: HOME | End: 2025-04-26
Payer: COMMERCIAL

## 2025-04-26 ENCOUNTER — APPOINTMENT (OUTPATIENT)
Dept: GENERAL RADIOLOGY | Age: 30
End: 2025-04-26
Payer: COMMERCIAL

## 2025-04-26 VITALS
WEIGHT: 229.1 LBS | OXYGEN SATURATION: 100 % | SYSTOLIC BLOOD PRESSURE: 115 MMHG | HEART RATE: 82 BPM | BODY MASS INDEX: 38.17 KG/M2 | HEIGHT: 65 IN | TEMPERATURE: 98.1 F | RESPIRATION RATE: 16 BRPM | DIASTOLIC BLOOD PRESSURE: 74 MMHG

## 2025-04-26 DIAGNOSIS — R42 DIZZINESS: Primary | ICD-10-CM

## 2025-04-26 LAB
ALBUMIN SERPL-MCNC: 2.5 G/DL (ref 3.5–4.6)
ALP SERPL-CCNC: 64 U/L (ref 40–130)
ALT SERPL-CCNC: 16 U/L (ref 0–33)
ANION GAP SERPL CALCULATED.3IONS-SCNC: 7 MEQ/L (ref 9–15)
AST SERPL-CCNC: 21 U/L (ref 0–35)
BASOPHILS # BLD: 0.1 K/UL (ref 0–0.2)
BASOPHILS NFR BLD: 0.5 %
BILIRUB SERPL-MCNC: <0.2 MG/DL (ref 0.2–0.7)
BILIRUB UR QL STRIP: NEGATIVE
BUN SERPL-MCNC: 16 MG/DL (ref 6–20)
CALCIUM SERPL-MCNC: 7.8 MG/DL (ref 8.5–9.9)
CHLORIDE SERPL-SCNC: 109 MEQ/L (ref 95–107)
CHP ED QC CHECK: YES
CLARITY UR: ABNORMAL
CO2 SERPL-SCNC: 23 MEQ/L (ref 20–31)
COLOR UR: YELLOW
CREAT SERPL-MCNC: 0.61 MG/DL (ref 0.5–0.9)
EKG ATRIAL RATE: 65 BPM
EKG DIAGNOSIS: NORMAL
EKG P AXIS: 31 DEGREES
EKG P-R INTERVAL: 106 MS
EKG Q-T INTERVAL: 392 MS
EKG QRS DURATION: 74 MS
EKG QTC CALCULATION (BAZETT): 407 MS
EKG R AXIS: 49 DEGREES
EKG T AXIS: 61 DEGREES
EKG VENTRICULAR RATE: 65 BPM
EOSINOPHIL # BLD: 0.1 K/UL (ref 0–0.7)
EOSINOPHIL NFR BLD: 0.8 %
ERYTHROCYTE [DISTWIDTH] IN BLOOD BY AUTOMATED COUNT: 15 % (ref 11.5–14.5)
GLOBULIN SER CALC-MCNC: 2.2 G/DL (ref 2.3–3.5)
GLUCOSE BLD-MCNC: 91 MG/DL
GLUCOSE BLD-MCNC: 91 MG/DL (ref 70–99)
GLUCOSE SERPL-MCNC: 56 MG/DL (ref 70–99)
GLUCOSE UR STRIP-MCNC: NEGATIVE MG/DL
HCT VFR BLD AUTO: 39.1 % (ref 37–47)
HGB BLD-MCNC: 12.3 G/DL (ref 12–16)
HGB UR QL STRIP: NEGATIVE
KETONES UR STRIP-MCNC: ABNORMAL MG/DL
LEUKOCYTE ESTERASE UR QL STRIP: NEGATIVE
LYMPHOCYTES # BLD: 2.1 K/UL (ref 1–4.8)
LYMPHOCYTES NFR BLD: 19.3 %
MCH RBC QN AUTO: 30.3 PG (ref 27–31.3)
MCHC RBC AUTO-ENTMCNC: 31.5 % (ref 33–37)
MCV RBC AUTO: 96.3 FL (ref 79.4–94.8)
MONOCYTES # BLD: 0.9 K/UL (ref 0.2–0.8)
MONOCYTES NFR BLD: 8 %
NEUTROPHILS # BLD: 7.6 K/UL (ref 1.4–6.5)
NEUTS SEG NFR BLD: 71 %
NITRITE UR QL STRIP: NEGATIVE
PERFORMED ON: NORMAL
PH UR STRIP: 5 [PH] (ref 5–9)
PLATELET # BLD AUTO: 312 K/UL (ref 130–400)
POTASSIUM SERPL-SCNC: 4.1 MEQ/L (ref 3.4–4.9)
PROT SERPL-MCNC: 4.7 G/DL (ref 6.3–8)
PROT UR STRIP-MCNC: ABNORMAL MG/DL
RBC # BLD AUTO: 4.06 M/UL (ref 4.2–5.4)
SODIUM SERPL-SCNC: 139 MEQ/L (ref 135–144)
SP GR UR STRIP: 1.03 (ref 1–1.03)
URINE REFLEX TO CULTURE: ABNORMAL
UROBILINOGEN UR STRIP-ACNC: 1 E.U./DL
WBC # BLD AUTO: 10.8 K/UL (ref 4.8–10.8)

## 2025-04-26 PROCEDURE — 71045 X-RAY EXAM CHEST 1 VIEW: CPT

## 2025-04-26 PROCEDURE — 80053 COMPREHEN METABOLIC PANEL: CPT

## 2025-04-26 PROCEDURE — 99285 EMERGENCY DEPT VISIT HI MDM: CPT

## 2025-04-26 PROCEDURE — 81003 URINALYSIS AUTO W/O SCOPE: CPT

## 2025-04-26 PROCEDURE — 36415 COLL VENOUS BLD VENIPUNCTURE: CPT

## 2025-04-26 PROCEDURE — 2580000003 HC RX 258: Performed by: PHYSICIAN ASSISTANT

## 2025-04-26 PROCEDURE — 85025 COMPLETE CBC W/AUTO DIFF WBC: CPT

## 2025-04-26 PROCEDURE — 6370000000 HC RX 637 (ALT 250 FOR IP): Performed by: PHYSICIAN ASSISTANT

## 2025-04-26 RX ORDER — MECLIZINE HYDROCHLORIDE 25 MG/1
25 TABLET ORAL ONCE
Status: DISCONTINUED | OUTPATIENT
Start: 2025-04-26 | End: 2025-04-26

## 2025-04-26 RX ORDER — ACETAMINOPHEN 500 MG
1000 TABLET ORAL ONCE
Status: COMPLETED | OUTPATIENT
Start: 2025-04-26 | End: 2025-04-26

## 2025-04-26 RX ORDER — 0.9 % SODIUM CHLORIDE 0.9 %
1000 INTRAVENOUS SOLUTION INTRAVENOUS ONCE
Status: DISCONTINUED | OUTPATIENT
Start: 2025-04-26 | End: 2025-04-26

## 2025-04-26 RX ORDER — 0.9 % SODIUM CHLORIDE 0.9 %
1000 INTRAVENOUS SOLUTION INTRAVENOUS ONCE
Status: COMPLETED | OUTPATIENT
Start: 2025-04-26 | End: 2025-04-26

## 2025-04-26 RX ADMIN — SODIUM CHLORIDE 1000 ML: 0.9 INJECTION, SOLUTION INTRAVENOUS at 18:43

## 2025-04-26 RX ADMIN — ACETAMINOPHEN 1000 MG: 500 TABLET ORAL at 16:55

## 2025-04-26 ASSESSMENT — PAIN DESCRIPTION - PAIN TYPE: TYPE: ACUTE PAIN

## 2025-04-26 ASSESSMENT — ENCOUNTER SYMPTOMS
COLOR CHANGE: 0
SORE THROAT: 0
RHINORRHEA: 0
ABDOMINAL DISTENTION: 0
EYE DISCHARGE: 0
ABDOMINAL PAIN: 0
SHORTNESS OF BREATH: 0
CONSTIPATION: 0

## 2025-04-26 ASSESSMENT — PAIN SCALES - GENERAL
PAINLEVEL_OUTOF10: 4
PAINLEVEL_OUTOF10: 6

## 2025-04-26 ASSESSMENT — PAIN DESCRIPTION - ORIENTATION
ORIENTATION: LEFT
ORIENTATION: LEFT

## 2025-04-26 ASSESSMENT — LIFESTYLE VARIABLES
HOW MANY STANDARD DRINKS CONTAINING ALCOHOL DO YOU HAVE ON A TYPICAL DAY: PATIENT DOES NOT DRINK
HOW OFTEN DO YOU HAVE A DRINK CONTAINING ALCOHOL: NEVER

## 2025-04-26 ASSESSMENT — PAIN - FUNCTIONAL ASSESSMENT: PAIN_FUNCTIONAL_ASSESSMENT: NONE - DENIES PAIN

## 2025-04-26 NOTE — ED TRIAGE NOTES
[facility] [employer] [facility].  Pt presents conscious, Aox4.  C/o: Near syncope, \"closing of vision\", helped herself to the floor, no fall.  Pt is stable with EMS.

## 2025-04-26 NOTE — ED PROVIDER NOTES
[facility] EMERGENCY DEPARTMENT  EMERGENCY DEPARTMENT ENCOUNTER      Pt Name: Jeniffer Kennedy  MRN: [id number]  Birthdate 1995  Date of evaluation: 4/26/2025  Provider: Laron Luque PA-C  4:25 PM EDT    CHIEF COMPLAINT       Chief Complaint   Patient presents with    Dizziness         HISTORY OF PRESENT ILLNESS   (Location/Symptom, Timing/Onset, Context/Setting, Quality, Duration, Modifying Factors, Severity)  Note limiting factors.   Jeniffer Kennedy is a 30 y.o. female who presents to the emergency department with complaint of dizziness, and feeling as if she was going to pass out.  Patient states that she had donated plasma earlier in the day today, she states during the procedure they had a hard time getting good flow.  She states after she had completed donation plasma, she was attempting to walk home, she states she walked to [facility], and called a friend at that time to try to make arrangements to pick her up, they were not available, and were making arrangements for transfer to the patient, she states she waited for about an hour and a half, and then decided she want to continue walking home, she states she walked from [facility] to [place] which was just across the street, she was trying to find somewhere to charge her phone, there is her phone was going dead, she states she felt dizzy, sat down, and then bystanders called 911, patient was transported to ED for further evaluation for near syncopal episode, she denies any chest pain, no shortness of breath, no nausea vomiting or recent illness.  Patient also states that her walk she began having some left-sided flank pain which is present predominately at time of movement, she denies any falls or specific injury.  She is rating her overall pain at this time as a 6 out of 10.  Patient states past medical history of gastric reflux, gastric bypass.    HPI    Nursing Notes were reviewed.    REVIEW OF SYSTEMS    (2-9 systems for level 4, 10 or  more for level 5)     Review of Systems   Constitutional:  Negative for activity change and appetite change.   HENT:  Negative for congestion, ear discharge, ear pain, nosebleeds, rhinorrhea and sore throat.    Eyes:  Negative for discharge.   Respiratory:  Negative for shortness of breath.    Cardiovascular:  Negative for chest pain, palpitations and leg swelling.   Gastrointestinal:  Negative for abdominal distention, abdominal pain and constipation.   Genitourinary:  Positive for flank pain. Negative for difficulty urinating and dysuria.   Musculoskeletal:  Negative for arthralgias.   Skin:  Negative for color change.   Neurological:  Positive for dizziness. Negative for syncope, weakness, numbness and headaches.   Psychiatric/Behavioral:  Negative for agitation and confusion.        Except as noted above the remainder of the review of systems was reviewed and negative.       PAST MEDICAL HISTORY     Past Medical History:   Diagnosis Date    Mental disorder     depression and anxiety    Postpartum depression     Sleep apnea          SURGICAL HISTORY       Past Surgical History:   Procedure Laterality Date    DILATION AND CURETTAGE OF UTERUS N/A 9/15/2023    ATTEMPTED LAPAROSCOPIC BILATERAL SALPINGECTOMY WITH NOVASURE ENDOMETRIAL ABLATION performed by Agustin Gregory MD at [facility] [place]    HYSTERECTOMY, VAGINAL N/A 10/20/2023    TOTAL LAPAROSCOPIC HYSTERECTOMY, RIGHT SALPINGECTOMY, LEFT OOPHERECTOMY performed by Agustin Gregory MD at [facility] [place]    LAPAROSCOPY Left 6/10/2021    LAPAROSCOPIC ECTOPIC REMOVAL, LEFT SALPINGECTOMY performed by Agustin Gregory MD at [facility] [place]    CATIE-EN-Y GASTRIC BYPASS N/A 6/4/2024    GASTRIC BYPASS CATIE-EN-Y LAPAROSCOPIC/ROBOTIC, HIATAL HERNIA REPAIR, MODIFIER 22 performed by Ori Ng MD at [facility] [place]    UPPER GASTROINTESTINAL ENDOSCOPY  2020    UPPER GASTROINTESTINAL ENDOSCOPY N/A 6/23/2023    EGD DIAGNOSTIC ONLY performed by Sheridan Laughlin MD at [facility]    UPPER

## 2025-04-27 NOTE — DISCHARGE INSTRUCTIONS
Please follow-up with your primary care doctor within the next week.  Please return to the emergency department for new or worsening symptoms.  Please stay hydrated at home drinking plenty of fluids and stay rested.

## 2025-04-27 NOTE — ED NOTES
Pt received discharge paperwork along prescriptions sent to pharmacy and follow up appointments. Pt verbalized understanding and had no questions or concerns at this time. Pt removed heself, IV was removed, catheter intact and dressing applied.Pt was educated on letting nursing staff remove IV.  Pt A/Ox4, VSS, ABC intact, and no sxs of acute distress. Pt ambulated with no difficulty to home. Pt provided insurance information for ride home.

## 2025-04-28 LAB
EKG ATRIAL RATE: 65 BPM
EKG DIAGNOSIS: NORMAL
EKG P AXIS: 31 DEGREES
EKG P-R INTERVAL: 106 MS
EKG Q-T INTERVAL: 392 MS
EKG QRS DURATION: 74 MS
EKG QTC CALCULATION (BAZETT): 407 MS
EKG R AXIS: 49 DEGREES
EKG T AXIS: 61 DEGREES
EKG VENTRICULAR RATE: 65 BPM

## 2025-05-06 ENCOUNTER — OFFICE VISIT (OUTPATIENT)
Age: 30
End: 2025-05-06
Payer: COMMERCIAL

## 2025-05-06 VITALS
BODY MASS INDEX: 38.15 KG/M2 | OXYGEN SATURATION: 98 % | DIASTOLIC BLOOD PRESSURE: 72 MMHG | HEART RATE: 86 BPM | WEIGHT: 229 LBS | TEMPERATURE: 97.2 F | HEIGHT: 65 IN | SYSTOLIC BLOOD PRESSURE: 114 MMHG

## 2025-05-06 VITALS
OXYGEN SATURATION: 96 % | BODY MASS INDEX: 38.15 KG/M2 | WEIGHT: 229 LBS | TEMPERATURE: 94.5 F | HEIGHT: 65 IN | HEART RATE: 125 BPM

## 2025-05-06 DIAGNOSIS — R35.0 URINARY FREQUENCY: ICD-10-CM

## 2025-05-06 DIAGNOSIS — Z20.5 EXPOSURE TO HEPATITIS C: ICD-10-CM

## 2025-05-06 DIAGNOSIS — M25.562 LEFT KNEE PAIN, UNSPECIFIED CHRONICITY: Primary | ICD-10-CM

## 2025-05-06 DIAGNOSIS — Z91.89 AT RISK FOR SEXUALLY TRANSMITTED INFECTION DUE TO UNPROTECTED SEX: ICD-10-CM

## 2025-05-06 DIAGNOSIS — N76.0 BV (BACTERIAL VAGINOSIS): ICD-10-CM

## 2025-05-06 DIAGNOSIS — N76.0 ACUTE VAGINITIS: ICD-10-CM

## 2025-05-06 DIAGNOSIS — Z91.89 AT RISK FOR SEXUALLY TRANSMITTED INFECTION DUE TO UNPROTECTED SEX: Primary | ICD-10-CM

## 2025-05-06 DIAGNOSIS — N89.8 VAGINAL DISCHARGE: ICD-10-CM

## 2025-05-06 DIAGNOSIS — B96.89 BV (BACTERIAL VAGINOSIS): ICD-10-CM

## 2025-05-06 LAB
BILIRUBIN, POC: NORMAL
BLOOD URINE, POC: NEGATIVE
CLARITY, POC: NORMAL
COLOR, POC: NORMAL
GLUCOSE URINE, POC: NEGATIVE MG/DL
KETONES, POC: NORMAL MG/DL
LEUKOCYTE EST, POC: NEGATIVE
NITRITE, POC: NEGATIVE
PH, POC: 6
PROTEIN, POC: 30 MG/DL
SPECIFIC GRAVITY, POC: 1.03
UROBILINOGEN, POC: 0.2 MG/DL

## 2025-05-06 PROCEDURE — PBSHW POCT URINALYSIS DIPSTICK W/O MICROSCOPE (AUTO): Performed by: NURSE PRACTITIONER

## 2025-05-06 PROCEDURE — G8417 CALC BMI ABV UP PARAM F/U: HCPCS | Performed by: NURSE PRACTITIONER

## 2025-05-06 PROCEDURE — 1036F TOBACCO NON-USER: CPT | Performed by: ORTHOPAEDIC SURGERY

## 2025-05-06 PROCEDURE — G8417 CALC BMI ABV UP PARAM F/U: HCPCS | Performed by: ORTHOPAEDIC SURGERY

## 2025-05-06 PROCEDURE — 99212 OFFICE O/P EST SF 10 MIN: CPT | Performed by: NURSE PRACTITIONER

## 2025-05-06 PROCEDURE — G8427 DOCREV CUR MEDS BY ELIG CLIN: HCPCS | Performed by: NURSE PRACTITIONER

## 2025-05-06 PROCEDURE — 99213 OFFICE O/P EST LOW 20 MIN: CPT | Performed by: ORTHOPAEDIC SURGERY

## 2025-05-06 PROCEDURE — 1036F TOBACCO NON-USER: CPT | Performed by: NURSE PRACTITIONER

## 2025-05-06 PROCEDURE — 99213 OFFICE O/P EST LOW 20 MIN: CPT | Performed by: NURSE PRACTITIONER

## 2025-05-06 PROCEDURE — 81003 URINALYSIS AUTO W/O SCOPE: CPT | Performed by: NURSE PRACTITIONER

## 2025-05-06 PROCEDURE — 99214 OFFICE O/P EST MOD 30 MIN: CPT | Performed by: ORTHOPAEDIC SURGERY

## 2025-05-06 PROCEDURE — G8427 DOCREV CUR MEDS BY ELIG CLIN: HCPCS | Performed by: ORTHOPAEDIC SURGERY

## 2025-05-06 RX ORDER — HYDROCODONE BITARTRATE AND ACETAMINOPHEN 5; 325 MG/1; MG/1
1 TABLET ORAL EVERY 4 HOURS PRN
Qty: 30 TABLET | Refills: 0 | Status: SHIPPED | OUTPATIENT
Start: 2025-05-06 | End: 2025-05-11

## 2025-05-06 SDOH — ECONOMIC STABILITY: FOOD INSECURITY: WITHIN THE PAST 12 MONTHS, YOU WORRIED THAT YOUR FOOD WOULD RUN OUT BEFORE YOU GOT MONEY TO BUY MORE.: NEVER TRUE

## 2025-05-06 SDOH — ECONOMIC STABILITY: FOOD INSECURITY: WITHIN THE PAST 12 MONTHS, THE FOOD YOU BOUGHT JUST DIDN'T LAST AND YOU DIDN'T HAVE MONEY TO GET MORE.: NEVER TRUE

## 2025-05-06 ASSESSMENT — PATIENT HEALTH QUESTIONNAIRE - PHQ9
1. LITTLE INTEREST OR PLEASURE IN DOING THINGS: NOT AT ALL
3. TROUBLE FALLING OR STAYING ASLEEP: NOT AT ALL
SUM OF ALL RESPONSES TO PHQ QUESTIONS 1-9: 0
2. FEELING DOWN, DEPRESSED OR HOPELESS: NOT AT ALL
7. TROUBLE CONCENTRATING ON THINGS, SUCH AS READING THE NEWSPAPER OR WATCHING TELEVISION: NOT AT ALL
SUM OF ALL RESPONSES TO PHQ QUESTIONS 1-9: 0
SUM OF ALL RESPONSES TO PHQ QUESTIONS 1-9: 0
6. FEELING BAD ABOUT YOURSELF - OR THAT YOU ARE A FAILURE OR HAVE LET YOURSELF OR YOUR FAMILY DOWN: NOT AT ALL
5. POOR APPETITE OR OVEREATING: NOT AT ALL
8. MOVING OR SPEAKING SO SLOWLY THAT OTHER PEOPLE COULD HAVE NOTICED. OR THE OPPOSITE, BEING SO FIGETY OR RESTLESS THAT YOU HAVE BEEN MOVING AROUND A LOT MORE THAN USUAL: NOT AT ALL
9. THOUGHTS THAT YOU WOULD BE BETTER OFF DEAD, OR OF HURTING YOURSELF: NOT AT ALL
SUM OF ALL RESPONSES TO PHQ QUESTIONS 1-9: 0
10. IF YOU CHECKED OFF ANY PROBLEMS, HOW DIFFICULT HAVE THESE PROBLEMS MADE IT FOR YOU TO DO YOUR WORK, TAKE CARE OF THINGS AT HOME, OR GET ALONG WITH OTHER PEOPLE: NOT DIFFICULT AT ALL
4. FEELING TIRED OR HAVING LITTLE ENERGY: NOT AT ALL

## 2025-05-06 ASSESSMENT — ENCOUNTER SYMPTOMS
DIARRHEA: 0
ABDOMINAL PAIN: 0
NAUSEA: 0

## 2025-05-06 NOTE — PROGRESS NOTES
Subjective  Jeniffer Kennedy, 30 y.o. female presents today with:  Chief Complaint   Patient presents with    Sexually Transmitted Diseases     She's had couple of partners and current partner stated he has hep c. Feeling fatigue and would like to be tested by blood work as well        HPI  Presents to walk-in clinic for STI testing   Concern exposure Hep C   States has had multiple partners   C/o milky discharge, odorous   Vaginal discomfort   Vaginal itching   C/o urinary frequency   Denies fever or chills                   Past Medical History:   Diagnosis Date    Mental disorder     depression and anxiety    Postpartum depression     Sleep apnea       Past Surgical History:   Procedure Laterality Date    DILATION AND CURETTAGE OF UTERUS N/A 9/15/2023    ATTEMPTED LAPAROSCOPIC BILATERAL SALPINGECTOMY WITH NOVASURE ENDOMETRIAL ABLATION performed by Agustin Gregory MD at [facility] [place]    HYSTERECTOMY, VAGINAL N/A 10/20/2023    TOTAL LAPAROSCOPIC HYSTERECTOMY, RIGHT SALPINGECTOMY, LEFT OOPHERECTOMY performed by Agustin Gregory MD at [facility] [place]    LAPAROSCOPY Left 6/10/2021    LAPAROSCOPIC ECTOPIC REMOVAL, LEFT SALPINGECTOMY performed by Agustin Gregory MD at [facility]    CATIE-EN-Y GASTRIC BYPASS N/A 6/4/2024    GASTRIC BYPASS CATIE-EN-Y LAPAROSCOPIC/ROBOTIC, HIATAL HERNIA REPAIR, MODIFIER 22 performed by Ori Ng MD at [facility]    UPPER GASTROINTESTINAL ENDOSCOPY  2020    UPPER GASTROINTESTINAL ENDOSCOPY N/A 6/23/2023    EGD DIAGNOSTIC ONLY performed by Sheridan Laughlin MD at [facility]    UPPER GASTROINTESTINAL ENDOSCOPY N/A 8/17/2023    ENDOSCOPIC ULTRASOUND with polypectomy performed by Rodri Thornton MD at [facility]    WISDOM TOOTH EXTRACTION  2017    WISDOM TOOTH EXTRACTION  2019     Family History   Problem Relation Age of Onset    Clotting Disorder Mother     Depression Mother     Stroke Mother     Diabetes Mother     Unknown Father     Cancer Maternal Grandfather     Celiac Disease Neg  [] : Resident

## 2025-05-06 NOTE — PROGRESS NOTES
Subjective:      Patient ID: Jeniffer Kennedy is a 30 y.o. female who presents today for:  Chief Complaint   Patient presents with    Follow-up     Patient presents to clinic for Left Knee  Symptoms: Locking, Grinding, Popping, Swelling, lateral and medial   Pain Level: 7/10       Subjective/Objective/Assessment/Plan:     SUBJECTIVE -patient comes in with left knee pain.  States that this is been ongoing since 2016.  She has done physical therapy and other therapy modalities.  Nothing is helped her.  She is scheduled for bariatric surgery.  X-rays were reviewed showing normal bony morphology.    OBJECTIVE -stable left knee exam.  Crepitus with the patella on range of motion.  She can fully flex and fully extend.  No effusion appreciated.  Difficult to do a exam due to body habitus.        ASSESSMENT -    Diagnosis Orders   1. Left knee pain, unspecified chronicity  Ambulatory referral to Pain Medicine              PLAN -she would like a cortisone injection into the left knee.  MRI shows mild chondral fissuring in the patellofemoral articulation.  Inferior prepatellar bursal swelling.  She has lost 100 pounds since January 2024.    She states that she got roughly 8 months relief from the last cortisone injection I gave her in her knee.  I am giving her her last refill of hydrocodone.  I told her she needs to get this from pain management as I am not doing any surgery on her.  She does have a past history of crack cocaine abuse.  But she states that she is 2 years clean and sober.  We have given her referral to our pain management colleague.  I can see her back on an as-needed basis from her musculoskeletal complaints.  I explained to her that this was the last prescription I was giving her of her narcotic unless I operate on her.  I offered her another injection into her knee.        --------------------------------------------------------------------------------------------------------------  Past Medical

## 2025-05-07 ENCOUNTER — RESULTS FOLLOW-UP (OUTPATIENT)
Age: 30
End: 2025-05-07

## 2025-05-07 LAB
CLUE CELLS VAG QL WET PREP: ABNORMAL
HEPATITIS C ANTIBODY: NONREACTIVE
HIV AG/AB: NONREACTIVE
T VAGINALIS VAG QL WET PREP: ABNORMAL
TRICHOMONAS VAGINALIS SCREEN: NEGATIVE
YEAST VAG QL WET PREP: ABNORMAL

## 2025-05-07 RX ORDER — METRONIDAZOLE 500 MG/1
500 TABLET ORAL 2 TIMES DAILY
Qty: 14 TABLET | Refills: 0 | Status: SHIPPED | OUTPATIENT
Start: 2025-05-07 | End: 2025-05-14

## 2025-05-09 LAB
C TRACH DNA UR QL NAA+PROBE: NEGATIVE
N GONORRHOEA DNA UR QL NAA+PROBE: NEGATIVE

## 2025-05-12 PROBLEM — R82.71 BACTERIURIA: Status: ACTIVE | Noted: 2025-05-12

## 2025-05-12 PROBLEM — O23.40 URINARY TRACT INFECTION IN MOTHER DURING PREGNANCY (HHS-HCC): Status: ACTIVE | Noted: 2025-05-12

## 2025-05-12 PROBLEM — R51.9 HEAD PAIN: Status: ACTIVE | Noted: 2025-05-12

## 2025-05-12 PROBLEM — Z86.59 HISTORY OF DEPRESSION: Status: ACTIVE | Noted: 2025-05-12

## 2025-05-12 PROBLEM — N76.0 BACTERIAL VAGINOSIS: Status: ACTIVE | Noted: 2025-05-12

## 2025-05-12 PROBLEM — R14.0 BLOATING SYMPTOM: Status: ACTIVE | Noted: 2023-07-06

## 2025-05-12 PROBLEM — M17.11 PATELLOFEMORAL ARTHRITIS OF RIGHT KNEE: Status: ACTIVE | Noted: 2025-05-12

## 2025-05-12 PROBLEM — K21.9 GERD (GASTROESOPHAGEAL REFLUX DISEASE): Status: ACTIVE | Noted: 2024-06-03

## 2025-05-12 PROBLEM — N89.8 VAGINAL ODOR: Status: ACTIVE | Noted: 2025-05-12

## 2025-05-12 PROBLEM — R10.84 GENERALIZED ABDOMINAL PAIN: Status: ACTIVE | Noted: 2021-04-07

## 2025-05-12 PROBLEM — R10.13 EPIGASTRIC PAIN: Status: ACTIVE | Noted: 2020-12-04

## 2025-05-12 PROBLEM — R93.89 ABNORMAL MRI: Status: ACTIVE | Noted: 2025-05-12

## 2025-05-12 PROBLEM — M25.562 PAIN IN LEFT KNEE: Status: ACTIVE | Noted: 2023-04-27

## 2025-05-12 PROBLEM — F32.A ANXIETY AND DEPRESSION: Status: ACTIVE | Noted: 2017-07-12

## 2025-05-12 PROBLEM — R10.2 PELVIC PAIN IN FEMALE: Status: ACTIVE | Noted: 2022-02-14

## 2025-05-12 PROBLEM — N92.1 MENOMETRORRHAGIA: Status: ACTIVE | Noted: 2023-06-05

## 2025-05-12 PROBLEM — K22.9 NODULE OF ESOPHAGUS: Status: ACTIVE | Noted: 2023-06-23

## 2025-05-12 PROBLEM — O00.90 ECTOPIC PREGNANCY WITHOUT INTRAUTERINE PREGNANCY (HHS-HCC): Status: ACTIVE | Noted: 2021-05-30

## 2025-05-12 PROBLEM — M22.2X9 PFS (PATELLOFEMORAL SYNDROME): Status: ACTIVE | Noted: 2025-05-12

## 2025-05-12 PROBLEM — F31.9 BIPOLAR DISORDER: Status: ACTIVE | Noted: 2023-04-27

## 2025-05-12 PROBLEM — G47.33 OBSTRUCTIVE SLEEP APNEA SYNDROME: Status: ACTIVE | Noted: 2020-12-07

## 2025-05-12 PROBLEM — R51.9 SHARP HEADACHE: Status: ACTIVE | Noted: 2025-05-12

## 2025-05-12 PROBLEM — K64.9 HEMORRHOID: Status: ACTIVE | Noted: 2025-05-12

## 2025-05-12 PROBLEM — R56.9 OBSERVED SEIZURE-LIKE ACTIVITY (MULTI): Status: ACTIVE | Noted: 2025-05-12

## 2025-05-12 PROBLEM — T83.32XA DISPLACEMENT OF INTRAUTERINE CONTRACEPTIVE DEVICE: Status: ACTIVE | Noted: 2025-05-12

## 2025-05-12 PROBLEM — K60.2 ANAL FISSURE: Status: ACTIVE | Noted: 2025-05-12

## 2025-05-12 PROBLEM — N93.9 ABNORMAL UTERINE BLEEDING (AUB): Status: ACTIVE | Noted: 2023-09-20

## 2025-05-12 PROBLEM — O00.202: Status: ACTIVE | Noted: 2021-05-28

## 2025-05-12 PROBLEM — R87.610 ATYPICAL SQUAMOUS CELLS OF UNDETERMINED SIGNIFICANCE (ASCUS) ON PAPANICOLAOU SMEAR OF CERVIX: Status: ACTIVE | Noted: 2017-09-07

## 2025-05-12 PROBLEM — R11.2 NAUSEA AND VOMITING: Status: ACTIVE | Noted: 2022-02-14

## 2025-05-12 PROBLEM — K59.00 CONSTIPATION: Status: ACTIVE | Noted: 2023-07-06

## 2025-05-12 PROBLEM — N91.2 AMENORRHEA: Status: ACTIVE | Noted: 2025-05-12

## 2025-05-12 PROBLEM — O92.70 LACTATION PROBLEM (HHS-HCC): Status: ACTIVE | Noted: 2025-05-12

## 2025-05-12 PROBLEM — R16.1 SPLENOMEGALY: Status: ACTIVE | Noted: 2021-05-21

## 2025-05-12 PROBLEM — R10.2 VAGINAL PAIN: Status: ACTIVE | Noted: 2025-05-12

## 2025-05-12 PROBLEM — E66.01 MORBID (SEVERE) OBESITY DUE TO EXCESS CALORIES (MULTI): Status: ACTIVE | Noted: 2024-06-03

## 2025-05-12 PROBLEM — K31.7 GASTRIC POLYP: Status: ACTIVE | Noted: 2023-07-06

## 2025-05-12 PROBLEM — F33.2 SEVERE EPISODE OF RECURRENT MAJOR DEPRESSIVE DISORDER, WITHOUT PSYCHOTIC FEATURES (MULTI): Status: ACTIVE | Noted: 2018-08-01

## 2025-05-12 PROBLEM — N39.0 URINARY TRACT INFECTION: Status: ACTIVE | Noted: 2025-05-12

## 2025-05-12 PROBLEM — B96.89 BACTERIAL VAGINOSIS: Status: ACTIVE | Noted: 2025-05-12

## 2025-05-12 PROBLEM — R12 HEART BURN: Status: ACTIVE | Noted: 2021-04-07

## 2025-05-12 PROBLEM — R76.8 BORDERLINE LYME SEROLOGY: Status: ACTIVE | Noted: 2025-05-12

## 2025-05-12 PROBLEM — R51.9 CHRONIC DAILY HEADACHE: Status: ACTIVE | Noted: 2025-05-12

## 2025-05-12 PROBLEM — E53.8 VITAMIN B 12 DEFICIENCY: Status: ACTIVE | Noted: 2025-05-12

## 2025-05-12 PROBLEM — G43.109 MIGRAINE WITH AURA AND WITHOUT STATUS MIGRAINOSUS, NOT INTRACTABLE: Status: ACTIVE | Noted: 2025-05-12

## 2025-05-12 PROBLEM — R23.8 SKIN IRRITATION: Status: ACTIVE | Noted: 2025-05-12

## 2025-05-12 PROBLEM — F41.9 ANXIETY AND DEPRESSION: Status: ACTIVE | Noted: 2017-07-12

## 2025-05-12 PROBLEM — G44.229 CHRONIC TENSION TYPE HEADACHE: Status: ACTIVE | Noted: 2025-05-12

## 2025-05-12 PROBLEM — O09.899 SUPERVISION OF OTHER HIGH RISK PREGNANCY, ANTEPARTUM (HHS-HCC): Status: ACTIVE | Noted: 2018-07-31

## 2025-05-12 PROBLEM — N89.8 VAGINAL IRRITATION: Status: ACTIVE | Noted: 2025-05-12

## 2025-05-12 PROBLEM — R51.9 DAILY HEADACHE: Status: ACTIVE | Noted: 2025-05-12

## 2025-05-12 RX ORDER — BENZTROPINE MESYLATE 1 MG/1
1 TABLET ORAL NIGHTLY
COMMUNITY
Start: 2020-03-12

## 2025-05-12 RX ORDER — ARIPIPRAZOLE 5 MG/1
1 TABLET ORAL DAILY
COMMUNITY

## 2025-05-12 RX ORDER — SIMETHICONE 80 MG
80 TABLET,CHEWABLE ORAL
COMMUNITY
Start: 2024-09-10

## 2025-05-12 RX ORDER — SERTRALINE HYDROCHLORIDE 100 MG/1
TABLET, FILM COATED ORAL
COMMUNITY
Start: 2020-03-12

## 2025-05-12 RX ORDER — HYDROCODONE BITARTRATE AND ACETAMINOPHEN 5; 325 MG/1; MG/1
TABLET ORAL
COMMUNITY
Start: 2025-04-05

## 2025-05-12 RX ORDER — APREPITANT 125 MG/1
CAPSULE ORAL
COMMUNITY
Start: 2024-05-30

## 2025-05-12 RX ORDER — DOCUSATE SODIUM 100 MG/1
CAPSULE, LIQUID FILLED ORAL 2 TIMES DAILY PRN
COMMUNITY

## 2025-05-12 RX ORDER — IRON POLYSACCHARIDE COMPLEX 150 MG
1 CAPSULE ORAL DAILY
COMMUNITY
Start: 2020-12-17

## 2025-05-12 RX ORDER — GABAPENTIN 400 MG/1
1 CAPSULE ORAL 3 TIMES DAILY
COMMUNITY
Start: 2021-02-22

## 2025-05-12 RX ORDER — LISINOPRIL 5 MG/1
TABLET ORAL
COMMUNITY
Start: 2024-10-29

## 2025-05-12 RX ORDER — METFORMIN HYDROCHLORIDE 500 MG/1
1 TABLET ORAL DAILY
COMMUNITY
Start: 2020-12-15

## 2025-05-12 RX ORDER — SENNOSIDES 25 MG/1
TABLET, FILM COATED ORAL EVERY 12 HOURS
COMMUNITY
Start: 2023-09-23

## 2025-05-12 RX ORDER — FLUOXETINE HYDROCHLORIDE 40 MG/1
CAPSULE ORAL
COMMUNITY

## 2025-05-12 RX ORDER — ENOXAPARIN SODIUM 100 MG/ML
INJECTION SUBCUTANEOUS
COMMUNITY
Start: 2024-06-05

## 2025-05-12 RX ORDER — MELOXICAM 15 MG/1
TABLET ORAL
COMMUNITY
Start: 2024-12-08

## 2025-05-12 RX ORDER — OMEPRAZOLE 40 MG/1
1 CAPSULE, DELAYED RELEASE ORAL
COMMUNITY
Start: 2024-09-12 | End: 2025-09-07

## 2025-05-12 RX ORDER — ASCORBIC ACID 250 MG
250 TABLET ORAL
COMMUNITY
Start: 2024-12-13

## 2025-05-12 RX ORDER — ARIPIPRAZOLE 20 MG/1
1 TABLET ORAL DAILY
COMMUNITY
Start: 2020-03-12

## 2025-05-12 RX ORDER — HYDROCORTISONE 25 MG/G
CREAM TOPICAL 2 TIMES DAILY
COMMUNITY
Start: 2021-02-19

## 2025-05-12 RX ORDER — PANTOPRAZOLE SODIUM 40 MG/1
40 TABLET, DELAYED RELEASE ORAL
COMMUNITY

## 2025-05-12 RX ORDER — ACETAMINOPHEN 500 MG
500 TABLET ORAL
COMMUNITY
Start: 2024-06-05

## 2025-05-12 RX ORDER — FERROUS SULFATE 325(65) MG
1 TABLET, DELAYED RELEASE (ENTERIC COATED) ORAL 2 TIMES DAILY
COMMUNITY
Start: 2024-05-21

## 2025-05-12 RX ORDER — DICLOFENAC SODIUM 10 MG/G
GEL TOPICAL
COMMUNITY
Start: 2025-02-02

## 2025-05-12 RX ORDER — ONDANSETRON 4 MG/1
1 TABLET, ORALLY DISINTEGRATING ORAL 3 TIMES DAILY PRN
COMMUNITY
Start: 2024-12-30

## 2025-05-12 RX ORDER — LURASIDONE HYDROCHLORIDE 40 MG/1
40 TABLET, FILM COATED ORAL
COMMUNITY

## 2025-05-12 RX ORDER — ALBUTEROL SULFATE 90 UG/1
1 INHALANT RESPIRATORY (INHALATION)
COMMUNITY
Start: 2024-11-13

## 2025-05-12 RX ORDER — NAPROXEN 500 MG/1
TABLET ORAL
COMMUNITY
Start: 2021-01-25

## 2025-05-12 RX ORDER — OXYCODONE AND ACETAMINOPHEN 5; 325 MG/1; MG/1
TABLET ORAL
COMMUNITY
Start: 2025-01-24

## 2025-05-12 RX ORDER — PHENTERMINE HYDROCHLORIDE 37.5 MG/1
37.5 TABLET ORAL
COMMUNITY

## 2025-05-12 RX ORDER — SUMATRIPTAN SUCCINATE 50 MG/1
50 TABLET ORAL
COMMUNITY
Start: 2025-01-07

## 2025-05-13 DIAGNOSIS — E55.9 VITAMIN D DEFICIENCY: ICD-10-CM

## 2025-05-13 DIAGNOSIS — E53.8 B12 DEFICIENCY: Primary | ICD-10-CM

## 2025-05-13 RX ORDER — ERGOCALCIFEROL 1.25 MG/1
50000 CAPSULE, LIQUID FILLED ORAL WEEKLY
Qty: 24 CAPSULE | Refills: 1 | Status: SHIPPED | OUTPATIENT
Start: 2025-05-13

## 2025-05-13 NOTE — PROGRESS NOTES
Vitamin B12 and vitamin D supplementation ordered.  I called Demetrice and asked her to start this supplementation today and to follow-up in our clinic as scheduled.    Candido Ng MD, FACS, [facility], [facility]Verified Surgeon,  USN-RET  Director of Metabolic and Bariatric Surgery  [facility]  [address], [place] [address]

## 2025-05-14 ENCOUNTER — APPOINTMENT (OUTPATIENT)
Dept: PLASTIC SURGERY | Facility: CLINIC | Age: 30
End: 2025-05-14
Payer: COMMERCIAL

## 2025-05-14 VITALS
DIASTOLIC BLOOD PRESSURE: 64 MMHG | WEIGHT: 217 LBS | SYSTOLIC BLOOD PRESSURE: 106 MMHG | HEIGHT: 64 IN | BODY MASS INDEX: 37.05 KG/M2

## 2025-05-14 DIAGNOSIS — E65 ABDOMINAL PANNUS: Primary | ICD-10-CM

## 2025-05-14 PROCEDURE — 1036F TOBACCO NON-USER: CPT | Performed by: PLASTIC SURGERY

## 2025-05-14 PROCEDURE — 99204 OFFICE O/P NEW MOD 45 MIN: CPT | Performed by: PLASTIC SURGERY

## 2025-05-14 PROCEDURE — 3008F BODY MASS INDEX DOCD: CPT | Performed by: PLASTIC SURGERY

## 2025-05-14 RX ORDER — ERGOCALCIFEROL 1.25 MG/1
1.25 CAPSULE ORAL
COMMUNITY
Start: 2025-05-13

## 2025-05-14 RX ORDER — LANOLIN ALCOHOL/MO/W.PET/CERES
1000 CREAM (GRAM) TOPICAL DAILY
COMMUNITY
Start: 2025-05-13

## 2025-05-14 RX ORDER — METRONIDAZOLE 500 MG/1
500 TABLET ORAL 2 TIMES DAILY
COMMUNITY
Start: 2025-05-07 | End: 2025-05-14

## 2025-05-14 ASSESSMENT — ENCOUNTER SYMPTOMS
BACK PAIN: 1
FEVER: 0
CHILLS: 0
UNEXPECTED WEIGHT CHANGE: 0

## 2025-05-14 ASSESSMENT — PAIN SCALES - GENERAL: PAINLEVEL_OUTOF10: 6

## 2025-05-14 NOTE — PROGRESS NOTES
Subjective   Patient ID: Eva Persaud is a 30 y.o. female.    HPI  30-year-old female status post gastric bypass 1 year ago with had May 14, 2025: Greater than 100 pound weight loss.  Patient is interested in an abdominal panniculectomy.  Review of Systems   Constitutional:  Negative for chills, fever and unexpected weight change.   Musculoskeletal:  Positive for back pain.       Objective   Physical Exam  Well-developed patient in no acute distress  Examination HEENT within normal limits  Neck supple no observable masses  Lungs clear to auscultation  Heart regular rate and rhythm  Abdomen soft nontender; abdominal pannus without any erythema or skin lesions in the lower abdomen  Abdominal measurements abdomen midline 13 cm right side 12 cm left upper 11 cm  Lower midline 20 cm lower right side 17 cm lower left side 15 cm.  Extremities full range of motion  Neurological exam is grossly within normal limits  Assessment/Plan   There are no diagnoses linked to this encounter.    Abdominal pannus.  Patient is currently homeless but states she has a place that she can recover  We have also discussed that her BMI has to be below 35 which means further weight loss and weight being stable for 6 months  Brochure provided to the patient  Follow-up as needed

## 2025-05-21 ENCOUNTER — HOSPITAL ENCOUNTER (EMERGENCY)
Facility: HOSPITAL | Age: 30
Discharge: HOME | End: 2025-05-21
Payer: COMMERCIAL

## 2025-05-21 ENCOUNTER — APPOINTMENT (OUTPATIENT)
Dept: RADIOLOGY | Facility: HOSPITAL | Age: 30
End: 2025-05-21
Payer: COMMERCIAL

## 2025-05-21 VITALS
OXYGEN SATURATION: 100 % | RESPIRATION RATE: 20 BRPM | WEIGHT: 217 LBS | HEIGHT: 64 IN | HEART RATE: 77 BPM | SYSTOLIC BLOOD PRESSURE: 127 MMHG | BODY MASS INDEX: 37.05 KG/M2 | DIASTOLIC BLOOD PRESSURE: 76 MMHG | TEMPERATURE: 98.2 F

## 2025-05-21 DIAGNOSIS — S70.01XA CONTUSION OF RIGHT HIP, INITIAL ENCOUNTER: Primary | ICD-10-CM

## 2025-05-21 DIAGNOSIS — S20.211A RIB CONTUSION, RIGHT, INITIAL ENCOUNTER: ICD-10-CM

## 2025-05-21 PROCEDURE — 73502 X-RAY EXAM HIP UNI 2-3 VIEWS: CPT | Mod: RT

## 2025-05-21 PROCEDURE — 73502 X-RAY EXAM HIP UNI 2-3 VIEWS: CPT | Mod: RIGHT SIDE | Performed by: RADIOLOGY

## 2025-05-21 PROCEDURE — 71101 X-RAY EXAM UNILAT RIBS/CHEST: CPT | Mod: RT

## 2025-05-21 PROCEDURE — 2500000001 HC RX 250 WO HCPCS SELF ADMINISTERED DRUGS (ALT 637 FOR MEDICARE OP): Performed by: PHYSICIAN ASSISTANT

## 2025-05-21 PROCEDURE — 99284 EMERGENCY DEPT VISIT MOD MDM: CPT

## 2025-05-21 RX ORDER — CYCLOBENZAPRINE HCL 10 MG
10 TABLET ORAL 2 TIMES DAILY PRN
Qty: 10 TABLET | Refills: 0 | Status: SHIPPED | OUTPATIENT
Start: 2025-05-21 | End: 2025-05-26

## 2025-05-21 RX ORDER — ACETAMINOPHEN 325 MG/1
975 TABLET ORAL ONCE
Status: COMPLETED | OUTPATIENT
Start: 2025-05-21 | End: 2025-05-21

## 2025-05-21 RX ORDER — IBUPROFEN 400 MG/1
400 TABLET, FILM COATED ORAL ONCE
Status: DISCONTINUED | OUTPATIENT
Start: 2025-05-21 | End: 2025-05-21 | Stop reason: HOSPADM

## 2025-05-21 RX ORDER — LIDOCAINE 50 MG/G
1 PATCH TOPICAL DAILY
Qty: 10 PATCH | Refills: 0 | Status: SHIPPED | OUTPATIENT
Start: 2025-05-21 | End: 2025-05-31

## 2025-05-21 RX ORDER — ACETAMINOPHEN 500 MG
1000 TABLET ORAL EVERY 6 HOURS PRN
Qty: 40 TABLET | Refills: 0 | Status: SHIPPED | OUTPATIENT
Start: 2025-05-21 | End: 2025-05-26

## 2025-05-21 RX ADMIN — ACETAMINOPHEN 975 MG: 325 TABLET ORAL at 16:55

## 2025-05-21 ASSESSMENT — PAIN DESCRIPTION - DESCRIPTORS: DESCRIPTORS: RADIATING

## 2025-05-21 ASSESSMENT — LIFESTYLE VARIABLES
HAVE YOU EVER FELT YOU SHOULD CUT DOWN ON YOUR DRINKING: NO
EVER HAD A DRINK FIRST THING IN THE MORNING TO STEADY YOUR NERVES TO GET RID OF A HANGOVER: NO
EVER FELT BAD OR GUILTY ABOUT YOUR DRINKING: NO
TOTAL SCORE: 0
HAVE PEOPLE ANNOYED YOU BY CRITICIZING YOUR DRINKING: NO

## 2025-05-21 ASSESSMENT — PAIN DESCRIPTION - ORIENTATION: ORIENTATION: RIGHT

## 2025-05-21 ASSESSMENT — COLUMBIA-SUICIDE SEVERITY RATING SCALE - C-SSRS
2. HAVE YOU ACTUALLY HAD ANY THOUGHTS OF KILLING YOURSELF?: NO
6. HAVE YOU EVER DONE ANYTHING, STARTED TO DO ANYTHING, OR PREPARED TO DO ANYTHING TO END YOUR LIFE?: NO
1. IN THE PAST MONTH, HAVE YOU WISHED YOU WERE DEAD OR WISHED YOU COULD GO TO SLEEP AND NOT WAKE UP?: NO

## 2025-05-21 ASSESSMENT — PAIN - FUNCTIONAL ASSESSMENT: PAIN_FUNCTIONAL_ASSESSMENT: 0-10

## 2025-05-21 ASSESSMENT — PAIN DESCRIPTION - PAIN TYPE: TYPE: ACUTE PAIN

## 2025-05-21 ASSESSMENT — PAIN DESCRIPTION - PROGRESSION: CLINICAL_PROGRESSION: NOT CHANGED

## 2025-05-21 ASSESSMENT — PAIN SCALES - GENERAL: PAINLEVEL_OUTOF10: 10 - WORST POSSIBLE PAIN

## 2025-05-21 ASSESSMENT — PAIN DESCRIPTION - LOCATION: LOCATION: HIP

## 2025-05-21 NOTE — Clinical Note
Eva Persaud was seen and treated in our emergency department on 5/21/2025.  She may return to work on 05/22/2025.  Custom restrictions that based on the patient's pain and physical limitations for 1 week.     If you have any questions or concerns, please don't hesitate to call.      Eden Santoyo PA-C

## 2025-05-22 ENCOUNTER — INITIAL CONSULT (OUTPATIENT)
Age: 30
End: 2025-05-22
Payer: COMMERCIAL

## 2025-05-22 VITALS
DIASTOLIC BLOOD PRESSURE: 60 MMHG | TEMPERATURE: 97.8 F | WEIGHT: 217 LBS | HEIGHT: 65 IN | SYSTOLIC BLOOD PRESSURE: 80 MMHG | BODY MASS INDEX: 36.15 KG/M2 | OXYGEN SATURATION: 98 % | HEART RATE: 103 BPM

## 2025-05-22 DIAGNOSIS — R14.0 BLOATING SYMPTOM: ICD-10-CM

## 2025-05-22 DIAGNOSIS — G89.4 CHRONIC PAIN SYNDROME: Primary | ICD-10-CM

## 2025-05-22 PROCEDURE — G8417 CALC BMI ABV UP PARAM F/U: HCPCS | Performed by: PAIN MEDICINE

## 2025-05-22 PROCEDURE — 1036F TOBACCO NON-USER: CPT | Performed by: PAIN MEDICINE

## 2025-05-22 PROCEDURE — 99204 OFFICE O/P NEW MOD 45 MIN: CPT | Performed by: PAIN MEDICINE

## 2025-05-22 PROCEDURE — G8427 DOCREV CUR MEDS BY ELIG CLIN: HCPCS | Performed by: PAIN MEDICINE

## 2025-05-22 PROCEDURE — 99203 OFFICE O/P NEW LOW 30 MIN: CPT | Performed by: PAIN MEDICINE

## 2025-05-22 RX ORDER — SIMETHICONE 80 MG
80 TABLET,CHEWABLE ORAL 4 TIMES DAILY PRN
Qty: 120 TABLET | Refills: 2 | Status: SHIPPED | OUTPATIENT
Start: 2025-05-22

## 2025-05-22 RX ORDER — OMEPRAZOLE 20 MG/1
20 CAPSULE, DELAYED RELEASE ORAL
Qty: 60 CAPSULE | Refills: 5 | Status: SHIPPED | OUTPATIENT
Start: 2025-05-22

## 2025-05-22 RX ORDER — ONDANSETRON 4 MG/1
4 TABLET, ORALLY DISINTEGRATING ORAL 3 TIMES DAILY PRN
Qty: 30 TABLET | Refills: 3 | Status: SHIPPED | OUTPATIENT
Start: 2025-05-22

## 2025-05-22 NOTE — TELEPHONE ENCOUNTER
Requested Prescriptions     Pending Prescriptions Disp Refills    simethicone (MYLICON) 80 MG chewable tablet 120 tablet 2     Sig: Take 1 tablet by mouth 4 times daily as needed for Flatulence    ondansetron (ZOFRAN-ODT) 4 MG disintegrating tablet 30 tablet 3     Sig: Take 1 tablet by mouth 3 times daily as needed for Nausea or Vomiting

## 2025-05-22 NOTE — PROGRESS NOTES
Omeprazole ordered as ER records from [facility] indicate the patient is using cocaine.  We discussed preoperatively that cocaine use can lead to marginal ulcer formation.  Will discuss further with patient at follow-up.    Candido Ng MD, FACS, [facility], [facility] Health[facility]-Verified Surgeon,  USN-RET  Director of Metabolic and Bariatric Surgery  [facility]  [address], [place] [id number]

## 2025-05-22 NOTE — PROGRESS NOTES
LAPAROSCOPIC BILATERAL SALPINGECTOMY WITH NOVASURE ENDOMETRIAL ABLATION performed by Agustin Gregory MD at [facility] [place]    HYSTERECTOMY, VAGINAL N/A 10/20/2023    TOTAL LAPAROSCOPIC HYSTERECTOMY, RIGHT SALPINGECTOMY, LEFT OOPHERECTOMY performed by Agustin Gregory MD at [facility] [place]    LAPAROSCOPY Left 6/10/2021    LAPAROSCOPIC ECTOPIC REMOVAL, LEFT SALPINGECTOMY performed by Agustin Gregory MD at [facility] OR    CATIE-EN-Y GASTRIC BYPASS N/A 2024    GASTRIC BYPASS CATIE-EN-Y LAPAROSCOPIC/ROBOTIC, HIATAL HERNIA REPAIR, MODIFIER 22 performed by Ori Ng MD at [facility] OR    UPPER GASTROINTESTINAL ENDOSCOPY      UPPER GASTROINTESTINAL ENDOSCOPY N/A 2023    EGD DIAGNOSTIC ONLY performed by Sheridan Laughlin MD at [facility]    UPPER GASTROINTESTINAL ENDOSCOPY N/A 2023    ENDOSCOPIC ULTRASOUND with polypectomy performed by Rodri Thornton MD at [facility]    WISDOM TOOTH EXTRACTION  2017    WISDOM TOOTH EXTRACTION  2019     Family History   Problem Relation Age of Onset    Clotting Disorder Mother     Depression Mother     Stroke Mother     Diabetes Mother     Unknown Father     Cancer Maternal Grandfather     Celiac Disease Neg Hx     Colon Cancer Neg Hx     Crohn's Disease Neg Hx      Social History     Socioeconomic History    Marital status: Single     Spouse name: Not on file    Number of children: Not on file    Years of education: Not on file    Highest education level: Not on file   Occupational History    Not on file   Tobacco Use    Smoking status: Former     Current packs/day: 0.00     Average packs/day: 0.5 packs/day for 0.5 years (0.2 ttl pk-yrs)     Types: Cigarettes     Start date: 2016     Quit date: 2016     Years since quittin.8    Smokeless tobacco: Never    Tobacco comments:     Only smoked rarely socially   Vaping Use    Vaping status: Never Used   Substance and Sexual Activity    Alcohol use: Not Currently     Comment: rare    Drug use: Not Currently     Types:

## 2025-06-03 ENCOUNTER — APPOINTMENT (OUTPATIENT)
Dept: GENERAL RADIOLOGY | Age: 30
End: 2025-06-03
Payer: COMMERCIAL

## 2025-06-03 ENCOUNTER — APPOINTMENT (OUTPATIENT)
Dept: CT IMAGING | Age: 30
End: 2025-06-03
Payer: COMMERCIAL

## 2025-06-03 ENCOUNTER — HOSPITAL ENCOUNTER (EMERGENCY)
Age: 30
Discharge: HOME | End: 2025-06-03
Attending: INTERNAL MEDICINE
Payer: COMMERCIAL

## 2025-06-03 VITALS
TEMPERATURE: 98.3 F | OXYGEN SATURATION: 100 % | WEIGHT: 204.4 LBS | BODY MASS INDEX: 34.05 KG/M2 | SYSTOLIC BLOOD PRESSURE: 121 MMHG | HEIGHT: 65 IN | DIASTOLIC BLOOD PRESSURE: 79 MMHG | HEART RATE: 76 BPM | RESPIRATION RATE: 16 BRPM

## 2025-06-03 DIAGNOSIS — S09.90XA INJURY OF HEAD, INITIAL ENCOUNTER: Primary | ICD-10-CM

## 2025-06-03 DIAGNOSIS — R42 DIZZINESS: ICD-10-CM

## 2025-06-03 DIAGNOSIS — D32.9 MENINGIOMA (HCC): ICD-10-CM

## 2025-06-03 LAB
BILIRUB UR QL STRIP: NEGATIVE
CLARITY UR: CLEAR
COLOR UR: ABNORMAL
EKG ATRIAL RATE: 65 BPM
EKG DIAGNOSIS: NORMAL
EKG P AXIS: 31 DEGREES
EKG P-R INTERVAL: 106 MS
EKG Q-T INTERVAL: 392 MS
EKG QRS DURATION: 74 MS
EKG QTC CALCULATION (BAZETT): 407 MS
EKG R AXIS: 49 DEGREES
EKG T AXIS: 61 DEGREES
EKG VENTRICULAR RATE: 65 BPM
GLUCOSE UR STRIP-MCNC: NEGATIVE MG/DL
HGB UR QL STRIP: NEGATIVE
KETONES UR STRIP-MCNC: NEGATIVE MG/DL
LEUKOCYTE ESTERASE UR QL STRIP: NEGATIVE
NITRITE UR QL STRIP: NEGATIVE
PH UR STRIP: 5 [PH] (ref 5–9)
PROT UR STRIP-MCNC: NEGATIVE MG/DL
SP GR UR STRIP: 1.03 (ref 1–1.03)
URINE REFLEX TO CULTURE: ABNORMAL
UROBILINOGEN UR STRIP-ACNC: 0.2 E.U./DL

## 2025-06-03 PROCEDURE — 90714 TD VACC NO PRESV 7 YRS+ IM: CPT | Performed by: INTERNAL MEDICINE

## 2025-06-03 PROCEDURE — 70450 CT HEAD/BRAIN W/O DYE: CPT

## 2025-06-03 PROCEDURE — 90471 IMMUNIZATION ADMIN: CPT | Performed by: INTERNAL MEDICINE

## 2025-06-03 PROCEDURE — 99284 EMERGENCY DEPT VISIT MOD MDM: CPT

## 2025-06-03 PROCEDURE — 81003 URINALYSIS AUTO W/O SCOPE: CPT

## 2025-06-03 PROCEDURE — 6360000002 HC RX W HCPCS: Performed by: INTERNAL MEDICINE

## 2025-06-03 PROCEDURE — 72125 CT NECK SPINE W/O DYE: CPT

## 2025-06-03 PROCEDURE — 71101 X-RAY EXAM UNILAT RIBS/CHEST: CPT

## 2025-06-03 PROCEDURE — 73030 X-RAY EXAM OF SHOULDER: CPT

## 2025-06-03 PROCEDURE — 6370000000 HC RX 637 (ALT 250 FOR IP): Performed by: INTERNAL MEDICINE

## 2025-06-03 RX ORDER — ONDANSETRON 4 MG/1
4 TABLET, FILM COATED ORAL EVERY 8 HOURS PRN
Qty: 20 TABLET | Refills: 0 | Status: SHIPPED | OUTPATIENT
Start: 2025-06-03

## 2025-06-03 RX ORDER — METHOCARBAMOL 500 MG/1
1000 TABLET, FILM COATED ORAL 3 TIMES DAILY PRN
Qty: 20 TABLET | Refills: 0 | Status: SHIPPED | OUTPATIENT
Start: 2025-06-03 | End: 2025-06-13

## 2025-06-03 RX ORDER — SUMATRIPTAN SUCCINATE 25 MG/1
25 TABLET ORAL
Qty: 5 TABLET | Refills: 0 | Status: SHIPPED | OUTPATIENT
Start: 2025-06-03

## 2025-06-03 RX ORDER — METHOCARBAMOL 500 MG/1
1500 TABLET, FILM COATED ORAL ONCE
Status: COMPLETED | OUTPATIENT
Start: 2025-06-03 | End: 2025-06-03

## 2025-06-03 RX ORDER — ONDANSETRON 4 MG/1
4 TABLET, ORALLY DISINTEGRATING ORAL ONCE
Status: COMPLETED | OUTPATIENT
Start: 2025-06-03 | End: 2025-06-03

## 2025-06-03 RX ORDER — ACETAMINOPHEN 500 MG
1000 TABLET ORAL ONCE
Status: COMPLETED | OUTPATIENT
Start: 2025-06-03 | End: 2025-06-03

## 2025-06-03 RX ORDER — LIDOCAINE 4 G/G
1 PATCH TOPICAL ONCE
Status: DISCONTINUED | OUTPATIENT
Start: 2025-06-03 | End: 2025-06-03 | Stop reason: HOSPADM

## 2025-06-03 RX ADMIN — METHOCARBAMOL 1500 MG: 500 TABLET ORAL at 14:35

## 2025-06-03 RX ADMIN — ACETAMINOPHEN 1000 MG: 500 TABLET ORAL at 11:52

## 2025-06-03 RX ADMIN — CLOSTRIDIUM TETANI TOXOID ANTIGEN (FORMALDEHYDE INACTIVATED) AND CORYNEBACTERIUM DIPHTHERIAE TOXOID ANTIGEN (FORMALDEHYDE INACTIVATED) 0.5 ML: 5; 2 INJECTION, SUSPENSION INTRAMUSCULAR at 11:52

## 2025-06-03 RX ADMIN — ONDANSETRON 4 MG: 4 TABLET, ORALLY DISINTEGRATING ORAL at 11:51

## 2025-06-03 ASSESSMENT — ENCOUNTER SYMPTOMS
SORE THROAT: 0
EYE PAIN: 0
SHORTNESS OF BREATH: 0
BACK PAIN: 0
NAUSEA: 1
RHINORRHEA: 0
VOMITING: 0
ABDOMINAL PAIN: 0
DIARRHEA: 0
COUGH: 0

## 2025-06-03 ASSESSMENT — PAIN - FUNCTIONAL ASSESSMENT: PAIN_FUNCTIONAL_ASSESSMENT: 0-10

## 2025-06-03 ASSESSMENT — PAIN DESCRIPTION - ORIENTATION: ORIENTATION: RIGHT

## 2025-06-03 ASSESSMENT — PAIN DESCRIPTION - LOCATION: LOCATION: SHOULDER;RIB CAGE

## 2025-06-03 ASSESSMENT — PAIN DESCRIPTION - DESCRIPTORS: DESCRIPTORS: ACHING

## 2025-06-03 ASSESSMENT — PAIN DESCRIPTION - PAIN TYPE: TYPE: ACUTE PAIN

## 2025-06-03 ASSESSMENT — PAIN SCALES - GENERAL: PAINLEVEL_OUTOF10: 8

## 2025-06-03 NOTE — ED PROVIDER NOTES
Emergency Department      DISCHARGE MEDICATIONS:  Discharge Medication List as of 6/3/2025  2:44 PM        START taking these medications    Details   methocarbamol (ROBAXIN) 500 MG tablet Take 2 tablets by mouth 3 times daily as needed (rib pain), Disp-20 tablet, R-0Normal      ondansetron (ZOFRAN) 4 MG tablet Take 1 tablet by mouth every 8 hours as needed for Nausea, Disp-20 tablet, R-0Normal      SUMAtriptan (IMITREX) 25 MG tablet Take 1 tablet by mouth once as needed for Migraine, Disp-5 tablet, R-0Normal           Controlled Substances Monitoring:          No data to display                (Please note that portions of this note were completed with a voice recognition program.  Efforts were made to edit the dictations but occasionally words are mis-transcribed.)    Shen Crystal DO (electronically signed)  Attending Emergency Physician    Supervising Attending Physician: Dr. Crystal.     Shen Crystal DO  06/03/25 [id number]
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
## 2025-06-05 ENCOUNTER — HOSPITAL ENCOUNTER (EMERGENCY)
Age: 30
Discharge: HOME | End: 2025-06-05
Payer: COMMERCIAL

## 2025-06-05 VITALS
OXYGEN SATURATION: 100 % | RESPIRATION RATE: 19 BRPM | HEART RATE: 99 BPM | TEMPERATURE: 98.4 F | DIASTOLIC BLOOD PRESSURE: 85 MMHG | BODY MASS INDEX: 33.45 KG/M2 | SYSTOLIC BLOOD PRESSURE: 117 MMHG | HEIGHT: 65 IN | WEIGHT: 200.8 LBS

## 2025-06-05 DIAGNOSIS — F14.90 COCAINE USE: ICD-10-CM

## 2025-06-05 DIAGNOSIS — D32.9 MENINGIOMA (HCC): Primary | ICD-10-CM

## 2025-06-05 DIAGNOSIS — R11.0 NAUSEA: ICD-10-CM

## 2025-06-05 LAB
ALBUMIN SERPL-MCNC: 2.7 G/DL (ref 3.5–4.6)
ALP SERPL-CCNC: 74 U/L (ref 40–130)
ALT SERPL-CCNC: 27 U/L (ref 0–33)
AMPHET UR QL SCN: ABNORMAL
ANION GAP SERPL CALCULATED.3IONS-SCNC: 9 MEQ/L (ref 9–15)
AST SERPL-CCNC: 18 U/L (ref 0–35)
BACTERIA URNS QL MICRO: ABNORMAL /HPF
BARBITURATES UR QL SCN: ABNORMAL
BASOPHILS # BLD: 0 K/UL (ref 0–0.2)
BASOPHILS NFR BLD: 0.3 %
BENZODIAZ UR QL SCN: ABNORMAL
BILIRUB SERPL-MCNC: <0.2 MG/DL (ref 0.2–0.7)
BILIRUB UR QL STRIP: ABNORMAL
BUN SERPL-MCNC: 17 MG/DL (ref 6–20)
CALCIUM SERPL-MCNC: 8 MG/DL (ref 8.5–9.9)
CANNABINOIDS UR QL SCN: ABNORMAL
CHLORIDE SERPL-SCNC: 105 MEQ/L (ref 95–107)
CHP ED QC CHECK: YES
CLARITY UR: ABNORMAL
CO2 SERPL-SCNC: 24 MEQ/L (ref 20–31)
COCAINE UR QL SCN: POSITIVE
COLOR UR: ABNORMAL
CREAT SERPL-MCNC: 0.77 MG/DL (ref 0.5–0.9)
CRYSTALS URNS MICRO: ABNORMAL /HPF
DRUG SCREEN COMMENT UR-IMP: ABNORMAL
EOSINOPHIL # BLD: 0.1 K/UL (ref 0–0.7)
EOSINOPHIL NFR BLD: 0.7 %
EPI CELLS #/AREA URNS AUTO: ABNORMAL /HPF (ref 0–5)
ERYTHROCYTE [DISTWIDTH] IN BLOOD BY AUTOMATED COUNT: 13.3 % (ref 11.5–14.5)
ETHANOL PERCENT: NORMAL G/DL
ETHANOLAMINE SERPL-MCNC: <10 MG/DL (ref 0–0.08)
FENTANYL SCREEN, URINE: ABNORMAL
GLOBULIN SER CALC-MCNC: 2.3 G/DL (ref 2.3–3.5)
GLUCOSE SERPL-MCNC: 90 MG/DL (ref 70–99)
GLUCOSE UR STRIP-MCNC: NEGATIVE MG/DL
HCT VFR BLD AUTO: 40.8 % (ref 37–47)
HGB BLD-MCNC: 13.2 G/DL (ref 12–16)
HGB UR QL STRIP: NEGATIVE
KETONES UR STRIP-MCNC: ABNORMAL MG/DL
LACTATE BLDV-SCNC: 2.1 MMOL/L (ref 0.5–2.2)
LEUKOCYTE ESTERASE UR QL STRIP: NEGATIVE
LIPASE SERPL-CCNC: 24 U/L (ref 12–95)
LYMPHOCYTES # BLD: 2.1 K/UL (ref 1–4.8)
LYMPHOCYTES NFR BLD: 20.4 %
MAGNESIUM SERPL-MCNC: 1.8 MG/DL (ref 1.7–2.4)
MCH RBC QN AUTO: 30.9 PG (ref 27–31.3)
MCHC RBC AUTO-ENTMCNC: 32.4 % (ref 33–37)
MCV RBC AUTO: 95.6 FL (ref 79.4–94.8)
METHADONE UR QL SCN: ABNORMAL
MONOCYTES # BLD: 0.8 K/UL (ref 0.2–0.8)
MONOCYTES NFR BLD: 7.7 %
NEUTROPHILS # BLD: 7.1 K/UL (ref 1.4–6.5)
NEUTS SEG NFR BLD: 70.4 %
NITRITE UR QL STRIP: NEGATIVE
OPIATES UR QL SCN: ABNORMAL
OXYCODONE UR QL SCN: ABNORMAL
PCP UR QL SCN: ABNORMAL
PH UR STRIP: 5 [PH] (ref 5–9)
PLATELET # BLD AUTO: 322 K/UL (ref 130–400)
POTASSIUM SERPL-SCNC: 3.7 MEQ/L (ref 3.4–4.9)
POTASSIUM SERPL-SCNC: 3.7 MEQ/L (ref 3.4–4.9)
PREGNANCY TEST URINE, POC: NORMAL
PROPOXYPH UR QL SCN: ABNORMAL
PROT SERPL-MCNC: 5 G/DL (ref 6.3–8)
PROT UR STRIP-MCNC: 30 MG/DL
RBC # BLD AUTO: 4.27 M/UL (ref 4.2–5.4)
RBC #/AREA URNS AUTO: ABNORMAL /HPF (ref 0–5)
SODIUM SERPL-SCNC: 138 MEQ/L (ref 135–144)
SP GR UR STRIP: 1.04 (ref 1–1.03)
TROPONIN, HIGH SENSITIVITY: <6 NG/L (ref 0–19)
URINE REFLEX TO CULTURE: YES
UROBILINOGEN UR STRIP-ACNC: 1 E.U./DL
WBC # BLD AUTO: 10.1 K/UL (ref 4.8–10.8)
WBC #/AREA URNS AUTO: ABNORMAL /HPF (ref 0–5)

## 2025-06-05 PROCEDURE — 6360000002 HC RX W HCPCS

## 2025-06-05 PROCEDURE — 96372 THER/PROPH/DIAG INJ SC/IM: CPT

## 2025-06-05 PROCEDURE — 85025 COMPLETE CBC W/AUTO DIFF WBC: CPT

## 2025-06-05 PROCEDURE — 80307 DRUG TEST PRSMV CHEM ANLYZR: CPT

## 2025-06-05 PROCEDURE — 83605 ASSAY OF LACTIC ACID: CPT

## 2025-06-05 PROCEDURE — 2580000003 HC RX 258

## 2025-06-05 PROCEDURE — 99284 EMERGENCY DEPT VISIT MOD MDM: CPT

## 2025-06-05 PROCEDURE — 6370000000 HC RX 637 (ALT 250 FOR IP)

## 2025-06-05 PROCEDURE — 87086 URINE CULTURE/COLONY COUNT: CPT

## 2025-06-05 PROCEDURE — 80053 COMPREHEN METABOLIC PANEL: CPT

## 2025-06-05 PROCEDURE — 83690 ASSAY OF LIPASE: CPT

## 2025-06-05 PROCEDURE — 81001 URINALYSIS AUTO W/SCOPE: CPT

## 2025-06-05 PROCEDURE — 96374 THER/PROPH/DIAG INJ IV PUSH: CPT

## 2025-06-05 PROCEDURE — 83735 ASSAY OF MAGNESIUM: CPT

## 2025-06-05 PROCEDURE — 82077 ASSAY SPEC XCP UR&BREATH IA: CPT

## 2025-06-05 PROCEDURE — 93005 ELECTROCARDIOGRAM TRACING: CPT

## 2025-06-05 PROCEDURE — 84484 ASSAY OF TROPONIN QUANT: CPT

## 2025-06-05 RX ORDER — ACETAMINOPHEN 500 MG
1000 TABLET ORAL ONCE
Status: COMPLETED | OUTPATIENT
Start: 2025-06-05 | End: 2025-06-05

## 2025-06-05 RX ORDER — 0.9 % SODIUM CHLORIDE 0.9 %
1000 INTRAVENOUS SOLUTION INTRAVENOUS ONCE
Status: COMPLETED | OUTPATIENT
Start: 2025-06-05 | End: 2025-06-05

## 2025-06-05 RX ORDER — ORPHENADRINE CITRATE 30 MG/ML
60 INJECTION INTRAMUSCULAR; INTRAVENOUS ONCE
Status: COMPLETED | OUTPATIENT
Start: 2025-06-05 | End: 2025-06-05

## 2025-06-05 RX ORDER — ONDANSETRON 2 MG/ML
4 INJECTION INTRAMUSCULAR; INTRAVENOUS ONCE
Status: COMPLETED | OUTPATIENT
Start: 2025-06-05 | End: 2025-06-05

## 2025-06-05 RX ORDER — ONDANSETRON 4 MG/1
4 TABLET, FILM COATED ORAL DAILY PRN
Qty: 30 TABLET | Refills: 0 | Status: SHIPPED | OUTPATIENT
Start: 2025-06-05

## 2025-06-05 RX ADMIN — ONDANSETRON 4 MG: 2 INJECTION, SOLUTION INTRAMUSCULAR; INTRAVENOUS at 16:08

## 2025-06-05 RX ADMIN — ACETAMINOPHEN 1000 MG: 500 TABLET ORAL at 17:21

## 2025-06-05 RX ADMIN — ORPHENADRINE CITRATE 60 MG: 60 INJECTION INTRAMUSCULAR; INTRAVENOUS at 17:21

## 2025-06-05 RX ADMIN — SODIUM CHLORIDE 1000 ML: 0.9 INJECTION, SOLUTION INTRAVENOUS at 14:42

## 2025-06-05 ASSESSMENT — ENCOUNTER SYMPTOMS: NAUSEA: 1

## 2025-06-05 ASSESSMENT — PAIN SCALES - GENERAL: PAINLEVEL_OUTOF10: 6

## 2025-06-05 NOTE — ED TRIAGE NOTES
Pt was here 6/3/25 for the same thing   Told to come back if things aren't getting better   Pt states they aren't getting better   Can't eat anything (always nauseated)  Pt is afebrile
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
## 2025-06-05 NOTE — CARE COORDINATION
At Dr Riddle's direction, this nurse contacted the [facility] neurosurgery outpatient office. I received a voicemail. I left a message regarding the referral and gave the patient's information and phone number as well as the number to reach us in the ER. Dr Riddle is aware. The patient states that this is acceptable to her and that she will also call [facility] for a follow up appointment.

## 2025-06-06 LAB
BACTERIA UR CULT: NORMAL
EKG ATRIAL RATE: 77 BPM
EKG DIAGNOSIS: NORMAL
EKG P AXIS: 9 DEGREES
EKG P-R INTERVAL: 114 MS
EKG Q-T INTERVAL: 400 MS
EKG QRS DURATION: 78 MS
EKG QTC CALCULATION (BAZETT): 452 MS
EKG R AXIS: 19 DEGREES
EKG T AXIS: 22 DEGREES
EKG VENTRICULAR RATE: 77 BPM

## 2025-06-09 ENCOUNTER — HOSPITAL ENCOUNTER (EMERGENCY)
Age: 30
Discharge: HOME | End: 2025-06-09
Payer: COMMERCIAL

## 2025-06-09 ENCOUNTER — INITIAL CONSULT (OUTPATIENT)
Age: 30
End: 2025-06-09
Payer: COMMERCIAL

## 2025-06-09 VITALS
DIASTOLIC BLOOD PRESSURE: 89 MMHG | OXYGEN SATURATION: 100 % | SYSTOLIC BLOOD PRESSURE: 112 MMHG | RESPIRATION RATE: 18 BRPM | WEIGHT: 208 LBS | BODY MASS INDEX: 35.51 KG/M2 | HEART RATE: 67 BPM | TEMPERATURE: 98.6 F | HEIGHT: 64 IN

## 2025-06-09 VITALS
WEIGHT: 200 LBS | HEIGHT: 65 IN | OXYGEN SATURATION: 99 % | RESPIRATION RATE: 20 BRPM | BODY MASS INDEX: 33.32 KG/M2 | TEMPERATURE: 96.6 F | HEART RATE: 90 BPM

## 2025-06-09 DIAGNOSIS — G45.9 TIA (TRANSIENT ISCHEMIC ATTACK): Primary | ICD-10-CM

## 2025-06-09 DIAGNOSIS — R20.0 NUMBNESS: Primary | ICD-10-CM

## 2025-06-09 LAB
ALBUMIN SERPL-MCNC: 2.4 G/DL (ref 3.5–4.6)
ALP SERPL-CCNC: 77 U/L (ref 40–130)
ALT SERPL-CCNC: 29 U/L (ref 0–33)
AMPHET UR QL SCN: ABNORMAL
ANION GAP SERPL CALCULATED.3IONS-SCNC: 6 MEQ/L (ref 9–15)
AST SERPL-CCNC: 22 U/L (ref 0–35)
BARBITURATES UR QL SCN: ABNORMAL
BASOPHILS # BLD: 0.1 K/UL (ref 0–0.2)
BASOPHILS NFR BLD: 0.6 %
BENZODIAZ UR QL SCN: ABNORMAL
BILIRUB SERPL-MCNC: 0.3 MG/DL (ref 0.2–0.7)
BILIRUB UR QL STRIP: NEGATIVE
BUN SERPL-MCNC: 13 MG/DL (ref 6–20)
CALCIUM SERPL-MCNC: 7.8 MG/DL (ref 8.5–9.9)
CANNABINOIDS UR QL SCN: POSITIVE
CHLORIDE SERPL-SCNC: 110 MEQ/L (ref 95–107)
CLARITY UR: CLEAR
CO2 SERPL-SCNC: 23 MEQ/L (ref 20–31)
COCAINE UR QL SCN: POSITIVE
COLOR UR: YELLOW
CREAT SERPL-MCNC: 0.89 MG/DL (ref 0.5–0.9)
DRUG SCREEN COMMENT UR-IMP: ABNORMAL
EOSINOPHIL # BLD: 0.1 K/UL (ref 0–0.7)
EOSINOPHIL NFR BLD: 0.9 %
ERYTHROCYTE [DISTWIDTH] IN BLOOD BY AUTOMATED COUNT: 13.5 % (ref 11.5–14.5)
ETHANOL PERCENT: NORMAL G/DL
ETHANOLAMINE SERPL-MCNC: <10 MG/DL (ref 0–0.08)
FENTANYL SCREEN, URINE: ABNORMAL
GLOBULIN SER CALC-MCNC: 2.6 G/DL (ref 2.3–3.5)
GLUCOSE BLD-MCNC: 77 MG/DL (ref 70–99)
GLUCOSE SERPL-MCNC: 91 MG/DL (ref 70–99)
GLUCOSE UR STRIP-MCNC: NEGATIVE MG/DL
HCT VFR BLD AUTO: 44.9 % (ref 37–47)
HGB BLD-MCNC: 13.6 G/DL (ref 12–16)
HGB UR QL STRIP: NEGATIVE
KETONES UR STRIP-MCNC: NEGATIVE MG/DL
LEUKOCYTE ESTERASE UR QL STRIP: NEGATIVE
LYMPHOCYTES # BLD: 3.4 K/UL (ref 1–4.8)
LYMPHOCYTES NFR BLD: 27.7 %
MCH RBC QN AUTO: 30.8 PG (ref 27–31.3)
MCHC RBC AUTO-ENTMCNC: 30.3 % (ref 33–37)
MCV RBC AUTO: 101.6 FL (ref 79.4–94.8)
METHADONE UR QL SCN: ABNORMAL
MONOCYTES # BLD: 0.7 K/UL (ref 0.2–0.8)
MONOCYTES NFR BLD: 6 %
NEUTROPHILS # BLD: 7.9 K/UL (ref 1.4–6.5)
NEUTS SEG NFR BLD: 64.6 %
NITRITE UR QL STRIP: NEGATIVE
OPIATES UR QL SCN: ABNORMAL
OXYCODONE UR QL SCN: ABNORMAL
PCP UR QL SCN: ABNORMAL
PERFORMED ON: NORMAL
PH UR STRIP: 6 [PH] (ref 5–9)
PLATELET # BLD AUTO: 332 K/UL (ref 130–400)
POTASSIUM SERPL-SCNC: 4.7 MEQ/L (ref 3.4–4.9)
PROPOXYPH UR QL SCN: ABNORMAL
PROT SERPL-MCNC: 5 G/DL (ref 6.3–8)
PROT UR STRIP-MCNC: NEGATIVE MG/DL
RBC # BLD AUTO: 4.42 M/UL (ref 4.2–5.4)
SODIUM SERPL-SCNC: 139 MEQ/L (ref 135–144)
SP GR UR STRIP: 1.01 (ref 1–1.03)
URINE REFLEX TO CULTURE: NORMAL
UROBILINOGEN UR STRIP-ACNC: 0.2 E.U./DL
WBC # BLD AUTO: 12.3 K/UL (ref 4.8–10.8)

## 2025-06-09 PROCEDURE — G8417 CALC BMI ABV UP PARAM F/U: HCPCS | Performed by: NEUROLOGICAL SURGERY

## 2025-06-09 PROCEDURE — 85025 COMPLETE CBC W/AUTO DIFF WBC: CPT

## 2025-06-09 PROCEDURE — 36415 COLL VENOUS BLD VENIPUNCTURE: CPT

## 2025-06-09 PROCEDURE — 99214 OFFICE O/P EST MOD 30 MIN: CPT | Performed by: NEUROLOGICAL SURGERY

## 2025-06-09 PROCEDURE — 99244 OFF/OP CNSLTJ NEW/EST MOD 40: CPT | Performed by: NEUROLOGICAL SURGERY

## 2025-06-09 PROCEDURE — G8427 DOCREV CUR MEDS BY ELIG CLIN: HCPCS | Performed by: NEUROLOGICAL SURGERY

## 2025-06-09 PROCEDURE — 80053 COMPREHEN METABOLIC PANEL: CPT

## 2025-06-09 PROCEDURE — 82077 ASSAY SPEC XCP UR&BREATH IA: CPT

## 2025-06-09 PROCEDURE — 81003 URINALYSIS AUTO W/O SCOPE: CPT

## 2025-06-09 PROCEDURE — 80307 DRUG TEST PRSMV CHEM ANLYZR: CPT

## 2025-06-09 PROCEDURE — 99283 EMERGENCY DEPT VISIT LOW MDM: CPT

## 2025-06-09 ASSESSMENT — ENCOUNTER SYMPTOMS
RHINORRHEA: 0
EYE PAIN: 0
SHORTNESS OF BREATH: 1
SORE THROAT: 0
BACK PAIN: 0
EYE PAIN: 0
SHORTNESS OF BREATH: 0
DIARRHEA: 0
ABDOMINAL DISTENTION: 0
BACK PAIN: 1
PHOTOPHOBIA: 0
VOMITING: 0
COUGH: 0
COUGH: 0
TROUBLE SWALLOWING: 0
NAUSEA: 0
ABDOMINAL PAIN: 0
ABDOMINAL PAIN: 0

## 2025-06-09 NOTE — ED TRIAGE NOTES
Patient presents with complaints of numbness to the right side of her body. She reports this sudden onset began while she was in the Medical Office Building waiting for her follow up neuro appointment. Patient in no distress on arrival. Patient alert and oriented. Skin pink, warm, and dry.

## 2025-06-09 NOTE — ED PROVIDER NOTES
[facility] EMERGENCY DEPARTMENT  EMERGENCY DEPARTMENT ENCOUNTER      Pt Name: Jeniffer Kennedy  MRN: [id number]  Birthdate 1995  Date of evaluation: 6/9/2025  Provider: Samira Glover PA-C  Note Started: 6/9/25 5:17 PM EDT    CHIEF COMPLAINT       Chief Complaint   Patient presents with    Numbness     Numbness to right side of body, sudden onset while she was in the MOB waiting for her follow up neuro appointment         HISTORY OF PRESENT ILLNESS   (Location/Symptom, Timing/Onset, Context/Setting, Quality, Duration, Modifying Factors, Severity)  Note limiting factors.   Jeniffer Kennedy is a 30 y.o. female who presents to the emergency department altered mental status.  Patient was being seen neurosurgery office due to recent hospital stay that showed a meningioma.  Over there she began to feel like the right side of her body was feeling pins and needle sensation she was having slurred speech different difficulty finding her words.  She says she has she does not remember them taking her to the emergency department but now is feeling much better.  She admits to cocaine use the last 4 days ago smoking.  She has had these episodes before over the last month.  She denies any headache chest pain shortness of breath abdominal pain nausea vomiting diarrhea.    HPI    Nursing Notes were reviewed.    REVIEW OF SYSTEMS    (2-9 systems for level 4, 10 or more for level 5)     Review of Systems   Constitutional:  Negative for chills, diaphoresis, fatigue and fever.   HENT:  Negative for congestion, rhinorrhea and sore throat.    Eyes:  Negative for photophobia and pain.   Respiratory:  Negative for cough and shortness of breath.    Cardiovascular:  Negative for chest pain and palpitations.   Gastrointestinal:  Negative for abdominal pain, diarrhea, nausea and vomiting.   Genitourinary:  Negative for dysuria and flank pain.   Musculoskeletal:  Negative for back pain.   Skin:  Negative for rash.   Neurological:

## 2025-06-09 NOTE — PROGRESS NOTES
Patient Name: Jeniffer Kennedy : 1995        Date: 2025      Type of Appt: Consult    Reason for appt: Meningioma     Referred by: Zonia Horn     Studies done: 6/3/25 CT of Head at [facility]  6/3/25 CT Cervical Spine at [facility]    Conservative Treatments (Have you ever tried any)  Physical Therapy in the last 6 months to a year:  NSAID's:   Allergy   Narcotics: yes   Muscle relaxants: Yes  Epidural injections:  No    Any Blood Thinners :  [] Yes  [x] No       [] Aspirin    [] Eliquis     [] Xarelto    [] Pletal   [] Plavix    [] Warfarin        Diabetic:  [] Yes   [x] No   If yes, prescribed insulin: [] Yes   [x]  No      Do you take any : [] Yes   [x]  No      [] Ozempic   [] Wegovy    [] Trulicity    [] Mounjaro     Smoking: [x] Yes   [] No      REVIEW OF SYSTEMS:    [] Rash     [] Difficulty Urinating   [x] Nausea    [] Fever      [x] Headaches    [] Bruising/Bleeding Easily    [] Hearing loss     [] Constipation     [] Sleep Disturbance   [x] Shortness of breath    [] Diarrhea   [x] Neck Pain    [x] Back Pain   
at 3:17 PM

## 2025-06-09 NOTE — ED NOTES
Pt in room at this time   Pt yelling on cellphone at this time   Pt using profanity at this time toward cellphone caller  Pt updated at this time that her room door will be closed for her privacy to make phone call

## 2025-06-09 NOTE — DISCHARGE INSTRUCTIONS
Cocaine can increase your risk for strokes. With the symptoms you are having I am concerned you are having TIA and should be observed in the hospital. You could end up with permanent disability or even death from a stroke event. If your symptoms reoccur you need to return to the ER.

## 2025-06-14 ENCOUNTER — APPOINTMENT (OUTPATIENT)
Dept: CT IMAGING | Age: 30
End: 2025-06-14
Payer: COMMERCIAL

## 2025-06-14 ENCOUNTER — APPOINTMENT (OUTPATIENT)
Dept: GENERAL RADIOLOGY | Age: 30
End: 2025-06-14
Payer: COMMERCIAL

## 2025-06-14 ENCOUNTER — HOSPITAL ENCOUNTER (EMERGENCY)
Age: 30
Discharge: HOME | End: 2025-06-14
Payer: COMMERCIAL

## 2025-06-14 VITALS
SYSTOLIC BLOOD PRESSURE: 125 MMHG | HEART RATE: 65 BPM | WEIGHT: 213.85 LBS | RESPIRATION RATE: 17 BRPM | TEMPERATURE: 98.4 F | DIASTOLIC BLOOD PRESSURE: 79 MMHG | HEIGHT: 64 IN | BODY MASS INDEX: 36.51 KG/M2 | OXYGEN SATURATION: 96 %

## 2025-06-14 DIAGNOSIS — R51.9 ACUTE NONINTRACTABLE HEADACHE, UNSPECIFIED HEADACHE TYPE: Primary | ICD-10-CM

## 2025-06-14 DIAGNOSIS — F14.10 COCAINE ABUSE (HCC): ICD-10-CM

## 2025-06-14 LAB
ALBUMIN SERPL-MCNC: 2.8 G/DL (ref 3.5–4.6)
ALP SERPL-CCNC: 90 U/L (ref 40–130)
ALT SERPL-CCNC: 32 U/L (ref 0–33)
AMPHET UR QL SCN: ABNORMAL
ANION GAP SERPL CALCULATED.3IONS-SCNC: 8 MEQ/L (ref 9–15)
AST SERPL-CCNC: 23 U/L (ref 0–35)
BARBITURATES UR QL SCN: ABNORMAL
BASOPHILS # BLD: 0 K/UL (ref 0–0.2)
BASOPHILS NFR BLD: 0.4 %
BENZODIAZ UR QL SCN: ABNORMAL
BILIRUB SERPL-MCNC: 0.3 MG/DL (ref 0.2–0.7)
BNP BLD-MCNC: 242 PG/ML
BUN SERPL-MCNC: 11 MG/DL (ref 6–20)
CALCIUM SERPL-MCNC: 7.9 MG/DL (ref 8.5–9.9)
CANNABINOIDS UR QL SCN: ABNORMAL
CHLORIDE SERPL-SCNC: 106 MEQ/L (ref 95–107)
CO2 SERPL-SCNC: 27 MEQ/L (ref 20–31)
COCAINE UR QL SCN: POSITIVE
CREAT SERPL-MCNC: 0.82 MG/DL (ref 0.5–0.9)
CRP SERPL HS-MCNC: <3 MG/L (ref 0–5)
DRUG SCREEN COMMENT UR-IMP: ABNORMAL
EOSINOPHIL # BLD: 0.1 K/UL (ref 0–0.7)
EOSINOPHIL NFR BLD: 0.8 %
ERYTHROCYTE [DISTWIDTH] IN BLOOD BY AUTOMATED COUNT: 13.2 % (ref 11.5–14.5)
ERYTHROCYTE [SEDIMENTATION RATE] IN BLOOD BY WESTERGREN METHOD: 5 MM (ref 0–20)
ETHANOL PERCENT: NORMAL G/DL
ETHANOLAMINE SERPL-MCNC: <10 MG/DL (ref 0–0.08)
FENTANYL SCREEN, URINE: ABNORMAL
GLOBULIN SER CALC-MCNC: 2.6 G/DL (ref 2.3–3.5)
GLUCOSE SERPL-MCNC: 75 MG/DL (ref 70–99)
HCT VFR BLD AUTO: 39 % (ref 37–47)
HGB BLD-MCNC: 12.6 G/DL (ref 12–16)
LYMPHOCYTES # BLD: 2.8 K/UL (ref 1–4.8)
LYMPHOCYTES NFR BLD: 28.6 %
MCH RBC QN AUTO: 30.4 PG (ref 27–31.3)
MCHC RBC AUTO-ENTMCNC: 32.3 % (ref 33–37)
MCV RBC AUTO: 94.2 FL (ref 79.4–94.8)
METHADONE UR QL SCN: ABNORMAL
MONOCYTES # BLD: 0.8 K/UL (ref 0.2–0.8)
MONOCYTES NFR BLD: 7.8 %
NEUTROPHILS # BLD: 6.1 K/UL (ref 1.4–6.5)
NEUTS SEG NFR BLD: 62.2 %
OPIATES UR QL SCN: ABNORMAL
OXYCODONE UR QL SCN: ABNORMAL
PCP UR QL SCN: ABNORMAL
PLATELET # BLD AUTO: 326 K/UL (ref 130–400)
POTASSIUM SERPL-SCNC: 3.7 MEQ/L (ref 3.4–4.9)
PROPOXYPH UR QL SCN: ABNORMAL
PROT SERPL-MCNC: 5.4 G/DL (ref 6.3–8)
RBC # BLD AUTO: 4.14 M/UL (ref 4.2–5.4)
SODIUM SERPL-SCNC: 141 MEQ/L (ref 135–144)
WBC # BLD AUTO: 9.8 K/UL (ref 4.8–10.8)

## 2025-06-14 PROCEDURE — 83880 ASSAY OF NATRIURETIC PEPTIDE: CPT

## 2025-06-14 PROCEDURE — 80307 DRUG TEST PRSMV CHEM ANLYZR: CPT

## 2025-06-14 PROCEDURE — 82077 ASSAY SPEC XCP UR&BREATH IA: CPT

## 2025-06-14 PROCEDURE — 96374 THER/PROPH/DIAG INJ IV PUSH: CPT

## 2025-06-14 PROCEDURE — 85025 COMPLETE CBC W/AUTO DIFF WBC: CPT

## 2025-06-14 PROCEDURE — 99285 EMERGENCY DEPT VISIT HI MDM: CPT

## 2025-06-14 PROCEDURE — 71045 X-RAY EXAM CHEST 1 VIEW: CPT

## 2025-06-14 PROCEDURE — 80053 COMPREHEN METABOLIC PANEL: CPT

## 2025-06-14 PROCEDURE — 6360000002 HC RX W HCPCS

## 2025-06-14 PROCEDURE — 93005 ELECTROCARDIOGRAM TRACING: CPT | Performed by: INTERNAL MEDICINE

## 2025-06-14 PROCEDURE — 86140 C-REACTIVE PROTEIN: CPT

## 2025-06-14 PROCEDURE — 85652 RBC SED RATE AUTOMATED: CPT

## 2025-06-14 PROCEDURE — 70450 CT HEAD/BRAIN W/O DYE: CPT

## 2025-06-14 PROCEDURE — 6370000000 HC RX 637 (ALT 250 FOR IP)

## 2025-06-14 PROCEDURE — 96375 TX/PRO/DX INJ NEW DRUG ADDON: CPT

## 2025-06-14 RX ORDER — ACETAMINOPHEN 500 MG
1000 TABLET ORAL ONCE
Status: COMPLETED | OUTPATIENT
Start: 2025-06-14 | End: 2025-06-14

## 2025-06-14 RX ORDER — METOCLOPRAMIDE 10 MG/1
10 TABLET ORAL 4 TIMES DAILY PRN
Qty: 40 TABLET | Refills: 0 | Status: SHIPPED | OUTPATIENT
Start: 2025-06-14

## 2025-06-14 RX ORDER — DIPHENHYDRAMINE HYDROCHLORIDE 50 MG/ML
25 INJECTION, SOLUTION INTRAMUSCULAR; INTRAVENOUS ONCE
Status: COMPLETED | OUTPATIENT
Start: 2025-06-14 | End: 2025-06-14

## 2025-06-14 RX ORDER — PROCHLORPERAZINE EDISYLATE 5 MG/ML
10 INJECTION INTRAMUSCULAR; INTRAVENOUS ONCE
Status: COMPLETED | OUTPATIENT
Start: 2025-06-14 | End: 2025-06-14

## 2025-06-14 RX ADMIN — ACETAMINOPHEN 1000 MG: 500 TABLET ORAL at 07:56

## 2025-06-14 RX ADMIN — DIPHENHYDRAMINE HYDROCHLORIDE 25 MG: 50 INJECTION INTRAMUSCULAR; INTRAVENOUS at 08:21

## 2025-06-14 RX ADMIN — PROCHLORPERAZINE EDISYLATE 10 MG: 5 INJECTION INTRAMUSCULAR; INTRAVENOUS at 08:22

## 2025-06-14 ASSESSMENT — PAIN DESCRIPTION - LOCATION
LOCATION: HEAD
LOCATION: HEAD;FOOT

## 2025-06-14 ASSESSMENT — LIFESTYLE VARIABLES
HOW OFTEN DO YOU HAVE A DRINK CONTAINING ALCOHOL: MONTHLY OR LESS
HOW MANY STANDARD DRINKS CONTAINING ALCOHOL DO YOU HAVE ON A TYPICAL DAY: 1 OR 2

## 2025-06-14 ASSESSMENT — PAIN SCALES - GENERAL
PAINLEVEL_OUTOF10: 7
PAINLEVEL_OUTOF10: 7

## 2025-06-14 ASSESSMENT — ENCOUNTER SYMPTOMS
NAUSEA: 1
VOMITING: 1

## 2025-06-14 ASSESSMENT — PAIN DESCRIPTION - DESCRIPTORS
DESCRIPTORS: ACHING;DISCOMFORT
DESCRIPTORS: ACHING

## 2025-06-14 ASSESSMENT — PAIN DESCRIPTION - ORIENTATION: ORIENTATION: RIGHT;LEFT

## 2025-06-14 NOTE — DISCHARGE INSTRUCTIONS
Take medications as directed.     Follow-up with neurology.     Return to ED if any new, or worsening symptoms.
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
## 2025-06-16 LAB
EKG ATRIAL RATE: 75 BPM
EKG DIAGNOSIS: NORMAL
EKG P AXIS: 22 DEGREES
EKG P-R INTERVAL: 128 MS
EKG Q-T INTERVAL: 400 MS
EKG QRS DURATION: 78 MS
EKG QTC CALCULATION (BAZETT): 446 MS
EKG R AXIS: 12 DEGREES
EKG T AXIS: 39 DEGREES
EKG VENTRICULAR RATE: 75 BPM

## 2025-06-20 ENCOUNTER — APPOINTMENT (OUTPATIENT)
Dept: PLASTIC SURGERY | Facility: CLINIC | Age: 30
End: 2025-06-20
Payer: COMMERCIAL

## 2025-06-22 ENCOUNTER — APPOINTMENT (OUTPATIENT)
Dept: GENERAL RADIOLOGY | Age: 30
End: 2025-06-22
Payer: COMMERCIAL

## 2025-06-22 ENCOUNTER — APPOINTMENT (OUTPATIENT)
Dept: ULTRASOUND IMAGING | Age: 30
End: 2025-06-22
Payer: COMMERCIAL

## 2025-06-22 ENCOUNTER — HOSPITAL ENCOUNTER (EMERGENCY)
Age: 30
Discharge: HOME | End: 2025-06-22
Payer: COMMERCIAL

## 2025-06-22 VITALS
HEIGHT: 64 IN | BODY MASS INDEX: 36.26 KG/M2 | SYSTOLIC BLOOD PRESSURE: 115 MMHG | OXYGEN SATURATION: 99 % | WEIGHT: 212.4 LBS | RESPIRATION RATE: 18 BRPM | DIASTOLIC BLOOD PRESSURE: 86 MMHG | HEART RATE: 88 BPM | TEMPERATURE: 98.4 F

## 2025-06-22 DIAGNOSIS — S30.0XXA CONTUSION OF LOWER BACK, INITIAL ENCOUNTER: ICD-10-CM

## 2025-06-22 DIAGNOSIS — R60.0 PERIPHERAL EDEMA: Primary | ICD-10-CM

## 2025-06-22 LAB
AMPHET UR QL SCN: ABNORMAL
ANION GAP SERPL CALCULATED.3IONS-SCNC: 9 MEQ/L (ref 9–15)
BARBITURATES UR QL SCN: ABNORMAL
BASOPHILS # BLD: 0.1 K/UL (ref 0–0.2)
BASOPHILS NFR BLD: 0.5 %
BENZODIAZ UR QL SCN: ABNORMAL
BUN SERPL-MCNC: 14 MG/DL (ref 6–20)
CALCIUM SERPL-MCNC: 8.1 MG/DL (ref 8.5–9.9)
CANNABINOIDS UR QL SCN: ABNORMAL
CHLORIDE SERPL-SCNC: 104 MEQ/L (ref 95–107)
CO2 SERPL-SCNC: 24 MEQ/L (ref 20–31)
COCAINE UR QL SCN: POSITIVE
CREAT SERPL-MCNC: 0.68 MG/DL (ref 0.5–0.9)
DRUG SCREEN COMMENT UR-IMP: ABNORMAL
EOSINOPHIL # BLD: 0.1 K/UL (ref 0–0.7)
EOSINOPHIL NFR BLD: 1 %
ERYTHROCYTE [DISTWIDTH] IN BLOOD BY AUTOMATED COUNT: 13.4 % (ref 11.5–14.5)
FENTANYL SCREEN, URINE: ABNORMAL
GLUCOSE SERPL-MCNC: 82 MG/DL (ref 70–99)
HCT VFR BLD AUTO: 39.6 % (ref 37–47)
HGB BLD-MCNC: 12.6 G/DL (ref 12–16)
LYMPHOCYTES # BLD: 3.4 K/UL (ref 1–4.8)
LYMPHOCYTES NFR BLD: 36.7 %
MCH RBC QN AUTO: 30.4 PG (ref 27–31.3)
MCHC RBC AUTO-ENTMCNC: 31.8 % (ref 33–37)
MCV RBC AUTO: 95.7 FL (ref 79.4–94.8)
METHADONE UR QL SCN: ABNORMAL
MONOCYTES # BLD: 0.7 K/UL (ref 0.2–0.8)
MONOCYTES NFR BLD: 7.9 %
NEUTROPHILS # BLD: 5 K/UL (ref 1.4–6.5)
NEUTS SEG NFR BLD: 53.6 %
OPIATES UR QL SCN: ABNORMAL
OXYCODONE UR QL SCN: ABNORMAL
PCP UR QL SCN: ABNORMAL
PLATELET # BLD AUTO: 300 K/UL (ref 130–400)
POTASSIUM SERPL-SCNC: 4 MEQ/L (ref 3.4–4.9)
PROPOXYPH UR QL SCN: ABNORMAL
RBC # BLD AUTO: 4.14 M/UL (ref 4.2–5.4)
SODIUM SERPL-SCNC: 137 MEQ/L (ref 135–144)
WBC # BLD AUTO: 9.3 K/UL (ref 4.8–10.8)

## 2025-06-22 PROCEDURE — 85025 COMPLETE CBC W/AUTO DIFF WBC: CPT

## 2025-06-22 PROCEDURE — 93970 EXTREMITY STUDY: CPT

## 2025-06-22 PROCEDURE — 71045 X-RAY EXAM CHEST 1 VIEW: CPT

## 2025-06-22 PROCEDURE — 80048 BASIC METABOLIC PNL TOTAL CA: CPT

## 2025-06-22 PROCEDURE — 99284 EMERGENCY DEPT VISIT MOD MDM: CPT

## 2025-06-22 PROCEDURE — 80307 DRUG TEST PRSMV CHEM ANLYZR: CPT

## 2025-06-22 PROCEDURE — 6370000000 HC RX 637 (ALT 250 FOR IP): Performed by: PHYSICIAN ASSISTANT

## 2025-06-22 PROCEDURE — 71046 X-RAY EXAM CHEST 2 VIEWS: CPT

## 2025-06-22 RX ORDER — GABAPENTIN 100 MG/1
100 CAPSULE ORAL 3 TIMES DAILY
Qty: 9 CAPSULE | Refills: 0 | Status: SHIPPED | OUTPATIENT
Start: 2025-06-22 | End: 2025-06-25

## 2025-06-22 RX ORDER — FUROSEMIDE 20 MG/1
20 TABLET ORAL DAILY
Status: DISCONTINUED | OUTPATIENT
Start: 2025-06-22 | End: 2025-06-23 | Stop reason: HOSPADM

## 2025-06-22 RX ORDER — MECLIZINE HYDROCHLORIDE 25 MG/1
25 TABLET ORAL ONCE
Status: COMPLETED | OUTPATIENT
Start: 2025-06-22 | End: 2025-06-22

## 2025-06-22 RX ORDER — FUROSEMIDE 20 MG/1
20 TABLET ORAL DAILY
Qty: 3 TABLET | Refills: 0 | Status: SHIPPED | OUTPATIENT
Start: 2025-06-22

## 2025-06-22 RX ORDER — ONDANSETRON 4 MG/1
4 TABLET, ORALLY DISINTEGRATING ORAL ONCE
Status: COMPLETED | OUTPATIENT
Start: 2025-06-22 | End: 2025-06-22

## 2025-06-22 RX ADMIN — FUROSEMIDE 20 MG: 20 TABLET ORAL at 18:54

## 2025-06-22 RX ADMIN — MECLIZINE HYDROCHLORIDE 25 MG: 25 TABLET ORAL at 18:54

## 2025-06-22 RX ADMIN — ONDANSETRON 4 MG: 4 TABLET, ORALLY DISINTEGRATING ORAL at 18:54

## 2025-06-22 ASSESSMENT — PAIN DESCRIPTION - LOCATION: LOCATION: LEG

## 2025-06-22 ASSESSMENT — LIFESTYLE VARIABLES
HOW MANY STANDARD DRINKS CONTAINING ALCOHOL DO YOU HAVE ON A TYPICAL DAY: 1 OR 2
HOW OFTEN DO YOU HAVE A DRINK CONTAINING ALCOHOL: MONTHLY OR LESS

## 2025-06-22 ASSESSMENT — PAIN - FUNCTIONAL ASSESSMENT: PAIN_FUNCTIONAL_ASSESSMENT: 0-10

## 2025-06-22 ASSESSMENT — PAIN SCALES - GENERAL: PAINLEVEL_OUTOF10: 5

## 2025-06-22 ASSESSMENT — PAIN DESCRIPTION - ORIENTATION: ORIENTATION: RIGHT;LEFT

## 2025-06-22 NOTE — ED TRIAGE NOTES
30 yr old female received from [employer]. Pt c/o shortness of breath when walking outside and swelling in her legs.

## 2025-06-22 NOTE — ED PROVIDER NOTES
[facility] EMERGENCY DEPARTMENT  EMERGENCY DEPARTMENT ENCOUNTER      Pt Name: Jeniffer Kennedy  MRN: [id number]  Birthdate 1995  Date of evaluation: 6/22/2025  Provider: Sunny Santiago PA-C  Note Started: 6/22/25 6:44 PM EDT    CHIEF COMPLAINT       Chief Complaint   Patient presents with    Shortness of Breath     Pt states she was walking home and it was getting hard to breath and her legs and feet are swollen.      Leg Swelling         HISTORY OF PRESENT ILLNESS   (Location/Symptom, Timing/Onset, Context/Setting, Quality, Duration, Modifying Factors, Severity)  Note limiting factors.   Jeniffer Kennedy is a 30 y.o. female who presents to the emergency department patient complains of bilateral leg swelling states this has been present for 2 weeks Notes that she was walking home was getting hard to breathe became nauseous.  Patient does continue to smoke.  Denies fever, chills, nausea, vomiting denies any urinary bleeding pain with urination.  She does note some possible intermittent rectal bleeding.    HPI    Nursing Notes were reviewed.    REVIEW OF SYSTEMS    (2-9 systems for level 4, 10 or more for level 5)     Review of Systems    Except as noted above the remainder of the review of systems was reviewed and negative.       PAST MEDICAL HISTORY     Past Medical History:   Diagnosis Date    Mental disorder     depression and anxiety    Postpartum depression     Sleep apnea          SURGICAL HISTORY       Past Surgical History:   Procedure Laterality Date    DILATION AND CURETTAGE OF UTERUS N/A 9/15/2023    ATTEMPTED LAPAROSCOPIC BILATERAL SALPINGECTOMY WITH NOVASURE ENDOMETRIAL ABLATION performed by Agustin Gregory MD at [facility] OR    HYSTERECTOMY, VAGINAL N/A 10/20/2023    TOTAL LAPAROSCOPIC HYSTERECTOMY, RIGHT SALPINGECTOMY, LEFT OOPHERECTOMY performed by Agustin Gregory MD at [facility] OR    LAPAROSCOPY Left 6/10/2021    LAPAROSCOPIC ECTOPIC REMOVAL, LEFT SALPINGECTOMY performed by Agustin Gregory MD at

## 2025-06-23 RX ORDER — ONDANSETRON 4 MG/1
4 TABLET, ORALLY DISINTEGRATING ORAL 3 TIMES DAILY PRN
Qty: 21 TABLET | Refills: 0 | Status: SHIPPED | OUTPATIENT
Start: 2025-06-23

## 2025-06-23 RX ORDER — NYSTATIN 100000 [USP'U]/ML
500000 SUSPENSION ORAL 4 TIMES DAILY
Qty: 140 ML | Refills: 0 | Status: SHIPPED | OUTPATIENT
Start: 2025-06-23 | End: 2025-06-30

## 2025-06-23 RX ORDER — MECLIZINE HYDROCHLORIDE 25 MG/1
25 TABLET ORAL 3 TIMES DAILY PRN
Qty: 15 TABLET | Refills: 0 | Status: SHIPPED | OUTPATIENT
Start: 2025-06-23 | End: 2025-07-03

## 2025-06-23 NOTE — DISCHARGE INSTRUCTIONS
Follow-up with primary care.  Follow-up with Brittani for further testing.   Mohs Method Verbiage: The operative site was curetted for margins and a beveled incision following the standard Mohs approach was done.  The specimen was harvested as a microscopic controlled layer and a map of the extirpated tissue was made for orientation.
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
## 2025-06-27 ENCOUNTER — HOSPITAL ENCOUNTER (EMERGENCY)
Facility: HOSPITAL | Age: 30
Discharge: HOME | End: 2025-06-28
Attending: EMERGENCY MEDICINE
Payer: COMMERCIAL

## 2025-06-27 ENCOUNTER — APPOINTMENT (OUTPATIENT)
Dept: CARDIOLOGY | Facility: HOSPITAL | Age: 30
End: 2025-06-27
Payer: COMMERCIAL

## 2025-06-27 ENCOUNTER — APPOINTMENT (OUTPATIENT)
Dept: RADIOLOGY | Facility: HOSPITAL | Age: 30
End: 2025-06-27
Payer: COMMERCIAL

## 2025-06-27 DIAGNOSIS — F14.90 COCAINE USE: ICD-10-CM

## 2025-06-27 DIAGNOSIS — D64.9 ANEMIA, UNSPECIFIED TYPE: ICD-10-CM

## 2025-06-27 DIAGNOSIS — R60.0 PERIPHERAL EDEMA: ICD-10-CM

## 2025-06-27 DIAGNOSIS — E87.6 HYPOKALEMIA: ICD-10-CM

## 2025-06-27 DIAGNOSIS — F41.9 ANXIETY: Primary | ICD-10-CM

## 2025-06-27 LAB
ALBUMIN SERPL BCP-MCNC: 2.4 G/DL (ref 3.4–5)
ALP SERPL-CCNC: 86 U/L (ref 33–110)
ALT SERPL W P-5'-P-CCNC: 32 U/L (ref 7–45)
AMPHETAMINES UR QL SCN: ABNORMAL
ANION GAP SERPL CALC-SCNC: 7 MMOL/L (ref 10–20)
APAP SERPL-MCNC: <10 UG/ML (ref ?–30)
AST SERPL W P-5'-P-CCNC: 18 U/L (ref 9–39)
BARBITURATES UR QL SCN: ABNORMAL
BASOPHILS # BLD AUTO: 0.04 X10*3/UL (ref 0–0.1)
BASOPHILS NFR BLD AUTO: 0.7 %
BENZODIAZ UR QL SCN: ABNORMAL
BILIRUB SERPL-MCNC: 0.4 MG/DL (ref 0–1.2)
BNP SERPL-MCNC: 36 PG/ML (ref 0–99)
BUN SERPL-MCNC: 11 MG/DL (ref 6–23)
BZE UR QL SCN: ABNORMAL
CALCIUM SERPL-MCNC: 7.8 MG/DL (ref 8.6–10.3)
CANNABINOIDS UR QL SCN: ABNORMAL
CARDIAC TROPONIN I PNL SERPL HS: <3 NG/L (ref 0–13)
CHLORIDE SERPL-SCNC: 107 MMOL/L (ref 98–107)
CO2 SERPL-SCNC: 28 MMOL/L (ref 21–32)
CREAT SERPL-MCNC: 0.75 MG/DL (ref 0.5–1.05)
EGFRCR SERPLBLD CKD-EPI 2021: >90 ML/MIN/1.73M*2
EOSINOPHIL # BLD AUTO: 0.07 X10*3/UL (ref 0–0.7)
EOSINOPHIL NFR BLD AUTO: 1.3 %
ERYTHROCYTE [DISTWIDTH] IN BLOOD BY AUTOMATED COUNT: 13.2 % (ref 11.5–14.5)
ETHANOL SERPL-MCNC: <10 MG/DL
FENTANYL+NORFENTANYL UR QL SCN: ABNORMAL
GLUCOSE SERPL-MCNC: 75 MG/DL (ref 74–99)
HCT VFR BLD AUTO: 33.2 % (ref 36–46)
HGB BLD-MCNC: 11 G/DL (ref 12–16)
IMM GRANULOCYTES # BLD AUTO: 0.01 X10*3/UL (ref 0–0.7)
IMM GRANULOCYTES NFR BLD AUTO: 0.2 % (ref 0–0.9)
INR PPP: 1 (ref 0.9–1.1)
LYMPHOCYTES # BLD AUTO: 2.77 X10*3/UL (ref 1.2–4.8)
LYMPHOCYTES NFR BLD AUTO: 49.6 %
MCH RBC QN AUTO: 31 PG (ref 26–34)
MCHC RBC AUTO-ENTMCNC: 33.1 G/DL (ref 32–36)
MCV RBC AUTO: 94 FL (ref 80–100)
METHADONE UR QL SCN: ABNORMAL
MONOCYTES # BLD AUTO: 0.47 X10*3/UL (ref 0.1–1)
MONOCYTES NFR BLD AUTO: 8.4 %
NEUTROPHILS # BLD AUTO: 2.22 X10*3/UL (ref 1.2–7.7)
NEUTROPHILS NFR BLD AUTO: 39.8 %
NRBC BLD-RTO: 0 /100 WBCS (ref 0–0)
OPIATES UR QL SCN: ABNORMAL
OXYCODONE+OXYMORPHONE UR QL SCN: ABNORMAL
PCP UR QL SCN: ABNORMAL
PLATELET # BLD AUTO: 248 X10*3/UL (ref 150–450)
POTASSIUM SERPL-SCNC: 3.2 MMOL/L (ref 3.5–5.3)
PROT SERPL-MCNC: 5.2 G/DL (ref 6.4–8.2)
PROTHROMBIN TIME: 11.5 SECONDS (ref 9.8–12.4)
RBC # BLD AUTO: 3.55 X10*6/UL (ref 4–5.2)
SALICYLATES SERPL-MCNC: <3 MG/DL (ref ?–20)
SODIUM SERPL-SCNC: 139 MMOL/L (ref 136–145)
WBC # BLD AUTO: 5.6 X10*3/UL (ref 4.4–11.3)

## 2025-06-27 PROCEDURE — 84484 ASSAY OF TROPONIN QUANT: CPT | Performed by: EMERGENCY MEDICINE

## 2025-06-27 PROCEDURE — 71046 X-RAY EXAM CHEST 2 VIEWS: CPT

## 2025-06-27 PROCEDURE — 80307 DRUG TEST PRSMV CHEM ANLYZR: CPT | Performed by: EMERGENCY MEDICINE

## 2025-06-27 PROCEDURE — 2500000004 HC RX 250 GENERAL PHARMACY W/ HCPCS (ALT 636 FOR OP/ED): Mod: JW | Performed by: EMERGENCY MEDICINE

## 2025-06-27 PROCEDURE — 80053 COMPREHEN METABOLIC PANEL: CPT | Performed by: EMERGENCY MEDICINE

## 2025-06-27 PROCEDURE — 99285 EMERGENCY DEPT VISIT HI MDM: CPT | Mod: 25 | Performed by: EMERGENCY MEDICINE

## 2025-06-27 PROCEDURE — 36415 COLL VENOUS BLD VENIPUNCTURE: CPT | Performed by: EMERGENCY MEDICINE

## 2025-06-27 PROCEDURE — 85610 PROTHROMBIN TIME: CPT | Performed by: EMERGENCY MEDICINE

## 2025-06-27 PROCEDURE — 93005 ELECTROCARDIOGRAM TRACING: CPT

## 2025-06-27 PROCEDURE — 80143 DRUG ASSAY ACETAMINOPHEN: CPT | Performed by: EMERGENCY MEDICINE

## 2025-06-27 PROCEDURE — 83880 ASSAY OF NATRIURETIC PEPTIDE: CPT | Performed by: EMERGENCY MEDICINE

## 2025-06-27 PROCEDURE — 96375 TX/PRO/DX INJ NEW DRUG ADDON: CPT

## 2025-06-27 PROCEDURE — 71046 X-RAY EXAM CHEST 2 VIEWS: CPT | Performed by: RADIOLOGY

## 2025-06-27 PROCEDURE — 80320 DRUG SCREEN QUANTALCOHOLS: CPT | Performed by: EMERGENCY MEDICINE

## 2025-06-27 PROCEDURE — 96374 THER/PROPH/DIAG INJ IV PUSH: CPT

## 2025-06-27 PROCEDURE — 85025 COMPLETE CBC W/AUTO DIFF WBC: CPT | Performed by: EMERGENCY MEDICINE

## 2025-06-27 RX ORDER — FUROSEMIDE 10 MG/ML
40 INJECTION INTRAMUSCULAR; INTRAVENOUS ONCE
Status: COMPLETED | OUTPATIENT
Start: 2025-06-27 | End: 2025-06-27

## 2025-06-27 RX ORDER — DIAZEPAM 5 MG/ML
5 INJECTION, SOLUTION INTRAMUSCULAR; INTRAVENOUS ONCE
Status: COMPLETED | OUTPATIENT
Start: 2025-06-27 | End: 2025-06-27

## 2025-06-27 RX ORDER — POTASSIUM CHLORIDE 20 MEQ/1
40 TABLET, EXTENDED RELEASE ORAL ONCE
Status: COMPLETED | OUTPATIENT
Start: 2025-06-27 | End: 2025-06-28

## 2025-06-27 RX ADMIN — FUROSEMIDE 40 MG: 10 INJECTION, SOLUTION INTRAMUSCULAR; INTRAVENOUS at 22:14

## 2025-06-27 RX ADMIN — DIAZEPAM 5 MG: 10 INJECTION, SOLUTION INTRAMUSCULAR; INTRAVENOUS at 22:15

## 2025-06-27 ASSESSMENT — PAIN SCALES - GENERAL
PAINLEVEL_OUTOF10: 5 - MODERATE PAIN
PAINLEVEL_OUTOF10: 6

## 2025-06-27 ASSESSMENT — PAIN - FUNCTIONAL ASSESSMENT: PAIN_FUNCTIONAL_ASSESSMENT: 0-10

## 2025-06-27 ASSESSMENT — LIFESTYLE VARIABLES
HAVE YOU EVER FELT YOU SHOULD CUT DOWN ON YOUR DRINKING: NO
TOTAL SCORE: 0
EVER HAD A DRINK FIRST THING IN THE MORNING TO STEADY YOUR NERVES TO GET RID OF A HANGOVER: NO
HAVE PEOPLE ANNOYED YOU BY CRITICIZING YOUR DRINKING: NO
EVER FELT BAD OR GUILTY ABOUT YOUR DRINKING: NO

## 2025-06-27 ASSESSMENT — PAIN DESCRIPTION - LOCATION: LOCATION: FOOT

## 2025-06-27 ASSESSMENT — PAIN DESCRIPTION - ORIENTATION: ORIENTATION: RIGHT

## 2025-06-27 ASSESSMENT — PAIN DESCRIPTION - PAIN TYPE: TYPE: ACUTE PAIN

## 2025-06-27 NOTE — ED PROVIDER NOTES
HPI   Chief Complaint   Patient presents with    Anxiety     Started having anxiety and a panic attack while waiting for her fiance to get released from [facility].          History provided by:  Patient    Chief Complaint   Patient presents with    Anxiety     Started having anxiety and a panic attack while waiting for her fiance to get released from [facility].        History of Present Illness:  Eva Persaud is a 30 y.o. female presents with anxiety.  The patient states she was at the [facility] today since 1 PM waiting for her fiancé to get released.  She got very anxious and developed a panic attack.  She also shares that she is concerned because she has had increased swelling in both of her legs.  No fever or chills.  No chest pain or shortness of breath.  No back or flank pain.  No abdominal pain.  Nothing seems to make her symptoms worse or better.  No treatment prior to arrival.      PMFSH:   As per HPI, otherwise nurses notes reviewed in EMR.    Past Medical History: Medical History[1]   Past Surgical History: Surgical History[2]   Family History: Family History[3]   Social History:  Social History[4]  Allergies: Allergies[5]  Current Outpatient Medications   Medication Instructions    albuterol 90 mcg/actuation inhaler 1 puff    aprepitant (Emend) 125 mg capsule     ARIPiprazole (Abilify) 20 mg tablet 1 tablet, Daily    ARIPiprazole (Abilify) 5 mg tablet 1 tablet, Daily    Arthritis Pain, diclofenac, 1 % gel APPLY THIN LAYER TOPICALLY TO AFFECTED AREA/JOINTS THREE TIMES DAILY AS NEEDED FOR KNEE PAIN    ascorbic acid (VITAMIN C) 250 mg    benztropine (Cogentin) 1 mg tablet 1 tablet, Nightly    cariprazine (Vraylar) 4.5 mg capsule Take by mouth.    cyanocobalamin (VITAMIN B-12) 1,000 mcg, Daily    cyclobenzaprine (FLEXERIL) 10 mg, oral, 2 times daily PRN    docusate sodium (Colace) 100 mg capsule 2 times daily PRN    enoxaparin (Lovenox) 40 mg/0.4 mL syringe     ergocalciferol (VITAMIN D-2) 1.25  "mg, Once Weekly    ferrous sulfate 325 (65 Fe) mg EC tablet 1 tablet, 2 times daily    FLUoxetine (PROzac) 40 mg capsule Take by mouth.    gabapentin (Neurontin) 400 mg capsule 1 capsule, 3 times daily    HYDROcodone-acetaminophen (Norco) 5-325 mg tablet TAKE 1 TABLET BY MOUTH EVERY 6 HOURS AS NEEDED FOR PAIN FOR UP FOUR DAYS.    hydrocortisone (Anusol-HC) 2.5 % rectal cream 2 times daily    iron polysaccharides (Ferrex 150) 150 mg iron capsule 1 capsule, Daily    lisinopril 5 mg tablet     lurasidone (LATUDA) 40 mg, Daily RT    meloxicam (Mobic) 15 mg tablet     metFORMIN (Glucophage) 500 mg tablet 1 tablet, Daily    naproxen (Naprosyn) 500 mg tablet Take by mouth.    omeprazole (PriLOSEC) 40 mg DR capsule 1 capsule, Daily before breakfast    ondansetron ODT (Zofran-ODT) 4 mg disintegrating tablet 1 tablet, 3 times daily PRN    oxyCODONE-acetaminophen (Percocet) 5-325 mg tablet     pantoprazole (PROTONIX) 40 mg, Daily RT    phentermine (ADIPEX-P) 37.5 mg    sertraline (Zoloft) 100 mg tablet Take by mouth.    simethicone (MYLICON) 80 mg    SUMAtriptan (IMITREX) 50 mg              Patient History   Medical History[6]  Surgical History[7]  Family History[8]  Social History[9]    Physical Exam   ED Triage Vitals [06/27/25 1756]   Temperature Heart Rate Respirations BP   37.3 °C (99.1 °F) 96 18 130/72      Pulse Ox Temp Source Heart Rate Source Patient Position   98 % Temporal -- Sitting      BP Location FiO2 (%)     Right arm --       Physical Exam  Physical Exam:    ED Triage Vitals [06/27/25 1756]   Temperature Heart Rate Respirations BP   37.3 °C (99.1 °F) 96 18 130/72      Pulse Ox Temp Source Heart Rate Source Patient Position   98 % Temporal -- Sitting      BP Location FiO2 (%)     Right arm --         Patient Vitals for the past 24 hrs:   BP Temp Temp src Pulse Resp SpO2 Height Weight   06/27/25 2226 105/75 -- -- 71 18 98 % -- --   06/27/25 1756 130/72 37.3 °C (99.1 °F) Temporal 96 18 98 % 1.651 m (5' 5\") 90.7 " kg (200 lb)       Constitutional: Vital signs per nursing notes.  Well developed, well nourished.  Mild acute distress.    Psychiatric: alert and oriented to person, place, and time; no abnormalities of mood or affect; memory intact  Eyes: PERRL; conjunctivae and lids normal; EOMI  ENT: otoscopic exam of external canal and TM´s normal; nasal mucosa, turbinates, and septum normal; mouth, tongue, and pharynx normal; pharynx without edema, exudate, or injection  Respiratory: normal respiratory effort and excursion; no rales, rhonchi, or wheezes; equal air entry  Cardiovascular: regular rate and rhythm; no murmurs, rubs or gallops; symmetric pulses; 1+ BLE edema; normal capillary refill; distal pulses present  Neurological: normal speech; CN II-XII grossly intact; normal motor and sensory function; no nystagmus  GI: no masses, tenderness, rebound or guarding; no palpable, pulsatile mass; no organomegaly; no hernia; normal bowel sounds; (-) Edwards´s sign; (-) McBurney´s sign; (-) CVA tenderness  Lymphatic: no adenopathy of neck  Musculoskeletal: normal digits and nails; no gross tendon or ligament injury; normal to palpation; normal strength/tone; neurovascular status intact; (-) Kemar´s sign; (-) straight leg raise; no palpable cords; calf circumference equal bilaterally  Skin: normal to inspection; normal to palpation; no rash  GCS: 15      ED Course & MDM   Diagnoses as of 06/27/25 2243   Anxiety   Peripheral edema   Anemia, unspecified type   Hypokalemia   Cocaine use                 No data recorded     Tigist Coma Scale Score: 15 (06/27/25 1757 : Cristopher Hahn RN)                           Medical Decision Making  Medical Decision Making:    EKG: My interpretation of EKG is normal sinus rhythm at 65 bpm with nonspecific ST-T changes    Labs:   Labs Reviewed   CBC WITH AUTO DIFFERENTIAL - Abnormal       Result Value    WBC 5.6      nRBC 0.0      RBC 3.55 (*)     Hemoglobin 11.0 (*)     Hematocrit 33.2 (*)     MCV 94       MCH 31.0      MCHC 33.1      RDW 13.2      Platelets 248      Neutrophils % 39.8      Immature Granulocytes %, Automated 0.2      Lymphocytes % 49.6      Monocytes % 8.4      Eosinophils % 1.3      Basophils % 0.7      Neutrophils Absolute 2.22      Immature Granulocytes Absolute, Automated 0.01      Lymphocytes Absolute 2.77      Monocytes Absolute 0.47      Eosinophils Absolute 0.07      Basophils Absolute 0.04     COMPREHENSIVE METABOLIC PANEL - Abnormal    Glucose 75      Sodium 139      Potassium 3.2 (*)     Chloride 107      Bicarbonate 28      Anion Gap 7 (*)     Urea Nitrogen 11      Creatinine 0.75      eGFR >90      Calcium 7.8 (*)     Albumin 2.4 (*)     Alkaline Phosphatase 86      Total Protein 5.2 (*)     AST 18      Bilirubin, Total 0.4      ALT 32     DRUG SCREEN,URINE - Abnormal    Amphetamine Screen, Urine Presumptive Negative      Barbiturate Screen, Urine Presumptive Negative      Benzodiazepines Screen, Urine Presumptive Negative      Cannabinoid Screen, Urine Presumptive Negative      Cocaine Metabolite Screen, Urine Presumptive Positive (*)     Fentanyl Screen, Urine Presumptive Negative      Opiate Screen, Urine Presumptive Negative      Oxycodone Screen, Urine Presumptive Negative      PCP Screen, Urine Presumptive Negative      Methadone Screen, Urine Presumptive Negative      Narrative:     Drug screen results are presumptive and should not be used to assess   compliance with prescribed medication. Contact the performing [facility] laboratory   to add-on definitive confirmatory testing if clinically indicated.    Toxicology screening results are reported qualitatively. The concentration must   be greater than or equal to the cutoff to be reported as positive. The concentration   at which the screening test can detect an individual drug or metabolite varies.   The absence of expected drug(s) and/or drug metabolite(s) may indicate non-compliance,   inappropriate timing of specimen collection  relative to drug administration, poor drug   absorption, diluted/adulterated urine, or limitations of testing. For medical purposes   only; not valid for forensic use.    Interpretive questions should be directed to the laboratory medical directors.   PROTIME-INR - Normal    Protime 11.5      INR 1.0     TROPONIN I, HIGH SENSITIVITY - Normal    Troponin I, High Sensitivity <3      Narrative:     Less than 99th percentile of normal range cutoff-  Female and children under 18 years old <14 ng/L; Male <21 ng/L: Negative  Repeat testing should be performed if clinically indicated.     Female and children under 18 years old 14-50 ng/L; Male 21-50 ng/L:  Consistent with possible cardiac damage and possible increased clinical   risk. Serial measurements may help to assess extent of myocardial damage.     >50 ng/L: Consistent with cardiac damage, increased clinical risk and  myocardial infarction. Serial measurements may help assess extent of   myocardial damage.      NOTE: Children less than 1 year old may have higher baseline troponin   levels and results should be interpreted in conjunction with the overall   clinical context.     NOTE: Troponin I testing is performed using a different   testing methodology at [facility] than at other   [facility]. Direct result comparisons should only   be made within the same method.   B-TYPE NATRIURETIC PEPTIDE - Normal    BNP 36      Narrative:        <100 pg/mL - Heart failure unlikely  100-299 pg/mL - Intermediate probability of acute heart                  failure exacerbation. Correlate with clinical                  context and patient history.    >=300 pg/mL - Heart Failure likely. Correlate with clinical                  context and patient history.    BNP testing is performed using different testing methodology at [facility] than at other [facility]. Direct result comparisons should only be made within the same method.      ACUTE  TOXICOLOGY PANEL, BLOOD - Normal    Acetaminophen <10.0      Salicylate  <3      Alcohol <10         Diagnostic Imaging:   XR chest 2 views   Final Result   No acute cardiopulmonary process.        MACRO:   None        Signed by: Divina Werner 6/27/2025 6:46 PM   Dictation workstation:   [id number]          ED Medication Administration:   Medications   potassium chloride CR (Klor-Con M20) ER tablet 40 mEq (has no administration in time range)   furosemide (Lasix) injection 40 mg (40 mg intravenous Given 6/27/25 2214)   diazePAM (Valium) injection 5 mg (5 mg intravenous Given 6/27/25 2215)       ED Course:     Eva Persaud is a 30 y.o. female presents with anxiety and leg swelling..    Differential Diagnoses Considered:  Organic disease, psych, tox; ACS, arrhythmia, electrolyte disorder, acute kidney injury, CHF, peripheral edema    Patient presents with psychiatric complaint and leg swelling.    Lab work to evaluate for evidence of anemia, electrolyte abnormality, including hypokalemia, hyponatremia, or hyperglycemia, drug poisoning/toxic ingestion, including alcohol/ASA/APAP.    Urine Tox Screen to evaluate for evidence of drug abuse.     EKG to evaluate for arrythmia/prolonged Qt interval.     Lab work to evaluate for evidence of severe anemia, electrolyte abnormality, CHF and acute kidney injury.    EKG/troponin to evaluate for evidence of ACS.    Chest x-ray to evaluate for evidence of CHF.    Considered duplex ultrasound of extremity, but not indicated as I considered but do not suspect DVT.    Considered X-rays of extremity, but not indicated as I considered but do not suspect fracture.    Considered antibiotics, but no antibiotics indicated as I considered but do not suspect cellulitis/infectious etiology.    Considered EPAT consult, but not indicated as patient has no acute SI/HI, is not a threat to self or others and has no grave disability.           Diagnoses as of 06/27/25 2873   Anxiety    Peripheral edema   Anemia, unspecified type   Hypokalemia   Cocaine use       Abnormal Labs Reviewed   CBC WITH AUTO DIFFERENTIAL - Abnormal; Notable for the following components:       Result Value    RBC 3.55 (*)     Hemoglobin 11.0 (*)     Hematocrit 33.2 (*)     All other components within normal limits   COMPREHENSIVE METABOLIC PANEL - Abnormal; Notable for the following components:    Potassium 3.2 (*)     Anion Gap 7 (*)     Calcium 7.8 (*)     Albumin 2.4 (*)     Total Protein 5.2 (*)     All other components within normal limits   DRUG SCREEN,URINE - Abnormal; Notable for the following components:    Cocaine Metabolite Screen, Urine Presumptive Positive (*)     All other components within normal limits    Narrative:     Drug screen results are presumptive and should not be used to assess   compliance with prescribed medication. Contact the performing [facility] laboratory   to add-on definitive confirmatory testing if clinically indicated.    Toxicology screening results are reported qualitatively. The concentration must   be greater than or equal to the cutoff to be reported as positive. The concentration   at which the screening test can detect an individual drug or metabolite varies.   The absence of expected drug(s) and/or drug metabolite(s) may indicate non-compliance,   inappropriate timing of specimen collection relative to drug administration, poor drug   absorption, diluted/adulterated urine, or limitations of testing. For medical purposes   only; not valid for forensic use.    Interpretive questions should be directed to the laboratory medical directors.       /75 (06/27/25 2226)    Temp      Pulse 71 (06/27/25 2226)   Resp 18 (06/27/25 2226)    SpO2 98 % (06/27/25 2226)          Diagnostic evaluation was completed.  Chest x-ray shows no acute cardiopulmonary process.  CBC shows a normal white blood cell count mild anemia with hemoglobin of 11.  Platelets in the normal range.  Troponin is  negative so do not suspect acute coronary syndrome.  BNP is in the normal range so do not suspect CHF.  Metabolic panel shows normal glucose.  Sodium is in the normal range.  Potassium is slightly low at 3.2.  Renal and liver function are unremarkable.  Acetaminophen, salicylate and alcohol are negative.  Urine drug screen is positive for cocaine.  INR is 1.0.    Re-evaluation: Repeat evaluation at 10:30 PM shows the patient is feeling slightly better.  Vital signs are stable.    Patient was treated with IV Valium and IV Lasix.    Medications administered improved the patient's condition.    No urgent or emergent conditions were identified.  The patient will be discharged home with short-term follow-up with primary care.    I discussed the results and discharge plan with the patient and/or family/friend if present.  I emphasized the importance of follow up with the physician I referred them to in the timeframe recommended.  I explained reasons for the patient to return to the Emergency Department.  Questions were addressed.  The patient and/or family/friend expressed understanding.      Patient was instructed to have follow up with primary doctor or specialist within 1-3 days.      Shared decision making made with patient, and/or family, who agrees with plan.      Escalation of care considered:     I considered hospitalization.  However, given the improvement in symptoms and reassuring workup, patient is appropriate for discharge and outpatient care. The risk of hospitalization outweighs the benefits. The patient is resting comfortably, well hydrated, tolerating PO, normal neuro exam, lungs CTA, ab soft NTND, not requiring supplemental O2/supportive care/IV medications or IVF.  Ambulating at baseline.    I do not suspect life threatening, emergent or urgent condition, currently.  Shared decision made with patient and/or family who agrees with plan.      Diagnosis:   1. Anxiety    2. Peripheral edema    3. Anemia,  unspecified type    4. Hypokalemia    5. Cocaine use              Procedure  Procedures         [1]   Past Medical History:  Diagnosis Date    Acute vaginitis     Bacterial vaginitis    Acute vaginitis     Bacterial vaginosis    Ankle sprain 2007    Encounter for immunization     Need for Tdap vaccination    Encounter for insertion of intrauterine contraceptive device     Encounter for IUD insertion    Encounter for routine postpartum follow-up     6 weeks postpartum follow-up    Encounter for screening for infections with a predominantly sexual mode of transmission     Screening for STD (sexually transmitted disease)    Encounter for supervision of normal pregnancy, unspecified, unspecified trimester     Encounter for supervision of normal pregnancy    Knee sprain 2008    Obesity complicating pregnancy, third trimester ([facility])     Obesity in pregnancy, antepartum, third trimester    Other abnormal findings in urine     Leukocytes in urine    Other disorders of lactation     Lactation symptom    Other mental disorders complicating pregnancy, third trimester     Depression affecting pregnancy in third trimester, antepartum    Other skin changes     Skin irritation    Other specified diseases and conditions complicating pregnancy ([facility])     Bacteriuria during pregnancy in third trimester    Other specified noninflammatory disorders of vagina     Vaginal odor    Pelvic and perineal pain     Pelvic pressure in female    Personal history of other diseases of the digestive system     History of constipation    Personal history of other diseases of the digestive system     History of gastroesophageal reflux (GERD)    Personal history of other diseases of the digestive system     History of ventral hernia    Personal history of other diseases of the female genital tract     History of amenorrhea    Personal history of other diseases of the female genital tract     History of vaginal discharge    Personal history of  other diseases of the musculoskeletal system and connective tissue     History of low back pain    Personal history of other diseases of the respiratory system     History of upper respiratory infection    Personal history of other mental and behavioral disorders     History of depression    Personal history of other specified conditions     History of nausea    Personal history of other specified conditions     History of gross hematuria    Tendinitis of knee     Unspecified infection of urinary tract in pregnancy, unspecified trimester ([facility])     UTI in pregnancy   [2]   Past Surgical History:  Procedure Laterality Date    BARIATRIC SURGERY  2024    OTHER SURGICAL HISTORY  2021    [place] tooth extraction    OTHER SURGICAL HISTORY  2021    Esophagogastroduodenoscopy   [3]   Family History  Problem Relation Name Age of Onset    Depression Mother Viki     Diabetes Mother Viki     Migraines Mother Viki     Stroke Mother Viki     Asthma Sister Razia     Depression Sister Razia     Cancer Other Moise     Diabetes Other Moise     Depression Other Quin    [4]   Social History  Tobacco Use    Smoking status: Former     Current packs/day: 0.00     Average packs/day: 0.3 packs/day for 1 year (0.3 ttl pk-yrs)     Types: Cigarettes     Quit date: 2017     Years since quittin.4    Smokeless tobacco: Never   Substance Use Topics    Alcohol use: Not Currently     Alcohol/week: 4.0 standard drinks of alcohol     Types: 1 Glasses of wine, 3 Standard drinks or equivalent per week    Drug use: Yes     Types: Cocaine   [5]   Allergies  Allergen Reactions    Eggplant Hives, Shortness of breath and Palpitations   [6]   Past Medical History:  Diagnosis Date    Acute vaginitis     Bacterial vaginitis    Acute vaginitis     Bacterial vaginosis    Ankle sprain     Encounter for immunization     Need for Tdap vaccination    Encounter for insertion of intrauterine contraceptive device     Encounter for IUD  insertion    Encounter for routine postpartum follow-up     6 weeks postpartum follow-up    Encounter for screening for infections with a predominantly sexual mode of transmission     Screening for STD (sexually transmitted disease)    Encounter for supervision of normal pregnancy, unspecified, unspecified trimester     Encounter for supervision of normal pregnancy    Knee sprain 2008    Obesity complicating pregnancy, third trimester ([facility])     Obesity in pregnancy, antepartum, third trimester    Other abnormal findings in urine     Leukocytes in urine    Other disorders of lactation     Lactation symptom    Other mental disorders complicating pregnancy, third trimester     Depression affecting pregnancy in third trimester, antepartum    Other skin changes     Skin irritation    Other specified diseases and conditions complicating pregnancy ([facility])     Bacteriuria during pregnancy in third trimester    Other specified noninflammatory disorders of vagina     Vaginal odor    Pelvic and perineal pain     Pelvic pressure in female    Personal history of other diseases of the digestive system     History of constipation    Personal history of other diseases of the digestive system     History of gastroesophageal reflux (GERD)    Personal history of other diseases of the digestive system     History of ventral hernia    Personal history of other diseases of the female genital tract     History of amenorrhea    Personal history of other diseases of the female genital tract     History of vaginal discharge    Personal history of other diseases of the musculoskeletal system and connective tissue     History of low back pain    Personal history of other diseases of the respiratory system     History of upper respiratory infection    Personal history of other mental and behavioral disorders     History of depression    Personal history of other specified conditions     History of nausea    Personal history of other  specified conditions     History of gross hematuria    Tendinitis of knee     Unspecified infection of urinary tract in pregnancy, unspecified trimester ([facility])     UTI in pregnancy   [7]   Past Surgical History:  Procedure Laterality Date    BARIATRIC SURGERY  2024    OTHER SURGICAL HISTORY  2021    [place] tooth extraction    OTHER SURGICAL HISTORY  2021    Esophagogastroduodenoscopy   [8]   Family History  Problem Relation Name Age of Onset    Depression Mother Viki     Diabetes Mother Viki     Migraines Mother Viki     Stroke Mother Viki     Asthma Sister Razia     Depression Sister Razia     Cancer Other Moise     Diabetes Other Moise     Depression Other Quin    [9]   Social History  Tobacco Use    Smoking status: Former     Current packs/day: 0.00     Average packs/day: 0.3 packs/day for 1 year (0.3 ttl pk-yrs)     Types: Cigarettes     Quit date: 2017     Years since quittin.4    Smokeless tobacco: Never   Substance Use Topics    Alcohol use: Not Currently     Alcohol/week: 4.0 standard drinks of alcohol     Types: 1 Glasses of wine, 3 Standard drinks or equivalent per week    Drug use: Yes     Types: Cocaine        Jacques MARQUEZ MD  [id number]

## 2025-06-28 VITALS
OXYGEN SATURATION: 98 % | RESPIRATION RATE: 18 BRPM | SYSTOLIC BLOOD PRESSURE: 111 MMHG | HEART RATE: 77 BPM | HEIGHT: 65 IN | TEMPERATURE: 99.1 F | BODY MASS INDEX: 33.32 KG/M2 | DIASTOLIC BLOOD PRESSURE: 79 MMHG | WEIGHT: 200 LBS

## 2025-06-28 LAB
ATRIAL RATE: 65 BPM
P AXIS: 99 DEGREES
P OFFSET: 191 MS
P ONSET: 153 MS
PR INTERVAL: 132 MS
Q ONSET: 219 MS
QRS COUNT: 11 BEATS
QRS DURATION: 78 MS
QT INTERVAL: 436 MS
QTC CALCULATION(BAZETT): 453 MS
QTC FREDERICIA: 447 MS
R AXIS: 48 DEGREES
T AXIS: 73 DEGREES
T OFFSET: 437 MS
VENTRICULAR RATE: 65 BPM

## 2025-06-28 PROCEDURE — 2500000002 HC RX 250 W HCPCS SELF ADMINISTERED DRUGS (ALT 637 FOR MEDICARE OP, ALT 636 FOR OP/ED): Performed by: EMERGENCY MEDICINE

## 2025-06-28 RX ADMIN — POTASSIUM CHLORIDE 40 MEQ: 1500 TABLET, EXTENDED RELEASE ORAL at 00:04

## 2025-06-28 ASSESSMENT — PAIN - FUNCTIONAL ASSESSMENT: PAIN_FUNCTIONAL_ASSESSMENT: 0-10

## 2025-06-28 ASSESSMENT — PAIN SCALES - GENERAL
PAINLEVEL_OUTOF10: 0 - NO PAIN
PAINLEVEL_OUTOF10: 0 - NO PAIN

## 2025-07-16 ENCOUNTER — TELEPHONE (OUTPATIENT)
Age: 30
End: 2025-07-16

## 2025-07-16 NOTE — TELEPHONE ENCOUNTER
Dr. Ng,     Patient called and stated that she has swelling in her feet and legs bilaterally and it hurts to walk.  She is asking if you can prescribe her something for this at [facility]. I explained that you would need to see her and she wanted me to ask.     I also gave her names and phone numbers for addiction relapse. [facility] and [facility]    She has recently been in the ED and had multiple tests ran.     Desirae HOWARD

## 2025-07-22 ENCOUNTER — HOSPITAL ENCOUNTER (INPATIENT)
Age: 30
LOS: 7 days | Discharge: HOME | DRG: 753 | End: 2025-07-29
Attending: PSYCHIATRY & NEUROLOGY | Admitting: PSYCHIATRY & NEUROLOGY
Payer: COMMERCIAL

## 2025-07-22 DIAGNOSIS — R45.851 DEPRESSION WITH SUICIDAL IDEATION: Primary | ICD-10-CM

## 2025-07-22 DIAGNOSIS — F32.A DEPRESSION WITH SUICIDAL IDEATION: Primary | ICD-10-CM

## 2025-07-22 LAB
ALBUMIN SERPL-MCNC: 2.8 G/DL (ref 3.5–4.6)
ALP SERPL-CCNC: 99 U/L (ref 40–130)
ALT SERPL-CCNC: 33 U/L (ref 0–33)
AMPHET UR QL SCN: ABNORMAL
ANION GAP SERPL CALCULATED.3IONS-SCNC: 8 MEQ/L (ref 9–15)
APAP SERPL-MCNC: <5 UG/ML (ref 10–30)
AST SERPL-CCNC: 19 U/L (ref 0–35)
BACTERIA URNS QL MICRO: NEGATIVE /HPF
BARBITURATES UR QL SCN: ABNORMAL
BASOPHILS # BLD: 0.1 K/UL (ref 0–0.2)
BASOPHILS NFR BLD: 0.7 %
BENZODIAZ UR QL SCN: POSITIVE
BILIRUB SERPL-MCNC: 0.5 MG/DL (ref 0.2–0.7)
BILIRUB UR QL STRIP: NEGATIVE
BUN SERPL-MCNC: 16 MG/DL (ref 6–20)
CALCIUM SERPL-MCNC: 8 MG/DL (ref 8.5–9.9)
CANNABINOIDS UR QL SCN: ABNORMAL
CHLORIDE SERPL-SCNC: 113 MEQ/L (ref 95–107)
CHOLEST SERPL-MCNC: 108 MG/DL (ref 0–199)
CK SERPL-CCNC: 89 U/L (ref 0–170)
CLARITY UR: ABNORMAL
CO2 SERPL-SCNC: 22 MEQ/L (ref 20–31)
COCAINE UR QL SCN: POSITIVE
COLOR UR: ABNORMAL
CREAT SERPL-MCNC: 0.91 MG/DL (ref 0.5–0.9)
CRYSTALS URNS MICRO: ABNORMAL /HPF
DRUG SCREEN COMMENT UR-IMP: ABNORMAL
EOSINOPHIL # BLD: 0.1 K/UL (ref 0–0.7)
EOSINOPHIL NFR BLD: 0.8 %
EPI CELLS #/AREA URNS AUTO: ABNORMAL /HPF (ref 0–5)
ERYTHROCYTE [DISTWIDTH] IN BLOOD BY AUTOMATED COUNT: 14.9 % (ref 11.5–14.5)
ETHANOL PERCENT: NORMAL G/DL
ETHANOLAMINE SERPL-MCNC: <10 MG/DL (ref 0–0.08)
FENTANYL SCREEN, URINE: ABNORMAL
GLOBULIN SER CALC-MCNC: 3.2 G/DL (ref 2.3–3.5)
GLUCOSE SERPL-MCNC: 95 MG/DL (ref 70–99)
GLUCOSE UR STRIP-MCNC: NEGATIVE MG/DL
HCG UR QL: NEGATIVE
HCT VFR BLD AUTO: 36.9 % (ref 37–47)
HDLC SERPL-MCNC: 57 MG/DL (ref 40–59)
HGB BLD-MCNC: 11.8 G/DL (ref 12–16)
HGB UR QL STRIP: ABNORMAL
HYALINE CASTS #/AREA URNS AUTO: ABNORMAL /HPF (ref 0–5)
KETONES UR STRIP-MCNC: 15 MG/DL
LDLC SERPL CALC-MCNC: 38 MG/DL (ref 0–129)
LEUKOCYTE ESTERASE UR QL STRIP: ABNORMAL
LYMPHOCYTES # BLD: 2.4 K/UL (ref 1–4.8)
LYMPHOCYTES NFR BLD: 27.1 %
MCH RBC QN AUTO: 30.8 PG (ref 27–31.3)
MCHC RBC AUTO-ENTMCNC: 32 % (ref 33–37)
MCV RBC AUTO: 96.3 FL (ref 79.4–94.8)
METHADONE UR QL SCN: ABNORMAL
MONOCYTES # BLD: 0.4 K/UL (ref 0.2–0.8)
MONOCYTES NFR BLD: 5 %
NEUTROPHILS # BLD: 5.8 K/UL (ref 1.4–6.5)
NEUTS SEG NFR BLD: 66.1 %
NITRITE UR QL STRIP: NEGATIVE
OPIATES UR QL SCN: ABNORMAL
OXYCODONE UR QL SCN: ABNORMAL
PCP UR QL SCN: ABNORMAL
PH UR STRIP: 5.5 [PH] (ref 5–9)
PLATELET # BLD AUTO: 364 K/UL (ref 130–400)
POTASSIUM SERPL-SCNC: 3.8 MEQ/L (ref 3.4–4.9)
PROPOXYPH UR QL SCN: ABNORMAL
PROT SERPL-MCNC: 6 G/DL (ref 6.3–8)
PROT UR STRIP-MCNC: 30 MG/DL
RBC # BLD AUTO: 3.83 M/UL (ref 4.2–5.4)
RBC #/AREA URNS HPF: ABNORMAL /HPF (ref 0–2)
SALICYLATES SERPL-MCNC: <0.3 MG/DL (ref 15–30)
SODIUM SERPL-SCNC: 143 MEQ/L (ref 135–144)
SP GR UR STRIP: 1.03 (ref 1–1.03)
TRIGL SERPL-MCNC: 66 MG/DL (ref 0–150)
TSH SERPL-MCNC: 2.9 UIU/ML (ref 0.44–3.86)
URINE REFLEX TO CULTURE: ABNORMAL
UROBILINOGEN UR STRIP-ACNC: 1 E.U./DL
WBC # BLD AUTO: 8.8 K/UL (ref 4.8–10.8)
WBC #/AREA URNS AUTO: ABNORMAL /HPF (ref 0–5)

## 2025-07-22 PROCEDURE — 80061 LIPID PANEL: CPT

## 2025-07-22 PROCEDURE — 82077 ASSAY SPEC XCP UR&BREATH IA: CPT

## 2025-07-22 PROCEDURE — 90791 PSYCH DIAGNOSTIC EVALUATION: CPT | Performed by: SOCIAL WORKER

## 2025-07-22 PROCEDURE — 83036 HEMOGLOBIN GLYCOSYLATED A1C: CPT

## 2025-07-22 PROCEDURE — 1240000000 HC EMOTIONAL WELLNESS R&B

## 2025-07-22 PROCEDURE — 85025 COMPLETE CBC W/AUTO DIFF WBC: CPT

## 2025-07-22 PROCEDURE — 82550 ASSAY OF CK (CPK): CPT

## 2025-07-22 PROCEDURE — 36415 COLL VENOUS BLD VENIPUNCTURE: CPT

## 2025-07-22 PROCEDURE — 80179 DRUG ASSAY SALICYLATE: CPT

## 2025-07-22 PROCEDURE — 99285 EMERGENCY DEPT VISIT HI MDM: CPT

## 2025-07-22 PROCEDURE — 80143 DRUG ASSAY ACETAMINOPHEN: CPT

## 2025-07-22 PROCEDURE — 6370000000 HC RX 637 (ALT 250 FOR IP): Performed by: PSYCHIATRY & NEUROLOGY

## 2025-07-22 PROCEDURE — 80307 DRUG TEST PRSMV CHEM ANLYZR: CPT

## 2025-07-22 PROCEDURE — 84443 ASSAY THYROID STIM HORMONE: CPT

## 2025-07-22 PROCEDURE — 84703 CHORIONIC GONADOTROPIN ASSAY: CPT

## 2025-07-22 PROCEDURE — 81003 URINALYSIS AUTO W/O SCOPE: CPT

## 2025-07-22 PROCEDURE — 80053 COMPREHEN METABOLIC PANEL: CPT

## 2025-07-22 PROCEDURE — 93005 ELECTROCARDIOGRAM TRACING: CPT | Performed by: PHYSICIAN ASSISTANT

## 2025-07-22 RX ORDER — HYDROXYZINE PAMOATE 50 MG/1
50 CAPSULE ORAL EVERY 6 HOURS PRN
Status: DISCONTINUED | OUTPATIENT
Start: 2025-07-22 | End: 2025-07-29 | Stop reason: HOSPADM

## 2025-07-22 RX ORDER — HALOPERIDOL 5 MG/1
5 TABLET ORAL EVERY 6 HOURS PRN
Status: DISCONTINUED | OUTPATIENT
Start: 2025-07-22 | End: 2025-07-29 | Stop reason: HOSPADM

## 2025-07-22 RX ORDER — BENZTROPINE MESYLATE 1 MG/ML
2 INJECTION, SOLUTION INTRAMUSCULAR; INTRAVENOUS 2 TIMES DAILY PRN
Status: DISCONTINUED | OUTPATIENT
Start: 2025-07-22 | End: 2025-07-29 | Stop reason: HOSPADM

## 2025-07-22 RX ORDER — HYDROXYZINE PAMOATE 25 MG/1
25 CAPSULE ORAL 3 TIMES DAILY PRN
Status: ON HOLD | COMMUNITY
Start: 2025-07-07 | End: 2025-07-29 | Stop reason: HOSPADM

## 2025-07-22 RX ORDER — ACETAMINOPHEN 325 MG/1
650 TABLET ORAL EVERY 4 HOURS PRN
Status: DISCONTINUED | OUTPATIENT
Start: 2025-07-22 | End: 2025-07-29 | Stop reason: HOSPADM

## 2025-07-22 RX ORDER — TRAZODONE HYDROCHLORIDE 50 MG/1
50 TABLET ORAL NIGHTLY PRN
Status: DISCONTINUED | OUTPATIENT
Start: 2025-07-22 | End: 2025-07-29 | Stop reason: HOSPADM

## 2025-07-22 RX ORDER — HALOPERIDOL 5 MG/ML
5 INJECTION INTRAMUSCULAR EVERY 6 HOURS PRN
Status: DISCONTINUED | OUTPATIENT
Start: 2025-07-22 | End: 2025-07-29 | Stop reason: HOSPADM

## 2025-07-22 RX ORDER — HYDROXYZINE HYDROCHLORIDE 50 MG/ML
50 INJECTION, SOLUTION INTRAMUSCULAR EVERY 6 HOURS PRN
Status: DISCONTINUED | OUTPATIENT
Start: 2025-07-22 | End: 2025-07-29 | Stop reason: HOSPADM

## 2025-07-22 RX ORDER — AMANTADINE HYDROCHLORIDE 100 MG/1
100 CAPSULE, GELATIN COATED ORAL 3 TIMES DAILY
Status: DISCONTINUED | OUTPATIENT
Start: 2025-07-22 | End: 2025-07-29 | Stop reason: HOSPADM

## 2025-07-22 RX ORDER — AMANTADINE HYDROCHLORIDE 100 MG/1
100 TABLET ORAL 3 TIMES DAILY
Status: ON HOLD | COMMUNITY
Start: 2025-07-12 | End: 2025-07-29 | Stop reason: HOSPADM

## 2025-07-22 RX ORDER — CYCLOBENZAPRINE HCL 5 MG
5 TABLET ORAL 2 TIMES DAILY PRN
Status: ON HOLD | COMMUNITY
Start: 2025-07-09 | End: 2025-07-29 | Stop reason: HOSPADM

## 2025-07-22 RX ORDER — MAGNESIUM HYDROXIDE/ALUMINUM HYDROXICE/SIMETHICONE 120; 1200; 1200 MG/30ML; MG/30ML; MG/30ML
30 SUSPENSION ORAL PRN
Status: DISCONTINUED | OUTPATIENT
Start: 2025-07-22 | End: 2025-07-25

## 2025-07-22 RX ADMIN — AMANTADINE 100 MG: 100 CAPSULE ORAL at 21:38

## 2025-07-22 RX ADMIN — TRAZODONE HYDROCHLORIDE 50 MG: 50 TABLET ORAL at 21:17

## 2025-07-22 RX ADMIN — HYDROXYZINE PAMOATE 50 MG: 50 CAPSULE ORAL at 21:17

## 2025-07-22 ASSESSMENT — PATIENT HEALTH QUESTIONNAIRE - PHQ9
10. IF YOU CHECKED OFF ANY PROBLEMS, HOW DIFFICULT HAVE THESE PROBLEMS MADE IT FOR YOU TO DO YOUR WORK, TAKE CARE OF THINGS AT HOME, OR GET ALONG WITH OTHER PEOPLE: VERY DIFFICULT
1. LITTLE INTEREST OR PLEASURE IN DOING THINGS: MORE THAN HALF THE DAYS
SUM OF ALL RESPONSES TO PHQ QUESTIONS 1-9: 16
6. FEELING BAD ABOUT YOURSELF - OR THAT YOU ARE A FAILURE OR HAVE LET YOURSELF OR YOUR FAMILY DOWN: MORE THAN HALF THE DAYS
2. FEELING DOWN, DEPRESSED OR HOPELESS: MORE THAN HALF THE DAYS
7. TROUBLE CONCENTRATING ON THINGS, SUCH AS READING THE NEWSPAPER OR WATCHING TELEVISION: MORE THAN HALF THE DAYS
4. FEELING TIRED OR HAVING LITTLE ENERGY: MORE THAN HALF THE DAYS
8. MOVING OR SPEAKING SO SLOWLY THAT OTHER PEOPLE COULD HAVE NOTICED. OR THE OPPOSITE, BEING SO FIGETY OR RESTLESS THAT YOU HAVE BEEN MOVING AROUND A LOT MORE THAN USUAL: SEVERAL DAYS
5. POOR APPETITE OR OVEREATING: SEVERAL DAYS
SUM OF ALL RESPONSES TO PHQ QUESTIONS 1-9: 16
SUM OF ALL RESPONSES TO PHQ QUESTIONS 1-9: 16
SUM OF ALL RESPONSES TO PHQ QUESTIONS 1-9: 14
3. TROUBLE FALLING OR STAYING ASLEEP: MORE THAN HALF THE DAYS
9. THOUGHTS THAT YOU WOULD BE BETTER OFF DEAD, OR OF HURTING YOURSELF: MORE THAN HALF THE DAYS

## 2025-07-22 ASSESSMENT — SLEEP AND FATIGUE QUESTIONNAIRES
AVERAGE NUMBER OF SLEEP HOURS: 4
DO YOU HAVE DIFFICULTY SLEEPING: YES
DO YOU USE A SLEEP AID: NO
SLEEP PATTERN: RESTLESSNESS

## 2025-07-22 ASSESSMENT — PAIN - FUNCTIONAL ASSESSMENT: PAIN_FUNCTIONAL_ASSESSMENT: NONE - DENIES PAIN

## 2025-07-22 NOTE — ED TRIAGE NOTES
Arrived with report of SI   States she \"just wants to die\"   States she has ideation but does not follow through   Had argument with jesus   Pt used crack cocaine at 1900 last night   Has not slept

## 2025-07-22 NOTE — ED PROVIDER NOTES
[facility] EMERGENCY DEPARTMENT  EMERGENCY DEPARTMENT ENCOUNTER      Pt Name: Jeniffer Kennedy  MRN: [id number]  Birthdate 1995  Date of evaluation: 7/22/2025  Provider: Kev Mcduffie PA-C  2:51 PM EDT      CHIEF COMPLAINT       Chief Complaint   Patient presents with    Suicidal         HISTORY OF PRESENT ILLNESS   (Location/Symptom, Timing/Onset, Context/Setting, Quality, Duration, Modifying Factors, Severity)  Note limiting factors.   Jeniffer Kennedy is a 30 y.o. female who presents to the emergency department with PMHx postpartum depression who presents to the emergency department with depression with suicidal ideation, hopelessness and helplessness.  Patient states argument with fiancé as inciting factor.  Patient does admit to crack cocaine usage last night. NO HI, + for AH VH which have been chronic.     HPI    Nursing Notes were reviewed.    REVIEW OF SYSTEMS    (2-9 systems for level 4, 10 or more for level 5)     Review of Systems   Constitutional:  Negative for fever.   Respiratory:  Negative for chest tightness.    Cardiovascular:  Negative for chest pain.   Gastrointestinal:  Negative for abdominal pain.   Genitourinary:  Positive for dysuria.   Psychiatric/Behavioral:  Positive for agitation, hallucinations, sleep disturbance and suicidal ideas. The patient is nervous/anxious.    All other systems reviewed and are negative.      Except as noted above the remainder of the review of systems was reviewed and negative.       PAST MEDICAL HISTORY     Past Medical History:   Diagnosis Date    Mental disorder     depression and anxiety    Postpartum depression     Sleep apnea          SURGICAL HISTORY       Past Surgical History:   Procedure Laterality Date    DILATION AND CURETTAGE OF UTERUS N/A 9/15/2023    ATTEMPTED LAPAROSCOPIC BILATERAL SALPINGECTOMY WITH NOVASURE ENDOMETRIAL ABLATION [place]ustin Gregory [place] [facility]    HYSTERECTOMY, VAGINAL N/A 10/20/2023    TOTAL LAPAROSCOPIC

## 2025-07-22 NOTE — VIRTUAL HEALTH
Jeniffer Kennedy  [id number]  1995     Social Work Behavioral Health Crisis Assessment    25    Chief Complaint: Depression     HPI: Patient is a 30 y.o. White (non-) female who presents for home. Patient presented to the ED on 25 from home.      Patient reported that she has been going though a lot of trauma for the last 6 months. She reported lost her father who , house, job, kids, and can't find her mom, patient reported that she also lost her job. Patient reported she relapsed on drugs and doesn't feel like she is able to keep herself safe. Patient also reports having health issues as well.  Patient reported that she has plan and intent to kill herself by slicing her neck open.      The patient is at increased risk for suicide given current SI, current major depressive episode, lack of social supports, with poor distress tolerance.  Patient's risk for suicide is significantly elevated would best be modified by inpatient psychiatric hospitalization for stabilization      Collateral: Attempted to call boyfriend no answer at this time,    Past Psychiatric History:  Previous Diagnoses/symptoms: Denies  Previous suicide attempts/self-harm: Denies  Inpatient psychiatric hospitalizations: yes  Current outpatient psychiatric provider: Denies  Current therapist: States not in therapy  Previous psychiatric medication trials: No prior medication trials  Current psychiatric medications: No current psychiatric medications  Family Psychiatric History: Denies    Sleep Hours: 8    Sleep concerns: denies    Use of sleep medications: denies    Substance Abuse History:  Tobacco: Denies  Alcohol: Denies  Marijuana: Denies  Stimulant: Endorses Cocaine, Benzodiazepine   Opiates: Denies  Benzodiazepine: Denies  Other illicit drug usage: Denies  History of substance/alcohol abuse treatment: Denies    Social History:  Education: H.S.  Living Situation/Interest: homeless  Marital/Committed relationship and

## 2025-07-23 PROBLEM — F31.63 BIPOLAR 1 DISORDER, MIXED, SEVERE (HCC): Status: ACTIVE | Noted: 2025-07-23

## 2025-07-23 LAB
EKG ATRIAL RATE: 63 BPM
EKG DIAGNOSIS: NORMAL
EKG P AXIS: 14 DEGREES
EKG P-R INTERVAL: 112 MS
EKG Q-T INTERVAL: 440 MS
EKG QRS DURATION: 78 MS
EKG QTC CALCULATION (BAZETT): 450 MS
EKG R AXIS: 24 DEGREES
EKG T AXIS: 39 DEGREES
EKG VENTRICULAR RATE: 63 BPM
ESTIMATED AVERAGE GLUCOSE: 62 MG/DL
HBA1C MFR BLD: 3.8 % (ref 4–6)

## 2025-07-23 PROCEDURE — 6370000000 HC RX 637 (ALT 250 FOR IP): Performed by: PSYCHIATRY & NEUROLOGY

## 2025-07-23 PROCEDURE — 1240000000 HC EMOTIONAL WELLNESS R&B

## 2025-07-23 PROCEDURE — 99223 1ST HOSP IP/OBS HIGH 75: CPT | Performed by: PSYCHIATRY & NEUROLOGY

## 2025-07-23 PROCEDURE — 6370000000 HC RX 637 (ALT 250 FOR IP): Performed by: REGISTERED NURSE

## 2025-07-23 RX ORDER — DOCUSATE SODIUM 100 MG/1
100 CAPSULE, LIQUID FILLED ORAL DAILY
Status: DISCONTINUED | OUTPATIENT
Start: 2025-07-23 | End: 2025-07-29 | Stop reason: HOSPADM

## 2025-07-23 RX ORDER — FERROUS SULFATE 325(65) MG
325 TABLET ORAL 2 TIMES DAILY
Status: DISCONTINUED | OUTPATIENT
Start: 2025-07-23 | End: 2025-07-29 | Stop reason: HOSPADM

## 2025-07-23 RX ORDER — OXCARBAZEPINE 150 MG/1
150 TABLET, FILM COATED ORAL 2 TIMES DAILY
Status: DISCONTINUED | OUTPATIENT
Start: 2025-07-23 | End: 2025-07-29 | Stop reason: HOSPADM

## 2025-07-23 RX ORDER — ALBUTEROL SULFATE 90 UG/1
1 INHALANT RESPIRATORY (INHALATION) EVERY 4 HOURS PRN
Status: DISCONTINUED | OUTPATIENT
Start: 2025-07-23 | End: 2025-07-29 | Stop reason: HOSPADM

## 2025-07-23 RX ADMIN — FERROUS SULFATE TAB 325 MG (65 MG ELEMENTAL FE) 325 MG: 325 (65 FE) TAB at 12:43

## 2025-07-23 RX ADMIN — ACETAMINOPHEN 650 MG: 325 TABLET ORAL at 09:22

## 2025-07-23 RX ADMIN — OXCARBAZEPINE 150 MG: 150 TABLET, FILM COATED ORAL at 10:36

## 2025-07-23 RX ADMIN — DOCUSATE SODIUM 100 MG: 100 CAPSULE, LIQUID FILLED ORAL at 12:43

## 2025-07-23 RX ADMIN — HYDROXYZINE PAMOATE 50 MG: 50 CAPSULE ORAL at 09:21

## 2025-07-23 RX ADMIN — AMANTADINE 100 MG: 100 CAPSULE ORAL at 16:33

## 2025-07-23 RX ADMIN — OXCARBAZEPINE 150 MG: 150 TABLET, FILM COATED ORAL at 21:57

## 2025-07-23 RX ADMIN — AMANTADINE 100 MG: 100 CAPSULE ORAL at 09:22

## 2025-07-23 RX ADMIN — AMANTADINE 100 MG: 100 CAPSULE ORAL at 12:43

## 2025-07-23 RX ADMIN — FERROUS SULFATE TAB 325 MG (65 MG ELEMENTAL FE) 325 MG: 325 (65 FE) TAB at 21:57

## 2025-07-23 ASSESSMENT — PATIENT HEALTH QUESTIONNAIRE - PHQ9
SUM OF ALL RESPONSES TO PHQ QUESTIONS 1-9: 19
SUM OF ALL RESPONSES TO PHQ QUESTIONS 1-9: 18
9. THOUGHTS THAT YOU WOULD BE BETTER OFF DEAD, OR OF HURTING YOURSELF: SEVERAL DAYS
6. FEELING BAD ABOUT YOURSELF - OR THAT YOU ARE A FAILURE OR HAVE LET YOURSELF OR YOUR FAMILY DOWN: NEARLY EVERY DAY
3. TROUBLE FALLING OR STAYING ASLEEP: MORE THAN HALF THE DAYS
5. POOR APPETITE OR OVEREATING: MORE THAN HALF THE DAYS
7. TROUBLE CONCENTRATING ON THINGS, SUCH AS READING THE NEWSPAPER OR WATCHING TELEVISION: NEARLY EVERY DAY
2. FEELING DOWN, DEPRESSED OR HOPELESS: NEARLY EVERY DAY
SUM OF ALL RESPONSES TO PHQ QUESTIONS 1-9: 19
10. IF YOU CHECKED OFF ANY PROBLEMS, HOW DIFFICULT HAVE THESE PROBLEMS MADE IT FOR YOU TO DO YOUR WORK, TAKE CARE OF THINGS AT HOME, OR GET ALONG WITH OTHER PEOPLE: EXTREMELY DIFFICULT
SUM OF ALL RESPONSES TO PHQ QUESTIONS 1-9: 19
1. LITTLE INTEREST OR PLEASURE IN DOING THINGS: NEARLY EVERY DAY
4. FEELING TIRED OR HAVING LITTLE ENERGY: MORE THAN HALF THE DAYS
8. MOVING OR SPEAKING SO SLOWLY THAT OTHER PEOPLE COULD HAVE NOTICED. OR THE OPPOSITE, BEING SO FIGETY OR RESTLESS THAT YOU HAVE BEEN MOVING AROUND A LOT MORE THAN USUAL: NOT AT ALL

## 2025-07-23 ASSESSMENT — PAIN DESCRIPTION - LOCATION: LOCATION: HEAD

## 2025-07-23 ASSESSMENT — PAIN SCALES - GENERAL
PAINLEVEL_OUTOF10: 0
PAINLEVEL_OUTOF10: 1

## 2025-07-23 ASSESSMENT — PAIN DESCRIPTION - DESCRIPTORS: DESCRIPTORS: ACHING

## 2025-07-23 ASSESSMENT — PAIN SCALES - WONG BAKER: WONGBAKER_NUMERICALRESPONSE: NO HURT

## 2025-07-23 NOTE — CARE COORDINATION
Client met with [facility] on Unit on 07/23/2025 - Horace MITCHELL    \" Client reports to have been struggling with mental health, MIROSLAVA and homelessness, recently was at [facility] for residential treatment, She wants to commit to residential treatment and Sober Living with [facility], I'll call S.P. and follow up tomorrow 07/24/2025 \"

## 2025-07-23 NOTE — PROGRESS NOTES
Found pt resting in bed, pt is approachable, appears calm, pt asked for vistaril 50 mg for #6 anxiety, no tremors or sweats noted. Pt reports a short relapse , pt reports she always hears voices but they are not bothering her now, pt reports she can not take haldol as she gets very violent. VS obtained. Gave tylenol for h/a

## 2025-07-23 NOTE — GROUP NOTE
Date: 7/23/2025    Group Start Time: 1155  Group End Time: 1255  Group Topic: Psychoeducation    [facility] 3W Karine Armstrong    Psychoeducation  Clarifying, Education, Exploration    Patients will engage in a discussion-based exercise about a variety of mental health topics (substance use, coping skills, self-esteem/self-perception, interpersonal relationships, etc.). Patients will reflect on how they can relate the prompts to their lives.    Focus: Problem-solving, Socialization  Goals: Improve Cognitive Skills, Improve Insight/Self-Awareness, Increase Socialization, Improve Self-Expression, Improve Coping Skills, Promote Reality Orientation     Patient appropriately responded to prompts about mental health-related topics. Patient described herself as \"brave, strong...overall a good person.\" Patient expressed that she tends to \"wear my heart on my sleeve\" and give more in relationships. Patient dozed off during group, and was encouraged by staff to return to her room to rest.     Attended: 1/2-3/4 attendance  Participation Level: Active Listener and Interactive  Participation Quality: Attentive, Sharing, and Supportive  Affect/Mood: Congruent/Euthymic  Speech: spontaneous, normal rate, and normal volume   Thought Content/Processes: Linear  Level of consciousness: Alert  Response: Able to verbalize current knowledge/experience and Able to verbalize/acknowledge new learning  Outcomes: Progressing towards goal and Actively participated in the group experience    Signature: Karine Tam, Licensed Professional Music Therapist (LPMT)

## 2025-07-23 NOTE — PROGRESS NOTES
Found pt in group sleeping, falling over to the side, awaken pt to return to her room, gave newly ordered meds,

## 2025-07-23 NOTE — PROGRESS NOTES
Behavioral Services  Medicare Certification Upon Admission    I certify that this patient's inpatient psychiatric hospital admission is medically necessary for:    [x] (1) Treatment which could reasonably be expected to improve this patient's condition,       [x] (2) Or for diagnostic study;     AND     [x](2) The inpatient psychiatric services are provided while the individual is under the care of a physician and are included in the individualized plan of care.    Estimated length of stay/service 3-5    Plan for post-hospital care OP    Electronically signed by CHAD RODRIGEZ MD on 7/23/2025 at 10:05 AM

## 2025-07-23 NOTE — CARE COORDINATION
Leisure Assessment  July 23, 2025 /  1445  pm    Appearance: Alert, Appears as stated age, and Pleasant  Current Mental Status: Calm and Cooperative  Affect/Mood: Congruent/ Euthymic  Thought Content/Processes: Linear  Insight/Judgement: Poor insight and Fair judgment  Speech: spontaneous, normal rate, and normal volume  Delusions/Hallucinations: none observed/reported   Admit Status:  Voluntary     Patient denied having any specific leisure interests, stating that she spends her time with \"anything I can get my hands on.\" Patient expressed that she enjoys spending time with people she knows, and describes her partner as supportive. Patient reported feeling suicidal in context of homelessness, unemployment, her children being in foster care, and her partner being a missing person. Patient presented as future-oriented and motivated, sharing that she wants to focus on her recovery. Patient identified her strength is the ability to \"keep moving forward.\"    Recommendations: Decrease Impulsivity/Impulsive Behaviors, Improve/Maintain Coping Skills Development, Improve/Maintain Emotion Regulation Skills/Mood, Improve/Maintain Expressive Communication/Self-Expression, Improve/Maintain Insight/Self-Awareness, and Improve/Maintain Participation in Healthy Leisure Interests    Signature: Karine Tam, Licensed Professional Music Therapist (LPMT)

## 2025-07-23 NOTE — GROUP NOTE
Patient did not attend group.    Date: 7/23/2025    Group Start Time: 0855  Group End Time: 0925  Group Topic: Community Meeting    [facility] 3W Kraine Armstrong    Personal Growth  Joelle Analysis/Discussion, Receptive Music Listening    Patients will listen to \"End of Beginning\" by DJO, and discuss possible themes/interpretations derived from the song. Patients will reflect on how they might relate the lyrics to their personal lives. Patients will identify potential triggers and how to avoid/reduce exposure to, and/or cope with them.    Focus: Acceptance, Change, Self-Growth, Processing  Goals: Improve Identity Development, Improve Self-Awareness, Improve Coping Skills, Promote Reality Orientation     Signature: Karine Tam, Licensed Professional Music Therapist (LPMT)

## 2025-07-23 NOTE — H&P
nerves II-XII grossly in tact  Gait: normal   Involuntary Movements: no    Mental Status Examination:    Level of consciousness:  within normal limits   Appearance:  ill-appearing  Behavior/Motor:  psychomotor retardation  Attitude toward examiner:  cooperative  Speech:  slow   Mood: constricted, decreased range, and depressed  Affect:  mood congruent  Thought processes:  goal directed   Association:\  Thought content:  Suicidal Ideation:  passive  Delusions:  no evidence of delusions  Perceptual Disturbance:  denies any perceptual disturbance  Cognition:  oriented to person, place, and time   Attention & Concentration intact  Memory intact  Insight poor   Judgement poor   Fund of Knowledge adequate    Mini Mental Status 30/30      DIAGNOSIS:    Bipolar mixed episode severe  Cocaine use disorder        RISK ASSESSMENT:    SUICIDE RISK ASSESSMENT: high  HOMICIDE: low  AGITATION/VIOLENCE: low  ELOPEMENT: low    LABS: REVIEWED TODAY:  Recent Labs     07/22/25  1527   WBC 8.8   HGB 11.8*        Recent Labs     07/22/25  1527      K 3.8   *   CO2 22   BUN 16   CREATININE 0.91*   GLUCOSE 95     Recent Labs     07/22/25  1527   BILITOT 0.5   ALKPHOS 99   AST 19   ALT 33     Lab Results   Component Value Date/Time    BARBSCNU Neg 07/22/2025 03:16 PM    LABBENZ POSITIVE 07/22/2025 03:16 PM    LABMETH Neg 07/22/2025 03:16 PM    PPXUR Negative 05/14/2024 04:49 PM    ETOH <10 07/22/2025 03:27 PM     Lab Results   Component Value Date/Time    TSH 2.900 07/22/2025 03:27 PM    TSH 1.650 06/27/2024 03:01 PM     No results found for: \"LITHIUM\"  No results found for: \"VALPROATE\", \"CBMZ\"  No results found for: \"LITHIUM\", \"VALPROATE\"    FURTHER LABS ORDERED :      Radiology   XR CHEST (2 VW)  Result Date: 6/27/2025  Interpreted By:  Kathya Hart, STUDY: XR CHEST 2 VIEWS;  6/27/2025 6:42 pm    INDICATION: Signs/Symptoms:SOB.    COMPARISON: None.    ACCESSION NUMBER(S): [id number]    ORDERING CLINICIAN: VAL

## 2025-07-23 NOTE — CONSULTS
Consult Note            Date:7/23/2025        Patient Name:Jeniffer Kennedy     YOB: 1995     Age:30 y.o.    Reason for Consult: Psych H&P    Chief Complaint     Chief Complaint   Patient presents with    Suicidal          History Obtained From   patient, electronic medical record    History of Present Illness   Patient is a 30-year-old female admitted for mental health evaluation for thoughts of suicide ideation.  Patient has a past medical history of sleep apnea postpartum depression anxiety and depression.  Patient denies chest pain, palpitations, lightheadedness, headache, dizziness, shortness of breath, cough, N/V/D, and changes in appetite.  Patient also denies smoking, illicit drug, and alcohol use.  Denies self-inflicted injuries or wounds.   Hospitalist consulted for evaluation and recommendations for acute and chronic disease management while receiving inpatient psych services.        Past Medical History     Past Medical History:   Diagnosis Date    Mental disorder     depression and anxiety    Postpartum depression     Sleep apnea         Past Surgical History     Past Surgical History:   Procedure Laterality Date    DILATION AND CURETTAGE OF UTERUS N/A 9/15/2023    ATTEMPTED LAPAROSCOPIC BILATERAL SALPINGECTOMY WITH NOVASURE ENDOMETRIAL ABLATION [place]ust[place] [facility] [place]    HYSTERECTOMY, VAGINAL N/A 10/20/2023    TOTAL LAPAROSCOPIC HYSTERECTOMY, RIGHT SALPINGECTOMY, LEFT OOPHERECTOMY performed by Agustin Gregory [place] [facility] [place]    LAPAROSCOPY Left 6/10/2021    LAPAROSCOPIC ECTOPIC REMOVAL, LEFT SALPINGECTOMY performed by Agustin Gregory MD at [facility] [place]    CATIE-EN-Y GASTRIC BYPASS N/A 6/4/2024    GASTRIC BYPASS CATIE-EN-Y LAPAROSCOPIC/ROBOTIC, HIATAL HERNIA REPAIR, MODIFIER 22 performed by Ori gN MD [place] [facility] [place]    UPPER GASTROINTESTINAL ENDOSCOPY  2020    UPPER GASTROINTESTINAL ENDOSCOPY N/A 6/23/2023    EGD DIAGNOSTIC ONLY performed by Sheridan Laughlin MD at

## 2025-07-23 NOTE — CARE COORDINATION
Psychosocial Assessment     Admission Reason: Client has been admitted to [facility] for psychiatric evaluation - depressive symptoms, SI, and polysubstance abuse.     C-SSRS Lifetime Recent Completed - Current Suicide Risk:   [] No Risk  [] Low [] Moderate [x] High     Risk Factors:   MIROSLAVA abuse, Depression, Grief, no social supports.     Protective Factors: Desire to feel better       Gender:  [] Male [x] Female [] Transgender  [] Other    Sexual Orientation:  [x] Heterosexual [] Homosexual [] Bisexual [] Other    Current or Past Mental Health and/or Addictions Treatment (and response to treatment):  [] Yes, When and Where:   [x] No    Substance Use/Alcohol Use/Addiction (document name of substance, age of onset, how much and how often, route of use and date of last use):  [x] Reports   Substance: Crack Cocaine   Age of Onset:  Teenage   How Much and how often: 1 G plus daily   Last Usage: Day of admission     [x] Patient was provided a listing of community sobriety resources on admission.    [] Denies    AUDIT: 0  DAST: 8  PHQ 9: 18     Sobriety Education provided:  [x] Yes  [] No  [] N/A [] Refused    Learners: Patient [x]  Family []  Significant Other  [] Caregiver [] Other []   Readiness: Eager []   Acceptance  [x]   Nonacceptances []   Refused []   Method: Explanation [x]   Handout []   Response: Verbalizes Understanding [x]   No evidence of Learning []   Refuses []     Referral made to [facility] for sobriety services and support  [x] Yes       Date: 07/23/2025               [] No    Family History of Mental Illness or Substance Use/Abuse:   [] Yes (Specify)    [x] No    Trauma and Abuse History:   [] Reports   ( Physical []  Verbal []  Emotional []  Financial []   Sexual [] )  [x] Denies      Legal History:  [x]  Yes (Specify)   Under investigation surrounding the circumstances of her adoptive fathers death.   [] No     Involvement:  [] Yes (Specify)    [x] No     Employment and/or Benefits:    []

## 2025-07-23 NOTE — FLOWSHEET NOTE
Patient blood pressure 91/64, she was encouraged to get up and start drinking fluids to stay hydrated.  Patient has no symptoms of being hypotensive.

## 2025-07-23 NOTE — GROUP NOTE
Group Therapy Note    Date: 7/23/2025    Group Start Time: 1005  Group End Time: 1050  Group Topic: Art Therapy     [facility] 3W Kimberlyn Archibald, DARIANW        Group Therapy Note    Attendees: 10       Patient's Goal:  To participate in morning group art therapy.     Notes:  Patient attended briefly.     Modes of Intervention: Activity      Discipline Responsible: Psychoeducational Specialist      Signature:  Kimberlyn Alba MA ATR LPAT

## 2025-07-24 PROCEDURE — 6370000000 HC RX 637 (ALT 250 FOR IP): Performed by: PSYCHIATRY & NEUROLOGY

## 2025-07-24 PROCEDURE — 6370000000 HC RX 637 (ALT 250 FOR IP): Performed by: REGISTERED NURSE

## 2025-07-24 PROCEDURE — 99232 SBSQ HOSP IP/OBS MODERATE 35: CPT | Performed by: PSYCHIATRY & NEUROLOGY

## 2025-07-24 PROCEDURE — 1240000000 HC EMOTIONAL WELLNESS R&B

## 2025-07-24 RX ORDER — ONDANSETRON 4 MG/1
4 TABLET, ORALLY DISINTEGRATING ORAL EVERY 8 HOURS PRN
Status: DISCONTINUED | OUTPATIENT
Start: 2025-07-24 | End: 2025-07-29 | Stop reason: HOSPADM

## 2025-07-24 RX ORDER — SUMATRIPTAN 50 MG/1
50 TABLET, FILM COATED ORAL ONCE
Status: COMPLETED | OUTPATIENT
Start: 2025-07-24 | End: 2025-07-24

## 2025-07-24 RX ADMIN — TRAZODONE HYDROCHLORIDE 50 MG: 50 TABLET ORAL at 21:15

## 2025-07-24 RX ADMIN — DOCUSATE SODIUM 100 MG: 100 CAPSULE, LIQUID FILLED ORAL at 09:17

## 2025-07-24 RX ADMIN — FERROUS SULFATE TAB 325 MG (65 MG ELEMENTAL FE) 325 MG: 325 (65 FE) TAB at 09:16

## 2025-07-24 RX ADMIN — OXCARBAZEPINE 150 MG: 150 TABLET, FILM COATED ORAL at 09:16

## 2025-07-24 RX ADMIN — OXCARBAZEPINE 150 MG: 150 TABLET, FILM COATED ORAL at 21:15

## 2025-07-24 RX ADMIN — TRAZODONE HYDROCHLORIDE 50 MG: 50 TABLET ORAL at 21:17

## 2025-07-24 RX ADMIN — HYDROXYZINE PAMOATE 50 MG: 50 CAPSULE ORAL at 14:58

## 2025-07-24 RX ADMIN — AMANTADINE 100 MG: 100 CAPSULE ORAL at 17:09

## 2025-07-24 RX ADMIN — AMANTADINE 100 MG: 100 CAPSULE ORAL at 09:16

## 2025-07-24 RX ADMIN — FERROUS SULFATE TAB 325 MG (65 MG ELEMENTAL FE) 325 MG: 325 (65 FE) TAB at 21:15

## 2025-07-24 RX ADMIN — SUMATRIPTAN SUCCINATE 50 MG: 50 TABLET ORAL at 11:07

## 2025-07-24 RX ADMIN — ACETAMINOPHEN 650 MG: 325 TABLET ORAL at 09:16

## 2025-07-24 RX ADMIN — AMANTADINE 100 MG: 100 CAPSULE ORAL at 13:09

## 2025-07-24 ASSESSMENT — PAIN DESCRIPTION - ORIENTATION: ORIENTATION: POSTERIOR

## 2025-07-24 ASSESSMENT — PAIN SCALES - GENERAL
PAINLEVEL_OUTOF10: 4
PAINLEVEL_OUTOF10: 0
PAINLEVEL_OUTOF10: 5

## 2025-07-24 ASSESSMENT — PAIN DESCRIPTION - LOCATION
LOCATION: HEAD
LOCATION: HEAD

## 2025-07-24 ASSESSMENT — PAIN DESCRIPTION - DESCRIPTORS
DESCRIPTORS: DULL
DESCRIPTORS: ACHING

## 2025-07-24 NOTE — PROGRESS NOTES
Pt reports she feels like she is having a panic attack, stated I can't find my boyfriend, we are like [place] and [place], pt says she doesn't know of anyone she can call that knows him . Pt is tearful and sobbing, at times, offered and gave vistaril 50 mg,

## 2025-07-24 NOTE — GROUP NOTE
Patient did not attend group.    Date: 7/24/2025    Group Start Time: 1230  Group End Time: 1315  Group Topic: Music Therapy    [place] 3W Karine Armstrong    Music and Mindfulness  Art Component/Reflection, Music Analysis/Discussion, Receptive Music Listening    Patients will be asked to listen to a series of classical pieces and create an art reflection based on what they hear. Patients will be challenged to stay mindful on the music and/or art while listening, and take note of how their emotions might change. Patients will discuss the benefits and challenges of staying mindful, as well as how they might use mindfulness techniques in the future.    Selections from The Planets by Heath Monet  I. [place], the Bringer of War; IV. [place], the Bringer of Jollity; II. [place], the Bringer of Peace; [place], the Bringer of Old Age    Focus: Creativity, Mindfulness, Relaxation  Goals: Improve Attention to Task, Increase Socialization, Improve Mood, Improve Self-Esteem, Improve Coping Skills, Increase Sensory Stimulation, Promote Reality Orientation     Signature: Karine Tam, Licensed Professional Music Therapist (LPMT)

## 2025-07-24 NOTE — PROGRESS NOTES
Pt is found sleeping , eyes closed resp even, hob up 60 degrees  as encouraged, called for heat in room to be lowered, appears vistaril was effective.

## 2025-07-24 NOTE — PROGRESS NOTES
[facility] FOLLOW-UP NOTE       2025     Patient was seen and examined in person, Chart reviewed   Patient's case discussed with staff/team    Chief Complaint: Depression    Interim History:     No change in her presentation  Report feeling depressed  Hopeless and worthless  Passive SI  No contact with her BF or does not know where he is currently  Worried about him  Disturbed sleep  Appetite:   [] Normal/Unchanged  [] Increased  [x] Decreased      Sleep:       [] Normal/Unchanged  [] Fair       [x] Poor              Energy:    [] Normal/Unchanged  [] Increased  [x] Decreased        SI [x] Present  [] Absent    HI  []Present  [] Absent     Aggression:  [] yes  [] no    Patient is [] able  [x] unable to CONTRACT FOR SAFETY     PAST MEDICAL/PSYCHIATRIC HISTORY:   Past Medical History:   Diagnosis Date    Mental disorder     depression and anxiety    Postpartum depression     Sleep apnea        FAMILY/SOCIAL HISTORY:  Family History   Problem Relation Age of Onset    Clotting Disorder Mother     Depression Mother     Stroke Mother     Diabetes Mother     Unknown Father     Cancer Maternal Grandfather     Celiac Disease Neg Hx     Colon Cancer Neg Hx     Crohn's Disease Neg Hx      Social History     Socioeconomic History    Marital status: Single     Spouse name: Not on file    Number of children: Not on file    Years of education: Not on file    Highest education level: Not on file   Occupational History    Not on file   Tobacco Use    Smoking status: Former     Current packs/day: 0.00     Average packs/day: 0.5 packs/day for 0.5 years (0.2 ttl pk-yrs)     Types: Cigarettes     Start date: 2016     Quit date: 2016     Years since quittin.0    Smokeless tobacco: Never    Tobacco comments:     Only smoked rarely socially   Vaping Use    Vaping status: Never Used   Substance and Sexual Activity    Alcohol use: Not Currently     Comment: rare    Drug use: Not

## 2025-07-24 NOTE — PROGRESS NOTES
Pt  is just done eating snack in the dinning room, is heard on the phone attempting to get things handled,

## 2025-07-24 NOTE — GROUP NOTE
Date: 7/24/2025    Group Start Time: 1005  Group End Time: 1035  Group Topic: Music Therapy    [facility] 3W Karine Armstrong    Challenging Negative Self-Talk  Composition/Songwriting, Joelle Analysis/Discussion, Re-creative Singing    Patients will listen to \"I Am Light\" by Madelin Farris and discuss interpretations, lyrics, and themes derived from the song. Patients will be asked to identify positive/negative qualities about themselves to be used in a group joelle substitution. Patients will be encouraged to contribute lyrics, recreate the song, and discuss the experience as a group.    Focus: Building Positive Experiences, Challenging Negative Self-Talk, Identity Development  Goals: Improve Self-Awareness, Increase Socialization, Improve Mood, Improve Self-Esteem, Improve Self-Expression, Improve Coping Skills, Promote Reality Orientation     Patient listened to recorded music and engaged in peer song discussions. Patient contributed the lyrics \"I am strong,\" \"I am not alone,\" \"I'm courageous,\" \"This is who I am,\" and \"I am not a burden\" for inclusion in group joelle substitution. Patient encouraged peers to share their ideas and provided appropriate feedback. Patient sang along to the recreated song.    Attended: Full attendance  Participation Level: Active Listener and Interactive  Participation Quality: Attentive, Sharing, and Supportive  Affect/Mood: Congruent/Euthymic and Brightens  Speech: spontaneous, normal rate, and normal volume   Thought Content/Processes: Linear  Level of consciousness: Alert  Response: Able to verbalize current knowledge/experience  Outcomes: Successfully internalized the purpose of intervention and Actively participated in the group experience    Signature: Karine Tam, Licensed Professional Music Therapist (LPMT)

## 2025-07-24 NOTE — PROGRESS NOTES
Progress Note    Date:7/24/2025       [facility]  Patient Name:Jeniffer Kennedy     YOB: 1995     Age:30 y.o.    Assessment        Hospital Problems           Last Modified POA    * (Principal) Depression with suicidal ideation 7/22/2025 Yes    Bipolar 1 disorder, mixed, severe ([facility]) 7/23/2025 Yes       Plan:      *Depression, bipolar 1 disorder  - Psychiatry managing     *Sleep apnea  - Albuterol every 4 hours as needed     *Hypotension stable     *Nausea  -Zofran 4 mg every 6 hours as needed    *Anemia Hg 11.3 on iron 325 twice daily  Patient asymptomatic     *Vaginal irritation/tear  -Witch hazel/tucks pads  as needed     *Polysubstance use patient positive for cocaine and benzodiazepines patient advised not to use    Additional work up or/and treatment plan may be added today or then after based on clinical progression by other providers or specialists.  Patient will need to follow-up with PCP for chronic disease management.     I have spent greater than 70% of time  spent focused exclusively on this patient ,reviewing  chart, labs/diagnostics, reconciling medications, &  answering questions with patient and discussing plan.  Subjective   Interval History Status: Patient seen for reports of nausea and headache.  Patient reports headache 5/10.  Describes headache as dull aching.  Denies changes in vision, photophobia.  Reports unrelieved by Tylenol.  Patient has allergies to NSAIDs.  Normally takes sumatriptan for relief.  Also reports nausea denies any vomiting.  Denies fever or chills.        Review of Systems   12 point review of systems reviewed with patient; negative other than as mentioned    Medications   Scheduled Meds:    OXcarbazepine  150 mg Oral BID    ferrous sulfate  325 mg Oral BID    docusate sodium  100 mg Oral Daily    amantadine  100 mg Oral TID     Continuous Infusions:   PRN Meds: ondansetron, albuterol sulfate HFA, witch hazel-glycerin, acetaminophen, magnesium

## 2025-07-24 NOTE — GROUP NOTE
Date: 7/24/2025    Group Start Time: 0912  Group End Time: 0936  Group Topic: Community Meeting    [facility] [place] [facility] Karine Armstrong    Cognitive Model: Automatic Thoughts  Clarifying, Education, Exploration, Support    Patients will learn about the cognitive model. Patients will practice connecting example situations to thoughts, emotions, and behaviors. Patients will determine differences between statements that are facts and statements that are opinions. Patients will be encouraged to share their insights with the group    Focus: Identifying & Reframing Self-Talk  Goals: Improve Attention to Task, Improve Cognitive Skills, Improve Identity Development, Improve Self-Awareness, Improve Self-Esteem, Improve Coping Skills     Patient listened to and engaged in peer discussions about the cognitive model. Patient socialized with peers/staff and spontaneously shared her thoughts on the group content. Patient expressed that she believes the statement \"I should always be nice\" is a fact, and shared a personal life example with the group.     Attended: 3/4-full attendance  Participation Level: Active Listener and Interactive  Participation Quality: Attentive, Sharing, and Supportive  Affect/Mood: Congruent/Euthymic  Speech: spontaneous, normal rate, and normal volume   Thought Content/Processes: Linear  Level of consciousness: Alert  Response: Able to verbalize current knowledge/experience  Outcomes: Progressing towards goal and Actively participated in the group experience    Signature: Karine Tam, Licensed Professional Music Therapist (LPMT)

## 2025-07-25 PROCEDURE — 1240000000 HC EMOTIONAL WELLNESS R&B

## 2025-07-25 PROCEDURE — 99232 SBSQ HOSP IP/OBS MODERATE 35: CPT | Performed by: PSYCHIATRY & NEUROLOGY

## 2025-07-25 PROCEDURE — 6370000000 HC RX 637 (ALT 250 FOR IP): Performed by: PSYCHIATRY & NEUROLOGY

## 2025-07-25 PROCEDURE — 6370000000 HC RX 637 (ALT 250 FOR IP): Performed by: REGISTERED NURSE

## 2025-07-25 RX ORDER — SIMETHICONE 80 MG
80 TABLET,CHEWABLE ORAL EVERY 6 HOURS PRN
Status: DISCONTINUED | OUTPATIENT
Start: 2025-07-25 | End: 2025-07-29 | Stop reason: HOSPADM

## 2025-07-25 RX ADMIN — FERROUS SULFATE TAB 325 MG (65 MG ELEMENTAL FE) 325 MG: 325 (65 FE) TAB at 21:31

## 2025-07-25 RX ADMIN — WITCH HAZEL: 500 SOLUTION RECTAL; TOPICAL at 18:36

## 2025-07-25 RX ADMIN — AMANTADINE 100 MG: 100 CAPSULE ORAL at 13:38

## 2025-07-25 RX ADMIN — FERROUS SULFATE TAB 325 MG (65 MG ELEMENTAL FE) 325 MG: 325 (65 FE) TAB at 08:47

## 2025-07-25 RX ADMIN — TRAZODONE HYDROCHLORIDE 50 MG: 50 TABLET ORAL at 21:31

## 2025-07-25 RX ADMIN — OXCARBAZEPINE 150 MG: 150 TABLET, FILM COATED ORAL at 21:31

## 2025-07-25 RX ADMIN — WITCH HAZEL: 500 SOLUTION RECTAL; TOPICAL at 21:36

## 2025-07-25 RX ADMIN — AMANTADINE 100 MG: 100 CAPSULE ORAL at 17:45

## 2025-07-25 RX ADMIN — OXCARBAZEPINE 150 MG: 150 TABLET, FILM COATED ORAL at 08:47

## 2025-07-25 RX ADMIN — SIMETHICONE 80 MG: 80 TABLET, CHEWABLE ORAL at 21:31

## 2025-07-25 RX ADMIN — HYDROXYZINE PAMOATE 50 MG: 50 CAPSULE ORAL at 17:45

## 2025-07-25 RX ADMIN — SIMETHICONE 80 MG: 80 TABLET, CHEWABLE ORAL at 13:38

## 2025-07-25 RX ADMIN — HYDROXYZINE PAMOATE 50 MG: 50 CAPSULE ORAL at 11:47

## 2025-07-25 RX ADMIN — AMANTADINE 100 MG: 100 CAPSULE ORAL at 08:47

## 2025-07-25 ASSESSMENT — PAIN DESCRIPTION - ORIENTATION
ORIENTATION: LEFT;RIGHT
ORIENTATION: RIGHT;LEFT

## 2025-07-25 ASSESSMENT — PAIN SCALES - GENERAL
PAINLEVEL_OUTOF10: 3
PAINLEVEL_OUTOF10: 3

## 2025-07-25 ASSESSMENT — PAIN DESCRIPTION - DESCRIPTORS
DESCRIPTORS: ACHING
DESCRIPTORS: ACHING

## 2025-07-25 ASSESSMENT — PAIN DESCRIPTION - LOCATION
LOCATION: BUTTOCKS
LOCATION: BUTTOCKS

## 2025-07-25 NOTE — GROUP NOTE
Group Therapy Note    Date: 7/25/2025    Group Start Time: 1300  Group End Time: 1330  Group Topic:  Group    Jennifer Kilpatrick LSW        Group Therapy Note       Purpose of Group:    To educate patient about supportive community-based resources available post-discharge through [facility].   Intervention/Activities:    Patient participated in an informational group session facilitated by peer supports from [facility]. Peer representatives provided an overview of the services offered, including:   Free daily transportation to and from the facility   Complimentary daily meals for all participants   Access to an Art Room, Fitness Center, and Computer Room   Opportunities for volunteering and social engagement to support reintegration into the community   Patient Response:    Patient was attentive and engaged during the discussion. Asked appropriate questions regarding transportation availability and volunteer options. Demonstrated interest in accessing services that promote structure, socialization, and personal growth in the community post-discharge.   Clinical Impressions:    Patient appears motivated to engage in supportive services that will facilitate recovery and community reintegration. [facility] may offer a stabilizing and enriching environment as part of patient’s discharge planning.   Plan/Recommendations:   Provide patient with [facility] referral and contact information   Encourage follow-up with peer support staff upon discharge   Include [facility] in patient’s discharge plan as a resource for structured community support    Signature:  WILLIAMS Mathias

## 2025-07-25 NOTE — FLOWSHEET NOTE
Pt rates Anxiety 4/10 Depression 7/10 Pt ate her snack in her room but states that she eats her meals in the dinning room. Pt states that she sees spots and has seen them most of her life bur denies hearing voices. Pt states she is worried about her fiances stateing he has been missing 72 hrs. Pt denies SI, and HI. Will continue to monitor.

## 2025-07-25 NOTE — CARE COORDINATION
Per [facility], patient has been accepted to [facility]. Clinicals need faxed to [phone].

## 2025-07-25 NOTE — GROUP NOTE
Date: 7/25/2025    Group Start Time: 1005  Group End Time: 1055  Group Topic: Music Therapy    [facility] [place] [facility] Armstrong    Live Music Group  Live Music Listening, Re-creative Singing    Patients will be given a songbook and offered the opportunity to select (a) song(s) of their choice for live music listening on [employer]. Patients will be encouraged to sing along, move along, and listen to the music.    Focus: Building Positive Experiences and Relaxation  Goals: Increase Socialization, Improve Mood, Improve Self-Esteem, Improve Coping Skills, Promote Reality Orientation     Patient selected songs for live music listening and sang along to familiar music. Patient socialized with peers/staff and connected over shared music interests. Patient expressed appreciation for the intervention.     Attended: 3/4-full attendance  Participation Level: Active Listener and Interactive  Participation Quality: Attentive, Sharing, and Supportive  Affect/Mood: Congruent/Euthymic and Brightens  Speech: spontaneous, normal rate, and normal volume   Thought Content/Processes: Linear  Level of consciousness: Alert  Response: Able to verbalize current knowledge/experience  Outcomes: Successfully internalized the purpose of intervention and Actively participated in the group experience    Signature: Karine Tam, Licensed Professional Music Therapist (LPMT)

## 2025-07-25 NOTE — PROGRESS NOTES
[facility]  BEHAVIORAL HEALTH FOLLOW-UP NOTE       2025     Patient was seen and examined in person, Chart reviewed   Patient's case discussed with staff/team    Chief Complaint: Depression    Interim History:   Pt still could not find the whereabout of her BF  Thinking about him feeling depressed  Less anxious  Hopeless and worthless  Passive SI  Disturbed sleep  Appetite:   [] Normal/Unchanged  [] Increased  [x] Decreased      Sleep:       [] Normal/Unchanged  [x] Fair       [] Poor              Energy:    [] Normal/Unchanged  [] Increased  [x] Decreased        SI [x] Present  [] Absent    HI  []Present  [] Absent     Aggression:  [] yes  [] no    Patient is [] able  [x] unable to CONTRACT FOR SAFETY     PAST MEDICAL/PSYCHIATRIC HISTORY:   Past Medical History:   Diagnosis Date    Mental disorder     depression and anxiety    Postpartum depression     Sleep apnea        FAMILY/SOCIAL HISTORY:  Family History   Problem Relation Age of Onset    Clotting Disorder Mother     Depression Mother     Stroke Mother     Diabetes Mother     Unknown Father     Cancer Maternal Grandfather     Celiac Disease Neg Hx     Colon Cancer Neg Hx     Crohn's Disease Neg Hx      Social History     Socioeconomic History    Marital status: Single     Spouse name: Not on file    Number of children: Not on file    Years of education: Not on file    Highest education level: Not on file   Occupational History    Not on file   Tobacco Use    Smoking status: Former     Current packs/day: 0.00     Average packs/day: 0.5 packs/day for 0.5 years (0.2 ttl pk-yrs)     Types: Cigarettes     Start date: 2016     Quit date: 2016     Years since quittin.0    Smokeless tobacco: Never    Tobacco comments:     Only smoked rarely socially   Vaping Use    Vaping status: Never Used   Substance and Sexual Activity    Alcohol use: Not Currently     Comment: rare    Drug use: Not Currently     Types: Marijuana (Weed),

## 2025-07-25 NOTE — GROUP NOTE
Date: 7/25/2025    Group Start Time: 0855  Group End Time: 0945  Group Topic: Music Therapy    [facility] [place] [facility] Karine rAmstrong    Motivation Playlist  Playlist Building/Song Share, Receptive Music Listening    Patients will be invited to select a song that is motivational for them. Patients will explain their connection to the song and the experience of listening to it in this setting.     Focus: Emotion Identification/Regulation, Empowerment, Self-Esteem  Goals: Improve Identity Development, Improve Self-Awareness, Increase Socialization, Improve Mood, Improve Self-Esteem, Improve Self-Expression, Improve Coping Skills     Patient listened to peers' song choices and verbalized support for their contributions. Patient selected a song that was motivational to her, stating that it reminds her that her overdose \"could've killed me, but it made me stronger.\" Patient sang along to familiar music with confidence and socialized throughout group. Patient expressed enjoyment of the intervention.      Attended: Full attendance  Participation Level: Active Listener and Interactive  Participation Quality: Attentive, Sharing, and Supportive  Affect/Mood: Congruent/Euthymic and Brightens  Speech: spontaneous, normal rate, and normal volume   Thought Content/Processes: Linear  Level of consciousness: Alert  Response: Able to verbalize current knowledge/experience and Able to verbalize/acknowledge new learning  Outcomes: Successfully internalized the purpose of intervention and Actively participated in the group experience    Signature: Karine Tam, Licensed Professional Music Therapist (LPMT)

## 2025-07-26 PROCEDURE — 6370000000 HC RX 637 (ALT 250 FOR IP): Performed by: REGISTERED NURSE

## 2025-07-26 PROCEDURE — 6370000000 HC RX 637 (ALT 250 FOR IP): Performed by: PSYCHIATRY & NEUROLOGY

## 2025-07-26 PROCEDURE — 1240000000 HC EMOTIONAL WELLNESS R&B

## 2025-07-26 RX ADMIN — AMANTADINE 100 MG: 100 CAPSULE ORAL at 13:33

## 2025-07-26 RX ADMIN — AMANTADINE 100 MG: 100 CAPSULE ORAL at 17:27

## 2025-07-26 RX ADMIN — AMANTADINE 100 MG: 100 CAPSULE ORAL at 10:02

## 2025-07-26 RX ADMIN — HYDROXYZINE PAMOATE 50 MG: 50 CAPSULE ORAL at 21:44

## 2025-07-26 RX ADMIN — ONDANSETRON 4 MG: 4 TABLET, ORALLY DISINTEGRATING ORAL at 10:10

## 2025-07-26 RX ADMIN — HYDROXYZINE PAMOATE 50 MG: 50 CAPSULE ORAL at 10:01

## 2025-07-26 RX ADMIN — SIMETHICONE 80 MG: 80 TABLET, CHEWABLE ORAL at 11:47

## 2025-07-26 RX ADMIN — OXCARBAZEPINE 150 MG: 150 TABLET, FILM COATED ORAL at 21:45

## 2025-07-26 RX ADMIN — OXCARBAZEPINE 150 MG: 150 TABLET, FILM COATED ORAL at 10:02

## 2025-07-26 RX ADMIN — AMANTADINE 100 MG: 100 CAPSULE ORAL at 21:44

## 2025-07-26 RX ADMIN — FERROUS SULFATE TAB 325 MG (65 MG ELEMENTAL FE) 325 MG: 325 (65 FE) TAB at 21:44

## 2025-07-26 RX ADMIN — DOCUSATE SODIUM 100 MG: 100 CAPSULE, LIQUID FILLED ORAL at 10:02

## 2025-07-26 ASSESSMENT — PAIN SCALES - GENERAL: PAINLEVEL_OUTOF10: 6

## 2025-07-26 NOTE — PROGRESS NOTES
[facility] BEHAVIORAL HEALTH FOLLOW-UP NOTE       7/27/2025     Patient was seen and examined in person, Chart reviewed   Patient's case discussed with staff/team    Chief Complaint: Depression    Interim History:   Patient seen upon the unit.  She is hyperverbal and forthcoming with information.  When asked about suicidal ideation she states \"I will be if I continue to talk about the things that upset me.\"  She states the current medication seems to be helping.  States the Trileptal seems to help her not crave cocaine.  She states she has \"a lot going on.  She has multiple somatic complaints and is angry at the medical staff because she states that he \"accidentally found a brain tumor.\"  She believes his brain tumor should been found sooner.  She denies auditory or visual hallucinations does not appear to be internally stimulated internally preoccupied.  Been medication compliant no significant behavioral disturbances she tells me she is going to rehab on discharge          Appetite:   [] Normal/Unchanged  [] Increased  [x] Decreased      Sleep:       [] Normal/Unchanged  [x] Fair       [] Poor              Energy:    [] Normal/Unchanged  [] Increased  [x] Decreased        SI [] Present  [x] Absent    HI  []Present  [x] Absent     Aggression:  [] yes  [] no    Patient is [] able  [x] unable to CONTRACT FOR SAFETY     PAST MEDICAL/PSYCHIATRIC HISTORY:   Past Medical History:   Diagnosis Date    Mental disorder     depression and anxiety    Postpartum depression     Sleep apnea        FAMILY/SOCIAL HISTORY:  Family History   Problem Relation Age of Onset    Clotting Disorder Mother     Depression Mother     Stroke Mother     Diabetes Mother     Unknown Father     Cancer Maternal Grandfather     Celiac Disease Neg Hx     Colon Cancer Neg Hx     Crohn's Disease Neg Hx      Social History     Socioeconomic History    Marital status: Single     Spouse name: Not on file    Number of children:

## 2025-07-26 NOTE — CARE COORDINATION
Pt approached this writer at the nurses station asking to initially speak to the . Pt then relayed to this writer that she saw \"another patient smoking a vape last night by phone 3.\" Pt reported that this was seen around 2100 on 7/25/25. Pt reported that \"it was definitely on the cameras, and it made another patient sick with the smell.\" This writer reported to the nursing staff, who then seized the contraband from the patient.     Signature: Pinky Mistry, Psychoeducational Specialist

## 2025-07-26 NOTE — GROUP NOTE
Group Therapy Note    Date: 7/26/2025    Group Start Time: 1230  Group End Time: 1300  Group Topic: Psychoeducation    [facility] Pinky Mak    Group Therapy Note    Note Card Discussion    Description:   Patients will be told to choose one or more of the note cards provided to them, and will read the question out loud to the other peers/staff. Patient will then answer the question, and all other peers will answer the same question. Questions range from using coping skills, mood management, and personal preferences. Patients will be encouraged to discuss their answers with other peers/staff.     Goals:   Increase Socialization  Improve Attention to Task  Improve Cognitive Skills  Improve Identity Development  Improve Self-Awareness  Improve Mood  Improve Self-Esteem  Improve Self-Expression  Promote Reality Orientation        Pt was pleasant and sociable during group session. Pt made frequent contributions to peer conversations. Pt completed the intervention as directed, and appeared bright in affect when speaking.     Attended: Full attendance  Participation Level: Active Listener and Interactive  Participation Quality: Appropriate, Attentive, and Sharing  Affect/Mood: Congruent and Bright/Brightens  Speech: normal rate and normal volume   Insight/Judgement: Fair insight and Fair judgment  Response: Able to verbalize current knowledge/experience, Able to verbalize/acknowledge new learning, Able to retain information, and Capable of insight    Signature: Pinky Mistry, Psychoeducational Specialist

## 2025-07-26 NOTE — GROUP NOTE
Group Therapy Note  Patient did not attend group.     Date: 7/26/2025    Group Start Time: 0855  Group End Time: 0915  Group Topic: Psychoeducation    [facility] Pinky Mak    Group Therapy Note      Who Are You?     Description:   Patients will be prompted to complete a worksheet that has different open-ended statements, describing who they are. These open-ended statements include explaining what the patient loves, what they want to achieve, what they used to be afraid of, what they are motivated by, what their habits are whether they are healthy/unhealthy, when they are the happiest, what makes them disappointed, what they believe in, and what they will one day achieve. Patients will then be encouraged to speak about their experiences and elaborate.     Goals:   Increase Socialization  Improve Attention to Task  Improve Identity Development  Improve Self-Awareness  Improve Self-Esteem  Improve Self-Expression    This writer attempted to continue group session to extend for at least 30 minutes by verbally offering a \"game\" type of activity with notecards and questions. Patients collectively verbalized that they were not receptive to this type of activity, therefore this writer ended group.        Patient did not attend group.     Signature: Pinky Mistry, Psychoeducational Specialist

## 2025-07-27 PROBLEM — F14.10 COCAINE USE DISORDER (HCC): Status: ACTIVE | Noted: 2025-07-27

## 2025-07-27 PROCEDURE — 1240000000 HC EMOTIONAL WELLNESS R&B

## 2025-07-27 PROCEDURE — 6370000000 HC RX 637 (ALT 250 FOR IP): Performed by: REGISTERED NURSE

## 2025-07-27 PROCEDURE — 6370000000 HC RX 637 (ALT 250 FOR IP): Performed by: PSYCHIATRY & NEUROLOGY

## 2025-07-27 RX ADMIN — HYDROXYZINE PAMOATE 50 MG: 50 CAPSULE ORAL at 08:00

## 2025-07-27 RX ADMIN — ONDANSETRON 4 MG: 4 TABLET, ORALLY DISINTEGRATING ORAL at 18:18

## 2025-07-27 RX ADMIN — DOCUSATE SODIUM 100 MG: 100 CAPSULE, LIQUID FILLED ORAL at 07:58

## 2025-07-27 RX ADMIN — OXCARBAZEPINE 150 MG: 150 TABLET, FILM COATED ORAL at 07:58

## 2025-07-27 RX ADMIN — HYDROXYZINE PAMOATE 50 MG: 50 CAPSULE ORAL at 22:24

## 2025-07-27 RX ADMIN — HYDROXYZINE PAMOATE 50 MG: 50 CAPSULE ORAL at 16:22

## 2025-07-27 RX ADMIN — SIMETHICONE 80 MG: 80 TABLET, CHEWABLE ORAL at 16:25

## 2025-07-27 RX ADMIN — ONDANSETRON 4 MG: 4 TABLET, ORALLY DISINTEGRATING ORAL at 10:34

## 2025-07-27 RX ADMIN — AMANTADINE 100 MG: 100 CAPSULE ORAL at 13:48

## 2025-07-27 RX ADMIN — ACETAMINOPHEN 650 MG: 325 TABLET ORAL at 16:23

## 2025-07-27 RX ADMIN — AMANTADINE 100 MG: 100 CAPSULE ORAL at 07:58

## 2025-07-27 RX ADMIN — AMANTADINE 100 MG: 100 CAPSULE ORAL at 18:18

## 2025-07-27 RX ADMIN — SIMETHICONE 80 MG: 80 TABLET, CHEWABLE ORAL at 10:34

## 2025-07-27 RX ADMIN — OXCARBAZEPINE 150 MG: 150 TABLET, FILM COATED ORAL at 21:01

## 2025-07-27 ASSESSMENT — PAIN DESCRIPTION - LOCATION: LOCATION: HEAD;JAW

## 2025-07-27 ASSESSMENT — PAIN SCALES - GENERAL
PAINLEVEL_OUTOF10: 0
PAINLEVEL_OUTOF10: 0
PAINLEVEL_OUTOF10: 5

## 2025-07-27 ASSESSMENT — PAIN DESCRIPTION - DESCRIPTORS: DESCRIPTORS: ACHING

## 2025-07-27 NOTE — PROGRESS NOTES
Pt is playing bag toss game, appears relaxed was laughing smiling, vistaril effective , mylicon also effective.

## 2025-07-27 NOTE — GROUP NOTE
Group Therapy Note    Date: 7/27/2025    Group Start Time: 1215  Group End Time: 1300  Group Topic: Psychoeducation    [facility] Pinky Mak    Group Therapy Note    End of week check-in     Description:   Patients will be given a paper to complete during this intervention. This template includes questions about patient's weeks: how were you feeling this week, what color would describe your week, etc. Patients will be encouraged to share their findings with peers/staff.     Goals:  Increase Socialization  Improve Attention to Task  Improve Cognitive Skills  Improve Identity Development  Improve Self-Awareness  Improve Mood  Improve Self-Esteem  Improve Self-Expression  Promote Reality Orientation          Pt was pleasant and sociable during group session. Pt was engaged in peer conversation. Pt completed the intervention as directed by this writer, and did not share her findings with the group.     Signature: Pinky Mistry, Psychoeducational Specialist

## 2025-07-27 NOTE — CARE COORDINATION
Client approached [facility] and is requesting a differed [facility] program location as her fiance has been admitted to another location. Client advised to reach out to [facility] and try to coordinate the change in [facility] treatment centers. Client given Horace BETHEA Contact information

## 2025-07-27 NOTE — PROGRESS NOTES
Pt attended and participated in [id number] Recreation Group.    Pt participated in game of corn-hole and socialized with peers. Pt has a kind and supportive attitude toward peers.        Electronically signed by Karina Flores on 7/27/2025 at 6:46 PM

## 2025-07-27 NOTE — PROGRESS NOTES
[facility] FOLLOW-UP NOTE       7/27/2025     Patient was seen and examined in person, Chart reviewed   Patient's case discussed with staff/team    Chief Complaint: Depression    Interim History:   Patient is seen at the nurses station this morning.  Her affect is calm she is pleasant and cooperative.  She states that she was able to talk to her fiancé who she had not been able to get a hold of it and she feels relieved because she was not sure what happened to him.  She states that was one of her biggest stressors.  She states she found out that he is at [facility] [place] and that is where she would like to go for rehab as well she states that she has been there before and that they were able to help her.  She states she is \"feeling great today.\"  She states the Trileptal continues to help her.  She vehemently denies suicidal or homicidal ideations intent or plan denies auditory or visual hallucinations she is out coloring pictures she is attending group she social select peers.  No behavior disturbances noted eating well sleeping well and no neurovegetative signs of depression        Appetite:   [] Normal/Unchanged  [] Increased  [x] Decreased      Sleep:       [] Normal/Unchanged  [x] Fair       [] Poor              Energy:    [] Normal/Unchanged  [] Increased  [x] Decreased        SI [] Present  [x] Absent    HI  []Present  [x] Absent     Aggression:  [] yes  [] no    Patient is [] able  [x] unable to CONTRACT FOR SAFETY     PAST MEDICAL/PSYCHIATRIC HISTORY:   Past Medical History:   Diagnosis Date    Mental disorder     depression and anxiety    Postpartum depression     Sleep apnea        FAMILY/SOCIAL HISTORY:  Family History   Problem Relation Age of Onset    Clotting Disorder Mother     Depression Mother     Stroke Mother     Diabetes Mother     Unknown Father     Cancer Maternal Grandfather     Celiac Disease Neg Hx     Colon Cancer Neg Hx     Crohn's

## 2025-07-27 NOTE — GROUP NOTE
Group Therapy Note    Date: 7/27/2025    Group Start Time: 0915  Group End Time: 1000  Group Topic: Psychoeducation    [facility] Pinky Mak    Group Therapy Note      Feelings Flower    Description:  Patients are directed to color each \"petal\" of a flower presented corresponding to a color of how they feel about their environment. Environments being reported on are yourself, family/support system, today, friends, your future, and tomorrow. The color options are great/yellow, good/green, okay/blue, fine/orange, bad/red, and unsafe/black. Patients will then be encouraged to converse about their flower with the attending facilitator, and discuss what changes can be made if necessary/possible.     Goals:   Increase Sensory Stimulation  Increase Socialization  Improve Attention to Task  Improve Cognitive Skills  Improve Identity Development  Improve Self-Awareness  Improve Mood  Improve Self-Expression  Improve Coping Skills       Pt was pleasant and sociable during group session. Pt appeared with bright affect when speaking. Pt reported that she feels \"unsafe around her friends d/t addiction and relapse.\" Pt contributed to peer contributions, and completed the intervention as directed by this writer.     Attended: Full attendance  Participation Level: Active Listener and Interactive  Participation Quality: Appropriate, Attentive, and Sharing  Affect/Mood: Congruent/Euthymic  Speech: normal rate and normal volume   Insight/Judgement: Fair insight and Fair judgment  Response: Able to verbalize current knowledge/experience, Able to verbalize/acknowledge new learning, Able to retain information, and Capable of insight    Signature: Pinky Mistry, Psychoeducational Specialist
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
## 2025-07-28 VITALS
TEMPERATURE: 97.9 F | SYSTOLIC BLOOD PRESSURE: 108 MMHG | BODY MASS INDEX: 35.02 KG/M2 | DIASTOLIC BLOOD PRESSURE: 79 MMHG | WEIGHT: 204 LBS | HEART RATE: 81 BPM | RESPIRATION RATE: 16 BRPM | OXYGEN SATURATION: 100 %

## 2025-07-28 PROCEDURE — 6370000000 HC RX 637 (ALT 250 FOR IP): Performed by: REGISTERED NURSE

## 2025-07-28 PROCEDURE — 1240000000 HC EMOTIONAL WELLNESS R&B

## 2025-07-28 PROCEDURE — 6370000000 HC RX 637 (ALT 250 FOR IP): Performed by: PSYCHIATRY & NEUROLOGY

## 2025-07-28 PROCEDURE — 99232 SBSQ HOSP IP/OBS MODERATE 35: CPT | Performed by: PSYCHIATRY & NEUROLOGY

## 2025-07-28 RX ADMIN — SIMETHICONE 80 MG: 80 TABLET, CHEWABLE ORAL at 21:19

## 2025-07-28 RX ADMIN — ONDANSETRON 4 MG: 4 TABLET, ORALLY DISINTEGRATING ORAL at 21:19

## 2025-07-28 RX ADMIN — ACETAMINOPHEN 650 MG: 325 TABLET ORAL at 14:33

## 2025-07-28 RX ADMIN — OXCARBAZEPINE 150 MG: 150 TABLET, FILM COATED ORAL at 09:55

## 2025-07-28 RX ADMIN — OXCARBAZEPINE 150 MG: 150 TABLET, FILM COATED ORAL at 20:52

## 2025-07-28 RX ADMIN — HYDROXYZINE PAMOATE 50 MG: 50 CAPSULE ORAL at 20:52

## 2025-07-28 RX ADMIN — AMANTADINE 100 MG: 100 CAPSULE ORAL at 14:20

## 2025-07-28 RX ADMIN — AMANTADINE 100 MG: 100 CAPSULE ORAL at 09:55

## 2025-07-28 RX ADMIN — AMANTADINE 100 MG: 100 CAPSULE ORAL at 18:54

## 2025-07-28 RX ADMIN — DOCUSATE SODIUM 100 MG: 100 CAPSULE, LIQUID FILLED ORAL at 09:55

## 2025-07-28 RX ADMIN — WITCH HAZEL: 500 SOLUTION RECTAL; TOPICAL at 21:20

## 2025-07-28 RX ADMIN — HYDROXYZINE PAMOATE 50 MG: 50 CAPSULE ORAL at 09:59

## 2025-07-28 ASSESSMENT — PAIN DESCRIPTION - DESCRIPTORS: DESCRIPTORS: DISCOMFORT

## 2025-07-28 ASSESSMENT — PAIN SCALES - GENERAL: PAINLEVEL_OUTOF10: 2

## 2025-07-28 ASSESSMENT — PAIN DESCRIPTION - LOCATION: LOCATION: VULVA

## 2025-07-28 NOTE — PROGRESS NOTES
PRN tylenol administered upon request for complaints of leg pain.   Electronically signed by Tiffany Crawford LPN on 7/28/2025 at 2:35 PM
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
## 2025-07-28 NOTE — GROUP NOTE
Date: 7/28/2025    Group Start Time: 0915  Group End Time: 0952  Group Topic: Music Therapy    ML 3W Karine Armstrong    Self-Exploration  Joelle Analysis/Discussion, Receptive Listening, and Song Sharing    Patients will listen to \"Look [place] to the [place]\" by Tasneem Fuentes and identify themes/interpretations/lyrics derived from the song. Patients will be encouraged to share their reflections with the group and explore how the song may relate to their lives.     Focus: Motivation and Support/Validation  Goals: Improve Attention to Task, Improve Self-Awareness, Improve Mood, Improve Self-Esteem, Improve Self-Expression, and Improve Coping Skills     Patient joined for the second half of group, and engaged in peer discussions. Patient expressed that she finds meaning and hope for the future in her sobriety and in getting  to her fiance. Patient selected a song to hear, and sang along to familiar music. Patient verbalized support for peer's contributions and socialized appropriately.     Attended: 1/2-3/4 attendance  Participation Level: Active Listener and Interactive  Participation Quality: Attentive, Sharing, and Supportive  Affect/Mood: Congruent/Euthymic  Speech: spontaneous, normal rate, and normal volume   Thought Content/Processes: Linear  Level of consciousness: Alert  Response: Able to verbalize current knowledge/experience  Outcomes: Successfully internalized the purpose of intervention and Actively participated in the group experience    Signature: Karine Tam, Licensed Professional Music Therapist (LPMT)

## 2025-07-28 NOTE — PROGRESS NOTES
PRN vistaril administered upon request for complaints of 4/10 anxiety.   Electronically signed by Tiffany Crawford LPN on 7/28/2025 at 10:00 AM

## 2025-07-28 NOTE — PROGRESS NOTES
Daily Progress Note  Cam Santana MD  7/28/2025  CHIEF COMPLAINT: Depression with suicidal ideation    Reviewed patient's current plan of care and vital signs with nursing staff.  Sleep:  8 hours last night  Attending groups: Yes    SUBJECTIVE:    Patient reports that she is doing better than before.  Reports marked improvement in her suicidal thoughts.  Reports feeling more hopeful about her recovery now.  Denies any suicidal ideation plan or intent at this time.  Reports that she is attending groups and working on her care plan.  Mentions that she still wants to go to a sober living facility however she wants to coordinate that with her fiancé.  Mentions that she has an address to meet him and mentions has plan to go to a sober living facility from there.  Tolerating medications well and denies any side effects of the medication.  Discussed about possible discharge tomorrow if she continues to improve and she is agreeable to the plan.    Mental Status Exam  Level of consciousness:  Within normal limits  Appearance: Hospital attire, seated in chair, with good grooming and hygiene   Behavior/Motor: No abnormalities noted  Attitude toward examiner:  Cooperative, attentive, good eye contact  Speech:  spontaneous, normal rate, normal volume and well articulated  Mood: Anxious  Affect: Irritable  Thought processes:  linear, goal directed and coherent  Thought content:  denies homicidal ideation  Suicidal Ideation: Denies suicidal ideation  Delusions:  no evidence of delusions  Perceptual Disturbance:  denies any perceptual disturbance  Cognition:  Oriented to self, location, time, and situation  Memory: age appropriate  Insight & Judgement: improving  Medication side effects:  denies         Data   weight is 92.5 kg (204 lb). Her oral temperature is 97.9 °F (36.6 °C). Her blood pressure is 108/79 and her pulse is 81. Her respiration is 16 and oxygen saturation is 100%.   Labs:   Admission on 07/22/2025

## 2025-07-28 NOTE — GROUP NOTE
Group Therapy Note    Date: 7/28/2025    Group Start Time: 1010  Group End Time: 1055  Group Topic: Activity    ML[facility] 3W Kimberlyn Archibadl, DARIANW        Group Therapy Note    Attendees: 11       Patient's Goal:  To participate in morning group art therapy.     Notes:  Patient attended the morning group art therapy session. She was friendly and pleasant with staff/peers. Patient enjoyed the hand coloring task. Group art therapy was beneficial for the patient.     Status After Intervention:  Improved    Participation Level: Active Listener    Participation Quality: Appropriate      Speech:  normal      Thought Process/Content: Logical      Affective Functioning: Congruent      Mood: elevated      Level of consciousness:  Alert      Response to Learning: Able to verbalize current knowledge/experience      Endings: None Reported    Modes of Intervention: Activity      Discipline Responsible: Psychoeducational Specialist      Signature:  Kimberlyn Alba MA ATR LPAT

## 2025-07-29 PROCEDURE — 6370000000 HC RX 637 (ALT 250 FOR IP): Performed by: REGISTERED NURSE

## 2025-07-29 PROCEDURE — 6370000000 HC RX 637 (ALT 250 FOR IP): Performed by: PSYCHIATRY & NEUROLOGY

## 2025-07-29 RX ORDER — PSEUDOEPHEDRINE HCL 30 MG
100 TABLET ORAL DAILY
Qty: 30 CAPSULE | Refills: 1 | Status: SHIPPED | OUTPATIENT
Start: 2025-07-30

## 2025-07-29 RX ORDER — OXCARBAZEPINE 150 MG/1
150 TABLET, FILM COATED ORAL 2 TIMES DAILY
Qty: 60 TABLET | Refills: 3 | Status: SHIPPED | OUTPATIENT
Start: 2025-07-29

## 2025-07-29 RX ORDER — FERROUS SULFATE 325(65) MG
325 TABLET ORAL 2 TIMES DAILY
Qty: 30 TABLET | Refills: 3 | Status: SHIPPED | OUTPATIENT
Start: 2025-07-29

## 2025-07-29 RX ORDER — AMANTADINE HYDROCHLORIDE 100 MG/1
100 CAPSULE, GELATIN COATED ORAL 3 TIMES DAILY
Qty: 60 CAPSULE | Refills: 3 | Status: SHIPPED | OUTPATIENT
Start: 2025-07-29

## 2025-07-29 RX ADMIN — AMANTADINE 100 MG: 100 CAPSULE ORAL at 09:02

## 2025-07-29 RX ADMIN — DOCUSATE SODIUM 100 MG: 100 CAPSULE, LIQUID FILLED ORAL at 09:02

## 2025-07-29 RX ADMIN — AMANTADINE 100 MG: 100 CAPSULE ORAL at 13:28

## 2025-07-29 RX ADMIN — HYDROXYZINE PAMOATE 50 MG: 50 CAPSULE ORAL at 13:28

## 2025-07-29 RX ADMIN — OXCARBAZEPINE 150 MG: 150 TABLET, FILM COATED ORAL at 09:02

## 2025-07-29 NOTE — GROUP NOTE
Date: 7/29/2025    Group Start Time: 0925  Group End Time: 1048  Group Topic: Music Therapy    [facility] Karine Armstrong    What do you need today, and how can music help?  Iso-Principle/Mood Vectoring, Playlist Building, Receptive Listening, and Song Sharing    Patients will identify different songs that fall under various prompts (support/validation, motivation, energy, relaxation). Patients will be invited to select a song that aligns with something they need today. Patients will be encouraged to explain their connection to the song and the experience of listening to it in this setting.    Focus: Building Positive Experiences, Emotion Awareness, Identification, & Regulation, and Relaxation  Goals: Increase Sensory Stimulation, Improve Self-Awareness, Improve Mood, Improve Self-Esteem, Improve Self-Expression, Improve Coping Skills, and Promote Reality Orientation       Patient independently completed her personal playlist. Patient selected two songs to hear falling under the prompt a song for motivation. Patient sang along to familiar music and connected over peers regarding shared music interests. Patient explained that she is motivated to discharge from the unit today. Patient verbalized support for peers' contributions and provided appropriate reassurance as needed.     Attended: 3/4-full attendance  Participation Level: Active Listener and Interactive  Participation Quality: Attentive, Sharing, and Supportive  Affect/Mood: Congruent/Euthymic and Brightens  Speech: spontaneous, normal rate, and normal volume   Thought Content/Processes: Linear  Level of consciousness: Alert  Response: Able to verbalize current knowledge/experience and Able to verbalize/acknowledge new learning  Outcomes: Successfully internalized the purpose of intervention, Actively participated in the group experience, and Anticipated discharge today    Signature: Karine Tam, Licensed Professional Music Therapist (LPMT)

## 2025-07-29 NOTE — TRANSITION OF CARE
Urinalysis with Reflex to Culture    Specimen: Urine   Result Value Ref Range    Color, UA DARK YELLOW (A) Straw/Yellow    Clarity, UA CLOUDY (A) Clear    Glucose, Ur Negative Negative mg/dL    Bilirubin, Urine Negative Negative    Ketones, Urine 15 (A) Negative mg/dL    Specific Gravity, UA 1.034 1.005 - 1.030    Blood, Urine LARGE (A) Negative    pH, Urine 5.5 5.0 - 9.0    Protein, UA 30 (A) Negative mg/dL    Urobilinogen, Urine 1.0 <2.0 E.U./dL    Nitrite, Urine Negative Negative    Leukocyte Esterase, Urine SMALL (A) Negative    Urine Reflex to Culture Not Indicated    Microscopic Urinalysis   Result Value Ref Range    RBC, UA  (A) 0 - 2 /HPF    Bacteria, UA Negative Negative /HPF    Crystals, UA 1+ Ca. Oxalate (A) None Seen /HPF    Hyaline Casts, UA 5-10 0 - 5 /HPF    WBC, UA 6-9 (A) 0 - 5 /HPF    Epithelial Cells, UA 3-5 0 - 5 /HPF   EKG 12 Lead   Result Value Ref Range    Ventricular Rate 63 BPM    Atrial Rate 63 BPM    P-R Interval 112 ms    QRS Duration 78 ms    Q-T Interval 440 ms    QTc Calculation (Bazett) 450 ms    P Axis 14 degrees    R Axis 24 degrees    T Axis 39 degrees    Diagnosis       Normal sinus rhythm  Nonspecific T wave abnormality  Abnormal ECG  Confirmed by ALEE GARRISON ([id number]) on 7/23/2025 7:27:30 AM         Immunizations administered during this encounter:   Immunization History   Administered Date(s) Administered    TD 5LF, TENIVAC, (age 7y+), IM, 0.5mL 06/03/2025     Influenza Vaccination Status: None of the above/Not documented/Unable to determine from medical record documentation    Screening for Metabolic Disorders for Patients on Antipsychotic Medications  (Data obtained from the EMR)    Estimated Body Mass Index  Body mass index is 35.02 kg/m².      Vital Signs/Blood Pressure  /79   Pulse 81   Temp 97.9 °F (36.6 °C) (Oral)   Resp 16   Wt 92.5 kg (204 lb)   LMP 02/14/2023   SpO2 100%   BMI 35.02 kg/m²      Fasting Blood Glucose or Hemoglobin A1c  No results

## 2025-07-29 NOTE — PROGRESS NOTES
CLINICAL PHARMACY NOTE: MEDS TO BEDS    Total # of Prescriptions Filled: 3   The following medications were delivered to the patient:  Oxcarbazepine 150 mg Tab  Iron 325 mg Tab  Docusate 100 mg Cap  Amantadine Out Of Stock... Patient will call when she needs (Already has med)    Additional Documentation:

## 2025-07-29 NOTE — DISCHARGE INSTRUCTIONS
Due to the Covid-19 Pandemic, [facility] is not currently available. For assistance with quitting smoking please go to https://smokefree.gov. A prescription for an FDA-approved tobacco cessation medication was offered at discharge and the patient refused.    Keep all follow up appointments, take medications as ordered, utilize positive supports, abstain from use of alcohol and drugs. If symptoms return or you feel at risk to yourself or others, please call 911, return the nearest emergency room, or call your local crisis hotline:  [facility]: [phone]  [facility]: [phone]  [facility]: [phone]    Someone from [facility] will be calling you tomorrow to follow up on your care. If you don't hear from us, give us a call! [phone].

## 2025-07-29 NOTE — DISCHARGE INSTR - DIET

## 2025-07-29 NOTE — DISCHARGE SUMMARY
DISCHARGE SUMMARY      Patient ID:  Jeniffer Kennedy  [id number]  30 y.o.  1995      Admit date: 2025    Discharge date and time: 2025    Admitting Physician: Ra Lu MD     Discharge Physician: Dr Ivan VEGA    Admission Diagnoses: Depression with suicidal ideation [F32.A, R45.851]    Admission Condition: poor    Discharged Condition: stable    Admission Circumstance: Per records,  Patient is a 30 y.o. White (non-) female who presents for home. Patient presented to the ED on 25 from home.  Patient reported that she has been going though a lot of trauma for the last 6 months. She reported lost her father who , house, job, kids, and can't find her mom, patient reported that she also lost her job. Patient reported she relapsed on drugs and doesn't feel like she is able to keep herself safe. Patient also reports having health issues as well.  Patient reported that she has plan and intent to kill herself by slicing her neck open.       The patient is at increased risk for suicide given current SI, current major depressive episode, lack of social supports, with poor distress tolerance.  Patient's risk for suicide is significantly elevated would best be modified by inpatient psychiatric hospitalization for stabilization        Collateral: Attempted to call boyfriend no answer at this time,       The patient is a 30 y.o. female single but engaged live with robyn, children x 2 in foster care with significant past history of anxiety, depression, ADD  Pt report that his fiancee is mentally unwell and she is worried about him  He took off from the hotel where they were living  Pt is currently homeless and their friend placed them in a motel  Pt is just focused on him more than her during the interview  Pt report having a break down following her BF left her and she was thinking of suicide  Want to put a bullet to her head- feel like a burden  DO not have access to

## 2025-07-29 NOTE — GROUP NOTE
Patient did not attend group / Anticipated discharge today.    Date: 7/29/2025    Group Start Time: 1215  Group End Time: 1315  Group Topic: Music Therapy    [facility] Karine Armstrong    Live Music Group  Live Listening and Re-creative Singing    Patients will be given a songbook and offered the opportunity to select (a) song(s) of their choice for live music listening on [employer]. Patients will be encouraged to sing along, move along, and listen to the music.    Focus: Active Listening, Building Positive Experiences, Processing, and Socialization  Goals: Increase Relaxation, Increase Sensory Stimulation, Increase Socialization, Improve Self-Esteem, Improve Self-Expression, and Improve Coping Skills     Signature: Karine Tam, Licensed Professional Music Therapist (LPMT)

## 2025-07-29 NOTE — PROGRESS NOTES
Pt is noted waiting on ride, asked for vistaril 50 mg and that was given #3, pt says she is not driving

## 2025-07-29 NOTE — PROGRESS NOTES
Pt escorted to lobby by staff and discharged to home via taxi, hospital issued taxi voucher given to pt. Belongings given to pt.  Pt denies any current suicidal ideation, homicidal ideation or hallucinations.   Mood and affect stable

## 2025-08-03 ENCOUNTER — APPOINTMENT (OUTPATIENT)
Dept: GENERAL RADIOLOGY | Age: 30
End: 2025-08-03
Payer: COMMERCIAL

## 2025-08-03 ENCOUNTER — APPOINTMENT (OUTPATIENT)
Dept: CT IMAGING | Age: 30
End: 2025-08-03
Payer: COMMERCIAL

## 2025-08-03 ENCOUNTER — HOSPITAL ENCOUNTER (EMERGENCY)
Age: 30
Discharge: HOME | End: 2025-08-03
Payer: COMMERCIAL

## 2025-08-03 VITALS
DIASTOLIC BLOOD PRESSURE: 85 MMHG | HEIGHT: 64 IN | RESPIRATION RATE: 20 BRPM | HEART RATE: 96 BPM | SYSTOLIC BLOOD PRESSURE: 104 MMHG | OXYGEN SATURATION: 98 % | TEMPERATURE: 98.6 F | BODY MASS INDEX: 32.88 KG/M2 | WEIGHT: 192.6 LBS

## 2025-08-03 DIAGNOSIS — S09.90XA CLOSED HEAD INJURY, INITIAL ENCOUNTER: ICD-10-CM
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
## 2025-08-03 DIAGNOSIS — S06.0XAA CONCUSSION WITH UNKNOWN LOSS OF CONSCIOUSNESS STATUS, INITIAL ENCOUNTER: ICD-10-CM

## 2025-08-03 DIAGNOSIS — S20.211A RIB CONTUSION, RIGHT, INITIAL ENCOUNTER: Primary | ICD-10-CM

## 2025-08-03 PROCEDURE — 6370000000 HC RX 637 (ALT 250 FOR IP): Performed by: PHYSICIAN ASSISTANT

## 2025-08-03 PROCEDURE — 70450 CT HEAD/BRAIN W/O DYE: CPT

## 2025-08-03 PROCEDURE — 71101 X-RAY EXAM UNILAT RIBS/CHEST: CPT

## 2025-08-03 PROCEDURE — 99284 EMERGENCY DEPT VISIT MOD MDM: CPT

## 2025-08-03 RX ORDER — METHOCARBAMOL 500 MG/1
500 TABLET, FILM COATED ORAL 4 TIMES DAILY PRN
Qty: 20 TABLET | Refills: 0 | Status: SHIPPED | OUTPATIENT
Start: 2025-08-03 | End: 2025-08-08

## 2025-08-03 RX ORDER — METHOCARBAMOL 500 MG/1
500 TABLET, FILM COATED ORAL ONCE
Status: COMPLETED | OUTPATIENT
Start: 2025-08-03 | End: 2025-08-03

## 2025-08-03 RX ORDER — LIDOCAINE 50 MG/G
1 PATCH TOPICAL DAILY
Qty: 30 PATCH | Refills: 0 | Status: SHIPPED | OUTPATIENT
Start: 2025-08-03

## 2025-08-03 RX ORDER — OXYCODONE AND ACETAMINOPHEN 5; 325 MG/1; MG/1
1 TABLET ORAL ONCE
Refills: 0 | Status: COMPLETED | OUTPATIENT
Start: 2025-08-03 | End: 2025-08-03

## 2025-08-03 RX ADMIN — OXYCODONE AND ACETAMINOPHEN 1 TABLET: 5; 325 TABLET ORAL at 20:42

## 2025-08-03 RX ADMIN — METHOCARBAMOL 500 MG: 500 TABLET ORAL at 20:42

## 2025-08-03 ASSESSMENT — PAIN SCALES - GENERAL: PAINLEVEL_OUTOF10: 9

## 2025-08-03 ASSESSMENT — PAIN DESCRIPTION - ORIENTATION: ORIENTATION: LEFT

## 2025-08-03 ASSESSMENT — PAIN DESCRIPTION - DESCRIPTORS: DESCRIPTORS: STABBING;SHARP

## 2025-08-03 ASSESSMENT — PAIN DESCRIPTION - LOCATION: LOCATION: RIB CAGE

## 2025-08-25 ENCOUNTER — APPOINTMENT (OUTPATIENT)
Dept: PRIMARY CARE | Facility: CLINIC | Age: 30
End: 2025-08-25
Payer: COMMERCIAL

## 2025-12-08 ENCOUNTER — APPOINTMENT (OUTPATIENT)
Dept: NEUROLOGY | Facility: CLINIC | Age: 30
End: 2025-12-08
Payer: COMMERCIAL

## 2025-12-29 ENCOUNTER — APPOINTMENT (OUTPATIENT)
Dept: NEUROLOGY | Facility: CLINIC | Age: 30
End: 2025-12-29
Payer: COMMERCIAL

## (undated) DEVICE — VISIGI 3D®  CALIBRATION SYSTEM  SIZE 40FR STD W/ BULB: Brand: BOEHRINGER® VISIGI 3D™ SLEEVE GASTRECTOMY CALIBRATION SYSTEM, SIZE 40FR W/BULB

## (undated) DEVICE — 4-PORT MANIFOLD: Brand: NEPTUNE 2

## (undated) DEVICE — SPONGE,LAP,18"X18",DLX,XR,ST,5/PK,40/PK: Brand: MEDLINE

## (undated) DEVICE — GLOVE SURG SZ 8 CRM LTX FREE POLYISOPRENE POLYMER BEAD ANTI

## (undated) DEVICE — TROCAR: Brand: KII SLEEVE

## (undated) DEVICE — ELECTRODE PT RET AD L9FT HI MOIST COND ADH HYDRGEL CORDED

## (undated) DEVICE — SYRINGE MED 30ML STD CLR PLAS LUERLOCK TIP N CTRL DISP

## (undated) DEVICE — LINER INCONT W7XL14IN PEACH POLYMER FLUF ADH WT CNTOUR DLX

## (undated) DEVICE — COVER LT HNDL BLU PLAS

## (undated) DEVICE — MANIPULATOR UTER INSTRUMENT ZUMI

## (undated) DEVICE — TUBE SET 96 MM 64 MM H2O PERISTALTIC STD AUX CHANNEL

## (undated) DEVICE — GAUZE,SPONGE,4"X4",16PLY,XRAY,STRL,LF: Brand: MEDLINE

## (undated) DEVICE — SUTURE VCRL SZ 0 L36IN ABSRB UD L36MM CT-1 1/2 CIR J946H

## (undated) DEVICE — FORCEPS BX L240CM JAW DIA2.8MM L CAP W/ NDL MIC MESH TOOTH

## (undated) DEVICE — TOWEL,OR,DSP,ST,BLUE,STD,4/PK,20PK/CS: Brand: MEDLINE

## (undated) DEVICE — DEVICE TRCR 12X9X3IN WHT CLSR DISP OMNICLOSE

## (undated) DEVICE — LABEL MED MINI W/ MARKER

## (undated) DEVICE — 1LYRTR 16FR10ML 100%SILI SNAP: Brand: MEDLINE INDUSTRIES, INC.

## (undated) DEVICE — TOTAL TRAY, DB, 100% SILI FOLEY, 16FR 10: Brand: MEDLINE

## (undated) DEVICE — SNARE ENDOSCP L240CM SHTH DIA24MM LOOP W10MM POLYP RND REINF

## (undated) DEVICE — TUBING, SUCTION, 1/4" X 10', STRAIGHT: Brand: MEDLINE

## (undated) DEVICE — GOWN,AURORA,NONREINFORCED,LARGE: Brand: MEDLINE

## (undated) DEVICE — GLOVE ORANGE PI 8 1/2   MSG9085

## (undated) DEVICE — TIP COVER ACCESSORY

## (undated) DEVICE — SHEARS ENDOSCP HARM 36CM ULTRASONIC CRV TIP UPGRD

## (undated) DEVICE — TROCAR: Brand: KII® SLEEVE

## (undated) DEVICE — YANKAUER,BULB TIP,W/O VENT,RIGID,STERILE: Brand: MEDLINE

## (undated) DEVICE — Device

## (undated) DEVICE — COLUMN DRAPE

## (undated) DEVICE — BLADE,CARBON-STEEL,11,STRL,DISPOSABLE,TB: Brand: MEDLINE

## (undated) DEVICE — ENDOSCOPIC TRAY TRNSPRT 20.5X16.5X4.1 IN RECYCL SUGAR PULP

## (undated) DEVICE — SUTURE MCRYL SZ 4-0 L27IN ABSRB UD L19MM PS-2 1/2 CIR PRIM Y426H

## (undated) DEVICE — TUBING, SUCTION, 9/32" X 12', STRAIGHT: Brand: MEDLINE INDUSTRIES, INC.

## (undated) DEVICE — GOWN,AURORA,NONRNF,XL,30/CS: Brand: MEDLINE

## (undated) DEVICE — BANDAGE ADH W2XL4IN NITRL FAB STRP CURAD

## (undated) DEVICE — ADHESIVE SKIN CLSR 0.7ML TOP DERMBND ADV

## (undated) DEVICE — GLOVE SURG SZ 9 THK91MIL LTX FREE SYN POLYISOPRENE ANTI

## (undated) DEVICE — KIT,ANTI FOG,W/SPONGE & FLUID,SOFT PACK: Brand: MEDLINE

## (undated) DEVICE — STAPLER 60: Brand: SUREFORM

## (undated) DEVICE — TRAY PREP DRY W/ PREM GLV 2 APPL 6 SPNG 2 UNDPD 1 OVERWRAP

## (undated) DEVICE — NEEDLE INSUF L150MM DIA2MM DISP FOR PNEUMOPERI ENDOPATH

## (undated) DEVICE — APPLICATOR MEDICATED 26 CC SOLUTION HI LT ORNG CHLORAPREP

## (undated) DEVICE — GOWN,SIRUS,POLYRNF,BRTHSLV,XLN/XL,20/CS: Brand: MEDLINE

## (undated) DEVICE — DRAPE,UNDERBUTTOCKS,PCH,STERILE: Brand: MEDLINE

## (undated) DEVICE — INTENDED FOR TISSUE SEPARATION, AND OTHER PROCEDURES THAT REQUIRE A SHARP SURGICAL BLADE TO PUNCTURE OR CUT.: Brand: BARD-PARKER ® CARBON RIB-BACK BLADES

## (undated) DEVICE — COUNTER NDL 40 COUNT HLD 70 FOAM BLK ADH W/ MAG

## (undated) DEVICE — CONMED SCOPE SAVER BITE BLOCK, 20X27 MM: Brand: SCOPE SAVER

## (undated) DEVICE — PAD BD FOAM 33X72X2.75 IN BLU EGGCRATE

## (undated) DEVICE — WARMER SCP 2 ANTIFOG LAP DISP

## (undated) DEVICE — SKIN PREP TRAY 4 COMPARTM TRAY: Brand: MEDLINE INDUSTRIES, INC.

## (undated) DEVICE — JELLY,LUBE,STERILE,FLIP TOP,TUBE,2-OZ: Brand: MEDLINE

## (undated) DEVICE — LINER PAD CONTOUR SUPER PEACH 7X14IN

## (undated) DEVICE — LIQUIBAND RAPID ADHESIVE 36/CS 0.8ML: Brand: MEDLINE

## (undated) DEVICE — DISSECTOR LAP DIA5MM BLNT TIP ENDOPATH

## (undated) DEVICE — SHEET BD STD REPOS LO FICT BTM SFT TOP NO STRP 9 HNDL PER

## (undated) DEVICE — ENDO CARRY-ON PROCEDURE KIT: Brand: ENDO CARRY-ON PROCEDURE KIT

## (undated) DEVICE — TROCAR: Brand: KII FIOS FIRST ENTRY

## (undated) DEVICE — DRAPE, LAVH, STERILE: Brand: MEDLINE

## (undated) DEVICE — GLOVE SURG SZ 85 L12IN FNGR THK94MIL TRNSLUC YEL LTX

## (undated) DEVICE — SET FLD CTRL SYS INFLO AND OUTFLO TB AQUILEX

## (undated) DEVICE — TRAP POLYP BALEEN

## (undated) DEVICE — NEEDLE INSUF L120MM ULT VERES ENDOPATH

## (undated) DEVICE — GENERAL LAPAROSCOPY: Brand: MEDLINE INDUSTRIES, INC.

## (undated) DEVICE — TUBING INSUFFLATION SMK EVAC HI FLO SET PNEUMOCLEAR

## (undated) DEVICE — STAPLER 60 RELOAD WHITE: Brand: SUREFORM

## (undated) DEVICE — ELECTRO LUBE IS A SINGLE PATIENT USE DEVICE THAT IS INTENDED TO BE USED ON ELECTROSURGICAL ELECTRODES TO REDUCE STICKING.: Brand: KEY SURGICAL ELECTRO LUBE

## (undated) DEVICE — SEAL

## (undated) DEVICE — SINGLE PORT MANIFOLD: Brand: NEPTUNE 2

## (undated) DEVICE — SYRINGE MED 10ML TRNSLUC BRL PLUNG BLK MRK POLYPR CTRL

## (undated) DEVICE — DRAPE,UTILITY,TAPE,15X26,STERILE: Brand: MEDLINE

## (undated) DEVICE — DRAPE EQUIP TRNSPRT CONTAINMENT FOR BK TAB

## (undated) DEVICE — PACK,BASIC: Brand: MEDLINE

## (undated) DEVICE — PACK,SET UP,DRAPE: Brand: MEDLINE

## (undated) DEVICE — BLADELESS OBTURATOR: Brand: WECK VISTA

## (undated) DEVICE — SUCTION IRRIGATOR: Brand: ENDOWRIST

## (undated) DEVICE — RESERVOIR,SUCTION,100CC,SILICONE: Brand: MEDLINE

## (undated) DEVICE — Z DUPLICATE USE 2431315 SET INSUF TBNG HI FLO W/ SMK EVAC FOR PNEUMOCLEAR

## (undated) DEVICE — SHEET,DRAPE,53X77,STERILE: Brand: MEDLINE

## (undated) DEVICE — SUTURE VICRYL + SZ 0 L27IN ABSRB VLT L26MM UR-6 5/8 CIR VCP603H

## (undated) DEVICE — DRAIN SURG 19FR 0.25IN SIL RND W/ TRCR INDIC DOT RADPQ FULL

## (undated) DEVICE — SUTURE ABSORBABLE MONOFILAMENT 2-0 SH 6 IN STRATAFIX SPRL SXPP1B415

## (undated) DEVICE — KIT CANSTR VAC TANTEM TB FOR AQUILEX FLD CTRL SYS

## (undated) DEVICE — KIT NOVASURE V5 THERMOABLATION DEVICE

## (undated) DEVICE — TTL1LYR 16FR10ML 100%SIL TMPST TR: Brand: MEDLINE

## (undated) DEVICE — COVER,TABLE,44X90,STERILE: Brand: MEDLINE

## (undated) DEVICE — Device: Brand: BALLOON3

## (undated) DEVICE — TISSUE RETRIEVAL SYSTEM: Brand: INZII RETRIEVAL SYSTEM

## (undated) DEVICE — BRUSH ENDO CLN L90.5IN SHTH DIA1.7MM BRIST DIA5-7MM 2-6MM

## (undated) DEVICE — SET ENDOSCP SEAL HYSTEROSCOPE RIG OUTFLO CHN DISP MYOSURE

## (undated) DEVICE — POUCH, INSTRUMENT, 3POCKET, INVISISHIELD: Brand: MEDLINE

## (undated) DEVICE — ARM DRAPE

## (undated) DEVICE — VCARE MEDIUM, UTERINE MANIPULATOR, VAGINAL-CERVICAL-AHLUWALIA'S-RETRACTOR-ELEVATOR: Brand: VCARE

## (undated) DEVICE — SOLUTION ANTIFOG VIS SYS CLEARIFY LAPSCP

## (undated) DEVICE — PATIENT RETURN ELECTRODE, SINGLE-USE, NON CONTACT QUALITY MONITORING, ADULT, WITH 9 FT (2.7 M) CORD, FOR PATIENTS WEIGHING OVER 33LBS. (15KG): Brand: MEGADYNE

## (undated) DEVICE — SUTURE POLYESTER STRATAFIX V-34 SXPP1B457

## (undated) DEVICE — VESSEL SEALER EXTEND: Brand: ENDOWRIST

## (undated) DEVICE — STAPLER 60 RELOAD BLUE: Brand: SUREFORM

## (undated) DEVICE — PAD N ADH W3XL4IN POLY COT SFT PERF FLM EASILY CUT ABSRB

## (undated) DEVICE — BAG RETRIEVAL SPECIMEN SUPERBAG 10 MEDIUM NYLON ITRODUCER

## (undated) DEVICE — Device: Brand: ENDO SMARTCAP

## (undated) DEVICE — SUTURE NONABSORBABLE MONOFILAMENT 3-0 PS-1 18 IN BLK ETHILON 1663H